# Patient Record
Sex: MALE | Race: WHITE | NOT HISPANIC OR LATINO | Employment: OTHER | URBAN - METROPOLITAN AREA
[De-identification: names, ages, dates, MRNs, and addresses within clinical notes are randomized per-mention and may not be internally consistent; named-entity substitution may affect disease eponyms.]

---

## 2018-06-28 DIAGNOSIS — I71.40 ABDOMINAL AORTIC ANEURYSM (AAA) WITHOUT RUPTURE (HCC): Primary | ICD-10-CM

## 2018-07-02 ENCOUNTER — OFFICE VISIT (OUTPATIENT)
Dept: CARDIAC SURGERY | Facility: CLINIC | Age: 83
End: 2018-07-02

## 2018-07-02 VITALS
BODY MASS INDEX: 26.53 KG/M2 | HEIGHT: 67 IN | SYSTOLIC BLOOD PRESSURE: 126 MMHG | TEMPERATURE: 98 F | HEART RATE: 89 BPM | OXYGEN SATURATION: 95 % | DIASTOLIC BLOOD PRESSURE: 80 MMHG | WEIGHT: 169 LBS

## 2018-07-02 DIAGNOSIS — I71.40 ABDOMINAL AORTIC ANEURYSM (AAA) WITHOUT RUPTURE (HCC): Primary | ICD-10-CM

## 2018-07-02 PROCEDURE — 99205 OFFICE O/P NEW HI 60 MIN: CPT | Performed by: THORACIC SURGERY (CARDIOTHORACIC VASCULAR SURGERY)

## 2018-07-02 RX ORDER — HYDROXYZINE HYDROCHLORIDE 25 MG/1
25 TABLET, FILM COATED ORAL DAILY
COMMUNITY

## 2018-07-02 RX ORDER — METHOCARBAMOL 500 MG/1
500 TABLET, FILM COATED ORAL DAILY PRN
COMMUNITY

## 2018-07-02 RX ORDER — MELATONIN
1000 DAILY
COMMUNITY

## 2018-07-02 RX ORDER — FUROSEMIDE 20 MG/1
20 TABLET ORAL
COMMUNITY

## 2018-07-02 RX ORDER — AMPICILLIN TRIHYDRATE 250 MG
200 CAPSULE ORAL DAILY
COMMUNITY

## 2018-07-02 RX ORDER — CLOBETASOL PROPIONATE 0.5 MG/G
OINTMENT TOPICAL 2 TIMES DAILY
COMMUNITY

## 2018-07-02 RX ORDER — ROPINIROLE 0.5 MG/1
0.5 TABLET, FILM COATED ORAL 3 TIMES DAILY
COMMUNITY

## 2018-07-02 RX ORDER — ASPIRIN 81 MG/1
81 TABLET ORAL DAILY
COMMUNITY

## 2018-07-02 RX ORDER — ATORVASTATIN CALCIUM 80 MG/1
80 TABLET, FILM COATED ORAL DAILY
COMMUNITY

## 2018-07-02 RX ORDER — LANOLIN ALCOHOL/MO/W.PET/CERES
1000 CREAM (GRAM) TOPICAL DAILY
COMMUNITY

## 2018-07-02 RX ORDER — AMLODIPINE BESYLATE 5 MG/1
5 TABLET ORAL DAILY
COMMUNITY

## 2018-07-02 NOTE — PROGRESS NOTES
"07/02/2018  Patient Information  Radha Varela                                                                                          [address] [place] [address]   1935  'PCP/Referring Physician'  Nicol Ugalde MD  [phone]  Sunshine Ugalde*  [phone]  Chief Complaint   Patient presents with   • Consult     \"I have a leaking aneurysm.\"   • Aortic Aneurysm       History of Present Illness:  This patient was referred to me to evaluate an abnormal CT scan finding of an infrarenal abdominal aortic aneurysm repair.  This patient has a most unusual story.  He apparently had an open repair of an infrarenal abdominal aortic aneurysm at the [facility] 21 years ago.  Approximately one year ago, he had a CT scan where the official report indicates the patient has an endoleak around his aneurysm.  Apparently nothing has been done regarding this finding during the last year because the patient and his wife felt he was too old and frail to undergo any further surgical procedures and his parkinsonism that has worsened recently.  Also I am unclear as to how he could have an endoleak if he did not have an endovascular repair to begin with.  I do not have the CT scan which was performed a year ago only the report reviewed.  The patient has no back pain, abdominal pain or flank pain and he has no significant claudication symptoms.  He apparently just has a CT scan report that is 1-year-old that indicates he has an endoleak around an aneurysm which was repaired with an open technique.  It might be possible that the native aneurysm sac was wrapped and sutured around the handsewn graft at the time of surgery and now contrast could be detected within the sac.      Patient Active Problem List   Diagnosis   • Abdominal aortic aneurysm (AAA) without rupture     Past Medical History:   Diagnosis Date   • Aneurysm    • Arthritis    • CHF (congestive heart failure)    • COPD " (chronic obstructive pulmonary disease)    • Coronary artery disease    • Dyslipidemia    • GERD (gastroesophageal reflux disease)    • Hiatal hernia    • Hypertension    • Parkinson's disease    • Peptic ulceration    • Psoriasis      Past Surgical History:   Procedure Laterality Date   • ABDOMINAL AORTIC ANEURYSM REPAIR WITH ENDOGRAFT     • CORONARY ARTERY BYPASS GRAFT     • SHOULDER ROTATOR CUFF REPAIR Right        Current Outpatient Prescriptions:   •  amLODIPine (NORVASC) 5 MG tablet, Take 5 mg by mouth Daily., Disp: , Rfl:   •  aspirin 81 MG EC tablet, Take 81 mg by mouth Daily., Disp: , Rfl:   •  atorvastatin (LIPITOR) 80 MG tablet, Take 80 mg by mouth Daily., Disp: , Rfl:   •  cholecalciferol (VITAMIN D3) 1000 units tablet, Take 1,000 Units by mouth Daily. 2 tablets daily, Disp: , Rfl:   •  clobetasol (TEMOVATE) 0.05 % ointment, Apply  topically 2 (Two) Times a Day., Disp: , Rfl:   •  Coenzyme Q10 (COQ10) 200 MG capsule, Take 200 mg by mouth Daily., Disp: , Rfl:   •  furosemide (LASIX) 20 MG tablet, Take 20 mg by mouth Daily., Disp: , Rfl:   •  hydrOXYzine (ATARAX) 25 MG tablet, Take 25 mg by mouth Daily., Disp: , Rfl:   •  methocarbamol (ROBAXIN) 500 MG tablet, Take 500 mg by mouth 3 (Three) Times a Day., Disp: , Rfl:   •  Omega-3 Fatty Acids (FISH OIL) 1360 MG capsule, Take  by mouth., Disp: , Rfl:   •  rOPINIRole (REQUIP) 0.5 MG tablet, Take 0.5 mg by mouth 3 (Three) Times a Day. Take 1 hour before bedtime., Disp: , Rfl:   •  vitamin B-12 (CYANOCOBALAMIN) 1000 MCG tablet, Take 1,000 mcg by mouth Daily., Disp: , Rfl:   Allergies   Allergen Reactions   • Sulfa Antibiotics Anaphylaxis     Social History     Social History   • Marital status:      Spouse name: N/A   • Number of children: 3   • Years of education: N/A     Occupational History   • retired/      Social History Main Topics   • Smoking status: Former Smoker     Packs/day: 3.00     Years: 15.00     Types: Cigarettes   •  "Smokeless tobacco: Never Used      Comment: quit 50 years ago   • Alcohol use Yes      Comment: wine   • Drug use: No   • Sexual activity: Not on file     Other Topics Concern   • Not on file     Social History Narrative    Lives in [facility] with his spouse.     Family History   Problem Relation Age of Onset   • Other Mother          of old age   • No Known Problems Father      Review of Systems   Constitution: Negative. Negative for chills, fever, malaise/fatigue, night sweats and weight loss.   HENT: Negative.  Negative for hearing loss, odynophagia and sore throat.    Cardiovascular: Positive for dyspnea on exertion and leg swelling. Negative for chest pain, orthopnea and palpitations.   Respiratory: Negative.  Negative for cough and hemoptysis.    Endocrine: Positive for cold intolerance and heat intolerance. Negative for polydipsia, polyphagia and polyuria.   Hematologic/Lymphatic: Does not bruise/bleed easily.   Skin: Positive for dry skin and itching. Negative for rash.   Musculoskeletal: Positive for back pain and joint pain. Negative for joint swelling and myalgias.   Gastrointestinal: Negative.  Negative for abdominal pain, constipation, diarrhea, hematemesis, hematochezia, melena, nausea and vomiting.   Genitourinary: Negative.  Negative for dysuria, frequency and hematuria.   Neurological: Positive for tremors. Negative for focal weakness, headaches, numbness and seizures.   Psychiatric/Behavioral: Negative.  Negative for suicidal ideas.   All other systems reviewed and are negative.    Vitals:    18 0658   BP: 126/80   BP Location: Right arm   Patient Position: Sitting   Pulse: 89   Temp: 98 °F (36.7 °C)   SpO2: 95%   Weight: 76.7 kg (169 lb)   Height: 170.2 cm (67\")      Physical Exam   CONSTITUTIONAL: Alert and conversant, Well dressed, Well nourished, No acute distress  EYES: Sclera clean, Anicteric, Pupils equal  ENT: No nasal deviation, Trachea midline  NECK: No neck masses, " Supple  LUNGS: No wheezing, Cough, non-congested  HEART: No rubs, No murmurs  GASTROINTESTINAL: Soft, non-distended, No masses, Non tender  to palpation, normal bowel sounds  NEURO: No motor deficits, No sensory deficits, Cranial Nerves 2 through 12 grossly intact  PSYCHIATRIC: Oriented to person, place and time, No memory deficits, Mood appropriate  VASCULAR: No carotid bruits, Femoral pulses palpable and symmetric  MUSKULOSKELETAL: No extremity trauma or extremity asymmetry    Labs/Imaging:   I have reviewed the unusual CT scan report discussing an endoleak after an open repair.    Assessment/Plan:   This is a patient with a previous open abdominal aortic aneurysm repair 21 years ago and now an unusual CT scan report comments on an endoleak.  This could be contrast within the native aneurysm sac which had been wrapped around the hand sutured graft to prevent fistulization at the time of surgery.  In any event, that scan is one year old and I do not have the images.  We will proceed with a CT angiogram of the abdominal aorta with 3-dimensional reconstruction.  Patient is aware of the risk of contrast reaction and nephrotoxicity and agrees to proceed.  We will make further plans based upon those studies.         Patient Active Problem List   Diagnosis   • Abdominal aortic aneurysm (AAA) without rupture     CC: MD Sonja Harris, , editing for Kit Feliciano M.D.    I, Kit Felciiano MD, have read and agree with the editing done by Sonja Mejia, .

## 2018-07-03 ENCOUNTER — TELEPHONE (OUTPATIENT)
Dept: CARDIAC SURGERY | Facility: CLINIC | Age: 83
End: 2018-07-03

## 2018-07-03 DIAGNOSIS — I71.40 ABDOMINAL AORTIC ANEURYSM (AAA) WITHOUT RUPTURE (HCC): Primary | ICD-10-CM

## 2018-07-03 NOTE — TELEPHONE ENCOUNTER
PT WANTING TO KNOW WHEN PT CAN GET TEST, TOLD PT THAT OUR OFFICE WILL BE IN TOUCH AS SOON AS WE KNOW WHEN [facility] WILL SCHEDULE THE CT

## 2018-07-06 ENCOUNTER — HOSPITAL ENCOUNTER (OUTPATIENT)
Dept: CT IMAGING | Facility: HOSPITAL | Age: 83
Discharge: HOME | End: 2018-07-06
Admitting: PHYSICIAN ASSISTANT

## 2018-07-06 DIAGNOSIS — I71.40 ABDOMINAL AORTIC ANEURYSM (AAA) WITHOUT RUPTURE (HCC): ICD-10-CM

## 2018-07-06 LAB — CREAT BLDA-MCNC: 1.5 MG/DL (ref 0.6–1.3)

## 2018-07-06 PROCEDURE — 74174 CTA ABD&PLVS W/CONTRAST: CPT

## 2018-07-06 PROCEDURE — 0 IOPAMIDOL PER 1 ML: Performed by: PHYSICIAN ASSISTANT

## 2018-07-06 PROCEDURE — 82565 ASSAY OF CREATININE: CPT

## 2018-07-06 RX ADMIN — IOPAMIDOL 75 ML: 755 INJECTION, SOLUTION INTRAVENOUS at 10:11

## 2018-07-16 ENCOUNTER — OFFICE VISIT (OUTPATIENT)
Dept: CARDIAC SURGERY | Facility: CLINIC | Age: 83
End: 2018-07-16

## 2018-07-16 VITALS
OXYGEN SATURATION: 98 % | SYSTOLIC BLOOD PRESSURE: 135 MMHG | DIASTOLIC BLOOD PRESSURE: 85 MMHG | WEIGHT: 169 LBS | HEART RATE: 74 BPM | TEMPERATURE: 97.8 F | BODY MASS INDEX: 26.53 KG/M2 | HEIGHT: 67 IN

## 2018-07-16 DIAGNOSIS — I71.40 ABDOMINAL AORTIC ANEURYSM (AAA) WITHOUT RUPTURE (HCC): Primary | ICD-10-CM

## 2018-07-16 PROCEDURE — 99213 OFFICE O/P EST LOW 20 MIN: CPT | Performed by: THORACIC SURGERY (CARDIOTHORACIC VASCULAR SURGERY)

## 2018-07-16 NOTE — PROGRESS NOTES
07/16/2018  Patient Information  Radha Varela                                                                                          [address]   [place] [address]   1935  'PCP/Referring Physician'  Janet Mahoney MD  [phone]  No ref. provider found    Chief Complaint   Patient presents with   • Follow-up     Follow up to discuss CTA results for an abdominal aortic aneurysm.   • Aortic Aneurysm       History of Present Illness:  As per my previous notes, this patient had an open aneurysm repair in [place] 20 years ago.  Apparently from a CT scan report from last year at the [facility], he was told he had an endoleak and they were planning some type of surgical intervention.  I could not understand the concept of an endoleak with an open repair.  I actually read the word endoleak in last year's report myself.  We repeated his CT scan here.  The patient had an open repair.  He has a very ectatic aorta with the maximum size of his aorta is 3.8 cm.      Patient Active Problem List   Diagnosis   • Abdominal aortic aneurysm (AAA) without rupture (CMS/HCC)     Past Medical History:   Diagnosis Date   • Aneurysm (CMS/HCC)    • Arthritis    • CHF (congestive heart failure) (CMS/HCC)    • COPD (chronic obstructive pulmonary disease) (CMS/HCC)    • Coronary artery disease    • Dyslipidemia    • GERD (gastroesophageal reflux disease)    • Hiatal hernia    • Hypertension    • Parkinson's disease (CMS/HCC)    • Peptic ulceration    • Psoriasis      Past Surgical History:   Procedure Laterality Date   • ABDOMINAL AORTIC ANEURYSM REPAIR WITH ENDOGRAFT     • CORONARY ARTERY BYPASS GRAFT     • SHOULDER ROTATOR CUFF REPAIR Right        Current Outpatient Prescriptions:   •  amLODIPine (NORVASC) 5 MG tablet, Take 5 mg by mouth Daily., Disp: , Rfl:   •  aspirin 81 MG EC tablet, Take 81 mg by mouth Daily., Disp: , Rfl:   •  atorvastatin (LIPITOR) 80 MG tablet, Take 80 mg by mouth Daily., Disp: , Rfl:    •  cholecalciferol (VITAMIN D3) 1000 units tablet, Take 1,000 Units by mouth Daily. 2 tablets daily, Disp: , Rfl:   •  clobetasol (TEMOVATE) 0.05 % ointment, Apply  topically 2 (Two) Times a Day., Disp: , Rfl:   •  Coenzyme Q10 (COQ10) 200 MG capsule, Take 200 mg by mouth Daily., Disp: , Rfl:   •  furosemide (LASIX) 20 MG tablet, Take 20 mg by mouth Daily., Disp: , Rfl:   •  hydrOXYzine (ATARAX) 25 MG tablet, Take 25 mg by mouth Daily., Disp: , Rfl:   •  methocarbamol (ROBAXIN) 500 MG tablet, Take 500 mg by mouth 3 (Three) Times a Day., Disp: , Rfl:   •  Omega-3 Fatty Acids (FISH OIL) 1360 MG capsule, Take  by mouth., Disp: , Rfl:   •  rOPINIRole (REQUIP) 0.5 MG tablet, Take 0.5 mg by mouth 3 (Three) Times a Day. Take 1 hour before bedtime., Disp: , Rfl:   •  vitamin B-12 (CYANOCOBALAMIN) 1000 MCG tablet, Take 1,000 mcg by mouth Daily., Disp: , Rfl:   Allergies   Allergen Reactions   • Sulfa Antibiotics Anaphylaxis     Social History     Social History   • Marital status:      Spouse name: N/A   • Number of children: 3   • Years of education: N/A     Occupational History   • retired/      Social History Main Topics   • Smoking status: Former Smoker     Packs/day: 3.00     Years: 15.00     Types: Cigarettes   • Smokeless tobacco: Never Used      Comment: quit 50 years ago   • Alcohol use Yes      Comment: wine   • Drug use: No   • Sexual activity: Not on file     Other Topics Concern   • Not on file     Social History Narrative    Lives in [facility] with his spouse.     Family History   Problem Relation Age of Onset   • Other Mother          of old age   • No Known Problems Father      Review of Systems   Constitution: Negative for chills, fever, malaise/fatigue, night sweats and weight loss.   HENT: Negative for hearing loss, odynophagia and sore throat.    Cardiovascular: Positive for dyspnea on exertion and leg swelling. Negative for chest pain, orthopnea and palpitations.  "  Respiratory: Negative for cough and hemoptysis.    Endocrine: Positive for cold intolerance and heat intolerance. Negative for polydipsia, polyphagia and polyuria.   Hematologic/Lymphatic: Does not bruise/bleed easily.   Skin: Positive for dry skin and itching. Negative for rash.   Musculoskeletal: Positive for back pain and joint pain. Negative for joint swelling and myalgias.   Gastrointestinal: Negative for abdominal pain, constipation, diarrhea, hematemesis, hematochezia, melena, nausea and vomiting.   Genitourinary: Negative for dysuria, frequency and hematuria.   Neurological: Positive for tremors. Negative for focal weakness, headaches, numbness and seizures.   Psychiatric/Behavioral: Negative for suicidal ideas.   All other systems reviewed and are negative.    Vitals:    07/16/18 0921   BP: 135/85   BP Location: Right arm   Patient Position: Sitting   Pulse: 74   Temp: 97.8 °F (36.6 °C)   SpO2: 98%   Weight: 76.7 kg (169 lb)   Height: 170.2 cm (67\")      Physical Exam   CONSTITUTIONAL: Alert and conversant, Well dressed, Well nourished, No acute distress  EYES: Sclera clean, Anicteric, Pupils equal  ENT: No nasal deviation, Trachea midline  NECK: No neck masses, Supple  LUNGS: No wheezing, Cough, non-congested  HEART: No rubs, No murmurs  GASTROINTESTINAL: Soft, non-distended, No masses, Non tender  to palpation, normal bowel sounds  NEURO: No motor deficits, No sensory deficits, Cranial Nerves 2 through 12 grossly intact there is a tremor of the right arm  PSYCHIATRIC: Oriented to person, place and time, No memory deficits, Mood appropriate  VASCULAR: No carotid bruits, Femoral pulses palpable and symmetric  MUSKULOSKELETAL: No extremity trauma or extremity asymmetry    Labs/Imaging:   I reviewed the CT scan report and I do not see evidence of an aneurysm.    Assessment/Plan:   I explained to the patient and his wife that I do not see that this patient requires surgery of any kind.  He is welcome to seek " opinions from other physicians but his maximum aortic dimension is 3.8 and he is a very frail 82-year-old patient and a very poor surgical candidate.  I actually recommended to them that we do not obtain any more CT scans.  They said they will agree with my assessment to not proceed with any operation and they do not want to seek opinions elsewhere, but they do request that I repeat the CT scan in one year.  I will make arrangements for a CT scan in one year.        Patient Active Problem List   Diagnosis   • Abdominal aortic aneurysm (AAA) without rupture (CMS/[facility])     CC:  MD Sonja Noel, , editing for Kit Feliciano M.D.    I, Kit Feliciano MD, have read and agree with the editing done by moose Callahan.

## 2018-11-21 RX ORDER — RANITIDINE 150 MG/1
150 TABLET ORAL 2 TIMES DAILY
Qty: 14 TABLET | Refills: 0 | Status: SHIPPED | OUTPATIENT
Start: 2018-11-21 | End: 2018-12-05

## 2018-11-21 RX ORDER — METHYLPREDNISOLONE 4 MG/1
TABLET ORAL
Qty: 21 TABLET | Refills: 0 | Status: SHIPPED | OUTPATIENT
Start: 2018-11-21 | End: 2018-12-05

## 2018-11-21 RX ORDER — OXYCODONE HYDROCHLORIDE AND ACETAMINOPHEN 5; 325 MG/1; MG/1
1 TABLET ORAL EVERY 8 HOURS PRN
Qty: 21 TABLET | Refills: 0 | Status: SHIPPED | OUTPATIENT
Start: 2018-11-21

## 2018-12-05 ENCOUNTER — OFFICE VISIT (OUTPATIENT)
Dept: ORTHOPEDIC SURGERY | Facility: CLINIC | Age: 83
End: 2018-12-05

## 2018-12-05 VITALS — BODY MASS INDEX: 25.43 KG/M2 | RESPIRATION RATE: 18 BRPM | HEIGHT: 67 IN | WEIGHT: 162 LBS

## 2018-12-05 DIAGNOSIS — G20 PARKINSON DISEASE (HCC): ICD-10-CM

## 2018-12-05 DIAGNOSIS — M70.62 GREATER TROCHANTERIC BURSITIS OF LEFT HIP: ICD-10-CM

## 2018-12-05 DIAGNOSIS — G62.9 POLYNEUROPATHY: ICD-10-CM

## 2018-12-05 DIAGNOSIS — M48.07 SPINAL STENOSIS OF LUMBOSACRAL REGION: ICD-10-CM

## 2018-12-05 DIAGNOSIS — M25.552 LATERAL PAIN OF LEFT HIP: Primary | ICD-10-CM

## 2018-12-05 DIAGNOSIS — M51.36 LUMBAR DEGENERATIVE DISC DISEASE: ICD-10-CM

## 2018-12-05 PROCEDURE — 99202 OFFICE O/P NEW SF 15 MIN: CPT | Performed by: ORTHOPAEDIC SURGERY

## 2018-12-05 PROCEDURE — 20610 DRAIN/INJ JOINT/BURSA W/O US: CPT | Performed by: ORTHOPAEDIC SURGERY

## 2018-12-05 PROCEDURE — 73502 X-RAY EXAM HIP UNI 2-3 VIEWS: CPT | Performed by: ORTHOPAEDIC SURGERY

## 2018-12-05 RX ADMIN — METHYLPREDNISOLONE ACETATE 40 MG: 40 INJECTION, SUSPENSION INTRA-ARTICULAR; INTRALESIONAL; INTRAMUSCULAR; SOFT TISSUE at 12:26

## 2018-12-10 ENCOUNTER — ANESTHESIA EVENT (OUTPATIENT)
Dept: PERIOP | Facility: HOSPITAL | Age: 83
End: 2018-12-10

## 2018-12-10 ENCOUNTER — ANESTHESIA (OUTPATIENT)
Dept: PERIOP | Facility: HOSPITAL | Age: 83
End: 2018-12-10

## 2018-12-10 ENCOUNTER — HOSPITAL ENCOUNTER (OUTPATIENT)
Facility: HOSPITAL | Age: 83
Setting detail: HOSPITAL OUTPATIENT SURGERY
Discharge: HOME | End: 2018-12-10
Attending: OPHTHALMOLOGY | Admitting: OPHTHALMOLOGY

## 2018-12-10 VITALS
TEMPERATURE: 97.4 F | HEART RATE: 78 BPM | SYSTOLIC BLOOD PRESSURE: 132 MMHG | OXYGEN SATURATION: 97 % | BODY MASS INDEX: 25.43 KG/M2 | HEIGHT: 67 IN | RESPIRATION RATE: 20 BRPM | WEIGHT: 162 LBS | DIASTOLIC BLOOD PRESSURE: 71 MMHG

## 2018-12-10 PROCEDURE — V2632 POST CHMBR INTRAOCULAR LENS: HCPCS | Performed by: OPHTHALMOLOGY

## 2018-12-10 PROCEDURE — 25010000002 EPINEPHRINE PER 0.1 MG: Performed by: OPHTHALMOLOGY

## 2018-12-10 PROCEDURE — 25010000002 MIDAZOLAM PER 1 MG: Performed by: NURSE ANESTHETIST, CERTIFIED REGISTERED

## 2018-12-10 DEVICE — LENS ACRYSOF IQ 6X13MM SA60WF 21.0: Type: IMPLANTABLE DEVICE | Site: POSTERIOR CHAMBER | Status: FUNCTIONAL

## 2018-12-10 RX ORDER — SODIUM CHLORIDE 0.9 % (FLUSH) 0.9 %
3 SYRINGE (ML) INJECTION AS NEEDED
Status: DISCONTINUED | OUTPATIENT
Start: 2018-12-10 | End: 2018-12-10 | Stop reason: HOSPADM

## 2018-12-10 RX ORDER — SODIUM CHLORIDE, SODIUM LACTATE, POTASSIUM CHLORIDE, CALCIUM CHLORIDE 600; 310; 30; 20 MG/100ML; MG/100ML; MG/100ML; MG/100ML
1000 INJECTION, SOLUTION INTRAVENOUS CONTINUOUS
Status: DISCONTINUED | OUTPATIENT
Start: 2018-12-10 | End: 2018-12-10 | Stop reason: HOSPADM

## 2018-12-10 RX ORDER — PREDNISOLONE ACETATE 10 MG/ML
1 SUSPENSION/ DROPS OPHTHALMIC
Status: DISCONTINUED | OUTPATIENT
Start: 2018-12-10 | End: 2018-12-10 | Stop reason: HOSPADM

## 2018-12-10 RX ORDER — CYCLOPENTOLATE HYDROCHLORIDE 20 MG/ML
1 SOLUTION/ DROPS OPHTHALMIC
Status: COMPLETED | OUTPATIENT
Start: 2018-12-10 | End: 2018-12-10

## 2018-12-10 RX ORDER — TETRACAINE HYDROCHLORIDE 5 MG/ML
1 SOLUTION OPHTHALMIC SEE ADMIN INSTRUCTIONS
Status: COMPLETED | OUTPATIENT
Start: 2018-12-10 | End: 2018-12-10

## 2018-12-10 RX ORDER — MOXIFLOXACIN 5 MG/ML
SOLUTION/ DROPS OPHTHALMIC AS NEEDED
Status: DISCONTINUED | OUTPATIENT
Start: 2018-12-10 | End: 2018-12-10 | Stop reason: HOSPADM

## 2018-12-10 RX ORDER — BALANCED SALT SOLUTION ENRICHED WITH BICARBONATE, DEXTROSE, AND GLUTATHIONE
KIT INTRAOCULAR AS NEEDED
Status: DISCONTINUED | OUTPATIENT
Start: 2018-12-10 | End: 2018-12-10 | Stop reason: HOSPADM

## 2018-12-10 RX ORDER — KETAMINE HYDROCHLORIDE 50 MG/ML
INJECTION, SOLUTION, CONCENTRATE INTRAMUSCULAR; INTRAVENOUS AS NEEDED
Status: DISCONTINUED | OUTPATIENT
Start: 2018-12-10 | End: 2018-12-10 | Stop reason: SURG

## 2018-12-10 RX ORDER — POLYMYXIN B SULFATE AND TRIMETHOPRIM 1; 10000 MG/ML; [USP'U]/ML
1 SOLUTION OPHTHALMIC
Status: DISCONTINUED | OUTPATIENT
Start: 2018-12-10 | End: 2018-12-10 | Stop reason: HOSPADM

## 2018-12-10 RX ORDER — EPINEPHRINE 1 MG/ML
INJECTION, SOLUTION, CONCENTRATE INTRAVENOUS AS NEEDED
Status: DISCONTINUED | OUTPATIENT
Start: 2018-12-10 | End: 2018-12-10 | Stop reason: HOSPADM

## 2018-12-10 RX ORDER — PREDNISOLONE ACETATE 10 MG/ML
SUSPENSION/ DROPS OPHTHALMIC AS NEEDED
Status: DISCONTINUED | OUTPATIENT
Start: 2018-12-10 | End: 2018-12-10 | Stop reason: HOSPADM

## 2018-12-10 RX ORDER — MIDAZOLAM HYDROCHLORIDE 1 MG/ML
INJECTION INTRAMUSCULAR; INTRAVENOUS AS NEEDED
Status: DISCONTINUED | OUTPATIENT
Start: 2018-12-10 | End: 2018-12-10 | Stop reason: SURG

## 2018-12-10 RX ORDER — POLYMYXIN B SULFATE AND TRIMETHOPRIM 1; 10000 MG/ML; [USP'U]/ML
SOLUTION OPHTHALMIC AS NEEDED
Status: DISCONTINUED | OUTPATIENT
Start: 2018-12-10 | End: 2018-12-10 | Stop reason: HOSPADM

## 2018-12-10 RX ORDER — PHENYLEPHRINE HYDROCHLORIDE 100 MG/ML
1 SOLUTION/ DROPS OPHTHALMIC
Status: COMPLETED | OUTPATIENT
Start: 2018-12-10 | End: 2018-12-10

## 2018-12-10 RX ORDER — TETRACAINE HYDROCHLORIDE 5 MG/ML
SOLUTION OPHTHALMIC AS NEEDED
Status: DISCONTINUED | OUTPATIENT
Start: 2018-12-10 | End: 2018-12-10 | Stop reason: HOSPADM

## 2018-12-10 RX ADMIN — KETAMINE HYDROCHLORIDE 6 MG: 50 INJECTION, SOLUTION INTRAMUSCULAR; INTRAVENOUS at 10:48

## 2018-12-10 RX ADMIN — TETRACAINE HYDROCHLORIDE 1 DROP: 5 SOLUTION OPHTHALMIC at 10:20

## 2018-12-10 RX ADMIN — CYCLOPENTOLATE HYDROCHLORIDE 1 DROP: 20 SOLUTION/ DROPS OPHTHALMIC at 10:22

## 2018-12-10 RX ADMIN — CYCLOPENTOLATE HYDROCHLORIDE 1 DROP: 20 SOLUTION/ DROPS OPHTHALMIC at 10:27

## 2018-12-10 RX ADMIN — PHENYLEPHRINE HYDROCHLORIDE 1 DROP: 100 SOLUTION/ DROPS OPHTHALMIC at 10:27

## 2018-12-10 RX ADMIN — TETRACAINE HYDROCHLORIDE 1 DROP: 5 SOLUTION OPHTHALMIC at 10:21

## 2018-12-10 RX ADMIN — PHENYLEPHRINE HYDROCHLORIDE 1 DROP: 100 SOLUTION/ DROPS OPHTHALMIC at 10:22

## 2018-12-10 RX ADMIN — SODIUM CHLORIDE, POTASSIUM CHLORIDE, SODIUM LACTATE AND CALCIUM CHLORIDE 1000 ML: 600; 310; 30; 20 INJECTION, SOLUTION INTRAVENOUS at 10:31

## 2018-12-10 RX ADMIN — TETRACAINE HYDROCHLORIDE 1 DROP: 5 SOLUTION OPHTHALMIC at 10:19

## 2018-12-10 RX ADMIN — MIDAZOLAM HYDROCHLORIDE 1 MG: 1 INJECTION, SOLUTION INTRAMUSCULAR; INTRAVENOUS at 10:39

## 2018-12-10 RX ADMIN — CYCLOPENTOLATE HYDROCHLORIDE 1 DROP: 20 SOLUTION/ DROPS OPHTHALMIC at 10:32

## 2018-12-10 RX ADMIN — PHENYLEPHRINE HYDROCHLORIDE 1 DROP: 100 SOLUTION/ DROPS OPHTHALMIC at 10:32

## 2018-12-10 NOTE — OP NOTE
PROCEDURE NOTE      Date of Procedure: 12/10/2018  Patient Name:  Radha Varela  MRN: [id number]  YOB: 1935      PREOPERATIVE DIAGNOSIS:  Visually significant combined form of senile cataract of the Left eye interfering with activities of daily living.    INDICATIONS FOR THE PROCEDURE:  Visually significant combined form of senile cataract of the Left eye interfering with activities of daily living.    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    SURGEON:  Charity Carrasco M.D.    NAME OF PROCEDURE:  Left cataract extraction with posterior chamber intraocular lens implant. Lens used: +21.0D SA60WF  CDE: 8.57    ANESTHESIA:  Topical with MAC.    COMPLICATIONS:  None.    DETAILS OF THE PROCEDURE:  After receiving appropriate informed consent and confirming and marking the eye to be operated upon, the patient was wheeled into the operating room. After confirming adequate anesthesia, the patient was prepped and draped in a standard sterile ophthalmic fashion. A lid speculum was then placed in the eye.  A 0.8 mm metal blade was then used to make two limbal paracenteses and the anterior chamber was reformed with Viscoat.  A 2.4 mm metal keratome was then used to make a temporal clear cornea tunneled incision.  A cystotome was used to puncture the anterior capsule and initiate a capsular flap.  Utrata forceps were used to complete a continuous curvilinear capsulorrhexis.  BSS on a [place] hydrodissection cannula was then used to hydrodissect and hydrodelineate the nucleus.  The nucleus was then phacoemulsified using a stop and chop technique.  CDE was 8.57%.  Remaining cortex was removed with bimanual I/A.  Posterior capsular polish was performed.  The capsular bag was then reformed with Healon-GV and a +21.0 D SA60WF lens implant, was then placed in the bag without event.  The Healon-GV was then removed  with bimanual irrigation and aspiration and the anterior chamber reformed with BSS.  The wounds were hydrated as necessary to maintain a water tight anterior chamber. Intracameral Moxifloxacin 1mg/0.1ml was administered and 5% povidone iodine solution was placed on the ocular surface. At the conclusion of the procedure, the lid speculum was removed and the patient undraped.  A drop of Vigamox and Pred Forte 1% and shield were placed over the eye.  The patient left the room in good condition having tolerated the procedure well.    Charity Carrasco MD

## 2018-12-10 NOTE — ANESTHESIA PREPROCEDURE EVALUATION
Anesthesia Evaluation     Patient summary reviewed and Nursing notes reviewed   no history of anesthetic complications:  NPO Solid Status: > 8 hours  NPO Liquid Status: > 8 hours           Airway   Mallampati: II  TM distance: >3 FB  Neck ROM: full  No difficulty expected and Possible difficult intubation  Dental      Pulmonary    (+) a smoker Former, COPD, decreased breath sounds,   Cardiovascular     PT is on anticoagulation therapy    (+) hypertension, CAD, CABG, CHF, CONWAY, PVD, hyperlipidemia,       Neuro/Psych  (+) neuromuscular disease, tremors,     GI/Hepatic/Renal/Endo    (+)  hiatal hernia, GERD, PUD,      Musculoskeletal     (+) arthralgias, back pain, chronic pain,   Abdominal    Substance History   (+) alcohol use,      OB/GYN          Other   (+) arthritis                     Anesthesia Plan    ASA 3     MAC   (Risks and benefits discussed including risk of aspiration, recall and dental damage. All patient questions answered. Will continue with POC.)  intravenous induction   Anesthetic plan, all risks, benefits, and alternatives have been provided, discussed and informed consent has been obtained with: patient.

## 2018-12-10 NOTE — DISCHARGE INSTRUCTIONS
[facility] [address] [place] [address]  [place] [address]  PH: [phone]  FAX: [phone]    Post - Operative Instructions for Dr. Carrasco's Cataract Patients    1.  Use your drops as prescribed, wait 5 minutes between each drop.  2.  Securely tape the protective shield over the operated eye at bedtime for the FIRST week only.  3.  Take your regular non-eye medications and continue any eye medications for the  non-operative eye.  4.  For the first week, avoid bending or stooping and lifting anything heavier than 5 pounds.  Also, avoid any blow to the head or eye.  Avoid getting dirty water in the eye.  Avoid sleeping on the side of the operated eye or face.  5.  No driving until you are told to by your physician.  6.  If significant eye pain is not relieved by medication, or you have increased redness, swelling, pain or loss of vision or any symptoms you are unsure of call Dr. Carrasco's office at [phone].    You should expect:    Slight Discomfort  Burning When Using Eye Drops  Blurred Vision and Dilated Pupil  Mild Redness  You Will Not Be Able To Read  Your Glasses May Not Work    Start the following eyedrops today as soon as you are home:     Pred Forte 1% instill one drop every 4 hours  Vigamox instill one drop every 8 hours    Wait 5 mins between drops.   Keep shield on at all times until office visit tomorrow.

## 2018-12-10 NOTE — ANESTHESIA POSTPROCEDURE EVALUATION
Patient: Radha Varela    Procedure Summary     Date:  12/10/18 Room / Location:  [facility] OR 1 /  [facility]    Anesthesia Start:  1045 Anesthesia Stop:      Procedure:  CATARACT PHACO EXTRACTION WITH INTRAOCULAR LENS IMPLANT LEFT (Left Eye) Diagnosis:       Combined forms of age-related cataract of both eyes      (Combined forms of age-related cataract of both eyes [H25.813])    Surgeon:  Charity Carrasco MD Provider:  Davi Bolton CRNA    Anesthesia Type:  MAC ASA Status:  3          Anesthesia Type: MAC  Last vitals  BP   132/77 (12/10/18 1021)   Temp   98 °F (36.7 °C) (12/10/18 1021)   Pulse   68 (12/10/18 1021)   Resp   18 (12/10/18 1021)     SpO2   96 % (12/10/18 1021)     Post Anesthesia Care and Evaluation    Patient location during evaluation: PHASE II  Patient participation: complete - patient participated  Level of consciousness: awake  Pain score: 0  Pain management: adequate  Airway patency: patent  Anesthetic complications: No anesthetic complications  PONV Status: none  Cardiovascular status: acceptable  Respiratory status: acceptable  Hydration status: acceptable    Comments: vsss resp spont, reflexes intact, responsive, report given to pacu nurse

## 2019-01-01 RX ORDER — METHYLPREDNISOLONE ACETATE 40 MG/ML
40 INJECTION, SUSPENSION INTRA-ARTICULAR; INTRALESIONAL; INTRAMUSCULAR; SOFT TISSUE
Status: COMPLETED | OUTPATIENT
Start: 2018-12-05 | End: 2018-12-05

## 2019-05-29 ENCOUNTER — TRANSCRIBE ORDERS (OUTPATIENT)
Dept: PHYSICAL THERAPY | Facility: CLINIC | Age: 84
End: 2019-05-29

## 2019-05-29 ENCOUNTER — TREATMENT (OUTPATIENT)
Dept: PHYSICAL THERAPY | Facility: CLINIC | Age: 84
End: 2019-05-29

## 2019-05-29 DIAGNOSIS — M54.32 BILATERAL SCIATICA: Primary | ICD-10-CM

## 2019-05-29 DIAGNOSIS — M54.42 CHRONIC BILATERAL LOW BACK PAIN WITH LEFT-SIDED SCIATICA: Primary | ICD-10-CM

## 2019-05-29 DIAGNOSIS — G89.29 CHRONIC BILATERAL LOW BACK PAIN WITH LEFT-SIDED SCIATICA: Primary | ICD-10-CM

## 2019-05-29 DIAGNOSIS — M54.31 BILATERAL SCIATICA: Primary | ICD-10-CM

## 2019-05-29 PROCEDURE — G0283 ELEC STIM OTHER THAN WOUND: HCPCS | Performed by: PHYSICAL THERAPIST

## 2019-05-29 PROCEDURE — 97162 PT EVAL MOD COMPLEX 30 MIN: CPT | Performed by: PHYSICAL THERAPIST

## 2019-05-29 PROCEDURE — 97012 MECHANICAL TRACTION THERAPY: CPT | Performed by: PHYSICAL THERAPIST

## 2019-05-29 NOTE — PROGRESS NOTES
"  Physical Therapy Initial Evaluation and Plan of Care        Patient: Radha Varela   : 1935  Diagnosis/ICD-10 Code:  Chronic bilateral low back pain with left-sided sciatica [M54.42, G89.29]  Referring practitioner: Kali Marcano M.D.  Date of Initial Visit: 2019  Today's Date: 2019  Patient seen for 1 sessions           Subjective Evaluation    History of Present Illness  Mechanism of injury: Pain started about the age of 40 handling heavy weight objects related to his job.  Lower back would \"go out\" from time to time.  Due to LBP stopped snow skiing about 10 years ago, and stopped golf about 5 years. 3-4 years ago saw a spinal surgeon who did not recommend surgery. Went through rehab at [facility] about the same time.     CURRENT COMPLAINTS  Bilateral Lumbar pain/spasm, worse left, with varying intensity.  Intermittent left lateral thigh/calf pain to ankle. No bowel or bladder dysfunction.  Difficultly walking due to LBP.  Symptoms worse with: standing, walking short distance.  Symptoms decreased with: lying, sitting in a suportative chair, stretching.    PMH: Parkinson disease, aging process issues. Bilateral foot numbness.  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  Aortic aneurism still present, had a sleeve placed about 21 years ago.    Subjective comment: Bilateral LBP with left LE symptoms  Patient Occupation: Retired:  [employer] .  Pain  Current pain ratin  At best pain ratin  At worst pain rating: 10  Quality: dull ache, throbbing, tight and radiating  Relieving factors: rest and change in position  Aggravating factors: ambulation and standing    Hand dominance: right             Objective       Static Posture   General Observations  Symmetrical weight bearing and flexed.     Head  Forward.    Shoulders  Rounded.    Thoracic Spine  Convex curve right, rotated left and hyperkyphosis.    Rib Cage  Rib hump.    Lumbar Spine   Flattened.   Lumbar spine (Left): Shifted.   Lumbar spine (Right): " Concave curve.      Pelvis   Anterior pelvic tilt  Pelvis (Right): Elevated.     Active Range of Motion     Lumbar   Flexion: WFL  Extension: 0 (LBP and tension in lumbar) degrees with pain  Left lateral flexion: 35 degrees with pain  Right lateral flexion: 45 degrees with pain  Left rotation: 35 degrees with pain  Right rotation: 35 degrees with pain    Strength/Myotome Testing     Left Hip   Planes of Motion   Flexion: 4  Extension: 4-  Abduction: 4    Right Hip   Planes of Motion   Flexion: 4  Extension: 4-  Abduction: 4    Left Knee   Flexion: 4+  Extension: 4+    Right Knee   Flexion: 4+  Extension: 4+    Left Ankle/Foot   Dorsiflexion: 5  Inversion: 5  Eversion: 5  Great toe extension: 5    Right Ankle/Foot   Dorsiflexion: 5  Inversion: 5  Eversion: 5  Great toe extension: 5    Tests     Lumbar     Left   Positive quadrant.   Negative crossed SLR and passive SLR.     Right   Positive quadrant.   Negative passive SLR.     Additional Tests Details  Trunk Unloading standing and prone decrease Lumbar and LE symptoms    Ambulation   Weight-Bearing Status   Weight-Bearing Status (Left): full weight bearing   Weight-Bearing Status (Right): full weight-bearing    Assistive device used: single point cane    Quality of Movement During Gait   Trunk  Forward lean.          Assessment & Plan     Assessment  Impairments: abnormal coordination, abnormal gait, abnormal or restricted ROM, activity intolerance, impaired balance, impaired physical strength and pain with function  Other impairment: Modified Oswestry 50  Assessment details: 83 year old male with a long standing history of lumbar pain and left LE symptoms that are getting worse with time.  He has lost normal posture and is in uncontrollable trunk flexion with scoliosis present.  He has signs and symptoms of spinal stenosis.  Responded well to gentle spinal unloading activities today.  Neurological exam is normal today.    Prognosis: fair  Prognosis details: Age,  general debility, and decline in function.  Functional Limitations: walking and standing  Goals  Plan Goals: STG to be in 4 weeks  1.  Pt is able to obtain a neutral position in prone for 30 minutes at a time.  2.  Pt is able to stay on feet up to 15 minutes at a time with AD  3.  Pt reports no symptoms below hip level   4.  Pt Modified Oswestry improves 45.  LTG to be in 12 weeks.  1.  Pt and family are independent with HEP.  2.  Pt Modified Oswestry improves 30.  3.  Pt is able to stay on feet 35 minutes at a time with assistive devices.  4.  Pt worst pain level is at 6/10.  5.  Pt is able to obtain an upright posture in standing for 3 minutes at a time several times a day.    Plan  Therapy options: will be seen for skilled physical therapy services  Planned modality interventions: high voltage pulsed current (pain management), traction and ultrasound  Planned therapy interventions: abdominal trunk stabilization, balance/weight-bearing training, body mechanics training, functional ROM exercises, gait training, home exercise program, joint mobilization, therapeutic activities, stretching, strengthening, spinal/joint mobilization, soft tissue mobilization, postural training and manual therapy  Frequency: 2x week  Duration in weeks: 12          Electrical Stimulation:    20     mins  05026 ( );  Traction     20     mins  85733      Timed Treatment:      mins   Total Treatment:     90   mins    PT SIGNATURE: Magdiel Carty PT   DATE TREATMENT INITIATED: 5/29/2019    Medicare Initial Certification  Certification Period: 8/28/2019  I certify that the therapy services are furnished while this patient is under my care.  The services outlined above are required by this patient, and will be reviewed every 90 days.     PHYSICIAN: ____________________________________________________________       Kali Marcano MD        DATE: __________________________________________________________________    Please sign and return  via fax to [phone].. Thank you, [facility].

## 2019-05-31 ENCOUNTER — TREATMENT (OUTPATIENT)
Dept: PHYSICAL THERAPY | Facility: CLINIC | Age: 84
End: 2019-05-31

## 2019-05-31 DIAGNOSIS — M54.42 CHRONIC BILATERAL LOW BACK PAIN WITH LEFT-SIDED SCIATICA: Primary | ICD-10-CM

## 2019-05-31 DIAGNOSIS — G89.29 CHRONIC BILATERAL LOW BACK PAIN WITH LEFT-SIDED SCIATICA: Primary | ICD-10-CM

## 2019-05-31 PROCEDURE — 97110 THERAPEUTIC EXERCISES: CPT | Performed by: PHYSICAL THERAPIST

## 2019-05-31 PROCEDURE — G0283 ELEC STIM OTHER THAN WOUND: HCPCS | Performed by: PHYSICAL THERAPIST

## 2019-05-31 PROCEDURE — 97012 MECHANICAL TRACTION THERAPY: CPT | Performed by: PHYSICAL THERAPIST

## 2019-06-04 ENCOUNTER — TREATMENT (OUTPATIENT)
Dept: PHYSICAL THERAPY | Facility: CLINIC | Age: 84
End: 2019-06-04

## 2019-06-04 DIAGNOSIS — G89.29 CHRONIC BILATERAL LOW BACK PAIN WITH LEFT-SIDED SCIATICA: Primary | ICD-10-CM

## 2019-06-04 DIAGNOSIS — M54.42 CHRONIC BILATERAL LOW BACK PAIN WITH LEFT-SIDED SCIATICA: Primary | ICD-10-CM

## 2019-06-04 PROCEDURE — 97035 APP MDLTY 1+ULTRASOUND EA 15: CPT | Performed by: PHYSICAL THERAPIST

## 2019-06-04 PROCEDURE — G0283 ELEC STIM OTHER THAN WOUND: HCPCS | Performed by: PHYSICAL THERAPIST

## 2019-06-04 PROCEDURE — 97110 THERAPEUTIC EXERCISES: CPT | Performed by: PHYSICAL THERAPIST

## 2019-06-06 ENCOUNTER — TREATMENT (OUTPATIENT)
Dept: PHYSICAL THERAPY | Facility: CLINIC | Age: 84
End: 2019-06-06

## 2019-06-06 DIAGNOSIS — G89.29 CHRONIC BILATERAL LOW BACK PAIN WITH LEFT-SIDED SCIATICA: Primary | ICD-10-CM

## 2019-06-06 DIAGNOSIS — M54.42 CHRONIC BILATERAL LOW BACK PAIN WITH LEFT-SIDED SCIATICA: Primary | ICD-10-CM

## 2019-06-06 PROCEDURE — 97110 THERAPEUTIC EXERCISES: CPT | Performed by: PHYSICAL THERAPIST

## 2019-06-06 PROCEDURE — G0283 ELEC STIM OTHER THAN WOUND: HCPCS | Performed by: PHYSICAL THERAPIST

## 2019-06-06 PROCEDURE — 97035 APP MDLTY 1+ULTRASOUND EA 15: CPT | Performed by: PHYSICAL THERAPIST

## 2019-06-07 NOTE — PROGRESS NOTES
Subjective   Radha Varela reports: Pain is not as bad when he stands, feels stronger but still can't stand long.    Objective   POSTURE: spinal kyphosis and left convex curve.  PALPATION: bilateral lumbar spine.    See Exercise, Manual, and Modality Logs for complete treatment.       Assessment/Plan  Spinal stenosis suspected but no LE symptoms just uncontrolled flexion of the trunk preventing standing activities.  Progress per Plan of Care and Progress strengthening /stabilization /functional activity           Manual Therapy:         mins  00160;  Therapeutic Exercise:    23     mins  92130;     Neuromuscular Simone:        mins  82093;    Therapeutic Activity:          mins  62876;     Gait Training:           mins  93118;     Ultrasound:     15     mins  60178;   Iontophoresis          mins  17582   Electrical Stimulation:    20     mins  84316 ( );  Dry Needling          mins self-pay  Fluidotherapy          mins 12916  Traction          mins 97041  Paraffin:          mins  97029    Timed Treatment:   38   mins   Total Treatment:     53   mins    Magdiel Carty, PT  Physical Therapist

## 2019-06-11 ENCOUNTER — TREATMENT (OUTPATIENT)
Dept: PHYSICAL THERAPY | Facility: CLINIC | Age: 84
End: 2019-06-11

## 2019-06-11 DIAGNOSIS — G89.29 CHRONIC BILATERAL LOW BACK PAIN WITH LEFT-SIDED SCIATICA: Primary | ICD-10-CM

## 2019-06-11 DIAGNOSIS — M54.42 CHRONIC BILATERAL LOW BACK PAIN WITH LEFT-SIDED SCIATICA: Primary | ICD-10-CM

## 2019-06-11 PROCEDURE — G0283 ELEC STIM OTHER THAN WOUND: HCPCS | Performed by: PHYSICAL THERAPIST

## 2019-06-11 PROCEDURE — 97110 THERAPEUTIC EXERCISES: CPT | Performed by: PHYSICAL THERAPIST

## 2019-06-11 NOTE — PROGRESS NOTES
Subjective   Radha Varela reports: He feels a little better since starting rehab.    Objective   POSTURE: spinal kyphosis and left convex curve.  PALPATION: bilateral lumbar spine.    See Exercise, Manual, and Modality Logs for complete treatment.       Assessment/Plan  Spinal stenosis suspected but no LE symptoms just uncontrolled flexion of the trunk preventing standing activities, strengthening activities appear to be helping.  Progress per Plan of Care and Progress strengthening /stabilization /functional activity           Manual Therapy:         mins  45018;  Therapeutic Exercise:    30     mins  16341;     Neuromuscular Simone:        mins  60878;    Therapeutic Activity:          mins  83863;     Gait Training:           mins  59137;     Ultrasound:          mins  52908;   Iontophoresis          mins  27684   Electrical Stimulation:    20     mins  31666 ( );  Dry Needling          mins self-pay  Fluidotherapy          mins 47942  Traction          mins 33234  Paraffin:          mins  93485    Timed Treatment:   30   mins   Total Treatment:     50   mins    Magdiel Carty, PT  Physical Therapist

## 2019-06-13 ENCOUNTER — TREATMENT (OUTPATIENT)
Dept: PHYSICAL THERAPY | Facility: CLINIC | Age: 84
End: 2019-06-13

## 2019-06-13 DIAGNOSIS — M54.42 CHRONIC BILATERAL LOW BACK PAIN WITH LEFT-SIDED SCIATICA: Primary | ICD-10-CM

## 2019-06-13 DIAGNOSIS — G89.29 CHRONIC BILATERAL LOW BACK PAIN WITH LEFT-SIDED SCIATICA: Primary | ICD-10-CM

## 2019-06-13 PROCEDURE — 97110 THERAPEUTIC EXERCISES: CPT | Performed by: PHYSICAL THERAPIST

## 2019-06-13 PROCEDURE — G0283 ELEC STIM OTHER THAN WOUND: HCPCS | Performed by: PHYSICAL THERAPIST

## 2019-06-13 NOTE — PROGRESS NOTES
Subjective   Radha Varela reports: PT is helping some, slowly.    Objective   POSTURE: spinal kyphosis and left convex curve.  PALPATION: bilateral lumbar spine tender, but not as severe.    See Exercise, Manual, and Modality Logs for complete treatment.       Assessment/Plan  Activity level improving, posture still very poor, strength cannot hold him upright for more than a few minutes at a time.  Progress per Plan of Care and Progress strengthening /stabilization /functional activity           Manual Therapy:         mins  88906;  Therapeutic Exercise:    39     mins  39876;     Neuromuscular Simone:        mins  86599;    Therapeutic Activity:          mins  91633;     Gait Training:           mins  06993;     Ultrasound:          mins  60466;   Iontophoresis          mins  17503   Electrical Stimulation:    20     mins  99531 ( );  Dry Needling          mins self-pay  Fluidotherapy          mins 22095  Traction          mins 96484  Paraffin:          mins  21141    Timed Treatment:   39   mins   Total Treatment:     20   mins    Magdiel Carty, PT  Physical Therapist

## 2019-06-16 NOTE — PROGRESS NOTES
Subjective   Radha Varela reports: feeling stronger, can walk a little longer now, but not as long as he wants.    Objective   POSTURE: severe kyphosis throughout entire spine and left scoliosis.  GAIT: ambulates with cane.  STRENGTH: Hip extension 4/5 functionally, bilaterally.    See Exercise, Manual, and Modality Logs for complete treatment.       Assessment/Plan  Age and posture makes progress very slow with him, but his effort is excellent.  Progress per Plan of Care and Progress strengthening /stabilization /functional activity           Manual Therapy:         mins  86360;  Therapeutic Exercise:    41     mins  23985;     Neuromuscular Simone:        mins  59558;    Therapeutic Activity:          mins  44095;     Gait Training:           mins  31622;     Ultrasound:          mins  85836;   Iontophoresis          mins  15191   Electrical Stimulation:    20     mins  00935 ( );  Dry Needling          mins self-pay  Fluidotherapy          mins 95488  Traction          mins 88718  Paraffin:          mins  16388    Timed Treatment:   41   mins   Total Treatment:     61   mins    Magdiel Carty, PT  Physical Therapist

## 2019-06-18 ENCOUNTER — TREATMENT (OUTPATIENT)
Dept: PHYSICAL THERAPY | Facility: CLINIC | Age: 84
End: 2019-06-18

## 2019-06-18 DIAGNOSIS — M54.42 CHRONIC BILATERAL LOW BACK PAIN WITH LEFT-SIDED SCIATICA: Primary | ICD-10-CM

## 2019-06-18 DIAGNOSIS — G89.29 CHRONIC BILATERAL LOW BACK PAIN WITH LEFT-SIDED SCIATICA: Primary | ICD-10-CM

## 2019-06-18 PROCEDURE — 97110 THERAPEUTIC EXERCISES: CPT | Performed by: PHYSICAL THERAPIST

## 2019-06-18 PROCEDURE — G0283 ELEC STIM OTHER THAN WOUND: HCPCS | Performed by: PHYSICAL THERAPIST

## 2019-06-18 NOTE — PROGRESS NOTES
Physical Therapy Daily Progress Note        Patient: Radha Varela   : 1935  Diagnosis/ICD-10 Code:  Chronic bilateral low back pain with left-sided sciatica [M54.42, G89.29]  Referring practitioner: Kali Marcano MD  Date of Initial Visit: Type: THERAPY  Noted: 2019  Today's Date: 2019  Patient seen for 7 sessions           Subjective   Radha Varela reports: LBP present today may be weather.    Objective   POSTURE: Still has gross kyphosis present, but does not appear to be as severe as previous weeks.  ROM: Thoracic Extension about 10 degrees short of neutral. Rotation about 15 degrees bilaterally.  See Exercise, Manual, and Modality Logs for complete treatment.       Assessment/Plan  His posture standing and during gait appears to be some better today. Rehab seems be helping some.  Progress per Plan of Care and Progress strengthening /stabilization /functional activity           Manual Therapy:         mins  09604;  Therapeutic Exercise:    44     mins  72841;     Neuromuscular Simone:        mins  94372;    Therapeutic Activity:          mins  56046;     Gait Training:           mins  11736;     Ultrasound:          mins  58253;    Electrical Stimulation:    20     mins  02483 ( );  Iontophoresis          mins 88773   Traction          mins  75460  Fluidotherapy          mins  04338  Dry Needling          mins self-pay  Paraffin          mins  26181    Timed Treatment:   44   mins   Total Treatment:     64   mins    Magdiel Carty PT  Physical Therapist

## 2019-06-20 ENCOUNTER — TREATMENT (OUTPATIENT)
Dept: PHYSICAL THERAPY | Facility: CLINIC | Age: 84
End: 2019-06-20

## 2019-06-20 DIAGNOSIS — M54.42 CHRONIC BILATERAL LOW BACK PAIN WITH LEFT-SIDED SCIATICA: Primary | ICD-10-CM

## 2019-06-20 DIAGNOSIS — G89.29 CHRONIC BILATERAL LOW BACK PAIN WITH LEFT-SIDED SCIATICA: Primary | ICD-10-CM

## 2019-06-20 PROCEDURE — G0283 ELEC STIM OTHER THAN WOUND: HCPCS | Performed by: PHYSICAL THERAPIST

## 2019-06-20 PROCEDURE — 97110 THERAPEUTIC EXERCISES: CPT | Performed by: PHYSICAL THERAPIST

## 2019-06-24 NOTE — PROGRESS NOTES
Physical Therapy Daily Progress Note        Patient: Radha Varela   : 1935  Diagnosis/ICD-10 Code:  Chronic bilateral low back pain with left-sided sciatica [M54.42, G89.29]  Referring practitioner: Kali Marcano MD  Date of Initial Visit: Type: THERAPY  Noted: 2019  Today's Date: 2019  Patient seen for 8 sessions           Subjective   Radha Varela reports: still feels some stronger and is standing some better.    Objective   GAIT: ambulates with SP cane most of the time, but if he takes slow steps he can walk short distance without AD  POSTURE: complete kyphosis of spine with scoliosis.    See Exercise, Manual, and Modality Logs for complete treatment.       Assessment/Plan  He is stronger but still very weak in LE and trunk.  PT is helping but progress is very slow.  Progress per Plan of Care and Progress strengthening /stabilization /functional activity           Manual Therapy:         mins  60225;  Therapeutic Exercise:    42     mins  26156;     Neuromuscular Simone:        mins  06181;    Therapeutic Activity:          mins  20226;     Gait Training:           mins  01856;     Ultrasound:          mins  80393;    Electrical Stimulation:    20     mins  80182 ( );  Iontophoresis          mins 07035   Traction          mins  98060  Fluidotherapy          mins  67200  Dry Needling          mins self-pay  Paraffin          mins  52128    Timed Treatment:   42   mins   Total Treatment:     62   mins    Magdiel Carty, PT  Physical Therapist

## 2019-06-25 ENCOUNTER — TREATMENT (OUTPATIENT)
Dept: PHYSICAL THERAPY | Facility: CLINIC | Age: 84
End: 2019-06-25

## 2019-06-25 DIAGNOSIS — G89.29 CHRONIC BILATERAL LOW BACK PAIN WITH LEFT-SIDED SCIATICA: Primary | ICD-10-CM

## 2019-06-25 DIAGNOSIS — M54.42 CHRONIC BILATERAL LOW BACK PAIN WITH LEFT-SIDED SCIATICA: Primary | ICD-10-CM

## 2019-06-25 PROCEDURE — 97110 THERAPEUTIC EXERCISES: CPT | Performed by: PHYSICAL THERAPIST

## 2019-06-25 PROCEDURE — G0283 ELEC STIM OTHER THAN WOUND: HCPCS | Performed by: PHYSICAL THERAPIST

## 2019-06-27 NOTE — PROGRESS NOTES
Physical Therapy Daily Progress Note        Patient: Radha Varela   : 1935  Diagnosis/ICD-10 Code:  Chronic bilateral low back pain with left-sided sciatica [M54.42, G89.29]  Referring practitioner: Kali Marcano MD  Date of Initial Visit: Type: THERAPY  Noted: 2019  Today's Date: 2019  Patient seen for 9 sessions           Subjective   Radha Varela reports: PT is definitely helping his pain and function, standing and walking longerl     Objective   GAIT: ambulates with SP cane most of the time, but if he takes slow steps he can walk short distance without AD  POSTURE: complete kyphosis of spine with scoliosis.  TRANSFERS:  Sit to stand and supine to sit less effort from the pt.    See Exercise, Manual, and Modality Logs for complete treatment.       Assessment/Plan  Suspect spinal stenosis, function and strength improving with rehab.  Progress per Plan of Care and Progress strengthening /stabilization /functional activity           Manual Therapy:         mins  92671;  Therapeutic Exercise:    40     mins  27144;     Neuromuscular Simone:        mins  41947;    Therapeutic Activity:          mins  17982;     Gait Training:           mins  45539;     Ultrasound:          mins  82162;    Electrical Stimulation:    20     mins  18216 ( );  Iontophoresis          mins 84559   Traction          mins  29975  Fluidotherapy          mins  12396  Dry Needling          mins self-pay  Paraffin          mins  58706    Timed Treatment:   40   mins   Total Treatment:     60   mins    Magdiel Carty PT  Physical Therapist

## 2019-07-02 ENCOUNTER — TELEPHONE (OUTPATIENT)
Dept: CARDIAC SURGERY | Facility: CLINIC | Age: 84
End: 2019-07-02

## 2019-07-08 ENCOUNTER — TELEPHONE (OUTPATIENT)
Dept: CARDIAC SURGERY | Facility: CLINIC | Age: 84
End: 2019-07-08

## 2019-07-08 ENCOUNTER — TREATMENT (OUTPATIENT)
Dept: PHYSICAL THERAPY | Facility: CLINIC | Age: 84
End: 2019-07-08

## 2019-07-08 DIAGNOSIS — I71.40 ABDOMINAL AORTIC ANEURYSM (AAA) WITHOUT RUPTURE (HCC): Primary | ICD-10-CM

## 2019-07-08 DIAGNOSIS — M54.42 CHRONIC BILATERAL LOW BACK PAIN WITH LEFT-SIDED SCIATICA: Primary | ICD-10-CM

## 2019-07-08 DIAGNOSIS — G89.29 CHRONIC BILATERAL LOW BACK PAIN WITH LEFT-SIDED SCIATICA: Primary | ICD-10-CM

## 2019-07-08 PROCEDURE — G0283 ELEC STIM OTHER THAN WOUND: HCPCS | Performed by: PHYSICAL THERAPIST

## 2019-07-08 PROCEDURE — 97012 MECHANICAL TRACTION THERAPY: CPT | Performed by: PHYSICAL THERAPIST

## 2019-07-08 PROCEDURE — 97035 APP MDLTY 1+ULTRASOUND EA 15: CPT | Performed by: PHYSICAL THERAPIST

## 2019-07-10 ENCOUNTER — TREATMENT (OUTPATIENT)
Dept: PHYSICAL THERAPY | Facility: CLINIC | Age: 84
End: 2019-07-10

## 2019-07-10 DIAGNOSIS — G89.29 CHRONIC BILATERAL LOW BACK PAIN WITH LEFT-SIDED SCIATICA: Primary | ICD-10-CM

## 2019-07-10 DIAGNOSIS — M54.42 CHRONIC BILATERAL LOW BACK PAIN WITH LEFT-SIDED SCIATICA: Primary | ICD-10-CM

## 2019-07-10 PROCEDURE — 97012 MECHANICAL TRACTION THERAPY: CPT | Performed by: PHYSICAL THERAPIST

## 2019-07-10 PROCEDURE — 97035 APP MDLTY 1+ULTRASOUND EA 15: CPT | Performed by: PHYSICAL THERAPIST

## 2019-07-10 PROCEDURE — G0283 ELEC STIM OTHER THAN WOUND: HCPCS | Performed by: PHYSICAL THERAPIST

## 2019-07-11 NOTE — PROGRESS NOTES
Subjective   Radha Varela reports: has been having a lot of bilateral LBP with right LE pain to the foot for the past 1 week +.  Walking and standing is very painful.    Objective   POSTURE: pt is in complete spinal kyphosis of about 50-60 degrees.  PALPATION: tender right>left lower lumbar and SI joint.  GAIT: complete trunk flexion, uses a single point cane, appears to be antalgic.    See Exercise, Manual, and Modality Logs for complete treatment.       Assessment/Plan  Less pain in lumbar area upon leaving.  Suspect severe spinal stenosis.  Seem to respond well to 90/90 lumbar traction.  Progress per Plan of Care and Progress strengthening /stabilization /functional activity           Manual Therapy:         mins  69599;  Therapeutic Exercise:         mins  35742;     Neuromuscular Simone:        mins  34126;    Therapeutic Activity:          mins  10806;     Gait Training:           mins  24049;     Ultrasound:     15     mins  00751;   Iontophoresis          mins  86845   Electrical Stimulation:    20     mins  91743 (MC );  Dry Needling          mins self-pay  Fluidotherapy          mins 54550  Traction     20     mins 44589  Paraffin:          mins  75412    Timed Treatment:   15   mins   Total Treatment:     55   mins    Magdiel Carty, PT  Physical Therapist

## 2019-07-12 ENCOUNTER — TREATMENT (OUTPATIENT)
Dept: PHYSICAL THERAPY | Facility: CLINIC | Age: 84
End: 2019-07-12

## 2019-07-12 DIAGNOSIS — G89.29 CHRONIC BILATERAL LOW BACK PAIN WITH LEFT-SIDED SCIATICA: Primary | ICD-10-CM

## 2019-07-12 DIAGNOSIS — M54.42 CHRONIC BILATERAL LOW BACK PAIN WITH LEFT-SIDED SCIATICA: Primary | ICD-10-CM

## 2019-07-12 PROCEDURE — G0283 ELEC STIM OTHER THAN WOUND: HCPCS | Performed by: PHYSICAL THERAPIST

## 2019-07-12 PROCEDURE — 97035 APP MDLTY 1+ULTRASOUND EA 15: CPT | Performed by: PHYSICAL THERAPIST

## 2019-07-12 PROCEDURE — 97012 MECHANICAL TRACTION THERAPY: CPT | Performed by: PHYSICAL THERAPIST

## 2019-07-12 PROCEDURE — 97110 THERAPEUTIC EXERCISES: CPT | Performed by: PHYSICAL THERAPIST

## 2019-07-15 ENCOUNTER — TREATMENT (OUTPATIENT)
Dept: PHYSICAL THERAPY | Facility: CLINIC | Age: 84
End: 2019-07-15

## 2019-07-15 DIAGNOSIS — G89.29 CHRONIC BILATERAL LOW BACK PAIN WITH LEFT-SIDED SCIATICA: Primary | ICD-10-CM

## 2019-07-15 DIAGNOSIS — M54.42 CHRONIC BILATERAL LOW BACK PAIN WITH LEFT-SIDED SCIATICA: Primary | ICD-10-CM

## 2019-07-15 PROCEDURE — 97110 THERAPEUTIC EXERCISES: CPT | Performed by: PHYSICAL THERAPIST

## 2019-07-15 PROCEDURE — 97012 MECHANICAL TRACTION THERAPY: CPT | Performed by: PHYSICAL THERAPIST

## 2019-07-15 PROCEDURE — 97035 APP MDLTY 1+ULTRASOUND EA 15: CPT | Performed by: PHYSICAL THERAPIST

## 2019-07-15 PROCEDURE — G0283 ELEC STIM OTHER THAN WOUND: HCPCS | Performed by: PHYSICAL THERAPIST

## 2019-07-15 NOTE — PROGRESS NOTES
Subjective   Radha Varela reports: LBP is less but the left LE pain to the foot persist.    Objective   AMBULATION: cane right hand.  POSTURE: Excessive kyphosis.  PALPATION: left lumbar/SI joint area  OBSERVATION: less pain with sit to stand and maneuvering on treatment table  See Exercise, Manual, and Modality Logs for complete treatment.       Assessment/Plan  Lumbar Stenosis with left lower lumbar nerve root irritation.  Improving.  Progress per Plan of Care           Manual Therapy:         mins  39587;  Therapeutic Exercise:    16     mins  97335;     Neuromuscular Simone:        mins  28562;    Therapeutic Activity:          mins  59538;     Gait Training:           mins  52565;     Ultrasound:     15     mins  36898;   Iontophoresis          mins  49839   Electrical Stimulation:    20     mins  41726 ( );  Dry Needling          mins self-pay  Fluidotherapy          mins 33913  Traction     20     mins 54408  Paraffin:          mins  89367    Timed Treatment:   31   mins   Total Treatment:     71   mins    Magdiel Carty, PT  Physical Therapist

## 2019-07-16 NOTE — PROGRESS NOTES
Subjective   Radha Varela reports: a lot less back and leg pain over the weekend, but the left LE has returned this morning.  Pain to the foot.    Objective   POSTURE: complete kyphosis of entire spine with some scolosis.  GAIT: ambulating with single point cane.  Step length =, but very slow gait, not complete foot clearance with all steps. Shuffles at times.  PALPATION: tender left SI joint and Left L5-S1 area.  See Exercise, Manual, and Modality Logs for complete treatment.       Assessment/Plan  He is more tender at left SI joint than any place else, but still suspect at nerve root issue on the left side. Responded very well to single traction on the left.  Progress per Plan of Care and Progress strengthening /stabilization /functional activity           Manual Therapy:         mins  93965;  Therapeutic Exercise:    21     mins  05904;     Neuromuscular Simone:        mins  87747;    Therapeutic Activity:          mins  01586;     Gait Training:           mins  72447;     Ultrasound:     10     mins  21337;   Iontophoresis          mins  16819   Electrical Stimulation:    20     mins  69391 ( );  Dry Needling          mins self-pay  Fluidotherapy          mins 87762  Traction     20     mins 92055  Paraffin:          mins  31180    Timed Treatment:   31   mins   Total Treatment:     71   mins    Magdiel Carty, PT  Physical Therapist

## 2019-07-17 ENCOUNTER — TREATMENT (OUTPATIENT)
Dept: PHYSICAL THERAPY | Facility: CLINIC | Age: 84
End: 2019-07-17

## 2019-07-17 DIAGNOSIS — M54.42 CHRONIC BILATERAL LOW BACK PAIN WITH LEFT-SIDED SCIATICA: Primary | ICD-10-CM

## 2019-07-17 DIAGNOSIS — G89.29 CHRONIC BILATERAL LOW BACK PAIN WITH LEFT-SIDED SCIATICA: Primary | ICD-10-CM

## 2019-07-17 PROCEDURE — G0283 ELEC STIM OTHER THAN WOUND: HCPCS | Performed by: PHYSICAL THERAPIST

## 2019-07-17 PROCEDURE — 97035 APP MDLTY 1+ULTRASOUND EA 15: CPT | Performed by: PHYSICAL THERAPIST

## 2019-07-17 PROCEDURE — 97012 MECHANICAL TRACTION THERAPY: CPT | Performed by: PHYSICAL THERAPIST

## 2019-07-17 PROCEDURE — 97110 THERAPEUTIC EXERCISES: CPT | Performed by: PHYSICAL THERAPIST

## 2019-07-18 ENCOUNTER — HOSPITAL ENCOUNTER (OUTPATIENT)
Dept: CT IMAGING | Facility: HOSPITAL | Age: 84
Discharge: HOME | End: 2019-07-18
Admitting: PHYSICIAN ASSISTANT

## 2019-07-18 DIAGNOSIS — I71.40 ABDOMINAL AORTIC ANEURYSM (AAA) WITHOUT RUPTURE (HCC): ICD-10-CM

## 2019-07-18 LAB — CREAT BLDA-MCNC: 1.7 MG/DL (ref 0.6–1.3)

## 2019-07-18 PROCEDURE — 74174 CTA ABD&PLVS W/CONTRAST: CPT

## 2019-07-18 PROCEDURE — 82565 ASSAY OF CREATININE: CPT

## 2019-07-18 PROCEDURE — 0 IOPAMIDOL PER 1 ML: Performed by: PHYSICIAN ASSISTANT

## 2019-07-18 RX ADMIN — IOPAMIDOL 70 ML: 755 INJECTION, SOLUTION INTRAVENOUS at 12:08

## 2019-07-18 NOTE — PROGRESS NOTES
Subjective   Radha Varela reports: last treatment helped a lot, almost back to where he was.    Objective   POSTURE: complete kyphosis of entire spine with some scolosis.  GAIT: ambulating with single point cane.  Step length =, but very slow gait, not complete foot clearance with all steps. Shuffles at times.  PALPATION: Left L5-S1 area.  See Exercise, Manual, and Modality Logs for complete treatment.       Assessment/Plan  Responded very well to single traction on the left. Appears to have S1 nerve root symptoms. He is improving.  Progress per Plan of Care and Progress strengthening /stabilization /functional activity           Manual Therapy:         mins  84871;  Therapeutic Exercise:    21     mins  59388;     Neuromuscular Simone:        mins  10931;    Therapeutic Activity:          mins  63534;     Gait Training:           mins  00049;     Ultrasound:     13     mins  04729;   Iontophoresis          mins  92996   Electrical Stimulation:    20     mins  07603 ( );  Dry Needling          mins self-pay  Fluidotherapy          mins 71174  Traction     20     mins 79423  Paraffin:          mins  67389    Timed Treatment:   34   mins   Total Treatment:     74   mins    Magdiel Carty, PT  Physical Therapist

## 2019-07-22 ENCOUNTER — OFFICE VISIT (OUTPATIENT)
Dept: CARDIAC SURGERY | Facility: CLINIC | Age: 84
End: 2019-07-22

## 2019-07-22 VITALS
BODY MASS INDEX: 26.84 KG/M2 | HEART RATE: 63 BPM | TEMPERATURE: 97.6 F | SYSTOLIC BLOOD PRESSURE: 142 MMHG | WEIGHT: 171 LBS | DIASTOLIC BLOOD PRESSURE: 64 MMHG | OXYGEN SATURATION: 98 % | HEIGHT: 67 IN

## 2019-07-22 DIAGNOSIS — I71.40 ABDOMINAL AORTIC ANEURYSM (AAA) WITHOUT RUPTURE (HCC): Primary | ICD-10-CM

## 2019-07-22 PROCEDURE — 99214 OFFICE O/P EST MOD 30 MIN: CPT | Performed by: THORACIC SURGERY (CARDIOTHORACIC VASCULAR SURGERY)

## 2019-07-22 NOTE — PROGRESS NOTES
07/22/2019  Patient Information  Radha Varela                                                                                          [address] [place] [address]   1935  'PCP/Referring Physician'  Kali Marcano MD  [phone]  No ref. provider found    Chief Complaint   Patient presents with   • Aortic Aneurysm     1 year follow-up with results from CTA abdomen for abdominal aortic aneurysm.  History of open repair in [place] over 20 years ago       History of Present Illness:   This is a patient who had an open infrarenal abdominal aortic aneurysm repair in [place] over 20 years ago.  I saw this patient last year where he was told that he had an endoleak and they were planning surgical intervention.  None of this made anatomic sense and we obtained a CT scan and indeed there was an open repair with no evidence of anastomotic aneurysm and no need for surgical intervention in my opinion.  I had recommended we obtain no further scans due to patient's overall frailty.  The patient was very insistent that we obtain one more scan in 1 year for evaluation and we did agree to do that.  At this time the patient is here to discuss that scan.  He has no unusual back, flank or abdominal pain.      Patient Active Problem List   Diagnosis   • Abdominal aortic aneurysm (AAA) without rupture (CMS/HCC)     Past Medical History:   Diagnosis Date   • Aneurysm (CMS/HCC)    • Arthritis    • CHF (congestive heart failure) (CMS/HCC)    • COPD (chronic obstructive pulmonary disease) (CMS/HCC)    • Coronary artery disease    • Dyslipidemia    • GERD (gastroesophageal reflux disease)    • Hiatal hernia    • History of exercise stress test    • History of nuclear stress test    • History of pneumonia    • Hypertension    • Lumbar degenerative disc disease    • Osteoarthritis    • Parkinson's disease (CMS/HCC)    • Peptic ulceration    • Psoriasis    • Spinal stenosis      Past Surgical History:    Procedure Laterality Date   • ABDOMINAL AORTIC ANEURYSM REPAIR WITH ENDOGRAFT     • APPENDECTOMY     • CATARACT EXTRACTION W/ INTRAOCULAR LENS IMPLANT Left 12/10/2018    Procedure: CATARACT PHACO EXTRACTION WITH INTRAOCULAR LENS IMPLANT LEFT;  Surgeon: Charity Carrasco MD;  Location: [facility];  Service: Ophthalmology   • COLONOSCOPY     • CORONARY ARTERY BYPASS GRAFT     • ENDOSCOPY     • SHOULDER ROTATOR CUFF REPAIR Right    • SKIN BIOPSY         Current Outpatient Medications:   •  amLODIPine (NORVASC) 5 MG tablet, Take 5 mg by mouth Daily., Disp: , Rfl:   •  aspirin 81 MG EC tablet, Take 81 mg by mouth Daily., Disp: , Rfl:   •  atorvastatin (LIPITOR) 80 MG tablet, Take 80 mg by mouth Daily., Disp: , Rfl:   •  cholecalciferol (VITAMIN D3) 1000 units tablet, Take 1,000 Units by mouth Daily. 2 tablets daily, Disp: , Rfl:   •  clobetasol (TEMOVATE) 0.05 % ointment, Apply  topically 2 (Two) Times a Day., Disp: , Rfl:   •  Coenzyme Q10 (COQ10) 200 MG capsule, Take 200 mg by mouth Daily., Disp: , Rfl:   •  furosemide (LASIX) 20 MG tablet, Take 20 mg by mouth Every Other Day As Needed., Disp: , Rfl:   •  hydrOXYzine (ATARAX) 25 MG tablet, Take 25 mg by mouth Daily., Disp: , Rfl:   •  methocarbamol (ROBAXIN) 500 MG tablet, Take 500 mg by mouth Daily As Needed., Disp: , Rfl:   •  Omega-3 Fatty Acids (FISH OIL) 1360 MG capsule, Take 1 capsule by mouth Daily., Disp: , Rfl:   •  oxyCODONE-acetaminophen (PERCOCET) 5-325 MG per tablet, Take 1 tablet by mouth Every 8 (Eight) Hours As Needed for Moderate Pain ., Disp: 21 tablet, Rfl: 0  •  rOPINIRole (REQUIP) 0.5 MG tablet, Take 0.5 mg by mouth 3 (Three) Times a Day. Take 1 hour before bedtime., Disp: , Rfl:   •  vitamin B-12 (CYANOCOBALAMIN) 1000 MCG tablet, Take 1,000 mcg by mouth Daily., Disp: , Rfl:   Allergies   Allergen Reactions   • Celebrex [Celecoxib] Anaphylaxis   • Sulfa Antibiotics Anaphylaxis     Social History     Socioeconomic History   • Marital status:       Spouse name: Not on file   • Number of children: 3   • Years of education: Not on file   • Highest education level: Not on file   Occupational History   • Occupation: retired/   Tobacco Use   • Smoking status: Former Smoker     Packs/day: 3.00     Years: 15.00     Pack years: 45.00     Types: Cigarettes     Last attempt to quit:      Years since quittin.5   • Smokeless tobacco: Never Used   • Tobacco comment: quit 50 years ago   Substance and Sexual Activity   • Alcohol use: Yes     Alcohol/week: 1.2 oz     Types: 2 Glasses of wine per week     Comment: DAILY   • Drug use: No   • Sexual activity: Defer   Social History Narrative    Lives in [facility] with his spouse.     Family History   Problem Relation Age of Onset   • Other Mother          of old age   • No Known Problems Father      Review of Systems   Constitution: Negative for chills, fever, malaise/fatigue, night sweats and weight loss.   HENT: Negative for hearing loss, odynophagia and sore throat.    Cardiovascular: Positive for leg swelling (left leg). Negative for chest pain, dyspnea on exertion, orthopnea and palpitations.   Respiratory: Negative for cough and hemoptysis.    Endocrine: Negative for cold intolerance, heat intolerance, polydipsia, polyphagia and polyuria.   Hematologic/Lymphatic: Does not bruise/bleed easily.   Skin: Negative for itching and rash.   Musculoskeletal: Positive for back pain. Negative for joint pain, joint swelling and myalgias.   Gastrointestinal: Negative for abdominal pain, constipation, diarrhea, hematemesis, hematochezia, melena, nausea and vomiting.   Genitourinary: Positive for frequency. Negative for dysuria and hematuria.   Neurological: Negative for focal weakness, headaches, numbness and seizures.   Psychiatric/Behavioral: Negative for depression and suicidal ideas. The patient is not nervous/anxious.    All other systems reviewed and are negative.    Vitals:    19 0812   BP:  "142/64   BP Location: Right arm   Patient Position: Sitting   Pulse: 63   Temp: 97.6 °F (36.4 °C)   TempSrc: Temporal   SpO2: 98%   Weight: 77.6 kg (171 lb)   Height: 170.2 cm (67\")      Physical Exam   CONSTITUTIONAL: Alert and conversant, Well dressed, frail in appearance, No acute distress walks with a cane  EYES: Sclera clean, Anicteric, Pupils equal  ENT: No nasal deviation, Trachea midline  NECK: No neck masses, Supple  LUNGS: No wheezing, Cough, non-congested  HEART: No rubs, No murmurs  GASTROINTESTINAL: Soft, non-distended, No masses, Non tender  to palpation, normal bowel sounds  NEURO: No motor deficits, No sensory deficits, Cranial Nerves 2 through 12 grossly intact  PSYCHIATRIC: Oriented to person, place and time, No memory deficits, Mood appropriate  VASCULAR: No carotid bruits, Femoral pulses palpable and symmetric  MUSKULOSKELETAL: No extremity trauma or extremity asymmetry    Labs/Imaging:   I reviewed the CT scan demonstrating no change from his previous scan.    Assessment/Plan:   This patient has had an open aneurysm repair over 20 years ago.  I reviewed the images myself.  There is no anastomotic aneurysm. The aorta above the graft has a maximum dimension of 3.8 cm.  The graft is somewhat ectatic and appears to have been too long and dimensions when it was measured and placed over 20 years ago.  However, no surgical intervention is needed at this time.  Patient is able to seek opinions from other physicians should they choose, but my recommendation is no further CAT scans for surveillance of this aneurysm in this frail 83-year-old patient and they seem to be in agreement with that, but I have asked that should they ever changed her mind they can seek opinion of other physicians.    Patient Active Problem List   Diagnosis   • Abdominal aortic aneurysm (AAA) without rupture (CMS/[facility], located within [facility])     CC: MD Sonja Ernandez, , editing for ADIEL Chew, Kit " MD Braden, have read and agree with the editing done by Sonja Mejia, .

## 2019-07-23 ENCOUNTER — TREATMENT (OUTPATIENT)
Dept: PHYSICAL THERAPY | Facility: CLINIC | Age: 84
End: 2019-07-23

## 2019-07-23 DIAGNOSIS — M54.42 CHRONIC BILATERAL LOW BACK PAIN WITH LEFT-SIDED SCIATICA: Primary | ICD-10-CM

## 2019-07-23 DIAGNOSIS — G89.29 CHRONIC BILATERAL LOW BACK PAIN WITH LEFT-SIDED SCIATICA: Primary | ICD-10-CM

## 2019-07-23 PROCEDURE — 97110 THERAPEUTIC EXERCISES: CPT | Performed by: PHYSICAL THERAPIST

## 2019-07-23 PROCEDURE — G0283 ELEC STIM OTHER THAN WOUND: HCPCS | Performed by: PHYSICAL THERAPIST

## 2019-07-23 PROCEDURE — 97012 MECHANICAL TRACTION THERAPY: CPT | Performed by: PHYSICAL THERAPIST

## 2019-07-24 NOTE — PROGRESS NOTES
Subjective   Radha Varela reports: the last couple of treatments have helped a lot. Less pain in the leg and back, and can walk around the house some better now.    Objective   GAIT: ambulating with single point cane in right hand.  TRANSFERS: very slow with laying to sitting but is independent (appears to be painful).  Sit to stand from low position min+ assist, sit to stand from chair independent.  PALPATION: Left L5-S1 area tender but not as severe as last week and not as large of an area.  See Exercise, Manual, and Modality Logs for complete treatment.       Assessment/Plan  Lumbar stenosis, function is improving. Moving some better.   Progress per Plan of Care and Progress strengthening /stabilization /functional activity           Manual Therapy:         mins  58493;  Therapeutic Exercise:    18     mins  31805;     Neuromuscular Simone:        mins  47217;    Therapeutic Activity:          mins  55296;     Gait Training:           mins  02863;     Ultrasound:          mins  61754;   Iontophoresis          mins  96111   Electrical Stimulation:    20     mins  32667 ( );  Dry Needling          mins self-pay  Fluidotherapy          mins 61153  Traction     20     mins 12677  Paraffin:          mins  25021    Timed Treatment:  18    mins   Total Treatment:     58   mins    Magdiel Carty, PT  Physical Therapist

## 2019-07-25 ENCOUNTER — TREATMENT (OUTPATIENT)
Dept: PHYSICAL THERAPY | Facility: CLINIC | Age: 84
End: 2019-07-25

## 2019-07-25 DIAGNOSIS — M54.42 CHRONIC BILATERAL LOW BACK PAIN WITH LEFT-SIDED SCIATICA: Primary | ICD-10-CM

## 2019-07-25 DIAGNOSIS — G89.29 CHRONIC BILATERAL LOW BACK PAIN WITH LEFT-SIDED SCIATICA: Primary | ICD-10-CM

## 2019-07-25 PROCEDURE — 97012 MECHANICAL TRACTION THERAPY: CPT | Performed by: PHYSICAL THERAPIST

## 2019-07-25 PROCEDURE — 97110 THERAPEUTIC EXERCISES: CPT | Performed by: PHYSICAL THERAPIST

## 2019-07-25 PROCEDURE — G0283 ELEC STIM OTHER THAN WOUND: HCPCS | Performed by: PHYSICAL THERAPIST

## 2019-07-25 PROCEDURE — 97035 APP MDLTY 1+ULTRASOUND EA 15: CPT | Performed by: PHYSICAL THERAPIST

## 2019-07-29 NOTE — PROGRESS NOTES
Subjective   Radha Varela reports: filling a lot better, back to where he was prior to last flare up.    Objective   GAIT: ambulating with single point cane in right hand.  TRANSFERS: very slow with laying to sitting but is independent (less painful).  Sit to stand from low position min assist, sit to stand from chair independent.  PALPATION: Left L5-S1 & SI joint area tender.    See Exercise, Manual, and Modality Logs for complete treatment.       Assessment/Plan  Lumbar stenosis, function is improving. Responding well to rehab again.  Progress per Plan of Care and Progress strengthening /stabilization /functional activity           Manual Therapy:         mins  22314;  Therapeutic Exercise:    24     mins  62211;     Neuromuscular Simone:        mins  52703;    Therapeutic Activity:          mins  25086;     Gait Training:           mins  83690;     Ultrasound:     15     mins  92621;   Iontophoresis          mins  38942   Electrical Stimulation:    20     mins  21251 ( );  Dry Needling          mins self-pay  Fluidotherapy          mins 74298  Traction     20     mins 40537  Paraffin:          mins  16588    Timed Treatment:  39    mins   Total Treatment:     79   mins    Magdiel Carty, PT  Physical Therapist

## 2019-07-30 ENCOUNTER — TREATMENT (OUTPATIENT)
Dept: PHYSICAL THERAPY | Facility: CLINIC | Age: 84
End: 2019-07-30

## 2019-07-30 DIAGNOSIS — M54.42 CHRONIC BILATERAL LOW BACK PAIN WITH LEFT-SIDED SCIATICA: Primary | ICD-10-CM

## 2019-07-30 DIAGNOSIS — G89.29 CHRONIC BILATERAL LOW BACK PAIN WITH LEFT-SIDED SCIATICA: Primary | ICD-10-CM

## 2019-07-30 PROCEDURE — G0283 ELEC STIM OTHER THAN WOUND: HCPCS | Performed by: PHYSICAL THERAPIST

## 2019-07-30 PROCEDURE — 97110 THERAPEUTIC EXERCISES: CPT | Performed by: PHYSICAL THERAPIST

## 2019-07-30 PROCEDURE — 97012 MECHANICAL TRACTION THERAPY: CPT | Performed by: PHYSICAL THERAPIST

## 2019-07-30 PROCEDURE — 97035 APP MDLTY 1+ULTRASOUND EA 15: CPT | Performed by: PHYSICAL THERAPIST

## 2019-08-01 ENCOUNTER — TREATMENT (OUTPATIENT)
Dept: PHYSICAL THERAPY | Facility: CLINIC | Age: 84
End: 2019-08-01

## 2019-08-01 DIAGNOSIS — G89.29 CHRONIC BILATERAL LOW BACK PAIN WITH LEFT-SIDED SCIATICA: Primary | ICD-10-CM

## 2019-08-01 DIAGNOSIS — M54.42 CHRONIC BILATERAL LOW BACK PAIN WITH LEFT-SIDED SCIATICA: Primary | ICD-10-CM

## 2019-08-01 PROCEDURE — G0283 ELEC STIM OTHER THAN WOUND: HCPCS | Performed by: PHYSICAL THERAPIST

## 2019-08-01 PROCEDURE — 97110 THERAPEUTIC EXERCISES: CPT | Performed by: PHYSICAL THERAPIST

## 2019-08-01 PROCEDURE — 97012 MECHANICAL TRACTION THERAPY: CPT | Performed by: PHYSICAL THERAPIST

## 2019-08-05 NOTE — PROGRESS NOTES
Physical Therapy Daily Progress Note        Patient: Radha Varela   : 1935  Diagnosis/ICD-10 Code:  Chronic bilateral low back pain with left-sided sciatica [M54.42, G89.29]  Referring practitioner: Kali Marcano MD  Date of Initial Visit: Type: THERAPY  Noted: 2019  Today's Date: 2019  Patient seen for 18 sessions           Subjective   Radha Varela reports: about the same as earlier in the week, overall some better. Left LBP is less, and Left LE symptoms are less frequent.     Objective   POSTURE: thoracic/lumbar,cervical kyphosis.  GAIT: ambulates with single point cane on the right.  OBSERVATION: some difficultly moving and changing position on treatment plinth, but better than a couple weeks ago.    See Exercise, Manual, and Modality Logs for complete treatment.       Assessment/Plan  Significant spinal dysfunctions, but improving.  Spinal stenosis with left LE symptoms slowly improving.  Progress per Plan of Care and Progress strengthening /stabilization /functional activity Progress slowly.           Manual Therapy:         mins  67535;  Therapeutic Exercise:    35     mins  05881;     Neuromuscular Simone:        mins  97490;    Therapeutic Activity:          mins  82430;     Gait Training:           mins  01210;     Ultrasound:          mins  83508;    Electrical Stimulation:    20     mins  80340 ( );  Iontophoresis          mins 94238   Traction     20     mins  96093  Fluidotherapy          mins  33717  Dry Needling          mins self-pay  Paraffin          mins  05917    Timed Treatment:   35   mins   Total Treatment:     75   mins    Magdiel Carty, PT  Physical Therapist

## 2019-08-06 ENCOUNTER — TREATMENT (OUTPATIENT)
Dept: PHYSICAL THERAPY | Facility: CLINIC | Age: 84
End: 2019-08-06

## 2019-08-06 DIAGNOSIS — G89.29 CHRONIC BILATERAL LOW BACK PAIN WITH LEFT-SIDED SCIATICA: Primary | ICD-10-CM

## 2019-08-06 DIAGNOSIS — M54.42 CHRONIC BILATERAL LOW BACK PAIN WITH LEFT-SIDED SCIATICA: Primary | ICD-10-CM

## 2019-08-06 PROCEDURE — 97110 THERAPEUTIC EXERCISES: CPT | Performed by: PHYSICAL THERAPIST

## 2019-08-06 PROCEDURE — 97012 MECHANICAL TRACTION THERAPY: CPT | Performed by: PHYSICAL THERAPIST

## 2019-08-06 PROCEDURE — G0283 ELEC STIM OTHER THAN WOUND: HCPCS | Performed by: PHYSICAL THERAPIST

## 2019-08-06 PROCEDURE — 97035 APP MDLTY 1+ULTRASOUND EA 15: CPT | Performed by: PHYSICAL THERAPIST

## 2019-08-06 NOTE — PROGRESS NOTES
Physical Therapy Daily Progress Note        Patient: Radha Varela   : 1935  Diagnosis/ICD-10 Code:  Chronic bilateral low back pain with left-sided sciatica [M54.42, G89.29]  Referring practitioner: Kali Marcano MD  Date of Initial Visit: Type: THERAPY  Noted: 2019  Today's Date: 2019  Patient seen for 19 sessions           Subjective   Radha Varela reports: had a good weekend, but today is experiencing more left lumbar/hip pain, not sure why.    Objective   POSTURE: kyphotic posture C-spine to sacral, thoracic scoliosis present as well.  PALPATION: tender left L5/S1 area.  Less tender over left SI joint today.  GAIT: ambulation with single point cane.  TRANSFERS: min assist with sit to stand from low seat.    See Exercise, Manual, and Modality Logs for complete treatment.       Assessment/Plan  Spinal deformities and some stenosis suspected.  Pt posture has improved some, but still abnormal.  Progress per Plan of Care and Progress strengthening /stabilization /functional activity           Manual Therapy:         mins  14327;  Therapeutic Exercise:    25     mins  64261;     Neuromuscular Simone:        mins  77775;    Therapeutic Activity:          mins  83058;     Gait Training:           mins  94207;     Ultrasound:     13     mins  81928;    Electrical Stimulation:    20     mins  92395 ( );  Iontophoresis          mins 07985   Traction     20     mins  67114  Fluidotherapy          mins  91514  Dry Needling          mins self-pay  Paraffin          mins  45282    Timed Treatment:   38   mins   Total Treatment:     78   mins    Magdiel Carty, PT  Physical Therapist

## 2019-08-08 ENCOUNTER — TREATMENT (OUTPATIENT)
Dept: PHYSICAL THERAPY | Facility: CLINIC | Age: 84
End: 2019-08-08

## 2019-08-08 DIAGNOSIS — G89.29 CHRONIC BILATERAL LOW BACK PAIN WITH LEFT-SIDED SCIATICA: Primary | ICD-10-CM

## 2019-08-08 DIAGNOSIS — M54.42 CHRONIC BILATERAL LOW BACK PAIN WITH LEFT-SIDED SCIATICA: Primary | ICD-10-CM

## 2019-08-08 PROCEDURE — G0283 ELEC STIM OTHER THAN WOUND: HCPCS | Performed by: PHYSICAL THERAPIST

## 2019-08-08 PROCEDURE — 97110 THERAPEUTIC EXERCISES: CPT | Performed by: PHYSICAL THERAPIST

## 2019-08-08 PROCEDURE — 97035 APP MDLTY 1+ULTRASOUND EA 15: CPT | Performed by: PHYSICAL THERAPIST

## 2019-08-08 PROCEDURE — 97012 MECHANICAL TRACTION THERAPY: CPT | Performed by: PHYSICAL THERAPIST

## 2019-08-12 NOTE — PROGRESS NOTES
Physical Therapy Daily Progress Note        Patient: Radha Varela   : 1935  Diagnosis/ICD-10 Code:  Chronic bilateral low back pain with left-sided sciatica [M54.42, G89.29]  Referring practitioner: Kali Marcano MD  Date of Initial Visit: Type: THERAPY  Noted: 2019  Today's Date: 2019  Patient seen for 20 sessions           Subjective   Radha Varela reports: less pain today than earlier in the week.    Objective   POSTURE: kyphotic posture C-spine to sacral, thoracic scoliosis present as well.  PALPATION: tender left L5/S1 area and left SI joint.  GAIT: ambulation with single point cane.  TRANSFERS: min assist with sit to stand from low seat.    See Exercise, Manual, and Modality Logs for complete treatment.       Assessment/Plan  Able to add back some activities today, did not seem to have an adverse effect on him at the time.  Progress per Plan of Care and Progress strengthening /stabilization /functional activity           Manual Therapy:         mins  84751;  Therapeutic Exercise:    34     mins  27182;     Neuromuscular Simone:        mins  26358;    Therapeutic Activity:          mins  91017;     Gait Training:           mins  77868;     Ultrasound:     13     mins  24801;    Electrical Stimulation:    20     mins  13685 ( );  Iontophoresis          mins 53824   Traction     20     mins  31015  Fluidotherapy          mins  94746  Dry Needling          mins self-pay  Paraffin          mins  46661    Timed Treatment:   47   mins   Total Treatment:     87   mins    Magdiel Carty, PT  Physical Therapist

## 2019-08-13 ENCOUNTER — TREATMENT (OUTPATIENT)
Dept: PHYSICAL THERAPY | Facility: CLINIC | Age: 84
End: 2019-08-13

## 2019-08-13 DIAGNOSIS — M54.42 CHRONIC BILATERAL LOW BACK PAIN WITH LEFT-SIDED SCIATICA: Primary | ICD-10-CM

## 2019-08-13 DIAGNOSIS — G89.29 CHRONIC BILATERAL LOW BACK PAIN WITH LEFT-SIDED SCIATICA: Primary | ICD-10-CM

## 2019-08-13 PROCEDURE — 97110 THERAPEUTIC EXERCISES: CPT | Performed by: PHYSICAL THERAPIST

## 2019-08-13 PROCEDURE — 97530 THERAPEUTIC ACTIVITIES: CPT | Performed by: PHYSICAL THERAPIST

## 2019-08-15 ENCOUNTER — TREATMENT (OUTPATIENT)
Dept: PHYSICAL THERAPY | Facility: CLINIC | Age: 84
End: 2019-08-15

## 2019-08-15 DIAGNOSIS — M54.42 CHRONIC BILATERAL LOW BACK PAIN WITH LEFT-SIDED SCIATICA: Primary | ICD-10-CM

## 2019-08-15 DIAGNOSIS — G89.29 CHRONIC BILATERAL LOW BACK PAIN WITH LEFT-SIDED SCIATICA: Primary | ICD-10-CM

## 2019-08-15 PROCEDURE — G0283 ELEC STIM OTHER THAN WOUND: HCPCS | Performed by: PHYSICAL THERAPIST

## 2019-08-15 PROCEDURE — 97110 THERAPEUTIC EXERCISES: CPT | Performed by: PHYSICAL THERAPIST

## 2019-08-15 NOTE — PROGRESS NOTES
Physical Therapy Daily Progress Note        Patient: Radha Varela   : 1935  Diagnosis/ICD-10 Code:  Chronic bilateral low back pain with left-sided sciatica [M54.42, G89.29]  Referring practitioner: Kali Marcano MD  Date of Initial Visit: Type: THERAPY  Noted: 2019  Today's Date: 8/15/2019  Patient seen for 21 sessions             Subjective   Radha Varela reports: He did well after treatment last time, with just the e-stim and hot pack at the end of the session. Says he does not believe the traction is necessary anymore, he is doing fine without it     Objective   No significant change since last treatment     See Exercise, Manual, and Modality Logs for complete treatment.       Assessment/Plan  Pt handled exercises well, including inclusion of a new back exercise. Said he felt the bridges he's done in the past may be the source of some irritation he's felt after past treatments, so we opted out of those today and will see how he feels next session   Progress per Plan of Care           Manual Therapy:         mins  15393;  Therapeutic Exercise:    45     mins  70945;     Neuromuscular Simone:        mins  01349;    Therapeutic Activity:          mins  95802;     Gait Training:           mins  46467;     Ultrasound:          mins  49655;    Electrical Stimulation:    20     mins  98696 ( );  Iontophoresis          mins 97456   Traction          mins  34110  Fluidotherapy          mins  02170  Dry Needling          mins self-pay  Paraffin          mins  61571    Timed Treatment:   45   mins   Total Treatment:     65   mins    I was present in the PT Department guiding the student by approving, concurring, and confirming the skilled judgement for all services rendered.     Ezequiel Angel, SPT  Student Physical Therapist    Magdiel Carty, PT  Physical Therapist

## 2019-08-15 NOTE — PROGRESS NOTES
Subjective   Radha Varela reports: far less pain down the leg and less lumbar pain. Able to stand a little longer, but still an issue.  Getting out of chairs is easier    Objective   PALPATION: tender left SI joint and Left lumbar/sacral junction.  POSTURE: more upright but still in kyphosis cervical to sacrum..  TRANSFERS: able to go sit to stand with minimal effort and independently today. But still needs min to mod assist off total gym.  See Exercise, Manual, and Modality Logs for complete treatment.       Assessment/Plan  Trying to see how pt does without traction today.  He appears to have less nerve root irritation and improved function.  Progress per Plan of Care and Progress strengthening /stabilization /functional activity           Manual Therapy:         mins  69513;  Therapeutic Exercise:    32     mins  86875;     Neuromuscular Simone:        mins  02013;    Therapeutic Activity:     37     mins  71035;     Gait Training:           mins  49220;     Ultrasound:          mins  81848;   Iontophoresis          mins  46812   Electrical Stimulation:         mins  84712 (MC );  Dry Needling          mins self-pay  Fluidotherapy          mins 28145  Traction          mins 10140  Paraffin:          mins  81892    Timed Treatment:   69   mins   Total Treatment:     69   mins    Magdiel Carty, PT  Physical Therapist

## 2019-08-20 ENCOUNTER — TREATMENT (OUTPATIENT)
Dept: PHYSICAL THERAPY | Facility: CLINIC | Age: 84
End: 2019-08-20

## 2019-08-20 DIAGNOSIS — M54.42 CHRONIC BILATERAL LOW BACK PAIN WITH LEFT-SIDED SCIATICA: Primary | ICD-10-CM

## 2019-08-20 DIAGNOSIS — G89.29 CHRONIC BILATERAL LOW BACK PAIN WITH LEFT-SIDED SCIATICA: Primary | ICD-10-CM

## 2019-08-20 PROCEDURE — 97110 THERAPEUTIC EXERCISES: CPT | Performed by: PHYSICAL THERAPIST

## 2019-08-20 PROCEDURE — 97530 THERAPEUTIC ACTIVITIES: CPT | Performed by: PHYSICAL THERAPIST

## 2019-08-20 PROCEDURE — G0283 ELEC STIM OTHER THAN WOUND: HCPCS | Performed by: PHYSICAL THERAPIST

## 2019-08-20 NOTE — PROGRESS NOTES
Physical Therapy Daily Progress Note        Patient: Radha Varela   : 1935  Diagnosis/ICD-10 Code:  Chronic bilateral low back pain with left-sided sciatica [M54.42, G89.29]  Referring practitioner: Kali Marcano MD  Date of Initial Visit: Type: THERAPY  Noted: 2019  Today's Date: 2019  Patient seen for 23 sessions           Subjective   Radha Varela reports: legs are doing a lot better. Back pain is more in the middle now, left side not as bad.    Objective   PALPATION: tender L5/S1 mid-line  POSTURE: Kyphotic curve throughout the spine.  GAIT: ambulates with single point cane, = step length.  TRANSFERS:  Able to get up from low equipment with SBA, but he did have to use his hands to pull up with.  See Exercise, Manual, and Modality Logs for complete treatment.       Assessment/Plan  Lumbar spinal stenosis symptoms improving. Better gait, improved functional strength. Still a fall risk but not as severe.  Progress per Plan of Care and Progress strengthening /stabilization /functional activity           Manual Therapy:         mins  60612;  Therapeutic Exercise:    30   mins  27592;     Neuromuscular Simone:        mins  68678;    Therapeutic Activity:     20     mins  96706;     Gait Training:           mins  54218;     Ultrasound:          mins  79654;    Electrical Stimulation:   20      mins  61709 ( );  Iontophoresis          mins 07482   Traction          mins  49635  Fluidotherapy          mins  75121  Dry Needling          mins self-pay  Paraffin          mins  09323    Timed Treatment:   50   mins   Total Treatment:     70   mins    Magdiel Carty PT  Physical Therapist

## 2019-08-22 ENCOUNTER — TREATMENT (OUTPATIENT)
Dept: PHYSICAL THERAPY | Facility: CLINIC | Age: 84
End: 2019-08-22

## 2019-08-22 DIAGNOSIS — M54.42 CHRONIC BILATERAL LOW BACK PAIN WITH LEFT-SIDED SCIATICA: Primary | ICD-10-CM

## 2019-08-22 DIAGNOSIS — G89.29 CHRONIC BILATERAL LOW BACK PAIN WITH LEFT-SIDED SCIATICA: Primary | ICD-10-CM

## 2019-08-22 PROCEDURE — 97110 THERAPEUTIC EXERCISES: CPT | Performed by: PHYSICAL THERAPIST

## 2019-08-22 PROCEDURE — G0283 ELEC STIM OTHER THAN WOUND: HCPCS | Performed by: PHYSICAL THERAPIST

## 2019-08-22 PROCEDURE — 97530 THERAPEUTIC ACTIVITIES: CPT | Performed by: PHYSICAL THERAPIST

## 2019-08-26 NOTE — PROGRESS NOTES
Physical Therapy Daily Progress Note        Patient: Radha Varela   : 1935  Diagnosis/ICD-10 Code:  Chronic bilateral low back pain with left-sided sciatica [M54.42, G89.29]  Referring practitioner: Kali Marcano MD  Date of Initial Visit: Type: THERAPY  Noted: 2019  Today's Date: 2019  Patient seen for 24 sessions           Subjective   Radha Varela reports: able to stand maybe 20 minutes at time now, walking longer in the house before needing to sit.  May be seeing a spinal surgeon again.    Objective   PALPATION: tender L5/S1 mid-line  POSTURE: Kyphotic curve throughout the spine.  GAIT: ambulates with single point cane, = step length.  OBSERVATION: able to get on and off the treatment plinth with greater ease now.  See Exercise, Manual, and Modality Logs for complete treatment.       Assessment/Plan  Lumbar spinal stenosis symptoms improving. Everyday life function improving for pt. Rehab has been helpful for him.  Progress per Plan of Care and Progress strengthening /stabilization /functional activity           Manual Therapy:         mins  13876;  Therapeutic Exercise:    30   mins  85822;     Neuromuscular Simone:        mins  60180;    Therapeutic Activity:     20     mins  45114;     Gait Training:           mins  27045;     Ultrasound:          mins  33063;    Electrical Stimulation:   20      mins  12242 ( );  Iontophoresis          mins 39252   Traction          mins  96775  Fluidotherapy          mins  61217  Dry Needling          mins self-pay  Paraffin          mins  22169    Timed Treatment:   50   mins   Total Treatment:     70   mins    Magdiel Carty, PT  Physical Therapist

## 2019-08-27 ENCOUNTER — TREATMENT (OUTPATIENT)
Dept: PHYSICAL THERAPY | Facility: CLINIC | Age: 84
End: 2019-08-27

## 2019-08-27 DIAGNOSIS — M54.42 CHRONIC BILATERAL LOW BACK PAIN WITH LEFT-SIDED SCIATICA: Primary | ICD-10-CM

## 2019-08-27 DIAGNOSIS — G89.29 CHRONIC BILATERAL LOW BACK PAIN WITH LEFT-SIDED SCIATICA: Primary | ICD-10-CM

## 2019-08-27 PROCEDURE — 97110 THERAPEUTIC EXERCISES: CPT | Performed by: PHYSICAL THERAPIST

## 2019-08-27 PROCEDURE — G0283 ELEC STIM OTHER THAN WOUND: HCPCS | Performed by: PHYSICAL THERAPIST

## 2019-08-27 PROCEDURE — 97530 THERAPEUTIC ACTIVITIES: CPT | Performed by: PHYSICAL THERAPIST

## 2019-08-29 ENCOUNTER — TREATMENT (OUTPATIENT)
Dept: PHYSICAL THERAPY | Facility: CLINIC | Age: 84
End: 2019-08-29

## 2019-08-29 DIAGNOSIS — M54.42 CHRONIC BILATERAL LOW BACK PAIN WITH LEFT-SIDED SCIATICA: Primary | ICD-10-CM

## 2019-08-29 DIAGNOSIS — G89.29 CHRONIC BILATERAL LOW BACK PAIN WITH LEFT-SIDED SCIATICA: Primary | ICD-10-CM

## 2019-08-29 PROCEDURE — 97530 THERAPEUTIC ACTIVITIES: CPT | Performed by: PHYSICAL THERAPIST

## 2019-08-29 PROCEDURE — G0283 ELEC STIM OTHER THAN WOUND: HCPCS | Performed by: PHYSICAL THERAPIST

## 2019-08-29 PROCEDURE — 97110 THERAPEUTIC EXERCISES: CPT | Performed by: PHYSICAL THERAPIST

## 2019-08-30 NOTE — PROGRESS NOTES
Physical Therapy Daily Progress Note        Patient: Radha Varela   : 1935  Diagnosis/ICD-10 Code:  Chronic bilateral low back pain with left-sided sciatica [M54.42, G89.29]  Referring practitioner: Kali Marcano MD  Date of Initial Visit: Type: THERAPY  Noted: 2019  Today's Date: 2019  Patient seen for 25 sessions           Subjective   Radha Varela reports: scheduled to see a neurologist about his back, D.O.at  in October.    Objective   PALPATION: tender L5/S1 mid-line  POSTURE: Kyphotic curve throughout the spine.  GAIT: ambulates with single point cane, = step length.  STRENGTH: Quad 4-/5 bilaterally    See Exercise, Manual, and Modality Logs for complete treatment.       Assessment/Plan  Chronic lumbar pain with intermittent LE symptoms improving with PT.  Still warrants additional rehab.  Progress per Plan of Care and Progress strengthening /stabilization /functional activity Reassess progress next visit.           Manual Therapy:         mins  72063;  Therapeutic Exercise:    32   mins  57848;     Neuromuscular Simone:        mins  96686;    Therapeutic Activity:     20     mins  86907;     Gait Training:           mins  97824;     Ultrasound:          mins  35240;    Electrical Stimulation:   20      mins  85194 ( );  Iontophoresis          mins 60583   Traction          mins  24086  Fluidotherapy          mins  74703  Dry Needling          mins self-pay  Paraffin          mins  26154    Timed Treatment:   52   mins   Total Treatment:     72   mins    Magdiel Carty, PT  Physical Therapist

## 2019-09-02 NOTE — PROGRESS NOTES
Physical Therapy Daily Progress Note        Patient: Radha Varela   : 1935  Diagnosis/ICD-10 Code:  Chronic bilateral low back pain with left-sided sciatica [M54.42, G89.29]  Referring practitioner: Kali Marcano MD  Date of Initial Visit: Type: THERAPY  Noted: 2019  Today's Date: 2019  Patient seen for 26 sessions           Subjective   Radha Varela reports: Pt is helping able to stand and walk some longer now.  Only very occasional leg pain now    Objective   PALPATION: tender L5/S1 mid-line  POSTURE: Kyphotic curve throughout the spine.  GAIT: ambulates with single point cane, = step length.    See Exercise, Manual, and Modality Logs for complete treatment.       Assessment/Plan  Chronic lumbar pain with intermittent LE symptoms improving with PT.  Improving his strength will help reduce his fall risk.  Progress per Plan of Care and Progress strengthening /stabilization /functional activity Reassess progress next visit.           Manual Therapy:         mins  36338;  Therapeutic Exercise:    33   mins  28369;     Neuromuscular Simone:        mins  30626;    Therapeutic Activity:     20     mins  16663;     Gait Training:           mins  41002;     Ultrasound:          mins  34118;    Electrical Stimulation:   20      mins  30956 ( );  Iontophoresis          mins 57802   Traction          mins  32384  Fluidotherapy          mins  24108  Dry Needling          mins self-pay  Paraffin          mins  70751    Timed Treatment:   53   mins   Total Treatment:     73   mins    Magdiel Carty PT  Physical Therapist

## 2019-09-03 ENCOUNTER — OFFICE VISIT (OUTPATIENT)
Dept: PHYSICAL THERAPY | Facility: CLINIC | Age: 84
End: 2019-09-03

## 2019-09-03 DIAGNOSIS — G89.29 CHRONIC BILATERAL LOW BACK PAIN WITH LEFT-SIDED SCIATICA: Primary | ICD-10-CM

## 2019-09-03 DIAGNOSIS — M54.42 CHRONIC BILATERAL LOW BACK PAIN WITH LEFT-SIDED SCIATICA: Primary | ICD-10-CM

## 2019-09-03 PROCEDURE — 97530 THERAPEUTIC ACTIVITIES: CPT | Performed by: PHYSICAL THERAPIST

## 2019-09-03 PROCEDURE — 97110 THERAPEUTIC EXERCISES: CPT | Performed by: PHYSICAL THERAPIST

## 2019-09-03 PROCEDURE — G0283 ELEC STIM OTHER THAN WOUND: HCPCS | Performed by: PHYSICAL THERAPIST

## 2019-09-05 ENCOUNTER — OFFICE VISIT (OUTPATIENT)
Dept: PHYSICAL THERAPY | Facility: CLINIC | Age: 84
End: 2019-09-05

## 2019-09-05 DIAGNOSIS — M54.42 CHRONIC BILATERAL LOW BACK PAIN WITH LEFT-SIDED SCIATICA: Primary | ICD-10-CM

## 2019-09-05 DIAGNOSIS — G89.29 CHRONIC BILATERAL LOW BACK PAIN WITH LEFT-SIDED SCIATICA: Primary | ICD-10-CM

## 2019-09-05 PROCEDURE — 97530 THERAPEUTIC ACTIVITIES: CPT | Performed by: PHYSICAL THERAPIST

## 2019-09-05 PROCEDURE — G0283 ELEC STIM OTHER THAN WOUND: HCPCS | Performed by: PHYSICAL THERAPIST

## 2019-09-05 PROCEDURE — 97110 THERAPEUTIC EXERCISES: CPT | Performed by: PHYSICAL THERAPIST

## 2019-09-05 NOTE — PROGRESS NOTES
Re-Assessment / Re-Certification        Patient: Radha Varela   : 1935  Diagnosis/ICD-10 Code:  Chronic bilateral low back pain with left-sided sciatica [M54.42, G89.29]  Referring practitioner: Kali Marcano MD  Date of Initial Visit: 9/3/2019  Today's Date: 2019  Patient seen for 27 sessions      Subjective:   Radha Varela reports: PT has helped a lot. Can stand and walk longer with less back and far less frequent left LE pain.  Subjective Questionnaire: Oswestry: 44  Clinical Progress: improved.  Kyphotic curve decreased from about 55 degrees to 32 degrees.  Home Program Compliance: Yes  Treatment has included: therapeutic exercise, therapeutic activity, electrical stimulation, ultrasound and traction    Subjective Evaluation    Pain  At best pain ratin  At worst pain ratin  Location: Bilateral lumbar pain, worse in the middle and the left lumbar. Spasm if he stands too long.         Objective       Static Posture     Head  Forward.    Shoulders  Rounded.    Thoracic Spine  Hyperkyphosis.    Lumbar Spine   Flattened.     Hip   Hip (Left): Increased flexion.   Hip (Right): Increased flexion.     Comments  32 degree spinal kyphosis.    Tenderness     Additional Tenderness Details  Mid line L4 to S1 and left SI joint area.    Neurological Testing     Reflexes   Left   Patellar (L4): normal (2+)  Achilles (S1): normal (2+)    Right   Patellar (L4): normal (2+)  Achilles (S1): normal (2+)    Active Range of Motion     Lumbar   Flexion: WFL  Extension: 10 degrees   Left lateral flexion: 40 degrees   Right lateral flexion: 45 degrees     Strength/Myotome Testing     Left Hip   Planes of Motion   Flexion: 4  Extension: 4+  Abduction: 4    Right Hip   Planes of Motion   Flexion: 4+  Extension: 4+  Abduction: 4    Left Knee   Flexion: 5  Extension: 5    Right Knee   Flexion: 5  Extension: 5    Left Ankle/Foot   Inversion: 5  Eversion: 5  Great toe extension: 5    Right Ankle/Foot    Inversion: 5  Eversion: 5  Great toe extension: 5    Tests     Lumbar     Left   Positive quadrant.   Negative passive SLR.     Right   Negative passive SLR and quadrant.     Ambulation   Weight-Bearing Status   Weight-Bearing Status (Left): full weight bearing   Weight-Bearing Status (Right): full weight-bearing    Assistive device used: single point cane    Ambulation: Level Surfaces   Ambulation with assistive device: independent    Ambulation: Stairs   Ascend stairs: independent  Railings: one rail  Descend stairs: independent  Railings: one rail    Observational Gait   Gait: crouched   Decreased walking speed and stride length.   Left foot contact pattern: heel to toe  Right foot contact pattern: heel to toe  Left arm swing: decreased  Right arm swing: decreased    Quality of Movement During Gait   Trunk  Forward lean.     Functional Assessment     Comments  Unable to squat beyond 1/3 ROM without support.  Unable to single leg stand without support.  TUG Functional Test:  20 seconds  Modified Oswestry: 44     Assessment & Plan     Assessment  Impairments: abnormal or restricted ROM, impaired balance and impaired physical strength  Functional Limitations: walking and standing    Progress toward previous goals: Partially Met    New Goals  Short-term goals (STG): To be met in 4 weeks  1.  Pt can stay up on his feet for 25 minutes at time.  2.  Pt function improves so that Modified Oswestry score improves to 39.  Long-term goals (LTG): To be met in 8 weeks.  1.  Pt is able to continue HEP at a local [facility] type facility.  2.  Reduce Kyphotic curve by another 7 degrees.  3.  Function improves so that Modified Oswestry score improves to 33.      Recommendations: Continue as planned  Timeframe: 6 weeks  Prognosis to achieve goals: good    PT Signature: Magdiel Carty, PT      Based upon review of the patient's progress and continued therapy plan, it is my medical opinion that Radha Varela should continue physical  [facility]  [address], [address]  [facility] [address] [phone].    Signature: __________________________________  Kali Marcano MD      Therapeutic Exercise:    31     mins  17047;     Therapeutic Activity:     21     mins  22677;       Electrical Stimulation:    20     mins  49279 ( );      Timed Treatment:   52   mins   Total Treatment:     72   mins

## 2019-09-09 NOTE — PROGRESS NOTES
Subjective   Radha Varela reports: Schedule for MRI tomorrow. Pt is very pleased with progress with PT, afraid without it he may regress.    Objective   POSTURE: Kyphotic curve of about 25 degrees/  GAIT: single point cane able to stay more upright for about 10 minutes at time.  TRANSFERS : independent with all.  PALPATION: tender lumbar/sacral junction.    See Exercise, Manual, and Modality Logs for complete treatment.       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  He has improved a lot since starting rehab. He may regress without continuing some kind of structured exercise program.  Progress per Plan of Care and Progress strengthening /stabilization /functional activity           Manual Therapy:         mins  22120;  Therapeutic Exercise:    30     mins  28216;     Neuromuscular Simone:        mins  65965;    Therapeutic Activity:     21     mins  22168;     Gait Training:           mins  94409;     Ultrasound:          mins  58431;   Iontophoresis          mins  90174   Electrical Stimulation:    20     mins  69424 (MC );  Dry Needling          mins self-pay  Fluidotherapy          mins 60058  Traction          mins 13594  Paraffin:          mins  73858    Timed Treatment:   51   mins   Total Treatment:     71   mins    Magdiel Carty, PT  Physical Therapist

## 2019-09-10 ENCOUNTER — OFFICE VISIT (OUTPATIENT)
Dept: PHYSICAL THERAPY | Facility: CLINIC | Age: 84
End: 2019-09-10

## 2019-09-10 DIAGNOSIS — G89.29 CHRONIC BILATERAL LOW BACK PAIN WITH LEFT-SIDED SCIATICA: Primary | ICD-10-CM

## 2019-09-10 DIAGNOSIS — M54.42 CHRONIC BILATERAL LOW BACK PAIN WITH LEFT-SIDED SCIATICA: Primary | ICD-10-CM

## 2019-09-10 PROCEDURE — 97110 THERAPEUTIC EXERCISES: CPT | Performed by: PHYSICAL THERAPIST

## 2019-09-10 PROCEDURE — 97530 THERAPEUTIC ACTIVITIES: CPT | Performed by: PHYSICAL THERAPIST

## 2019-09-10 PROCEDURE — G0283 ELEC STIM OTHER THAN WOUND: HCPCS | Performed by: PHYSICAL THERAPIST

## 2019-09-12 ENCOUNTER — OFFICE VISIT (OUTPATIENT)
Dept: PHYSICAL THERAPY | Facility: CLINIC | Age: 84
End: 2019-09-12

## 2019-09-12 DIAGNOSIS — M54.42 CHRONIC BILATERAL LOW BACK PAIN WITH LEFT-SIDED SCIATICA: Primary | ICD-10-CM

## 2019-09-12 DIAGNOSIS — G89.29 CHRONIC BILATERAL LOW BACK PAIN WITH LEFT-SIDED SCIATICA: Primary | ICD-10-CM

## 2019-09-12 PROCEDURE — G0283 ELEC STIM OTHER THAN WOUND: HCPCS | Performed by: PHYSICAL THERAPIST

## 2019-09-12 PROCEDURE — 97530 THERAPEUTIC ACTIVITIES: CPT | Performed by: PHYSICAL THERAPIST

## 2019-09-12 PROCEDURE — 97110 THERAPEUTIC EXERCISES: CPT | Performed by: PHYSICAL THERAPIST

## 2019-09-12 NOTE — PROGRESS NOTES
Subjective   Radha Varela reports: After MRI, his PCP has decided he needed to go to another physician.    Objective   POSTURE: Kyphotic curve of about 22 degrees  TRANSFERS : independent with all.  PALPATION: tender lumbar/sacral junction.    See Exercise, Manual, and Modality Logs for complete treatment.       Assessment/Plan  Pt is still a fall risk but seems he may be plateauing.  Progress per Plan of Care and Progress strengthening /stabilization /functional activity           Manual Therapy:         mins  44900;  Therapeutic Exercise:    30     mins  24560;     Neuromuscular Simone:        mins  92445;    Therapeutic Activity:     22     mins  46214;     Gait Training:           mins  21371;     Ultrasound:          mins  79078;   Iontophoresis          mins  74442   Electrical Stimulation:    20     mins  59069 ( );  Dry Needling          mins self-pay  Fluidotherapy          mins 18586  Traction          mins 98151  Paraffin:          mins  72956    Timed Treatment:   52   mins   Total Treatment:     72   mins    Magdiel Carty, PT  Physical Therapist

## 2019-09-15 NOTE — PROGRESS NOTES
Subjective   Radha Varela reports: his wife suffered a compression fracture and is hospital awaiting a kyphoplasty.  Pt is looking a lift chairs for himself due to difficulty getting out of chairs.  Has more LBP than he has over the past couple weeks.     Objective   POSTURE: Kyphotic curve of about 24 degrees.  GAIT: single point cane able to stay more upright for about 10-15 minutes at time.  TRANSFERS : independent with all, but is very slow with sit to stand.  PALPATION: tender lumbar/sacral junction.    See Exercise, Manual, and Modality Logs for complete treatment.       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  But still seems to be plateauing.   Progress per Plan of Care and Progress strengthening /stabilization /functional activity 1 x next week, may consider discharging pt.           Manual Therapy:         mins  41454;  Therapeutic Exercise:    30     mins  37388;     Neuromuscular Simone:        mins  78769;    Therapeutic Activity:     20     mins  97628;     Gait Training:           mins  46843;     Ultrasound:        mins  62495;   Iontophoresis          mins  33435   Electrical Stimulation:    20     mins  87649 ( );  Dry Needling          mins self-pay  Fluidotherapy          mins 39738  Traction          mins 30656  Paraffin:          mins  83078    Timed Treatment:   50   mins   Total Treatment:     70   mins    Magdiel Carty, PT  Physical Therapist

## 2020-12-17 RX ORDER — METHYLPREDNISOLONE 4 MG/1
TABLET ORAL
Qty: 21 TABLET | Refills: 0 | Status: SHIPPED | OUTPATIENT
Start: 2020-12-17

## 2021-10-31 RX ORDER — CEPHALEXIN 500 MG/1
500 CAPSULE ORAL 3 TIMES DAILY
Qty: 30 CAPSULE | Refills: 0 | Status: SHIPPED | OUTPATIENT
Start: 2021-10-31 | End: 2021-11-10

## 2023-06-08 RX ORDER — CHOLECALCIFEROL (VITAMIN D3) 125 MCG
10 CAPSULE ORAL NIGHTLY PRN
COMMUNITY

## 2023-06-12 ENCOUNTER — HOSPITAL ENCOUNTER (OUTPATIENT)
Facility: HOSPITAL | Age: 88
Setting detail: HOSPITAL OUTPATIENT SURGERY
Discharge: HOME | End: 2023-06-12
Attending: OPHTHALMOLOGY | Admitting: OPHTHALMOLOGY
Payer: MEDICARE

## 2023-06-12 ENCOUNTER — ANESTHESIA (OUTPATIENT)
Dept: PERIOP | Facility: HOSPITAL | Age: 88
End: 2023-06-12
Payer: MEDICARE

## 2023-06-12 ENCOUNTER — ANESTHESIA EVENT (OUTPATIENT)
Dept: PERIOP | Facility: HOSPITAL | Age: 88
End: 2023-06-12
Payer: MEDICARE

## 2023-06-12 VITALS
HEART RATE: 56 BPM | WEIGHT: 162 LBS | BODY MASS INDEX: 25.43 KG/M2 | HEIGHT: 67 IN | SYSTOLIC BLOOD PRESSURE: 162 MMHG | OXYGEN SATURATION: 95 % | RESPIRATION RATE: 18 BRPM | TEMPERATURE: 98.2 F | DIASTOLIC BLOOD PRESSURE: 64 MMHG

## 2023-06-12 PROCEDURE — 25010000002 EPINEPHRINE PER 0.1 MG: Performed by: OPHTHALMOLOGY

## 2023-06-12 PROCEDURE — V2632 POST CHMBR INTRAOCULAR LENS: HCPCS | Performed by: OPHTHALMOLOGY

## 2023-06-12 PROCEDURE — 25010000002 MIDAZOLAM PER 1MG: Performed by: NURSE ANESTHETIST, CERTIFIED REGISTERED

## 2023-06-12 DEVICE — LENS ACRYSOF IQ 6X13MM SA60WF 20: Type: IMPLANTABLE DEVICE | Site: POSTERIOR CHAMBER | Status: FUNCTIONAL

## 2023-06-12 RX ORDER — TETRACAINE HYDROCHLORIDE 5 MG/ML
1 SOLUTION OPHTHALMIC SEE ADMIN INSTRUCTIONS
Status: DISCONTINUED | OUTPATIENT
Start: 2023-06-12 | End: 2023-06-12 | Stop reason: HOSPADM

## 2023-06-12 RX ORDER — MIDAZOLAM HYDROCHLORIDE 2 MG/2ML
INJECTION, SOLUTION INTRAMUSCULAR; INTRAVENOUS AS NEEDED
Status: DISCONTINUED | OUTPATIENT
Start: 2023-06-12 | End: 2023-06-12 | Stop reason: SURG

## 2023-06-12 RX ORDER — PREDNISOLONE ACETATE 10 MG/ML
1 SUSPENSION/ DROPS OPHTHALMIC SEE ADMIN INSTRUCTIONS
Status: DISCONTINUED | OUTPATIENT
Start: 2023-06-12 | End: 2023-06-12 | Stop reason: HOSPADM

## 2023-06-12 RX ORDER — PREDNISOLONE ACETATE 10 MG/ML
SUSPENSION/ DROPS OPHTHALMIC AS NEEDED
Status: DISCONTINUED | OUTPATIENT
Start: 2023-06-12 | End: 2023-06-12 | Stop reason: HOSPADM

## 2023-06-12 RX ORDER — BALANCED SALT SOLUTION 6.4; .75; .48; .3; 3.9; 1.7 MG/ML; MG/ML; MG/ML; MG/ML; MG/ML; MG/ML
SOLUTION OPHTHALMIC AS NEEDED
Status: DISCONTINUED | OUTPATIENT
Start: 2023-06-12 | End: 2023-06-12 | Stop reason: HOSPADM

## 2023-06-12 RX ORDER — SODIUM CHLORIDE, SODIUM LACTATE, POTASSIUM CHLORIDE, CALCIUM CHLORIDE 600; 310; 30; 20 MG/100ML; MG/100ML; MG/100ML; MG/100ML
1000 INJECTION, SOLUTION INTRAVENOUS CONTINUOUS
Status: DISCONTINUED | OUTPATIENT
Start: 2023-06-12 | End: 2023-06-12 | Stop reason: HOSPADM

## 2023-06-12 RX ORDER — KETAMINE HCL IN NACL, ISO-OSM 100MG/10ML
SYRINGE (ML) INJECTION AS NEEDED
Status: DISCONTINUED | OUTPATIENT
Start: 2023-06-12 | End: 2023-06-12 | Stop reason: SURG

## 2023-06-12 RX ORDER — TETRACAINE HYDROCHLORIDE 5 MG/ML
SOLUTION OPHTHALMIC AS NEEDED
Status: DISCONTINUED | OUTPATIENT
Start: 2023-06-12 | End: 2023-06-12 | Stop reason: HOSPADM

## 2023-06-12 RX ORDER — SODIUM CHLORIDE 0.9 % (FLUSH) 0.9 %
10 SYRINGE (ML) INJECTION AS NEEDED
Status: DISCONTINUED | OUTPATIENT
Start: 2023-06-12 | End: 2023-06-12 | Stop reason: HOSPADM

## 2023-06-12 RX ORDER — POLYMYXIN B SULFATE AND TRIMETHOPRIM 1; 10000 MG/ML; [USP'U]/ML
SOLUTION OPHTHALMIC AS NEEDED
Status: DISCONTINUED | OUTPATIENT
Start: 2023-06-12 | End: 2023-06-12 | Stop reason: HOSPADM

## 2023-06-12 RX ORDER — CYCLOPENT/TROPIC/PHEN/KETR/WAT 1%-1%-2.5%
1 DROPS (EA) OPHTHALMIC (EYE)
Status: COMPLETED | OUTPATIENT
Start: 2023-06-12 | End: 2023-06-12

## 2023-06-12 RX ORDER — POLYMYXIN B SULFATE AND TRIMETHOPRIM 1; 10000 MG/ML; [USP'U]/ML
1 SOLUTION OPHTHALMIC SEE ADMIN INSTRUCTIONS
Status: DISCONTINUED | OUTPATIENT
Start: 2023-06-12 | End: 2023-06-12 | Stop reason: HOSPADM

## 2023-06-12 RX ADMIN — Medication 1 DROP: at 09:22

## 2023-06-12 RX ADMIN — MIDAZOLAM HYDROCHLORIDE 1 MG: 1 INJECTION, SOLUTION INTRAMUSCULAR; INTRAVENOUS at 09:54

## 2023-06-12 RX ADMIN — TETRACAINE HYDROCHLORIDE 1 DROP: 5 SOLUTION OPHTHALMIC at 09:16

## 2023-06-12 RX ADMIN — TETRACAINE HYDROCHLORIDE 1 DROP: 5 SOLUTION OPHTHALMIC at 09:15

## 2023-06-12 RX ADMIN — Medication 1 DROP: at 09:17

## 2023-06-12 RX ADMIN — Medication 5 MG: at 09:54

## 2023-06-12 RX ADMIN — Medication 1 DROP: at 09:27

## 2023-06-12 RX ADMIN — SODIUM CHLORIDE, POTASSIUM CHLORIDE, SODIUM LACTATE AND CALCIUM CHLORIDE 1000 ML: 600; 310; 30; 20 INJECTION, SOLUTION INTRAVENOUS at 09:23

## 2023-06-12 NOTE — ANESTHESIA PREPROCEDURE EVALUATION
Anesthesia Evaluation     Patient summary reviewed and Nursing notes reviewed   no history of anesthetic complications:   NPO Solid Status: > 8 hours  NPO Liquid Status: > 8 hours           Airway   Mallampati: II  TM distance: >3 FB  Neck ROM: full  Possible difficult intubation  Dental - normal exam     Pulmonary    (+) a smoker Former, COPD,sleep apnea, decreased breath sounds  Cardiovascular - normal exam    PT is on anticoagulation therapy    (+) hypertension, valvular problems/murmurs murmur, past MI , CAD, CABGdysrhythmias Paroxysmal Atrial Fib, CHF , CONWAY, PVD, hyperlipidemia,  carotid artery disease      Neuro/Psych  (+) neuromuscular disease, Parkinson's disease, tremors  GI/Hepatic/Renal/Endo    (+) hiatal hernia, GERD, PUD, renal disease CRI    Musculoskeletal     (+) arthralgias, back pain, chronic pain  Abdominal    Substance History   (+) alcohol use     OB/GYN          Other   arthritis,                     Anesthesia Plan    ASA 3     MAC     (Risks and benefits discussed including risk of aspiration, recall and dental damage. All patient questions answered. Will continue with POC.)  intravenous induction     Anesthetic plan, risks, benefits, and alternatives have been provided, discussed and informed consent has been obtained with: patient.  Pre-procedure education provided  Plan discussed with CRNA.

## 2023-06-12 NOTE — DISCHARGE INSTRUCTIONS
[facility] [address] [place] [address]  [place] [address]  PH: [phone]  FAX: [phone]      Post - Operative Instructions for Dr. Carrasco's Cataract Patients      1.  Use your drops as prescribed, wait 5 minutes between each drop.  Eye drops are to be used as instructed during the daytime only. Do not get up in the night to use eye drops.  2.  Continue glaucoma eye drops in both eyes post op.  3.  Securely tape the protective shield over the operated eye at bedtime for the FIRST week only.  4.  Take your regular non-eye medications and continue any eye medications for the  non-operative eye.  5.  For the first week, avoid bending or stooping and lifting anything heavier than 5 pounds.  Also, avoid any blow to the head or eye.  Avoid getting dirty water in the eye.  Avoid sleeping on the side of the operated eye or face.  6.  No driving until you are told to by your physician.  7.  If significant eye pain is not relieved by medication, or you have increased redness, swelling, pain or loss of vision or any symptoms you are unsure of call Dr. Carrasco's office at [phone].      You should expect:    Slight Discomfort  Burning When Using Eye Drops  Blurred Vision and Dilated Pupil  Mild Redness  You Will Not Be Able To Read  Your Glasses May Not Work      PHYSICIAN TO CHOOSE THE APPROPRIATE MEDICATIONS TO BE SENT HOME WITH PATIENT    __x__  Prednisolone Acetate 1% (Pred Forte) ophthalmic solution    ____   Difluprednate (Durezol) .05% ophthalmic emulsion    ____   Vigamox(moxifloxacin) 0.5% ophthalmic solution    __x__   Polymyxin B/Trimethoprim solution    ____   Bacitracin Zinc and polymyxin B sulfate ophthalmic ointment   Start the following eyedrops today as soon as you are home:     Prednisolone Acetate 1% (Pred Forte) ophthalmic solution instill one drop every 2 hours while awake  Polytrim instill one drop every 4 hours    Wait 5 mins between drops. Eye drops are to be  used as instructed during the daytime only. Do not get up in the night to use eye drops.    You may only remove the eye shield to administer eye medications (place shield back over eye after each medication administration); otherwise keep shield on at all times until office visit tomorrow.    Please follow all post op instructions and follow up appointment time from your physician's office included in your discharge packet.  . No pushing, pulling, tugging,  heavy lifting, or strenuous activity.  No major decision making, driving, or drinking alcoholic beverages for 24 hours. ( due to the medications you have  received)  Always use good hand hygiene/washing techniques.  NO driving while taking pain medications.    * if you have an incision:  Check your incision area every day for signs of infection.   Check for:  * more redness, swelling, or pain  *more fluid or blood  *warmth  *pus or bad smell    To assist you in voiding:  Drink plenty of fluids  Listen to running water while attempting to void.    If you are unable to urinate and you have an uncomfortable urge to void or it has been   6 hours since you were discharged, return to the Emergency Room

## 2023-06-12 NOTE — ANESTHESIA POSTPROCEDURE EVALUATION
Patient: Radha Varela    Procedure Summary       Date: 06/12/23 Room / Location: [facility] OR 1 /  [facility]    Anesthesia Start: 0951 Anesthesia Stop: 1014    Procedure: CATARACT PHACO EXTRACTION WITH INTRAOCULAR LENS IMPLANT RIGHT (Right: Eye) Diagnosis:       Age-related nuclear cataract of right eye      (Age-related nuclear cataract of right eye [H25.11])    Surgeons: Charity Carrasco MD Provider: Portia Monson CRNA    Anesthesia Type: MAC ASA Status: 3            Anesthesia Type: MAC    Vitals  Vitals Value Taken Time   /76 06/12/23 1019   Temp 98.2 °F (36.8 °C) 06/12/23 1019   Pulse 58 06/12/23 1019   Resp 18 06/12/23 1019   SpO2 96 % 06/12/23 1019           Post Anesthesia Care and Evaluation    Patient location during evaluation: PHASE II  Patient participation: complete - patient participated  Level of consciousness: awake and alert  Pain score: 0  Pain management: satisfactory to patient    Airway patency: patent  Anesthetic complications: No anesthetic complications  PONV Status: none  Cardiovascular status: acceptable and stable  Respiratory status: acceptable  Hydration status: acceptable    Comments: Vitals signs as noted in nursing documentation as per protocol.

## 2023-06-12 NOTE — OP NOTE
PROCEDURE NOTE      Date of Procedure: 6/12/2023  Patient Name:  Radha Varela  MRN: [id number]  YOB: 1935      PREOPERATIVE DIAGNOSIS:  Visually significant combined form of senile cataract of the Right eye interfering with activities of daily living.    INDICATIONS FOR THE PROCEDURE:  Visually significant combined form of senile cataract of the Right eye interfering with activities of daily living.    POSTOPERATIVE DIAGNOSIS:  Visually significant combined form of senile cataract of the Right eye interfering with activities of daily living. Hard lens.    SURGEON:  Charity Carrasco M.D.    NAME OF PROCEDURE:  Right cataract extraction with posterior chamber intraocular lens implant. Lens used: +   Implant Name Type Inv. Item Serial No.  Lot No. LRB No. Used Action   LENS ACRYSOF IQ 6X13MM SA60WF 20 - [id number] - [id number] Implant LENS ACRYSOF IQ 6X13MM [id number] 20 [id number] THERESE NA Right 1 Implanted     CDE: 19.24 of which ~14 in the bag    ANESTHESIA:  Topical with MAC.    COMPLICATIONS:  None.    ESTIMATED BLOOD LOSS:  Less than 1cc.    DETAILS OF THE PROCEDURE:  After receiving appropriate informed consent and confirming and marking the eye to be operated upon, the patient was wheeled into the operating room. After confirming adequate anesthesia, the patient was prepped and draped in a standard sterile ophthalmic fashion. A lid speculum was then placed in the eye.  A 0.8 mm metal blade was then used to make two limbal paracenteses and the anterior chamber was reformed with Viscoat.  A 2.4 mm metal keratome was then used to make a superonasal clear cornea tunneled incision.  A cystotome was used to puncture the anterior capsule and initiate a capsular flap.  Utrata forceps were used to complete a continuous curvilinear capsulorrhexis.  BSS on a [place] hydrodissection cannula was then used to hydrodissect and hydrodelineate the nucleus.  The nucleus was then  phacoemulsified using a divide and conquertechnique.  CDE was 19.24%.  Remaining cortex was removed with bimanual I/A.  Posterior capsular polish was performed.  The capsular bag was then reformed with Provisc and a   Implant Name Type Inv. Item Serial No.  Lot No. LRB No. Used Action   LENS ACRYSOF IQ 6X13MM SA60WF 20 - [id number] - [id number] Implant LENS ACRYSOF IQ 6X13MM SA60WF 20 [id number] THERESE NA Right 1 Implanted     lens implant, was then placed in the bag without event.  The Provisc was then removed with bimanual irrigation and aspiration and the anterior chamber reformed with BSS.  The wounds were hydrated as necessary to maintain a water tight anterior chamber. Intracameral Moxifloxacin 1mg/0.1ml was administered and 5% povidone iodine solution was placed on the ocular surface. At the conclusion of the procedure, the lid speculum was removed and the patient undraped.  A drop of Polytrim and prednisolone acetate 1%  and shield were placed over the eye.  The patient left the room in good condition having tolerated the procedure well.    Charity Carrasco MD

## 2023-08-13 ENCOUNTER — HOSPITAL ENCOUNTER (EMERGENCY)
Facility: HOSPITAL | Age: 88
Discharge: HOME | End: 2023-08-13
Attending: EMERGENCY MEDICINE | Admitting: EMERGENCY MEDICINE
Payer: MEDICARE

## 2023-08-13 ENCOUNTER — APPOINTMENT (OUTPATIENT)
Dept: CT IMAGING | Facility: HOSPITAL | Age: 88
End: 2023-08-13
Payer: MEDICARE

## 2023-08-13 ENCOUNTER — APPOINTMENT (OUTPATIENT)
Dept: GENERAL RADIOLOGY | Facility: HOSPITAL | Age: 88
End: 2023-08-13
Payer: MEDICARE

## 2023-08-13 VITALS
RESPIRATION RATE: 17 BRPM | HEART RATE: 63 BPM | SYSTOLIC BLOOD PRESSURE: 149 MMHG | HEIGHT: 67 IN | OXYGEN SATURATION: 97 % | WEIGHT: 162 LBS | DIASTOLIC BLOOD PRESSURE: 76 MMHG | BODY MASS INDEX: 25.43 KG/M2 | TEMPERATURE: 97.2 F

## 2023-08-13 DIAGNOSIS — I71.40 ABDOMINAL AORTIC ANEURYSM (AAA) WITHOUT RUPTURE, UNSPECIFIED PART: ICD-10-CM

## 2023-08-13 DIAGNOSIS — B02.9 HERPES ZOSTER WITHOUT COMPLICATION: ICD-10-CM

## 2023-08-13 DIAGNOSIS — I71.40 ABDOMINAL AORTIC ANEURYSM (AAA), UNSPECIFIED PART, UNSPECIFIED WHETHER RUPTURED: ICD-10-CM

## 2023-08-13 DIAGNOSIS — R19.7 DIARRHEA, UNSPECIFIED TYPE: Primary | ICD-10-CM

## 2023-08-13 LAB
ALBUMIN SERPL-MCNC: 4.2 G/DL (ref 3.5–5.2)
ALBUMIN/GLOB SERPL: 1.7 G/DL
ALP SERPL-CCNC: 93 U/L (ref 39–117)
ALT SERPL W P-5'-P-CCNC: <5 U/L (ref 1–41)
ANION GAP SERPL CALCULATED.3IONS-SCNC: 11.3 MMOL/L (ref 5–15)
AST SERPL-CCNC: 9 U/L (ref 1–40)
BASOPHILS # BLD AUTO: 0.04 10*3/MM3 (ref 0–0.2)
BASOPHILS NFR BLD AUTO: 0.6 % (ref 0–1.5)
BILIRUB SERPL-MCNC: 0.3 MG/DL (ref 0–1.2)
BILIRUB UR QL STRIP: NEGATIVE
BUN SERPL-MCNC: 36 MG/DL (ref 8–23)
BUN/CREAT SERPL: 16.1 (ref 7–25)
CALCIUM SPEC-SCNC: 8.5 MG/DL (ref 8.6–10.5)
CHLORIDE SERPL-SCNC: 102 MMOL/L (ref 98–107)
CLARITY UR: CLEAR
CO2 SERPL-SCNC: 24.7 MMOL/L (ref 22–29)
COLOR UR: YELLOW
CREAT SERPL-MCNC: 2.24 MG/DL (ref 0.76–1.27)
DEPRECATED RDW RBC AUTO: 47.9 FL (ref 37–54)
EGFRCR SERPLBLD CKD-EPI 2021: 27.7 ML/MIN/1.73
EOSINOPHIL # BLD AUTO: 0.39 10*3/MM3 (ref 0–0.4)
EOSINOPHIL NFR BLD AUTO: 6.1 % (ref 0.3–6.2)
ERYTHROCYTE [DISTWIDTH] IN BLOOD BY AUTOMATED COUNT: 13.1 % (ref 12.3–15.4)
GLOBULIN UR ELPH-MCNC: 2.5 GM/DL
GLUCOSE BLDC GLUCOMTR-MCNC: 101 MG/DL (ref 70–130)
GLUCOSE SERPL-MCNC: 99 MG/DL (ref 65–99)
GLUCOSE UR STRIP-MCNC: NEGATIVE MG/DL
HCT VFR BLD AUTO: 33.7 % (ref 37.5–51)
HGB BLD-MCNC: 10.7 G/DL (ref 13–17.7)
HGB UR QL STRIP.AUTO: NEGATIVE
HOLD SPECIMEN: NORMAL
HOLD SPECIMEN: NORMAL
IMM GRANULOCYTES # BLD AUTO: 0.04 10*3/MM3 (ref 0–0.05)
IMM GRANULOCYTES NFR BLD AUTO: 0.6 % (ref 0–0.5)
KETONES UR QL STRIP: NEGATIVE
LEUKOCYTE ESTERASE UR QL STRIP.AUTO: NEGATIVE
LYMPHOCYTES # BLD AUTO: 0.84 10*3/MM3 (ref 0.7–3.1)
LYMPHOCYTES NFR BLD AUTO: 13.1 % (ref 19.6–45.3)
MAGNESIUM SERPL-MCNC: 2.2 MG/DL (ref 1.6–2.4)
MCH RBC QN AUTO: 31.7 PG (ref 26.6–33)
MCHC RBC AUTO-ENTMCNC: 31.8 G/DL (ref 31.5–35.7)
MCV RBC AUTO: 99.7 FL (ref 79–97)
MONOCYTES # BLD AUTO: 0.95 10*3/MM3 (ref 0.1–0.9)
MONOCYTES NFR BLD AUTO: 14.8 % (ref 5–12)
NEUTROPHILS NFR BLD AUTO: 4.15 10*3/MM3 (ref 1.7–7)
NEUTROPHILS NFR BLD AUTO: 64.8 % (ref 42.7–76)
NITRITE UR QL STRIP: NEGATIVE
NRBC BLD AUTO-RTO: 0 /100 WBC (ref 0–0.2)
PH UR STRIP.AUTO: 5.5 [PH] (ref 5–8)
PLATELET # BLD AUTO: 121 10*3/MM3 (ref 140–450)
PMV BLD AUTO: 9.2 FL (ref 6–12)
POTASSIUM SERPL-SCNC: 3.6 MMOL/L (ref 3.5–5.2)
PROT SERPL-MCNC: 6.7 G/DL (ref 6–8.5)
PROT UR QL STRIP: NEGATIVE
RBC # BLD AUTO: 3.38 10*6/MM3 (ref 4.14–5.8)
SODIUM SERPL-SCNC: 138 MMOL/L (ref 136–145)
SP GR UR STRIP: 1.01 (ref 1–1.03)
TROPONIN T SERPL HS-MCNC: 41 NG/L
TROPONIN T SERPL HS-MCNC: 47 NG/L
UROBILINOGEN UR QL STRIP: NORMAL
WBC NRBC COR # BLD: 6.41 10*3/MM3 (ref 3.4–10.8)
WHOLE BLOOD HOLD COAG: NORMAL
WHOLE BLOOD HOLD SPECIMEN: NORMAL

## 2023-08-13 PROCEDURE — 99284 EMERGENCY DEPT VISIT MOD MDM: CPT

## 2023-08-13 PROCEDURE — 82948 REAGENT STRIP/BLOOD GLUCOSE: CPT

## 2023-08-13 PROCEDURE — 71045 X-RAY EXAM CHEST 1 VIEW: CPT

## 2023-08-13 PROCEDURE — 84484 ASSAY OF TROPONIN QUANT: CPT

## 2023-08-13 PROCEDURE — 81003 URINALYSIS AUTO W/O SCOPE: CPT

## 2023-08-13 PROCEDURE — 80053 COMPREHEN METABOLIC PANEL: CPT

## 2023-08-13 PROCEDURE — 36415 COLL VENOUS BLD VENIPUNCTURE: CPT

## 2023-08-13 PROCEDURE — 96360 HYDRATION IV INFUSION INIT: CPT

## 2023-08-13 PROCEDURE — 74176 CT ABD & PELVIS W/O CONTRAST: CPT

## 2023-08-13 PROCEDURE — 83735 ASSAY OF MAGNESIUM: CPT

## 2023-08-13 PROCEDURE — 84484 ASSAY OF TROPONIN QUANT: CPT | Performed by: EMERGENCY MEDICINE

## 2023-08-13 PROCEDURE — 85025 COMPLETE CBC W/AUTO DIFF WBC: CPT

## 2023-08-13 RX ORDER — ACYCLOVIR 50 MG/G
1 OINTMENT TOPICAL EVERY 4 HOURS
Qty: 30 G | Refills: 0 | Status: SHIPPED | OUTPATIENT
Start: 2023-08-13

## 2023-08-13 RX ORDER — SODIUM CHLORIDE 0.9 % (FLUSH) 0.9 %
10 SYRINGE (ML) INJECTION AS NEEDED
Status: DISCONTINUED | OUTPATIENT
Start: 2023-08-13 | End: 2023-08-13 | Stop reason: HOSPADM

## 2023-08-13 RX ORDER — HYDROCODONE BITARTRATE AND ACETAMINOPHEN 5; 325 MG/1; MG/1
1 TABLET ORAL EVERY 6 HOURS PRN
Qty: 10 TABLET | Refills: 0 | Status: SHIPPED | OUTPATIENT
Start: 2023-08-13

## 2023-08-13 RX ADMIN — SODIUM CHLORIDE 500 ML: 9 INJECTION, SOLUTION INTRAVENOUS at 09:38

## 2023-08-13 NOTE — ED NOTES
[facility] called back with Dr. Hernandez requesting MD Rj. Call transferred.

## 2023-08-13 NOTE — ED NOTES
Called [facility] [[email or web address]] at this time requesting Vascular Surgery consult per MD Rj.  [facility] [email or web address] stated they would call us back.

## 2023-08-14 NOTE — ED PROVIDER NOTES
Subjective   History of Present Illness  87-year-old male presents to the ED with a chief complaint of generalized weakness fatigue and diarrhea.  Patient has had moderate diarrhea for the last 5 days to week it has improved slightly.  He is feeling generally weak and has had some decreased urinary output and his urine has been dark.  He is not having any chest pain.  No shortness of breath.  No cough or wheeze.  No other complaints at this time.        Review of Systems   Constitutional:  Positive for fatigue.   Cardiovascular:  Negative for chest pain, palpitations and leg swelling.   Gastrointestinal:  Positive for diarrhea. Negative for constipation, nausea and vomiting.   Neurological:  Positive for weakness.   All other systems reviewed and are negative.    Past Medical History:   Diagnosis Date    Aneurysm     Arthritis     Cardiac murmur     Carotid stenosis     Celiac artery stenosis     Chest pain     CHF (congestive heart failure)     Chronic kidney disease     COPD (chronic obstructive pulmonary disease)     Coronary artery disease     Dyslipidemia     Elevated cholesterol     GERD (gastroesophageal reflux disease)     Hiatal hernia     History of exercise stress test     History of nuclear stress test     History of pneumonia     Hypertension     Lumbar degenerative disc disease     MI, old 1995    Osteoarthritis     PAD (peripheral artery disease)     Parkinson's disease     Paroxysmal A-fib     Peptic ulceration     Psoriasis     Renal artery stenosis     Sleep apnea     uses o2 @ HS    Spinal stenosis     Tremor     Vertebral artery stenosis        Allergies   Allergen Reactions    Celebrex [Celecoxib] Anaphylaxis    Sulfa Antibiotics Anaphylaxis       Past Surgical History:   Procedure Laterality Date    ABDOMINAL AORTIC ANEURYSM REPAIR WITH ENDOGRAFT  1994    APPENDECTOMY      CATARACT EXTRACTION W/ INTRAOCULAR LENS IMPLANT Left 12/10/2018    Procedure: CATARACT PHACO EXTRACTION WITH INTRAOCULAR  LENS IMPLANT LEFT;  Surgeon: Charity Carrasco MD;  Location: [facility];  Service: Ophthalmology    CATARACT EXTRACTION W/ INTRAOCULAR LENS IMPLANT Right 2023    Procedure: CATARACT PHACO EXTRACTION WITH INTRAOCULAR LENS IMPLANT RIGHT;  Surgeon: Charity Carrasco MD;  Location: [facility];  Service: Ophthalmology;  Laterality: Right;    COLONOSCOPY      CORONARY ARTERY BYPASS GRAFT      ENDOSCOPY      SHOULDER ROTATOR CUFF REPAIR Right     SKIN BIOPSY         Family History   Problem Relation Age of Onset    Other Mother          of old age    No Known Problems Father        Social History     Socioeconomic History    Marital status:     Number of children: 3   Tobacco Use    Smoking status: Former     Packs/day: 3.00     Years: 15.00     Pack years: 45.00     Types: Cigarettes     Quit date:      Years since quittin.6    Smokeless tobacco: Never    Tobacco comments:     quit 50 years ago   Vaping Use    Vaping Use: Never used   Substance and Sexual Activity    Alcohol use: Not Currently     Alcohol/week: 2.0 standard drinks     Types: 2 Glasses of wine per week     Comment: DAILY    Drug use: No    Sexual activity: Defer           Objective   Physical Exam  Vitals and nursing note reviewed.   Constitutional:       Appearance: Normal appearance.   HENT:      Head: Normocephalic and atraumatic.   Eyes:      Extraocular Movements: Extraocular movements intact.      Pupils: Pupils are equal, round, and reactive to light.   Cardiovascular:      Pulses: Normal pulses.   Pulmonary:      Effort: Pulmonary effort is normal.      Breath sounds: Normal breath sounds.   Musculoskeletal:         General: No swelling or tenderness.   Skin:     Comments: Vesicular rash along dermatomal pattern from right back along right lateral chest wall consistent with herpes zoster   Neurological:      General: No focal deficit present.      Mental Status: He is alert and oriented to person, place, and time.        Procedures           ED Course  ED Course as of 08/14/23 1713   Sun Aug 13, 2023   0939 EKG interpreted by me.  Sinus rhythm.  Bradycardic.  Frequent PVCs.  Left bundle branch block.  No obvious ST or T wave changes.  Nonspecific EKG [CG]      ED Course User Index  [CG] Chris De La Rosa                                            Medical Decision Making  Problems Addressed:  Abdominal aortic aneurysm (AAA) without rupture, unspecified part: complicated acute illness or injury  Abdominal aortic aneurysm (AAA), unspecified part, unspecified whether ruptured: complicated acute illness or injury  Diarrhea, unspecified type: complicated acute illness or injury  Herpes zoster without complication: complicated acute illness or injury    Amount and/or Complexity of Data Reviewed  Labs: ordered.  Radiology: ordered.    Risk  Prescription drug management.      Chief complaint: Diarrhea    Differential diagnosis includes but not limited to: Gastroenteritis, colitis, diverticulitis, other    Patient arrives stable, afebrile, without respiratory distress with initial vitals interpreted by myself.  Pertinent initial vitals include within normal limits    Plan: Symptomatic treatment IV fluid hydration CT imaging    Results from initial plan were reviewed and interpreted by myself and include: Mild ADITHYA.  Minimal elevation in his creatinine.  Further labs did not show significant elevation in white blood cell count.  Chronic anemia.  CT negative for acute process.  Does show known abnormalities of his aorta.  Discussed this with the patient and his daughter at bedside who indicates that he has previously been told by [facility] earlier last year that he is not a candidate for any type of repair on his aorta.    Consultations N/A    Reevaluation resting comfortably.  Feeling better.    Discussion: See note above.  Appropriate for discharge and follow-up outpatient as needed.  Family is agreeable to  this plan.    Diagnostic information from other sources includes: Review of previous visits, prior labs, prior imaging, available notes from prior evaluations or visits with specialists, medication list, allergies, past medical history, past surgical history    Interventions in the ED included: IV fluid rehydration.    Disposition plan: Discharge      Final diagnoses:   Diarrhea, unspecified type   Herpes zoster without complication   Abdominal aortic aneurysm (AAA), unspecified part, unspecified whether ruptured   Abdominal aortic aneurysm (AAA) without rupture, unspecified part       ED Disposition  ED Disposition       ED Disposition   Discharge    Condition   Stable    Comment   --               No follow-up provider specified.       Medication List        New Prescriptions      acyclovir 5 % ointment  Commonly known as: Zovirax  Apply 1 application  topically to the appropriate area as directed Every 4 (Four) Hours.     HYDROcodone-acetaminophen 5-325 MG per tablet  Commonly known as: NORCO  Take 1 tablet by mouth Every 6 (Six) Hours As Needed for Severe Pain.               Where to Get Your Medications        These medications were sent to [facility] - [place], [place] - [address] [place] [address] [address] - [phone]  - [phone] [address] [place] [id number]      Phone: [phone]   acyclovir 5 % ointment  HYDROcodone-acetaminophen 5-325 MG per tablet            Chris De La Rosa, DO  08/14/23 9019

## 2023-08-22 RX ORDER — ACYCLOVIR 50 MG/G
1 OINTMENT TOPICAL 4 TIMES DAILY
Qty: 30 G | Refills: 3 | Status: SHIPPED | OUTPATIENT
Start: 2023-08-22 | End: 2023-08-25 | Stop reason: HOSPADM

## 2023-08-23 ENCOUNTER — APPOINTMENT (OUTPATIENT)
Dept: CT IMAGING | Facility: HOSPITAL | Age: 88
DRG: 392 | End: 2023-08-23
Payer: MEDICARE

## 2023-08-23 ENCOUNTER — APPOINTMENT (OUTPATIENT)
Dept: GENERAL RADIOLOGY | Facility: HOSPITAL | Age: 88
DRG: 392 | End: 2023-08-23
Payer: MEDICARE

## 2023-08-23 ENCOUNTER — HOSPITAL ENCOUNTER (INPATIENT)
Facility: HOSPITAL | Age: 88
LOS: 1 days | Discharge: HOME | DRG: 392 | End: 2023-08-25
Attending: STUDENT IN AN ORGANIZED HEALTH CARE EDUCATION/TRAINING PROGRAM | Admitting: INTERNAL MEDICINE
Payer: MEDICARE

## 2023-08-23 DIAGNOSIS — E87.6 HYPOKALEMIA: ICD-10-CM

## 2023-08-23 DIAGNOSIS — B02.9 HERPES ZOSTER WITHOUT COMPLICATION: ICD-10-CM

## 2023-08-23 DIAGNOSIS — I71.40 ABDOMINAL AORTIC ANEURYSM (AAA) WITHOUT RUPTURE, UNSPECIFIED PART: ICD-10-CM

## 2023-08-23 DIAGNOSIS — F02.B0 MODERATE DEMENTIA DUE TO PARKINSON'S DISEASE, WITHOUT BEHAVIORAL DISTURBANCE, PSYCHOTIC DISTURBANCE, MOOD DISTURBANCE, OR ANXIETY: ICD-10-CM

## 2023-08-23 DIAGNOSIS — R77.8 ELEVATED TROPONIN: ICD-10-CM

## 2023-08-23 DIAGNOSIS — I71.40 ABDOMINAL AORTIC ANEURYSM (AAA), UNSPECIFIED PART, UNSPECIFIED WHETHER RUPTURED: ICD-10-CM

## 2023-08-23 DIAGNOSIS — R53.1 WEAKNESS GENERALIZED: ICD-10-CM

## 2023-08-23 DIAGNOSIS — G20 MODERATE DEMENTIA DUE TO PARKINSON'S DISEASE, WITHOUT BEHAVIORAL DISTURBANCE, PSYCHOTIC DISTURBANCE, MOOD DISTURBANCE, OR ANXIETY: ICD-10-CM

## 2023-08-23 DIAGNOSIS — R19.7 DIARRHEA, UNSPECIFIED TYPE: Primary | ICD-10-CM

## 2023-08-23 PROBLEM — I25.10 CORONARY ARTERY DISEASE INVOLVING NATIVE CORONARY ARTERY OF NATIVE HEART WITHOUT ANGINA PECTORIS: Status: ACTIVE | Noted: 2023-08-23

## 2023-08-23 PROBLEM — G20.A1 PARKINSON'S DISEASE DEMENTIA: Status: ACTIVE | Noted: 2023-08-23

## 2023-08-23 PROBLEM — F02.80 PARKINSON'S DISEASE DEMENTIA: Status: ACTIVE | Noted: 2023-08-23

## 2023-08-23 LAB
ALBUMIN SERPL-MCNC: 4.2 G/DL (ref 3.5–5.2)
ALBUMIN/GLOB SERPL: 1.8 G/DL
ALP SERPL-CCNC: 82 U/L (ref 39–117)
ALT SERPL W P-5'-P-CCNC: <5 U/L (ref 1–41)
ANION GAP SERPL CALCULATED.3IONS-SCNC: 12.1 MMOL/L (ref 5–15)
AST SERPL-CCNC: 14 U/L (ref 1–40)
BASOPHILS # BLD AUTO: 0.05 10*3/MM3 (ref 0–0.2)
BASOPHILS NFR BLD AUTO: 0.7 % (ref 0–1.5)
BILIRUB SERPL-MCNC: 0.5 MG/DL (ref 0–1.2)
BUN SERPL-MCNC: 28 MG/DL (ref 8–23)
BUN/CREAT SERPL: 12.5 (ref 7–25)
CALCIUM SPEC-SCNC: 9 MG/DL (ref 8.6–10.5)
CHLORIDE SERPL-SCNC: 98 MMOL/L (ref 98–107)
CO2 SERPL-SCNC: 26.9 MMOL/L (ref 22–29)
CREAT SERPL-MCNC: 2.24 MG/DL (ref 0.76–1.27)
DEPRECATED RDW RBC AUTO: 44.9 FL (ref 37–54)
EGFRCR SERPLBLD CKD-EPI 2021: 27.7 ML/MIN/1.73
EOSINOPHIL # BLD AUTO: 0.18 10*3/MM3 (ref 0–0.4)
EOSINOPHIL NFR BLD AUTO: 2.5 % (ref 0.3–6.2)
ERYTHROCYTE [DISTWIDTH] IN BLOOD BY AUTOMATED COUNT: 12.9 % (ref 12.3–15.4)
FLUAV SUBTYP SPEC NAA+PROBE: NOT DETECTED
FLUBV RNA ISLT QL NAA+PROBE: NOT DETECTED
GEN 5 2HR TROPONIN T REFLEX: 68 NG/L
GLOBULIN UR ELPH-MCNC: 2.3 GM/DL
GLUCOSE SERPL-MCNC: 99 MG/DL (ref 65–99)
HCT VFR BLD AUTO: 32.2 % (ref 37.5–51)
HGB BLD-MCNC: 10.7 G/DL (ref 13–17.7)
HOLD SPECIMEN: NORMAL
HOLD SPECIMEN: NORMAL
IMM GRANULOCYTES # BLD AUTO: 0.04 10*3/MM3 (ref 0–0.05)
IMM GRANULOCYTES NFR BLD AUTO: 0.6 % (ref 0–0.5)
LYMPHOCYTES # BLD AUTO: 1.17 10*3/MM3 (ref 0.7–3.1)
LYMPHOCYTES NFR BLD AUTO: 16.5 % (ref 19.6–45.3)
MAGNESIUM SERPL-MCNC: 2.2 MG/DL (ref 1.6–2.4)
MCH RBC QN AUTO: 31.8 PG (ref 26.6–33)
MCHC RBC AUTO-ENTMCNC: 33.2 G/DL (ref 31.5–35.7)
MCV RBC AUTO: 95.8 FL (ref 79–97)
MONOCYTES # BLD AUTO: 0.75 10*3/MM3 (ref 0.1–0.9)
MONOCYTES NFR BLD AUTO: 10.6 % (ref 5–12)
NEUTROPHILS NFR BLD AUTO: 4.9 10*3/MM3 (ref 1.7–7)
NEUTROPHILS NFR BLD AUTO: 69.1 % (ref 42.7–76)
NRBC BLD AUTO-RTO: 0 /100 WBC (ref 0–0.2)
NT-PROBNP SERPL-MCNC: 2549 PG/ML (ref 0–1800)
PLATELET # BLD AUTO: 129 10*3/MM3 (ref 140–450)
PMV BLD AUTO: 9.2 FL (ref 6–12)
POTASSIUM SERPL-SCNC: 2.8 MMOL/L (ref 3.5–5.2)
PROT SERPL-MCNC: 6.5 G/DL (ref 6–8.5)
RBC # BLD AUTO: 3.36 10*6/MM3 (ref 4.14–5.8)
SARS-COV-2 RNA RESP QL NAA+PROBE: NOT DETECTED
SODIUM SERPL-SCNC: 137 MMOL/L (ref 136–145)
TROPONIN T DELTA: -7 NG/L
TROPONIN T SERPL HS-MCNC: 75 NG/L
WBC NRBC COR # BLD: 7.09 10*3/MM3 (ref 3.4–10.8)
WHOLE BLOOD HOLD COAG: NORMAL
WHOLE BLOOD HOLD SPECIMEN: NORMAL

## 2023-08-23 PROCEDURE — 83880 ASSAY OF NATRIURETIC PEPTIDE: CPT | Performed by: PHYSICIAN ASSISTANT

## 2023-08-23 PROCEDURE — 84484 ASSAY OF TROPONIN QUANT: CPT | Performed by: STUDENT IN AN ORGANIZED HEALTH CARE EDUCATION/TRAINING PROGRAM

## 2023-08-23 PROCEDURE — G0378 HOSPITAL OBSERVATION PER HR: HCPCS

## 2023-08-23 PROCEDURE — 80053 COMPREHEN METABOLIC PANEL: CPT | Performed by: STUDENT IN AN ORGANIZED HEALTH CARE EDUCATION/TRAINING PROGRAM

## 2023-08-23 PROCEDURE — 93005 ELECTROCARDIOGRAM TRACING: CPT | Performed by: STUDENT IN AN ORGANIZED HEALTH CARE EDUCATION/TRAINING PROGRAM

## 2023-08-23 PROCEDURE — 71045 X-RAY EXAM CHEST 1 VIEW: CPT

## 2023-08-23 PROCEDURE — 74176 CT ABD & PELVIS W/O CONTRAST: CPT

## 2023-08-23 PROCEDURE — 36415 COLL VENOUS BLD VENIPUNCTURE: CPT

## 2023-08-23 PROCEDURE — 99285 EMERGENCY DEPT VISIT HI MDM: CPT

## 2023-08-23 PROCEDURE — 87636 SARSCOV2 & INF A&B AMP PRB: CPT | Performed by: STUDENT IN AN ORGANIZED HEALTH CARE EDUCATION/TRAINING PROGRAM

## 2023-08-23 PROCEDURE — 83735 ASSAY OF MAGNESIUM: CPT | Performed by: STUDENT IN AN ORGANIZED HEALTH CARE EDUCATION/TRAINING PROGRAM

## 2023-08-23 PROCEDURE — 85025 COMPLETE CBC W/AUTO DIFF WBC: CPT | Performed by: STUDENT IN AN ORGANIZED HEALTH CARE EDUCATION/TRAINING PROGRAM

## 2023-08-23 PROCEDURE — 25010000002 HEPARIN (PORCINE) PER 1000 UNITS: Performed by: INTERNAL MEDICINE

## 2023-08-23 RX ORDER — ROPINIROLE 0.25 MG/1
0.5 TABLET, FILM COATED ORAL EVERY 8 HOURS SCHEDULED
Status: DISCONTINUED | OUTPATIENT
Start: 2023-08-23 | End: 2023-08-25 | Stop reason: HOSPADM

## 2023-08-23 RX ORDER — ACETAMINOPHEN 650 MG/1
650 SUPPOSITORY RECTAL EVERY 4 HOURS PRN
Status: DISCONTINUED | OUTPATIENT
Start: 2023-08-23 | End: 2023-08-25 | Stop reason: HOSPADM

## 2023-08-23 RX ORDER — AMLODIPINE BESYLATE 5 MG/1
5 TABLET ORAL DAILY
Status: DISCONTINUED | OUTPATIENT
Start: 2023-08-24 | End: 2023-08-25 | Stop reason: HOSPADM

## 2023-08-23 RX ORDER — ACETAMINOPHEN 325 MG/1
650 TABLET ORAL EVERY 4 HOURS PRN
Status: DISCONTINUED | OUTPATIENT
Start: 2023-08-23 | End: 2023-08-25 | Stop reason: HOSPADM

## 2023-08-23 RX ORDER — SODIUM CHLORIDE 9 MG/ML
40 INJECTION, SOLUTION INTRAVENOUS AS NEEDED
Status: DISCONTINUED | OUTPATIENT
Start: 2023-08-23 | End: 2023-08-25 | Stop reason: HOSPADM

## 2023-08-23 RX ORDER — HEPARIN SODIUM 5000 [USP'U]/ML
5000 INJECTION, SOLUTION INTRAVENOUS; SUBCUTANEOUS EVERY 12 HOURS SCHEDULED
Status: DISCONTINUED | OUTPATIENT
Start: 2023-08-23 | End: 2023-08-25 | Stop reason: HOSPADM

## 2023-08-23 RX ORDER — SODIUM CHLORIDE 0.9 % (FLUSH) 0.9 %
10 SYRINGE (ML) INJECTION AS NEEDED
Status: DISCONTINUED | OUTPATIENT
Start: 2023-08-23 | End: 2023-08-25 | Stop reason: HOSPADM

## 2023-08-23 RX ORDER — POTASSIUM CHLORIDE 750 MG/1
40 CAPSULE, EXTENDED RELEASE ORAL ONCE
Status: COMPLETED | OUTPATIENT
Start: 2023-08-23 | End: 2023-08-23

## 2023-08-23 RX ORDER — ASPIRIN 81 MG/1
81 TABLET ORAL DAILY
Status: DISCONTINUED | OUTPATIENT
Start: 2023-08-24 | End: 2023-08-25 | Stop reason: HOSPADM

## 2023-08-23 RX ORDER — SODIUM CHLORIDE 0.9 % (FLUSH) 0.9 %
10 SYRINGE (ML) INJECTION EVERY 12 HOURS SCHEDULED
Status: DISCONTINUED | OUTPATIENT
Start: 2023-08-23 | End: 2023-08-25 | Stop reason: HOSPADM

## 2023-08-23 RX ORDER — HYDROCODONE BITARTRATE AND ACETAMINOPHEN 5; 325 MG/1; MG/1
1 TABLET ORAL EVERY 6 HOURS PRN
Status: DISCONTINUED | OUTPATIENT
Start: 2023-08-23 | End: 2023-08-25 | Stop reason: HOSPADM

## 2023-08-23 RX ORDER — CHOLECALCIFEROL (VITAMIN D3) 125 MCG
10 CAPSULE ORAL NIGHTLY PRN
Status: DISCONTINUED | OUTPATIENT
Start: 2023-08-23 | End: 2023-08-25 | Stop reason: HOSPADM

## 2023-08-23 RX ORDER — ACETAMINOPHEN 160 MG/5ML
650 SOLUTION ORAL EVERY 4 HOURS PRN
Status: DISCONTINUED | OUTPATIENT
Start: 2023-08-23 | End: 2023-08-25 | Stop reason: HOSPADM

## 2023-08-23 RX ORDER — SODIUM CHLORIDE 9 MG/ML
100 INJECTION, SOLUTION INTRAVENOUS CONTINUOUS
Status: DISCONTINUED | OUTPATIENT
Start: 2023-08-23 | End: 2023-08-24

## 2023-08-23 RX ORDER — L.ACID,PARA/B.BIFIDUM/S.THERM 8B CELL
1 CAPSULE ORAL 2 TIMES DAILY
Status: DISCONTINUED | OUTPATIENT
Start: 2023-08-23 | End: 2023-08-25 | Stop reason: HOSPADM

## 2023-08-23 RX ORDER — ONDANSETRON 2 MG/ML
4 INJECTION INTRAMUSCULAR; INTRAVENOUS EVERY 6 HOURS PRN
Status: DISCONTINUED | OUTPATIENT
Start: 2023-08-23 | End: 2023-08-25 | Stop reason: HOSPADM

## 2023-08-23 RX ORDER — HYDROXYZINE HYDROCHLORIDE 25 MG/1
25 TABLET, FILM COATED ORAL NIGHTLY PRN
Status: DISCONTINUED | OUTPATIENT
Start: 2023-08-23 | End: 2023-08-25 | Stop reason: HOSPADM

## 2023-08-23 RX ORDER — ATORVASTATIN CALCIUM 80 MG/1
80 TABLET, FILM COATED ORAL DAILY
Status: DISCONTINUED | OUTPATIENT
Start: 2023-08-24 | End: 2023-08-25 | Stop reason: HOSPADM

## 2023-08-23 RX ADMIN — Medication 1 CAPSULE: at 21:27

## 2023-08-23 RX ADMIN — CARBIDOPA AND LEVODOPA 2 TABLET: 25; 100 TABLET ORAL at 21:27

## 2023-08-23 RX ADMIN — HEPARIN SODIUM 5000 UNITS: 5000 INJECTION INTRAVENOUS; SUBCUTANEOUS at 21:28

## 2023-08-23 RX ADMIN — Medication 10 ML: at 21:28

## 2023-08-23 RX ADMIN — POTASSIUM CHLORIDE 40 MEQ: 10 CAPSULE, COATED, EXTENDED RELEASE ORAL at 16:29

## 2023-08-23 RX ADMIN — SODIUM CHLORIDE 500 ML: 9 INJECTION, SOLUTION INTRAVENOUS at 16:06

## 2023-08-23 RX ADMIN — ROPINIROLE 0.5 MG: 0.25 TABLET, FILM COATED ORAL at 21:27

## 2023-08-23 RX ADMIN — SODIUM CHLORIDE 500 ML: 9 INJECTION, SOLUTION INTRAVENOUS at 17:43

## 2023-08-23 NOTE — ED PROVIDER NOTES
Subjective  History of Present Illness:    Chief Complaint: Diarrhea   History of Present Illness: Patient is an 87-year-old male with history of arthritis, carotid stenosis, celiac artery stenosis, CHF, COPD, CAD, hypertension, atrial fibrillation, Parkinson's disease, and sleep apnea presenting to the ER for evaluation of possible dehydration.  Patient's daughter is at bedside and helps with HPI.  Patient lives at an assisted living facility.  She reports that patient was seen here earlier this month on the 13th with diarrhea and dehydration.  He was also found to have shingles and was treated with acyclovir.  She states that he had been doing well after the fluids for a while, diarrhea persisted but he was able to eat and drink.  She states for the past couple days he has not been eating or drinking and she is afraid he may be dehydrated given he has had persistent diarrhea.  She reports that the outside facility had sent a stool sample and was told that there was blood in the stool but there were no other results that were noted to her.  She denies any known history of fever or chills at the facility.  Patient is denying any pain, shortness of breath.  She states she does wear oxygen at night sometimes when sleeping.  She reports that he has known AAA and follows with [facility] but they have told him he is not a surgical candidate.  Onset: Days to weeks  Duration: Persistent  Exacerbating / Alleviating factors: None  Associated symptoms: None      Nurses Notes reviewed and agree, including vitals, allergies, social history and prior medical history.     REVIEW OF SYSTEMS: All systems reviewed and not pertinent unless noted.  Review of Systems      Positive for: Diarrhea    Negative for: Fever, chills, abdominal pain, cough, chest pain, shortness of breath    Past Medical History:   Diagnosis Date    Aneurysm     Arthritis     Cardiac murmur     Carotid stenosis     Celiac artery stenosis     Chest  pain     CHF (congestive heart failure)     Chronic kidney disease     COPD (chronic obstructive pulmonary disease)     Coronary artery disease     Dyslipidemia     Elevated cholesterol     GERD (gastroesophageal reflux disease)     Hiatal hernia     History of exercise stress test     History of nuclear stress test     History of pneumonia     Hypertension     Lumbar degenerative disc disease     MI, old     Osteoarthritis     PAD (peripheral artery disease)     Parkinson's disease     Paroxysmal A-fib     Peptic ulceration     Psoriasis     Renal artery stenosis     Sleep apnea     uses o2 @ HS    Spinal stenosis     Tremor     Vertebral artery stenosis        Allergies:    Celebrex [celecoxib] and Sulfa antibiotics      Past Surgical History:   Procedure Laterality Date    ABDOMINAL AORTIC ANEURYSM REPAIR WITH ENDOGRAFT      APPENDECTOMY      CATARACT EXTRACTION W/ INTRAOCULAR LENS IMPLANT Left 12/10/2018    Procedure: CATARACT PHACO EXTRACTION WITH INTRAOCULAR LENS IMPLANT LEFT;  Surgeon: Charity Carrasco MD;  Location: [facility] OR;  Service: Ophthalmology    CATARACT EXTRACTION W/ INTRAOCULAR LENS IMPLANT Right 2023    Procedure: CATARACT PHACO EXTRACTION WITH INTRAOCULAR LENS IMPLANT RIGHT;  Surgeon: Charity Carrasco MD;  Location: [facility] OR;  Service: Ophthalmology;  Laterality: Right;    COLONOSCOPY      CORONARY ARTERY BYPASS GRAFT      ENDOSCOPY      SHOULDER ROTATOR CUFF REPAIR Right     SKIN BIOPSY           Social History     Socioeconomic History    Marital status:     Number of children: 3   Tobacco Use    Smoking status: Former     Packs/day: 3.00     Years: 15.00     Pack years: 45.00     Types: Cigarettes     Quit date:      Years since quittin.6    Smokeless tobacco: Never    Tobacco comments:     quit 50 years ago   Vaping Use    Vaping Use: Never used   Substance and Sexual Activity    Alcohol use: Not Currently     Alcohol/week: 2.0 standard drinks     Types: 2  "Glasses of wine per week     Comment: DAILY    Drug use: No    Sexual activity: Defer         Family History   Problem Relation Age of Onset    Other Mother          of old age    No Known Problems Father        Objective  Physical Exam:  /62   Pulse 50   Temp 98.1 øF (36.7 øC) (Oral)   Resp 16   Ht 170.2 cm (67\")   Wt 72.6 kg (160 lb)   SpO2 95%   BMI 25.06 kg/mý      Physical Exam    CONSTITUTIONAL: Well developed, no acute distress, appears chronically ill.  He is sleeping and resting in the stretcher but will answer questions  VITAL SIGNS: per nursing, reviewed and noted  SKIN: exposed skin with no rashes, ulcerations or petechiae  EYES: Grossly EOMI, no icterus, PERRL  ENT: Normal voice.  Nares patent, tongue and lips are very dry  RESPIRATORY:  No increased work of breathing. No retractions.  Lung sounds decreased bilaterally without obvious wheezes  CARDIOVASCULAR: Bradycardic, regular rhythm  GI: Abdomen soft, nontender to palpation  MUSCULOSKELETAL:  No tenderness. Full ROM. Strength and tone grossly normal.  no spasms.  NEUROLOGIC: Alert, oriented x 3. No gross deficits. GCS 15.   PSYCH: appropriate affect.      Procedures    ED Course:        ED Course as of 08/23/23 1842   Wed Aug 23, 2023   1536 EKG interpreted by me, left bundle branch block with normal sinus rhythm, no concerning ST changes noted, rate of 61, abnormal EKG [JE]   1557 Discussed with patient and family member that we could send stool for PCR if able to give sample for [LR]   1619 Magnesium: 2.2 [LR]   1619 proBNP(!): 2,549.0 [LR]   1619 HS Troponin T(!!): 75 [LR]   1619 Glucose: 99 [LR]   1619 BUN(!): 28 [LR]   1619 Creatinine(!): 2.24 [LR]   1619 Sodium: 137 [LR]   1619 Potassium(!): 2.8 [LR]   1619 Chloride: 98 [LR]   1619 CO2: 26.9 [LR]   1619 Calcium: 9.0 [LR]   1619 Total Protein: 6.5 [LR]   1619 Albumin: 4.2 [LR]   1619 ALT (SGPT): <5 [LR]   1619 AST (SGOT): 14 [LR]   1619 Alkaline Phosphatase: 82 [LR]   1619 " Total Bilirubin: 0.5 [LR]   1619 Globulin: 2.3 [LR]   1619 A/G Ratio: 1.8 [LR]   1620 BUN/Creatinine Ratio: 12.5 [LR]   1620 Anion Gap: 12.1 [LR]   1620 eGFR(!): 27.7 [LR]   1620 WBC: 7.09 [LR]   1620 Hemoglobin(!): 10.7 [LR]   1635 COVID19: Not Detected [LR]   1635 Influenza A PCR: Not Detected [LR]   1635 Influenza B PCR: Not Detected [LR]   1713 Discussed with patient's family at bedside.  Patient is too weak to go back to his assisted living facility at this time, will speak to the hospitalist for admission.  He feels tired and weak at this time but denies any chest pain or shortness of breath [LR]   1717 Spoke with Dr. Glass, they want a GI PCR panel ordered and a noncontrasted CT scan, will place in observation. [LR]      ED Course User Index  [JE] Waqas Damon MD  [LR] Loli Cabezas PA-C       Lab Results (last 24 hours)       Procedure Component Value Units Date/Time    CBC & Differential [[id number]]  (Abnormal) Collected: 08/23/23 1535    Specimen: Blood Updated: 08/23/23 1541    Narrative:      The following orders were created for panel order CBC & Differential.  Procedure                               Abnormality         Status                     ---------                               -----------         ------                     CBC Auto Differential[[id number]]        Abnormal            Final result                 Please view results for these tests on the individual orders.    Comprehensive Metabolic Panel [[id number]]  (Abnormal) Collected: 08/23/23 1535    Specimen: Blood Updated: 08/23/23 1603     Glucose 99 mg/dL      BUN 28 mg/dL      Creatinine 2.24 mg/dL      Sodium 137 mmol/L      Potassium 2.8 mmol/L      Chloride 98 mmol/L      CO2 26.9 mmol/L      Calcium 9.0 mg/dL      Total Protein 6.5 g/dL      Albumin 4.2 g/dL      ALT (SGPT) <5 U/L      AST (SGOT) 14 U/L      Alkaline Phosphatase 82 U/L      Total Bilirubin 0.5 mg/dL      Globulin 2.3 gm/dL      A/G Ratio 1.8 g/dL       BUN/Creatinine Ratio 12.5     Anion Gap 12.1 mmol/L      eGFR 27.7 mL/min/1.73     Narrative:      GFR Normal >60  Chronic Kidney Disease <60  Kidney Failure <15    The GFR formula is only valid for adults with stable renal function between ages 18 and 70.    Single High Sensitivity Troponin T [[id number]]  (Abnormal) Collected: 08/23/23 1535    Specimen: Blood Updated: 08/23/23 1617     HS Troponin T 75 ng/L     Narrative:      High Sensitive Troponin T Reference Range:  <10.0 ng/L- Negative Female for AMI  <15.0 ng/L- Negative Male for AMI  >=10 - Abnormal Female indicating possible myocardial injury.  >=15 - Abnormal Male indicating possible myocardial injury.   Clinicians would have to utilize clinical acumen, EKG, Troponin, and serial changes to determine if it is an Acute Myocardial Infarction or myocardial injury due to an underlying chronic condition.         Magnesium [[id number]]  (Normal) Collected: 08/23/23 1535    Specimen: Blood Updated: 08/23/23 1603     Magnesium 2.2 mg/dL     CBC Auto Differential [[id number]]  (Abnormal) Collected: 08/23/23 1535    Specimen: Blood Updated: 08/23/23 1541     WBC 7.09 10*3/mm3      RBC 3.36 10*6/mm3      Hemoglobin 10.7 g/dL      Hematocrit 32.2 %      MCV 95.8 fL      MCH 31.8 pg      MCHC 33.2 g/dL      RDW 12.9 %      RDW-SD 44.9 fl      MPV 9.2 fL      Platelets 129 10*3/mm3      Neutrophil % 69.1 %      Lymphocyte % 16.5 %      Monocyte % 10.6 %      Eosinophil % 2.5 %      Basophil % 0.7 %      Immature Grans % 0.6 %      Neutrophils, Absolute 4.90 10*3/mm3      Lymphocytes, Absolute 1.17 10*3/mm3      Monocytes, Absolute 0.75 10*3/mm3      Eosinophils, Absolute 0.18 10*3/mm3      Basophils, Absolute 0.05 10*3/mm3      Immature Grans, Absolute 0.04 10*3/mm3      nRBC 0.0 /100 WBC     BNP [[id number]]  (Abnormal) Collected: 08/23/23 1535    Specimen: Blood Updated: 08/23/23 1602     proBNP 2,549.0 pg/mL     Narrative:      Among patients with dyspnea, NT-proBNP is  highly sensitive for the detection of acute congestive heart failure. In addition NT-proBNP of <300 pg/ml effectively rules out acute congestive heart failure with 99% negative predictive value.      COVID-19 and FLU A/B PCR - Swab, Nasopharynx [[id number]]  (Normal) Collected: 08/23/23 1536    Specimen: Swab from Nasopharynx Updated: 08/23/23 1632     COVID19 Not Detected     Influenza A PCR Not Detected     Influenza B PCR Not Detected    Narrative:      Fact sheet for providers: https://www.fda.gov/media/569423/download    Fact sheet for patients: https://www.fda.gov/media/864967/download    Test performed by PCR.    High Sensitivity Troponin T 2Hr [[id number]]  (Abnormal) Collected: 08/23/23 1746    Specimen: Blood Updated: 08/23/23 1829     HS Troponin T 68 ng/L      Troponin T Delta -7 ng/L     Narrative:      High Sensitive Troponin T Reference Range:  <10.0 ng/L- Negative Female for AMI  <15.0 ng/L- Negative Male for AMI  >=10 - Abnormal Female indicating possible myocardial injury.  >=15 - Abnormal Male indicating possible myocardial injury.   Clinicians would have to utilize clinical acumen, EKG, Troponin, and serial changes to determine if it is an Acute Myocardial Infarction or myocardial injury due to an underlying chronic condition.                  XR Chest 1 View    Result Date: 8/23/2023  FINAL REPORT TECHNIQUE: An AP portable view of the chest was obtained. CLINICAL HISTORY: Weak/Dizzy/AMS triage protocol COMPARISON: None FINDINGS: There has been a prior median sternotomy.  There are areas of linear atelectasis in the left lung base.  There is an apparent masslike opacity seen at the right cardiophrenic angle.  CT chest could further evaluate unless the nature of this opacity is known.  There is borderline cardiomegaly without pulmonary edema. There is no pleural disease, adenopathy, or significant osseous abnormality.     Impression: Mild left basilar atelectasis.  Possible mass in the right  cardiophrenic angle, consider CT. Authenticated and Electronically Signed by Tu Villarreal M.D. on 08/23/2023 06:28:41 PM        MDM     Amount and/or Complexity of Data Reviewed  Clinical lab tests: reviewed  Tests in the medicine section of CPTr: reviewed  Decide to obtain previous medical records or to obtain history from someone other than the patient: yes        Initial impression of presenting illness: Patient is an 87-year-old male presenting to the ER for evaluation of possible dehydration    DDX: includes but is not limited to: Electrolyte abnormalities, dehydration, viral illness, infectious diarrhea, underlying infection such as C. difficile, urinary tract infection, fluid overload, pneumonia, and other concerns.    Patient arrives in overall stable condition with vitals interpreted by myself.     Pertinent features from physical exam: Patient initially 87% on room air was placed on 2 L nasal cannula.  He appears dry, mucous membranes are dry.  Patient is sleepy but arousable, abdomen nontender    Initial diagnostic plan: We will obtain basic labs, troponin, magnesium, CMP, BNP, urinalysis, COVID influenza swab, EKG and chest x-ray.  We will start with 500 cc fluid bolus.  Discussed obtaining stool sample if possible.    Results from initial plan were reviewed and interpreted by me revealing overall stable work-up.  EKG interpreted by the attending.  Patient's hemoglobin was 10.7 which was stable in comparison to previous 10 days ago.  Creatinine is also stable at 2.24 with no significant change.  BUN was 28, potassium was 2.8, no other significant electrolyte abnormalities.  Initial troponin was 75, proBNP was 2549.  COVID and influenza were negative.  Chest x-ray was reviewed and appears similar to previous, sent for official radiology read.  Urinalysis pending    Diagnostic information from other sources: Chart review, patient's daughter.    Interventions / Re-evaluation: Patient was given 1 L of fluid  total, p.o. potassium.    Results/clinical rationale were discussed with patient and daughter at bedside.  Patient's daughter states that he is at an assisted living facility and given his weakness there is no way that he can care for himself at this time, lives with wife who is in a amputee and not able to care for patient at this time.  Given the significant weakness, they are agreeable to be admitted to the hospital for further work-up and possible placement to rehab facility if needed.    Consultations/Discussion of results with other physicians: Spoke with hospitalist Dr. Glass who graciously accepted patient for admission.  He did request a noncontrasted CT scan of the abdomen pelvis which was ordered, ordered a GI panel as well but patient has not been able to give stool sample yet.    Disposition plan: Admit  -----    Final diagnoses:   Diarrhea, unspecified type   Hypokalemia   Weakness generalized   Elevated troponin          Loli Cabezas PA-C  08/23/23 9733

## 2023-08-23 NOTE — H&P
[facility]   HISTORY AND PHYSICAL      Name:  Radha Varela   Age:  87 y.o.  Sex:  male  :  1935  MRN:  [id number]   Visit Number:  [id number]  Admission Date:  2023  Date Of Service:  23  Primary Care Physician:  Provider, No Known    Chief Complaint:     Generalized weakness and diarrhea.    History Of Presenting Illness:      Mr. Varela is an 87-year-old pleasant male resident of assisted living facility, with multiple medical problems including coronary artery disease status post CABG, chronic kidney disease stage III, abdominal aortic aneurysm repair, atrial fibrillation, Parkinson's disease, carotid and celiac artery stenosis, COPD on home oxygen at nighttime, obstructive sleep apnea was brought to the emergency room by his daughter with symptoms of generalized weakness and progressive diarrhea that has been going on for more than 10 days.  There is no prior history of antibiotics use.  No history of any fever or abdominal pain.  The diarrhea is watery associated with mucus but no blood.  No history of any nausea or vomiting.  Apparently a stool sample was sent at the facility and it showed positivity for occult blood.  Patient denies any chest pain or shortness of breath.  He does occasionally complain of gaseous bloating of the stomach.  Patient does have extensive history of aortic atherosclerosis including aortic aneurysm at 5.3 cm.  He was deemed not to be a surgical candidate at [facility].    Patient was recently seen in the emergency room on 2023 where he was diagnosed with shingles and was given topical medication.  At that time CT of the abdomen and pelvis was done which showed infrarenal abdominal aortic aneurysm with interval increase in size.  It also showed thoracic and abdominal aorta aneurysm measuring up to 5.4 cm.    In the emergency room, he was afebrile with a heart rate of 62 and a blood pressure of 127/60.  His  initial pulse oxygen saturation was 87% on room air and he was placed on 2 L of oxygen with improvement in his saturation to 95%.  Initial troponin T was 75 with a proBNP of 2549.  CMP was fairly unremarkable except for a BUN of 28, creatinine of 2.24 (baseline), potassium of 2.8.  CBC showed a hemoglobin of 10.7 (baseline) and a platelet count of 129 (baseline).    Review Of Systems:    All systems were reviewed and negative except as mentioned in history of presenting illness, assessment and plan.    Past Medical History: Patient  has a past medical history of Aneurysm, Arthritis, Cardiac murmur, Carotid stenosis, Celiac artery stenosis, Chest pain, CHF (congestive heart failure), Chronic kidney disease, COPD (chronic obstructive pulmonary disease), Coronary artery disease, Dyslipidemia, Elevated cholesterol, GERD (gastroesophageal reflux disease), Hiatal hernia, History of exercise stress test, History of nuclear stress test, History of pneumonia, Hypertension, Lumbar degenerative disc disease, MI, old (1995), Osteoarthritis, PAD (peripheral artery disease), Parkinson's disease, Paroxysmal A-fib, Peptic ulceration, Psoriasis, Renal artery stenosis, Sleep apnea, Spinal stenosis, Tremor, and Vertebral artery stenosis.    Past Surgical History: Patient  has a past surgical history that includes Coronary artery bypass graft (1995); AAA repair, endovascular (1994); Shoulder open rotator cuff repair (Right); Appendectomy; Colonoscopy; Esophagogastroduodenoscopy; Skin biopsy; Cataract extraction w/ intraocular lens implant (Left, 12/10/2018); and Cataract extraction w/ intraocular lens implant (Right, 6/12/2023).    Social History: Patient  reports that he quit smoking about 54 years ago. His smoking use included cigarettes. He has a 45.00 pack-year smoking history. He has never used smokeless tobacco. He reports that he does not currently use alcohol after a past usage of about 2.0 standard drinks per week. He reports  that he does not use drugs.    Family History:  Patient family history includes No Known Problems in his father; Other in his mother.     Allergies:      Celebrex [celecoxib] and Sulfa antibiotics    Home Medications:    Prior to Admission Medications       Prescriptions Last Dose Informant Patient Reported? Taking?    acyclovir (Zovirax) 5 % ointment   No No    Apply 1 application  topically to the appropriate area as directed 4 (Four) Times a Day.    amLODIPine (NORVASC) 5 MG tablet  Self Yes No    Take 1 tablet by mouth Daily.    aspirin 81 MG EC tablet  Self Yes No    Take 1 tablet by mouth Daily.    atorvastatin (LIPITOR) 80 MG tablet  Self Yes No    Take 1 tablet by mouth Daily.    carbidopa-levodopa (SINEMET)  MG per tablet   Yes No    Take 2 tablets by mouth 4 (Four) Times a Day.    cholecalciferol (VITAMIN D3) 1000 units tablet  Self Yes No    Take 1 tablet by mouth Daily. 2 tablets daily    Coenzyme Q10 (COQ10) 200 MG capsule  Self Yes No    Take 1 capsule by mouth Daily.    furosemide (LASIX) 20 MG tablet  Self Yes No    Take 2 tablets by mouth Daily.    HYDROcodone-acetaminophen (NORCO) 5-325 MG per tablet   No No    Take 1 tablet by mouth Every 6 (Six) Hours As Needed for Severe Pain.    hydrOXYzine (ATARAX) 25 MG tablet  Self Yes No    Take 1 tablet by mouth At Night As Needed.    melatonin 5 MG tablet tablet   Yes No    Take 2 tablets by mouth At Night As Needed.    methocarbamol (ROBAXIN) 500 MG tablet  Self Yes No    Take 1 tablet by mouth Daily As Needed.    rOPINIRole (REQUIP) 0.5 MG tablet  Self Yes No    Take 1 tablet by mouth 4 (Four) Times a Day. Take 1 hour before bedtime.          ED Medications:    Medications   sodium chloride 0.9 % flush 10 mL (has no administration in time range)   sodium chloride 0.9 % flush 10 mL (has no administration in time range)   sodium chloride 0.9 % bolus 500 mL (500 mL Intravenous New Bag 8/23/23 8619)   sodium chloride 0.9 % bolus 500 mL (0 mL  "Intravenous Stopped 8/23/23 1728)   potassium chloride (MICRO-K) CR capsule 40 mEq (40 mEq Oral Given 8/23/23 1629)     Vital Signs:  Temp:  [98.1 øF (36.7 øC)] 98.1 øF (36.7 øC)  Heart Rate:  [46-62] 49  Resp:  [16] 16  BP: (122-137)/(60-70) 129/63        08/23/23  1543   Weight: 72.6 kg (160 lb)     Body mass index is 25.06 kg/mý.    Physical Exam:     Most recent vital Signs: /63   Pulse (!) 49   Temp 98.1 øF (36.7 øC) (Oral)   Resp 16   Ht 170.2 cm (67\")   Wt 72.6 kg (160 lb)   SpO2 93%   BMI 25.06 kg/mý     Physical Exam  Constitutional:       General: He is not in acute distress.     Appearance: Normal appearance. He is not ill-appearing.   HENT:      Head: Normocephalic and atraumatic.      Right Ear: External ear normal.      Left Ear: External ear normal.      Nose: Nose normal.      Mouth/Throat:      Mouth: Mucous membranes are moist.   Eyes:      Extraocular Movements: Extraocular movements intact.      Conjunctiva/sclera: Conjunctivae normal.   Cardiovascular:      Rate and Rhythm: Regular rhythm. Bradycardia present.      Pulses: Normal pulses.      Heart sounds: Normal heart sounds. No murmur heard.  Pulmonary:      Effort: Pulmonary effort is normal.      Breath sounds: Normal breath sounds. No wheezing or rales.   Abdominal:      General: Abdomen is flat. Bowel sounds are normal.      Palpations: Abdomen is soft.      Tenderness: There is no abdominal tenderness. There is no guarding or rebound.      Comments: Midline surgical scar noted.   Musculoskeletal:         General: Normal range of motion.      Cervical back: Neck supple.      Right lower leg: No edema.      Left lower leg: No edema.      Comments: Vein stripping scars noted.   Skin:     General: Skin is warm.      Findings: Rash present. No erythema.      Comments: Healing shingles rash noted on the right infra axillary area.   Neurological:      General: No focal deficit present.      Mental Status: He is alert and oriented to " person, place, and time. Mental status is at baseline.   Psychiatric:         Mood and Affect: Mood normal.         Behavior: Behavior normal.     Laboratory data:    I have reviewed the labs done in the emergency room.    Results from last 7 days   Lab Units 08/23/23  1535   SODIUM mmol/L 137   POTASSIUM mmol/L 2.8*   CHLORIDE mmol/L 98   CO2 mmol/L 26.9   BUN mg/dL 28*   CREATININE mg/dL 2.24*   CALCIUM mg/dL 9.0   BILIRUBIN mg/dL 0.5   ALK PHOS U/L 82   ALT (SGPT) U/L <5   AST (SGOT) U/L 14   GLUCOSE mg/dL 99     Results from last 7 days   Lab Units 08/23/23  1535   WBC 10*3/mm3 7.09   HEMOGLOBIN g/dL 10.7*   HEMATOCRIT % 32.2*   PLATELETS 10*3/mm3 129*         Results from last 7 days   Lab Units 08/23/23  1535   HSTROP T ng/L 75*     Results from last 7 days   Lab Units 08/23/23  1535   PROBNP pg/mL 2,549.0*     EKG:      EKG done in the emergency room was reviewed by me.  It shows sinus bradycardia at 61 bpm.  Left axis deviation noted.  Intraventricular conduction abnormality noted with no significant ST-T changes.    Radiology:    Portable chest x-ray done in the emergency room was reviewed by me.  It shows borderline cardiomegaly.  Sternal wires noted.  No significant infiltrates noted.  An official report is currently pending.    Assessment:    Acute persistent diarrhea of uncertain etiology, POA.  Generalized weakness secondary to dehydration and hypokalemia, POA.  Chronic kidney disease stage III.  Recent history of right-sided thoracic shingles rash.  Coronary artery disease status post CABG.  Abdominal aortic aneurysm status postrepair.  Parkinson's disease.  Obstructive sleep apnea.  Chronic anemia.      Plan:    Persistent diarrhea.  - Exact etiology of this is uncertain but could be infective in nature.  - We will obtain GI panel.  - I will hold off on antibody therapy at this time.  - We will continue gentle IV hydration.  - Lactobacillus supplements.    Hypokalemia.  - Patient has already received  40 mEq of potassium supplement in the emergency room.  - We will place him on electrolyte protocol.    Generalized weakness/Parkinson's disease.  - We will consult physical and Occupational Therapy.    Elevated troponin level.  - This is likely secondary to chronic kidney disease.  - I do not suspect acute coronary syndrome at this time.  - We will repeat troponin level in the morning.    I have discussed the patient's condition and treatment plan with his daughter Sandra who is at the bedside.  His CODE STATUS is full code.    Risk Assessment: Moderate  DVT Prophylaxis: Heparin  Code Status: Full  Diet: Cardiac    Advance Care Planning   ACP discussion was held with the patient during this visit. Patient does not have an advance directive, information provided.         Osvaldo Glass MD  08/23/23  17:49 EDT    Dictated utilizing Dragon dictation.

## 2023-08-23 NOTE — ED NOTES
"Patient states he is still unable to provide a urine sample just yet. States he \"prefers not to have a catheter\".   "

## 2023-08-24 ENCOUNTER — APPOINTMENT (OUTPATIENT)
Dept: CARDIOLOGY | Facility: HOSPITAL | Age: 88
DRG: 392 | End: 2023-08-24
Payer: MEDICARE

## 2023-08-24 LAB
ADV 40+41 DNA STL QL NAA+NON-PROBE: NOT DETECTED
ANION GAP SERPL CALCULATED.3IONS-SCNC: 11 MMOL/L (ref 5–15)
ASTRO TYP 1-8 RNA STL QL NAA+NON-PROBE: NOT DETECTED
BACTERIA UR QL AUTO: NORMAL /HPF
BH CV ECHO MEAS - AI P1/2T: 573.7 MSEC
BH CV ECHO MEAS - AO MAX PG: 9.6 MMHG
BH CV ECHO MEAS - AO MEAN PG: 5 MMHG
BH CV ECHO MEAS - AO ROOT DIAM: 4.1 CM
BH CV ECHO MEAS - AO V2 MAX: 155 CM/SEC
BH CV ECHO MEAS - AO V2 VTI: 35.7 CM
BH CV ECHO MEAS - AVA(I,D): 1.9 CM2
BH CV ECHO MEAS - EDV(CUBED): 26.5 ML
BH CV ECHO MEAS - EDV(MOD-SP2): 74.5 ML
BH CV ECHO MEAS - EDV(MOD-SP4): 74 ML
BH CV ECHO MEAS - EF(MOD-BP): 59.6 %
BH CV ECHO MEAS - EF(MOD-SP2): 63.1 %
BH CV ECHO MEAS - EF(MOD-SP4): 56.9 %
BH CV ECHO MEAS - ESV(CUBED): 9.1 ML
BH CV ECHO MEAS - ESV(MOD-SP2): 27.5 ML
BH CV ECHO MEAS - ESV(MOD-SP4): 31.9 ML
BH CV ECHO MEAS - FS: 29.9 %
BH CV ECHO MEAS - IVS/LVPW: 1.15 CM
BH CV ECHO MEAS - IVSD: 1.46 CM
BH CV ECHO MEAS - LAT PEAK E' VEL: 7.7 CM/SEC
BH CV ECHO MEAS - LV MASS(C)D: 133.5 GRAMS
BH CV ECHO MEAS - LV MAX PG: 4.1 MMHG
BH CV ECHO MEAS - LV MEAN PG: 2 MMHG
BH CV ECHO MEAS - LV V1 MAX: 101 CM/SEC
BH CV ECHO MEAS - LV V1 VTI: 20.7 CM
BH CV ECHO MEAS - LVIDD: 3 CM
BH CV ECHO MEAS - LVIDS: 2.09 CM
BH CV ECHO MEAS - LVOT AREA: 3.3 CM2
BH CV ECHO MEAS - LVOT DIAM: 2.04 CM
BH CV ECHO MEAS - LVPWD: 1.27 CM
BH CV ECHO MEAS - MED PEAK E' VEL: 4 CM/SEC
BH CV ECHO MEAS - MV A MAX VEL: 110 CM/SEC
BH CV ECHO MEAS - MV DEC TIME: 0.3 MSEC
BH CV ECHO MEAS - MV E MAX VEL: 78.3 CM/SEC
BH CV ECHO MEAS - MV E/A: 0.71
BH CV ECHO MEAS - MV MAX PG: 7.1 MMHG
BH CV ECHO MEAS - MV MEAN PG: 3 MMHG
BH CV ECHO MEAS - MV V2 VTI: 36.2 CM
BH CV ECHO MEAS - MVA(VTI): 1.87 CM2
BH CV ECHO MEAS - PA ACC TIME: 0.1 SEC
BH CV ECHO MEAS - PA V2 MAX: 97.6 CM/SEC
BH CV ECHO MEAS - PI END-D VEL: 90.9 CM/SEC
BH CV ECHO MEAS - RV MAX PG: 1.81 MMHG
BH CV ECHO MEAS - RV V1 MAX: 67.2 CM/SEC
BH CV ECHO MEAS - RV V1 VTI: 13.4 CM
BH CV ECHO MEAS - SV(LVOT): 67.7 ML
BH CV ECHO MEAS - SV(MOD-SP2): 47 ML
BH CV ECHO MEAS - SV(MOD-SP4): 42.1 ML
BH CV ECHO MEAS - TAPSE (>1.6): 1.12 CM
BH CV ECHO MEASUREMENTS AVERAGE E/E' RATIO: 13.38
BH CV XLRA - TDI S': 5.7 CM/SEC
BILIRUB UR QL STRIP: NEGATIVE
BUN SERPL-MCNC: 25 MG/DL (ref 8–23)
BUN/CREAT SERPL: 14 (ref 7–25)
C CAYETANENSIS DNA STL QL NAA+NON-PROBE: NOT DETECTED
C COLI+JEJ+UPSA DNA STL QL NAA+NON-PROBE: NOT DETECTED
CALCIUM SPEC-SCNC: 8.4 MG/DL (ref 8.6–10.5)
CHLORIDE SERPL-SCNC: 106 MMOL/L (ref 98–107)
CLARITY UR: CLEAR
CO2 SERPL-SCNC: 25 MMOL/L (ref 22–29)
COLOR UR: ABNORMAL
CREAT SERPL-MCNC: 1.78 MG/DL (ref 0.76–1.27)
CRYPTOSP DNA STL QL NAA+NON-PROBE: NOT DETECTED
DEPRECATED RDW RBC AUTO: 45.3 FL (ref 37–54)
E HISTOLYT DNA STL QL NAA+NON-PROBE: NOT DETECTED
EAEC PAA PLAS AGGR+AATA ST NAA+NON-PRB: NOT DETECTED
EC STX1+STX2 GENES STL QL NAA+NON-PROBE: NOT DETECTED
EGFRCR SERPLBLD CKD-EPI 2021: 36.5 ML/MIN/1.73
EPEC EAE GENE STL QL NAA+NON-PROBE: NOT DETECTED
ERYTHROCYTE [DISTWIDTH] IN BLOOD BY AUTOMATED COUNT: 12.7 % (ref 12.3–15.4)
ETEC LTA+ST1A+ST1B TOX ST NAA+NON-PROBE: NOT DETECTED
G LAMBLIA DNA STL QL NAA+NON-PROBE: NOT DETECTED
GLUCOSE SERPL-MCNC: 76 MG/DL (ref 65–99)
GLUCOSE UR STRIP-MCNC: NEGATIVE MG/DL
HCT VFR BLD AUTO: 33 % (ref 37.5–51)
HGB BLD-MCNC: 10.7 G/DL (ref 13–17.7)
HGB UR QL STRIP.AUTO: NEGATIVE
HYALINE CASTS UR QL AUTO: NORMAL /LPF
KETONES UR QL STRIP: ABNORMAL
LEUKOCYTE ESTERASE UR QL STRIP.AUTO: NEGATIVE
MAGNESIUM SERPL-MCNC: 2.2 MG/DL (ref 1.6–2.4)
MCH RBC QN AUTO: 31.7 PG (ref 26.6–33)
MCHC RBC AUTO-ENTMCNC: 32.4 G/DL (ref 31.5–35.7)
MCV RBC AUTO: 97.6 FL (ref 79–97)
NITRITE UR QL STRIP: NEGATIVE
NOROVIRUS GI+II RNA STL QL NAA+NON-PROBE: NOT DETECTED
P SHIGELLOIDES DNA STL QL NAA+NON-PROBE: NOT DETECTED
PH UR STRIP.AUTO: 6 [PH] (ref 5–8)
PLATELET # BLD AUTO: 128 10*3/MM3 (ref 140–450)
PMV BLD AUTO: 9.5 FL (ref 6–12)
POTASSIUM SERPL-SCNC: 2.9 MMOL/L (ref 3.5–5.2)
PROT UR QL STRIP: ABNORMAL
RBC # BLD AUTO: 3.38 10*6/MM3 (ref 4.14–5.8)
RBC # UR STRIP: NORMAL /HPF
REF LAB TEST METHOD: NORMAL
RVA RNA STL QL NAA+NON-PROBE: NOT DETECTED
S ENT+BONG DNA STL QL NAA+NON-PROBE: NOT DETECTED
SAPO I+II+IV+V RNA STL QL NAA+NON-PROBE: NOT DETECTED
SHIGELLA SP+EIEC IPAH ST NAA+NON-PROBE: NOT DETECTED
SODIUM SERPL-SCNC: 142 MMOL/L (ref 136–145)
SP GR UR STRIP: 1.01 (ref 1–1.03)
SQUAMOUS #/AREA URNS HPF: NORMAL /HPF
TROPONIN T SERPL HS-MCNC: 69 NG/L
TSH SERPL DL<=0.05 MIU/L-ACNC: 0.7 UIU/ML (ref 0.27–4.2)
UROBILINOGEN UR QL STRIP: ABNORMAL
V CHOL+PARA+VUL DNA STL QL NAA+NON-PROBE: NOT DETECTED
V CHOLERAE DNA STL QL NAA+NON-PROBE: NOT DETECTED
WBC # UR STRIP: NORMAL /HPF
WBC NRBC COR # BLD: 6.25 10*3/MM3 (ref 3.4–10.8)
Y ENTEROCOL DNA STL QL NAA+NON-PROBE: NOT DETECTED

## 2023-08-24 PROCEDURE — 84484 ASSAY OF TROPONIN QUANT: CPT | Performed by: INTERNAL MEDICINE

## 2023-08-24 PROCEDURE — 81001 URINALYSIS AUTO W/SCOPE: CPT | Performed by: STUDENT IN AN ORGANIZED HEALTH CARE EDUCATION/TRAINING PROGRAM

## 2023-08-24 PROCEDURE — 97162 PT EVAL MOD COMPLEX 30 MIN: CPT

## 2023-08-24 PROCEDURE — 25010000002 HEPARIN (PORCINE) PER 1000 UNITS: Performed by: INTERNAL MEDICINE

## 2023-08-24 PROCEDURE — 99204 OFFICE O/P NEW MOD 45 MIN: CPT | Performed by: INTERNAL MEDICINE

## 2023-08-24 PROCEDURE — G0378 HOSPITAL OBSERVATION PER HR: HCPCS

## 2023-08-24 PROCEDURE — 93306 TTE W/DOPPLER COMPLETE: CPT

## 2023-08-24 PROCEDURE — 80048 BASIC METABOLIC PNL TOTAL CA: CPT | Performed by: INTERNAL MEDICINE

## 2023-08-24 PROCEDURE — 83735 ASSAY OF MAGNESIUM: CPT | Performed by: INTERNAL MEDICINE

## 2023-08-24 PROCEDURE — 84443 ASSAY THYROID STIM HORMONE: CPT | Performed by: INTERNAL MEDICINE

## 2023-08-24 PROCEDURE — 97165 OT EVAL LOW COMPLEX 30 MIN: CPT

## 2023-08-24 PROCEDURE — 93306 TTE W/DOPPLER COMPLETE: CPT | Performed by: INTERNAL MEDICINE

## 2023-08-24 PROCEDURE — 87507 IADNA-DNA/RNA PROBE TQ 12-25: CPT | Performed by: PHYSICIAN ASSISTANT

## 2023-08-24 PROCEDURE — 85027 COMPLETE CBC AUTOMATED: CPT | Performed by: INTERNAL MEDICINE

## 2023-08-24 RX ORDER — POTASSIUM CHLORIDE 20 MEQ/1
40 TABLET, EXTENDED RELEASE ORAL EVERY 4 HOURS
Status: COMPLETED | OUTPATIENT
Start: 2023-08-24 | End: 2023-08-24

## 2023-08-24 RX ORDER — ESCITALOPRAM OXALATE 10 MG/1
10 TABLET ORAL DAILY
Status: ON HOLD | COMMUNITY
End: 2023-08-25 | Stop reason: SDUPTHER

## 2023-08-24 RX ORDER — NITROGLYCERIN 0.4 MG/1
0.4 TABLET SUBLINGUAL
Status: ON HOLD | COMMUNITY

## 2023-08-24 RX ORDER — ENTACAPONE 200 MG/1
200 TABLET ORAL 4 TIMES DAILY
Status: ON HOLD | COMMUNITY
End: 2023-08-25 | Stop reason: SDUPTHER

## 2023-08-24 RX ORDER — LANOLIN ALCOHOL/MO/W.PET/CERES
1000 CREAM (GRAM) TOPICAL DAILY
Status: ON HOLD | COMMUNITY
End: 2023-08-25 | Stop reason: SDUPTHER

## 2023-08-24 RX ORDER — SENNOSIDES A AND B 8.6 MG/1
1 TABLET, FILM COATED ORAL DAILY PRN
COMMUNITY
End: 2023-08-25 | Stop reason: HOSPADM

## 2023-08-24 RX ORDER — ACETAMINOPHEN 325 MG/1
650 TABLET ORAL EVERY 6 HOURS PRN
Status: ON HOLD | COMMUNITY

## 2023-08-24 RX ORDER — MAGNESIUM OXIDE 400 MG/1
400 TABLET ORAL DAILY
COMMUNITY
End: 2023-08-25 | Stop reason: HOSPADM

## 2023-08-24 RX ORDER — SACCHAROMYCES BOULARDII 250 MG
250 CAPSULE ORAL 3 TIMES DAILY
Status: ON HOLD | COMMUNITY
End: 2023-08-25 | Stop reason: SDUPTHER

## 2023-08-24 RX ORDER — TRAZODONE HYDROCHLORIDE 50 MG/1
50 TABLET ORAL NIGHTLY
Status: ON HOLD | COMMUNITY
End: 2023-08-25 | Stop reason: SDUPTHER

## 2023-08-24 RX ORDER — LOPERAMIDE HYDROCHLORIDE 2 MG/1
4 CAPSULE ORAL 4 TIMES DAILY PRN
Status: ON HOLD | COMMUNITY
End: 2023-08-25 | Stop reason: SDUPTHER

## 2023-08-24 RX ORDER — SODIUM CHLORIDE 9 MG/ML
50 INJECTION, SOLUTION INTRAVENOUS CONTINUOUS
Status: DISCONTINUED | OUTPATIENT
Start: 2023-08-24 | End: 2023-08-25

## 2023-08-24 RX ORDER — LOPERAMIDE HYDROCHLORIDE 2 MG/1
2 CAPSULE ORAL 2 TIMES DAILY
Status: DISCONTINUED | OUTPATIENT
Start: 2023-08-24 | End: 2023-08-25 | Stop reason: HOSPADM

## 2023-08-24 RX ORDER — ONDANSETRON 4 MG/1
4 TABLET, FILM COATED ORAL EVERY 8 HOURS PRN
Status: ON HOLD | COMMUNITY
End: 2023-08-25 | Stop reason: SDUPTHER

## 2023-08-24 RX ORDER — FAMOTIDINE 10 MG
10 TABLET ORAL 2 TIMES DAILY PRN
Status: ON HOLD | COMMUNITY
End: 2023-08-25 | Stop reason: SDUPTHER

## 2023-08-24 RX ADMIN — ATORVASTATIN CALCIUM 80 MG: 80 TABLET, FILM COATED ORAL at 08:48

## 2023-08-24 RX ADMIN — SODIUM CHLORIDE 50 ML/HR: 9 INJECTION, SOLUTION INTRAVENOUS at 14:48

## 2023-08-24 RX ADMIN — HEPARIN SODIUM 5000 UNITS: 5000 INJECTION INTRAVENOUS; SUBCUTANEOUS at 08:47

## 2023-08-24 RX ADMIN — CARBIDOPA AND LEVODOPA 2 TABLET: 25; 100 TABLET ORAL at 08:48

## 2023-08-24 RX ADMIN — Medication 10 ML: at 08:48

## 2023-08-24 RX ADMIN — SODIUM CHLORIDE 100 ML/HR: 9 INJECTION, SOLUTION INTRAVENOUS at 04:50

## 2023-08-24 RX ADMIN — Medication 10 ML: at 21:47

## 2023-08-24 RX ADMIN — Medication 1 CAPSULE: at 21:47

## 2023-08-24 RX ADMIN — CARBIDOPA AND LEVODOPA 2 TABLET: 25; 100 TABLET ORAL at 17:15

## 2023-08-24 RX ADMIN — HEPARIN SODIUM 5000 UNITS: 5000 INJECTION INTRAVENOUS; SUBCUTANEOUS at 21:47

## 2023-08-24 RX ADMIN — ROPINIROLE 0.5 MG: 0.25 TABLET, FILM COATED ORAL at 22:21

## 2023-08-24 RX ADMIN — HYDROCODONE BITARTRATE AND ACETAMINOPHEN 1 TABLET: 5; 325 TABLET ORAL at 13:04

## 2023-08-24 RX ADMIN — POTASSIUM CHLORIDE 40 MEQ: 1500 TABLET, EXTENDED RELEASE ORAL at 13:04

## 2023-08-24 RX ADMIN — ROPINIROLE 0.5 MG: 0.25 TABLET, FILM COATED ORAL at 06:11

## 2023-08-24 RX ADMIN — AMLODIPINE BESYLATE 5 MG: 5 TABLET ORAL at 08:47

## 2023-08-24 RX ADMIN — Medication 1 CAPSULE: at 08:48

## 2023-08-24 RX ADMIN — CARBIDOPA AND LEVODOPA 2 TABLET: 25; 100 TABLET ORAL at 21:47

## 2023-08-24 RX ADMIN — LOPERAMIDE HYDROCHLORIDE 2 MG: 2 CAPSULE ORAL at 22:44

## 2023-08-24 RX ADMIN — CARBIDOPA AND LEVODOPA 2 TABLET: 25; 100 TABLET ORAL at 11:41

## 2023-08-24 RX ADMIN — ASPIRIN 81 MG: 81 TABLET, COATED ORAL at 08:47

## 2023-08-24 RX ADMIN — POTASSIUM CHLORIDE 40 MEQ: 1500 TABLET, EXTENDED RELEASE ORAL at 21:46

## 2023-08-24 RX ADMIN — POTASSIUM CHLORIDE 40 MEQ: 1500 TABLET, EXTENDED RELEASE ORAL at 17:15

## 2023-08-24 NOTE — PLAN OF CARE
"Goal Outcome Evaluation:  Plan of Care Reviewed With: patient        Progress: no change  Outcome Evaluation: OT eval completed. Patient is supine in bed, Ox4, no c/o pain. On 2L O2 satting 100%, O2 removed prior to mobility as patient states he only wears it at night. Urinary catheter intact. Patient performed supine to sit with modified ind, requires Min A for UBB, mod A for LBB, SBA for UBD, mod A for LBD, SBA for grooming, Sup for self feeding, mod A for toileting. Patient reports bilateral feet numbness as well as \"freezing episodes\" at home that have caused him to fall. Patient performed sit to stand and tf to chair with min A using RW, declined further mobility this date. Patient is expected to benefit from continued OT services prior to DC. In regards to DC patient states he wants to talk to his daughter.      Anticipated Discharge Disposition (OT): home with assist, home with home health, inpatient rehabilitation facility  "

## 2023-08-24 NOTE — PLAN OF CARE
Goal Outcome Evaluation:  Plan of Care Reviewed With: patient        Progress: no change  Outcome Evaluation: Pt participated in PT eval this date. Pt transferred to EOB with CGA. Pt transferred to standing with Megan and Rwx. Pt required assist for donning socks and brief. Pt was able to transfer to the chair 3ft with Megan and Rwx. Pt did well with mobility, but states he would be open to STR as he has concerns he needs more strengthening and to work on his gait. Pt is expected to benefit from skilled PTx during this inpatient stay to address deficits in strength, gait and transfers.      Anticipated Discharge Disposition (PT): inpatient rehabilitation facility, skilled nursing facility, home with assist, home with home health

## 2023-08-24 NOTE — CONSULTS
In Patient Consult      Date of Consultation: 2023  Patient Name: Radha Varela  MRN: [id number]  : 1935     Referring provider: Osvaldo Glass MD    Primary care provider:  Provider, No Known    Reason for consultation: Acute diarrhea    History of Present Illness:   This is a 87-year-old elderly male patient with multiple medical comorbidities including hypertension, hyperlipidemia, coronary artery disease status post CABG, remote history of abdominal arctic aneurysm repair in , chronic atrial fibrillation, CKD stage III, Parkinson disease, peripheral vascular disease, COPD on home oxygen, obstructive sleep apnea was brought in emergency room on 2023 with complaints of generalized weakness and progressive diarrhea for the past  2 weeks.  Patient is currently resident of assisted living facility.    Patient states that he does have issues with the stomach especially having loose stool depending on what he eats and drink however usually will get 1 or 2 loose stools.  But this time since about 2 weeks he has been having progressive watery diarrhea mixed with the loose stool without any blood.  He initially went about 2-3 times per day but later on it was 5-6 times per day and profuse.  Associated lower abdominal cramps pain as such.  Denies any fever or chills.  Denies any nausea vomiting      There is no history of any recent antibiotic use recently or any new medications.  No prior history of any colitis.  Family history of any inflammatory bowel disease.  No prior history of any C. difficile colitis.  He had a recent shingles 2 weeks ago.  He had a colonoscopy about 3 to 4 years ago along with EGD as per patient and was been told normal. Patient has known history of abdominal aortic aneurysm about 6.1 cm and thoracic aneurysm measuring about 5.1 cm.    In the emergency room his work-up done revealed severe hypokalemia with potassium 2.8 and signs of dehydration.   Hemoglobin was 10.7 which was baseline.  Borderline low platelet of 129,000 which was baseline as well.  He was admitted for further evaluation management and GI was consulted.    Subjective     Past Medical History:   Diagnosis Date    Aneurysm     Arthritis     Cardiac murmur     Carotid stenosis     Celiac artery stenosis     Chest pain     CHF (congestive heart failure)     Chronic kidney disease     Coronary artery disease     Dyslipidemia     Elevated cholesterol     GERD (gastroesophageal reflux disease)     Hiatal hernia     History of exercise stress test     History of nuclear stress test     History of pneumonia     Hypertension     Lumbar degenerative disc disease     MI, old     Osteoarthritis     PAD (peripheral artery disease)     Parkinson's disease     Paroxysmal A-fib     Peptic ulceration     Psoriasis     Renal artery stenosis     Sleep apnea     uses o2 @ HS    Spinal stenosis     Tremor     Vertebral artery stenosis        Past Surgical History:   Procedure Laterality Date    ABDOMINAL AORTIC ANEURYSM REPAIR WITH ENDOGRAFT      APPENDECTOMY      CATARACT EXTRACTION W/ INTRAOCULAR LENS IMPLANT Left 12/10/2018    Procedure: CATARACT PHACO EXTRACTION WITH INTRAOCULAR LENS IMPLANT LEFT;  Surgeon: Charity Carrasco MD;  Location: [facility];  Service: Ophthalmology    CATARACT EXTRACTION W/ INTRAOCULAR LENS IMPLANT Right 2023    Procedure: CATARACT PHACO EXTRACTION WITH INTRAOCULAR LENS IMPLANT RIGHT;  Surgeon: Charity Carrasco MD;  Location: [facility] OR;  Service: Ophthalmology;  Laterality: Right;    COLONOSCOPY      CORONARY ARTERY BYPASS GRAFT      ENDOSCOPY      SHOULDER ROTATOR CUFF REPAIR Right     SKIN BIOPSY         Family History   Problem Relation Age of Onset    Other Mother          of old age    No Known Problems Father        Social History     Socioeconomic History    Marital status:     Number of children: 3   Tobacco Use    Smoking status: Former     Packs/day:  3.00     Years: 15.00     Pack years: 45.00     Types: Cigarettes     Quit date:      Years since quittin.6    Smokeless tobacco: Never    Tobacco comments:     quit 50 years ago   Vaping Use    Vaping Use: Never used   Substance and Sexual Activity    Alcohol use: Not Currently     Alcohol/week: 2.0 standard drinks     Types: 2 Glasses of wine per week     Comment: DAILY    Drug use: No    Sexual activity: Defer         Current Facility-Administered Medications:     acetaminophen (TYLENOL) tablet 650 mg, 650 mg, Oral, Q4H PRN **OR** acetaminophen (TYLENOL) 160 MG/5ML solution 650 mg, 650 mg, Oral, Q4H PRN **OR** acetaminophen (TYLENOL) suppository 650 mg, 650 mg, Rectal, Q4H PRN, Osvaldo Glass MD    amLODIPine (NORVASC) tablet 5 mg, 5 mg, Oral, Daily, Osvaldo Glass MD, 5 mg at 23 0847    aspirin EC tablet 81 mg, 81 mg, Oral, Daily, Osvaldo Glass MD, 81 mg at 23 0847    atorvastatin (LIPITOR) tablet 80 mg, 80 mg, Oral, Daily, Osvaldo Glass MD, 80 mg at 23 0848    Calcium Replacement - Follow Nurse / BPA Driven Protocol, , Does not apply, PRN, Osvaldo Glass MD    carbidopa-levodopa (SINEMET)  MG per tablet 2 tablet, 2 tablet, Oral, 4x Daily, Osvaldo Glass MD, 2 tablet at 23 1715    heparin (porcine) 5000 UNIT/ML injection 5,000 Units, 5,000 Units, Subcutaneous, Q12H, Osvaldo Glass MD, 5,000 Units at 23 0847    HYDROcodone-acetaminophen (NORCO) 5-325 MG per tablet 1 tablet, 1 tablet, Oral, Q6H PRN, Osvaldo Glass MD, 1 tablet at 23 1304    hydrOXYzine (ATARAX) tablet 25 mg, 25 mg, Oral, Nightly PRN, Osvaldo Glass MD    lactobacillus acidophilus (RISAQUAD) capsule 1 capsule, 1 capsule, Oral, BID, Osvaldo Glass MD, 1 capsule at 23 0848    Magnesium Standard Dose Replacement - Follow Nurse / BPA Driven Protocol, , Does not apply, Quan TENORIO Roshan, MD    melatonin tablet 10 mg, 10 mg, Oral, Nightly PRNQuan Roshan, MD    ondansetron (ZOFRAN) injection 4 mg, 4 mg,  Intravenous, Q6H PRN, Osvaldo Glass MD    Phosphorus Replacement - Follow Nurse / BPA Driven Protocol, , Does not apply, PRNQuan Roshan, MD    potassium chloride (K-DUR,KLOR-CON) CR tablet 40 mEq, 40 mEq, Oral, Q4H, Osvaldo Glass MD, 40 mEq at 08/24/23 1715    Potassium Replacement - Follow Nurse / BPA Driven Protocol, , Does not apply, PRNQuan Roshan, MD    rOPINIRole (REQUIP) tablet 0.5 mg, 0.5 mg, Oral, Q8H, Osvaldo Glass MD, 0.5 mg at 08/24/23 0611    sodium chloride 0.9 % flush 10 mL, 10 mL, Intravenous, PRN, Waqas Damon MD    [COMPLETED] Insert Peripheral IV, , , Once **AND** sodium chloride 0.9 % flush 10 mL, 10 mL, Intravenous, PRN, Loli Cabezas PA-C    sodium chloride 0.9 % flush 10 mL, 10 mL, Intravenous, Q12H, Osvaldo Glass MD, 10 mL at 08/24/23 0848    sodium chloride 0.9 % flush 10 mL, 10 mL, Intravenous, PRN, Osvaldo Glass MD    sodium chloride 0.9 % infusion 40 mL, 40 mL, Intravenous, PRN, Osvaldo Glass MD    sodium chloride 0.9 % infusion, 50 mL/hr, Intravenous, Continuous, Osvaldo Glass MD, Last Rate: 50 mL/hr at 08/24/23 1448, 50 mL/hr at 08/24/23 1448    Allergies   Allergen Reactions    Celebrex [Celecoxib] Anaphylaxis    Sulfa Antibiotics Anaphylaxis       Review of Systems   Constitutional:  Negative for fever.   HENT:  Negative for sore throat and trouble swallowing.    Eyes:  Negative for visual disturbance.   Respiratory:  Negative for cough, chest tightness and shortness of breath.    Cardiovascular:  Negative for chest pain, palpitations and leg swelling.   Gastrointestinal:  Positive for abdominal pain (Abdominal cramps) and diarrhea. Negative for blood in stool, constipation, nausea, vomiting and indigestion.   Endocrine: Negative for polyphagia.   Genitourinary:  Negative for dysuria and hematuria.   Musculoskeletal:  Negative for back pain, joint swelling and neck pain.   Skin:  Negative for rash, skin lesions and wound.   Neurological:  Positive for weakness  "(Generalized weakness). Negative for dizziness, seizures, speech difficulty, numbness and confusion.   Hematological:  Negative for adenopathy. Does not bruise/bleed easily.   Psychiatric/Behavioral:  Negative for hallucinations and depressed mood.       The following portions of the patient's history were reviewed and updated as appropriate: allergies, current medications, past family history, past medical history, past social history, past surgical history and problem list.    Objective     Vitals:    08/24/23 0723 08/24/23 1104 08/24/23 1105 08/24/23 1550   BP: 146/75  162/74 95/61   BP Location: Left arm  Left arm Left arm   Patient Position: Lying  Lying Sitting   Pulse: 67  60 62   Resp: 20  18 18   Temp: 97.8 øF (36.6 øC)  97.9 øF (36.6 øC) 98.1 øF (36.7 øC)   TempSrc: Oral  Oral Oral   SpO2: 91%  92% 92%   Weight:  73.3 kg (161 lb 9.6 oz)     Height:  170.2 cm (67.01\")         Physical Exam  Vitals and nursing note reviewed.   Constitutional:       Appearance: Normal appearance. He is well-developed.   HENT:      Head: Normocephalic and atraumatic.      Right Ear: External ear normal.      Left Ear: External ear normal.      Mouth/Throat:      Mouth: Mucous membranes are dry.   Eyes:      Comments: Pallor present   Neck:      Thyroid: No thyromegaly.      Trachea: No tracheal deviation.   Cardiovascular:      Rate and Rhythm: Normal rate and regular rhythm.      Heart sounds: No murmur heard.  Pulmonary:      Effort: Pulmonary effort is normal. No respiratory distress.      Breath sounds: Normal breath sounds.   Abdominal:      General: Bowel sounds are normal. There is no distension.      Palpations: Abdomen is soft. There is no mass.      Tenderness: There is no abdominal tenderness.      Hernia: No hernia is present.      Comments: Prior surgical scar noted in the midline   Musculoskeletal:         General: Normal range of motion.      Cervical back: Normal range of motion.   Skin:     General: Skin is " warm and dry.   Neurological:      Mental Status: He is alert and oriented to person, place, and time.      Cranial Nerves: No cranial nerve deficit.      Sensory: No sensory deficit.   Psychiatric:         Behavior: Behavior normal.         Thought Content: Thought content normal.         Judgment: Judgment normal.       Results from last 7 days   Lab Units 08/24/23  0712 08/23/23  1535   SODIUM mmol/L 142 137   POTASSIUM mmol/L 2.9* 2.8*   CHLORIDE mmol/L 106 98   CO2 mmol/L 25.0 26.9   BUN mg/dL 25* 28*   CREATININE mg/dL 1.78* 2.24*   CALCIUM mg/dL 8.4* 9.0   ALBUMIN g/dL  --  4.2   BILIRUBIN mg/dL  --  0.5   ALK PHOS U/L  --  82   ALT (SGPT) U/L  --  <5   AST (SGOT) U/L  --  14   GLUCOSE mg/dL 76 99   WBC 10*3/mm3 6.25 7.09   HEMOGLOBIN g/dL 10.7* 10.7*   PLATELETS 10*3/mm3 128* 129*       Imaging Results (Last 24 Hours)       Procedure Component Value Units Date/Time    CT Abdomen Pelvis Without Contrast [[id number]] Collected: 08/23/23 1957     Updated: 08/23/23 1958    Narrative:      FINAL REPORT    TECHNIQUE:  Routine axial images through the abdomen and pelvis were  obtained.    CLINICAL HISTORY:  Persistent diarrhea, weakness    COMPARISON:  CXR earlier today    FINDINGS:  Abdomen: There are marked coronary artery calcifications.  There has been prior CABG.  There is a marked tortuous lower thoracic aorta extending to the right of the midline which accounts for the abnormal opacity at the cardiophrenic angle on the prior   chest x-ray.  The gallbladder is normal.  Pancreas is predominantly fatty replaced.  There are right greater than left renal cysts.  The solid abdominal organs and ureters are otherwise unremarkable.  There is increased fluid within the colon consistent   with history of diarrhea with no evidence of acute colitis.  The GI tract is otherwise unremarkable.  There has been a prior appendectomy.  The abdominal aorta is calcified and torturous with an infrarenal aneurysm measuring 6.1 cm in  greatest diameter   on the coronal images.  There is lesser ectasia of the bilateral common iliac arteries.    Pelvis: The urinary bladder is normal. There is no pelvic or abdominal ascites, adenopathy or acute osseous abnormality.  There is scoliosis and severe degenerative changes of the thoracolumbar spine.      Impression:      Increased fluid within the colon consistent with diarrhea.  No acute abnormality of the abdomen and pelvis otherwise.    Markedly tortuous and dilated aorta with a 6.1 cm infrarenal abdominal aortic aneurysm.    Authenticated and Electronically Signed by Tu Villarreal M.D. on  08/23/2023 07:57:15 PM            Assessment / Plan      Assessment/Recommendations:     Acute diarrhea  2.  Dehydration  3.  Hypokalemia  4.  Recent history of shingles  5.  Chronic normocytic anemia    Etiology of his acute nonbloody watery diarrhea is unclear however history and work-up so far more suggestive of some sort of viral diarrhea.  Unclear whether his recent shingles has any role to play.  C. difficile colitis need to be ruled out although unlikely.     His history and imaging studies are not suggestive any ischemic colitis or chronic colitis  His diarrhea has already improved.  He had only 2 bowel movement since morning.     Would recommend sending stool for C. difficile colitis  If C. difficile negative, consider Imodium 1 tablets p.o. twice daily for 48 hours  May consider Flagyl 500 mg p.o. 3 times daily for 7 days if diarrhea persists  Probiotics daily  No indication for endoscopic examination of the colon  We will follow along with you      6.  Coronary artery disease  7.  A-fib  8.  Abdominal and thoracic aortic aneurysm        Thank you very much for letting me participate in the care of this patient.  Please do not hesitate to call me if you have any questions.    Mary Lou Marquez MD  [facility]  8/24/2023  19:28 EDT    Please note that portions of this note may have been  completed with a voice recognition program.

## 2023-08-24 NOTE — THERAPY EVALUATION
Patient Name: Radha Varela  : 1935    MRN: [id number]                              Today's Date: 2023       Admit Date: 2023    Visit Dx:     ICD-10-CM ICD-9-CM   1. Diarrhea, unspecified type  R19.7 787.91   2. Hypokalemia  E87.6 276.8   3. Weakness generalized  R53.1 780.79   4. Elevated troponin  R77.8 790.6     Patient Active Problem List   Diagnosis    Abdominal aortic aneurysm (AAA) without rupture    Generalized weakness    Hypokalemia    Diarrhea of presumed infectious origin    Parkinson's disease dementia    Coronary artery disease involving native coronary artery of native heart without angina pectoris     Past Medical History:   Diagnosis Date    Aneurysm     Arthritis     Cardiac murmur     Carotid stenosis     Celiac artery stenosis     Chest pain     CHF (congestive heart failure)     Chronic kidney disease     Coronary artery disease     Dyslipidemia     Elevated cholesterol     GERD (gastroesophageal reflux disease)     Hiatal hernia     History of exercise stress test     History of nuclear stress test     History of pneumonia     Hypertension     Lumbar degenerative disc disease     MI, old     Osteoarthritis     PAD (peripheral artery disease)     Parkinson's disease     Paroxysmal A-fib     Peptic ulceration     Psoriasis     Renal artery stenosis     Sleep apnea     uses o2 @ HS    Spinal stenosis     Tremor     Vertebral artery stenosis      Past Surgical History:   Procedure Laterality Date    ABDOMINAL AORTIC ANEURYSM REPAIR WITH ENDOGRAFT      APPENDECTOMY      CATARACT EXTRACTION W/ INTRAOCULAR LENS IMPLANT Left 12/10/2018    Procedure: CATARACT PHACO EXTRACTION WITH INTRAOCULAR LENS IMPLANT LEFT;  Surgeon: Charity Carrasco MD;  Location: [facility];  Service: Ophthalmology    CATARACT EXTRACTION W/ INTRAOCULAR LENS IMPLANT Right 2023    Procedure: CATARACT PHACO EXTRACTION WITH INTRAOCULAR LENS IMPLANT RIGHT;  Surgeon: Charity Carrasco MD;  Location:   [facility];  Service: Ophthalmology;  Laterality: Right;    COLONOSCOPY      CORONARY ARTERY BYPASS GRAFT  1995    ENDOSCOPY      SHOULDER ROTATOR CUFF REPAIR Right     SKIN BIOPSY        General Information       Row Name 08/24/23 1237          OT Time and Intention    Document Type evaluation  -SD     Mode of Treatment occupational therapy  -SD       Row Name 08/24/23 1237          General Information    Patient Profile Reviewed yes  -SD     Prior Level of Function independent:;all household mobility;min assist:;ADL's  [facility], [facility] and [facility] available at [facility]. Patient walks with a rollator, also has a RW, wears O2 at night, has a walk in shower with [facility]  -SD     Existing Precautions/Restrictions fall;other (see comments)  urinary catheter, O2 at night, sensation deficits major feet  -SD     Barriers to Rehab medically complex;previous functional deficit;impaired sensation  -SD       Row Name 08/24/23 1237          Living Environment    People in Home spouse  -SD       Row Name 08/24/23 1237          Home Main Entrance    Number of Stairs, Main Entrance none  -SD       Row Name 08/24/23 1237          Stairs Within Home, Primary    Number of Stairs, Within Home, Primary none  -SD       Row Name 08/24/23 1237          Cognition    Orientation Status (Cognition) oriented x 4  -SD       Row Name 08/24/23 1237          Safety Issues, Functional Mobility    Safety Issues Affecting Function (Mobility) safety precautions follow-through/compliance;safety precaution awareness  -SD     Impairments Affecting Function (Mobility) balance;endurance/activity tolerance;strength;sensation/sensory awareness  -SD               User Key  (r) = Recorded By, (t) = Taken By, (c) = Cosigned By      Initials Name Provider Type    SD Lizbet Skinner OT Occupational Therapist                     Mobility/ADL's       Row Name 08/24/23 1242          Bed Mobility    Bed Mobility supine-sit  -SD      Supine-Sit [place] (Bed Mobility) modified independence  -SD     Assistive Device (Bed Mobility) bed rails;head of bed elevated  -SD       Row Name 08/24/23 1242          Transfers    Transfers sit-stand transfer;bed-chair transfer  -SD       Row Name 08/24/23 1242          Bed-Chair Transfer    Bed-Chair [place] (Transfers) minimum assist (75% patient effort)  -SD     Assistive Device (Bed-Chair Transfers) walker, front-wheeled  -SD       Row Name 08/24/23 1242          Sit-Stand Transfer    Sit-Stand [place] (Transfers) minimum assist (75% patient effort)  -SD     Assistive Device (Sit-Stand Transfers) walker, front-wheeled  -SD       Row Name 08/24/23 1242          Functional Mobility    Functional Mobility- Ind. Level minimum assist (75% patient effort)  -SD     Functional Mobility- Device walker, front-wheeled  -SD     Functional Mobility-Distance (Feet) 3  -SD     Functional Mobility- Safety Issues balance decreased during turns;sequencing ability decreased;step length decreased;weight-shifting ability decreased  -SD       Row Name 08/24/23 1242          Activities of Daily Living    BADL Assessment/Intervention bathing;upper body dressing;lower body dressing;grooming;feeding;toileting  -SD       Row Name 08/24/23 1242          Bathing Assessment/Intervention    [place] Level (Bathing) upper body;minimum assist (75% patient effort);lower body;moderate assist (50% patient effort)  -SD       Row Name 08/24/23 1242          Upper Body Dressing Assessment/Training    [place] Level (Upper Body Dressing) contact guard assist  -SD       Row Name 08/24/23 1242          Lower Body Dressing Assessment/Training    [place] Level (Lower Body Dressing) don;pants/bottoms;socks;moderate assist (50% patient effort)  -SD       Row Name 08/24/23 1242          Grooming Assessment/Training    [place] Level (Grooming) standby assist  -SD       Row Name 08/24/23 1242          Self-Feeding  Assessment/Training    [place] Level (Feeding) supervision  -SD       Row Name 08/24/23 1242          Toileting Assessment/Training    [place] Level (Toileting) moderate assist (50% patient effort)  -SD     Comment, (Toileting) urinary catheter  -SD               User Key  (r) = Recorded By, (t) = Taken By, (c) = Cosigned By      Initials Name Provider Type    Lizbet Ward OT Occupational Therapist                   Obj/Interventions       Row Name 08/24/23 1243          Sensory Assessment (Somatosensory)    Sensory Assessment (Somatosensory) bilateral LE  -SD     Bilateral LE Sensory Assessment general sensation  -SD     Sensory Subjective Reports numbness  -SD       Row Name 08/24/23 1243          Range of Motion Comprehensive    General Range of Motion bilateral upper extremity ROM WFL  -SD       [facility] Name 08/24/23 1243          Strength Comprehensive (MMT)    General Manual Muscle Testing (MMT) Assessment upper extremity strength deficits identified  -SD     Comment, General Manual Muscle Testing (MMT) Assessment UB 3+/5  -SD               User Key  (r) = Recorded By, (t) = Taken By, (c) = Cosigned By      Initials Name Provider Type    Lizbet Ward OT Occupational Therapist                   Goals/Plan       Row Name 08/24/23 1248          Transfer Goal 1 (OT)    Activity/Assistive Device (Transfer Goal 1, OT) sit-to-stand/stand-to-sit;walker, rolling  -SD     [place] Level/Cues Needed (Transfer Goal 1, OT) standby assist  -SD     Time Frame (Transfer Goal 1, OT) long term goal (LTG)  -SD     Progress/Outcome (Transfer Goal 1, OT) goal ongoing  -SD       Row Name 08/24/23 1248          Dressing Goal 1 (OT)    Activity/Device (Dressing Goal 1, OT) lower body dressing  -SD     [place]/Cues Needed (Dressing Goal 1, OT) contact guard required  -SD     Time Frame (Dressing Goal 1, OT) 2 weeks  -SD     Progress/Outcome (Dressing Goal 1, OT) goal ongoing  -SD       Row Name 08/24/23  "1248          Toileting Goal 1 (OT)    Activity/Device (Toileting Goal 1, OT) toileting skills, all;commode  -SD     [place] Level/Cues Needed (Toileting Goal 1, OT) contact guard required  -SD     Time Frame (Toileting Goal 1, OT) 2 weeks  -SD     Progress/Outcome (Toileting Goal 1, OT) goal ongoing  -SD       Row Name 08/24/23 1248          Strength Goal 1 (OT)    Strength Goal 1 (OT) Patient to perform UB ther ex as tolerated  -SD     Time Frame (Strength Goal 1, OT) long term goal (LTG)  -SD     Progress/Outcome (Strength Goal 1, OT) goal ongoing  -SD       Row Name 08/24/23 1248          Therapy Assessment/Plan (OT)    Planned Therapy Interventions (OT) activity tolerance training;adaptive equipment training;BADL retraining;patient/caregiver education/training;ROM/therapeutic exercise;strengthening exercise;transfer/mobility retraining  -SD               User Key  (r) = Recorded By, (t) = Taken By, (c) = Cosigned By      Initials Name Provider Type    Lizbet Ward OT Occupational Therapist                   Clinical Impression       Row Name 08/24/23 1243          Pain Assessment    Pretreatment Pain Rating 0/10 - no pain  -SD     Posttreatment Pain Rating 0/10 - no pain  -SD       Row Name 08/24/23 1243          Plan of Care Review    Plan of Care Reviewed With patient  -SD     Progress no change  -SD     Outcome Evaluation OT eval completed. Patient is supine in bed, Ox4, no c/o pain. On 2L O2 satting 100%, O2 removed prior to mobility as patient states he only wears it at night. Urinary catheter intact. Patient performed supine to sit with modified ind, requires Min A for UBB, mod A for LBB, SBA for UBD, mod A for LBD, SBA for grooming, Sup for self feeding, mod A for toileting. Patient reports bilateral feet numbness as well as \"freezing episodes\" at home that have caused him to fall. Patient performed sit to stand and tf to chair with min A using RW, declined further mobility this date. " Patient is expected to benefit from continued OT services prior to DC. In regards to DC patient states he wants to talk to his daughter.  -SD       Row Name 08/24/23 1243          Therapy Assessment/Plan (OT)    Patient/Family Therapy Goal Statement (OT) increase strength and mobility  -SD     Rehab Potential (OT) good, to achieve stated therapy goals  -SD     Criteria for Skilled Therapeutic Interventions Met (OT) skilled treatment is necessary  -SD     Therapy Frequency (OT) 3 times/wk  5 times if indicated  -SD       Row Name 08/24/23 1243          Therapy Plan Review/Discharge Plan (OT)    Anticipated Discharge Disposition (OT) home with assist;home with home health;inpatient rehabilitation facility  -SD       Row Name 08/24/23 1243          Vital Signs    Pre SpO2 (%) 100  -SD     O2 Delivery Pre Treatment supplemental O2  -SD     O2 Delivery Intra Treatment room air  -SD     Post SpO2 (%) 96  -SD     O2 Delivery Post Treatment room air  -SD       Row Name 08/24/23 1243          Positioning and Restraints    Pre-Treatment Position in bed  -SD     Post Treatment Position chair  -SD     In Chair sitting;call light within reach;encouraged to call for assist;exit alarm on  -SD               User Key  (r) = Recorded By, (t) = Taken By, (c) = Cosigned By      Initials Name Provider Type    Lizbet Ward OT Occupational Therapist                   Outcome Measures       Row Name 08/24/23 1249          How much help from another is currently needed...    Putting on and taking off regular lower body clothing? 2  -SD     Bathing (including washing, rinsing, and drying) 2  -SD     Toileting (which includes using toilet bed pan or urinal) 2  -SD     Putting on and taking off regular upper body clothing 3  -SD     Taking care of personal grooming (such as brushing teeth) 3  -SD     Eating meals 4  -SD     AM-PAC 6 Clicks Score (OT) 16  -SD       Row Name 08/24/23 0800          How much help from another person do you  currently need...    Turning from your back to your side while in flat bed without using bedrails? 3  -AL     Moving from lying on back to sitting on the side of a flat bed without bedrails? 3  -AL     Moving to and from a bed to a chair (including a wheelchair)? 2  -AL     Standing up from a chair using your arms (e.g., wheelchair, bedside chair)? 2  -AL     Climbing 3-5 steps with a railing? 1  -AL     To walk in hospital room? 2  -AL     AM-PAC 6 Clicks Score (PT) 13  -AL     Highest level of mobility 4 --> Transferred to chair/commode  -AL       Row Name 08/24/23 1249          Functional Assessment    Outcome Measure Options AM-PAC 6 Clicks Daily Activity (OT)  -SD               User Key  (r) = Recorded By, (t) = Taken By, (c) = Cosigned By      Initials Name Provider Type    Lizbet Ward OT Occupational Therapist    Antoinette Huston, RN Registered Nurse                    Occupational Therapy Education       Title: PT OT SLP Therapies (In Progress)       Topic: Occupational Therapy (In Progress)       Point: ADL training (Done)       Description:   Instruct learner(s) on proper safety adaptation and remediation techniques during self care or transfers.   Instruct in proper use of assistive devices.                  Learning Progress Summary             Patient Acceptance, E,TB, VU by SD at 8/24/2023 1250    Comment: OT POC                         Point: Home exercise program (Not Started)       Description:   Instruct learner(s) on appropriate technique for monitoring, assisting and/or progressing therapeutic exercises/activities.                  Learner Progress:  Not documented in this visit.              Point: Precautions (Not Started)       Description:   Instruct learner(s) on prescribed precautions during self-care and functional transfers.                  Learner Progress:  Not documented in this visit.              Point: Body mechanics (Not Started)       Description:   Instruct learner(s)  "on proper positioning and spine alignment during self-care, functional mobility activities and/or exercises.                  Learner Progress:  Not documented in this visit.                              User Key       Initials Effective Dates Name Provider Type Discipline    SD 06/16/21 -  Lizbet Skinner OT Occupational Therapist OT                  OT Recommendation and Plan  Planned Therapy Interventions (OT): activity tolerance training, adaptive equipment training, BADL retraining, patient/caregiver education/training, ROM/therapeutic exercise, strengthening exercise, transfer/mobility retraining  Therapy Frequency (OT): 3 times/wk (5 times if indicated)  Plan of Care Review  Plan of Care Reviewed With: patient  Progress: no change  Outcome Evaluation: OT eval completed. Patient is supine in bed, Ox4, no c/o pain. On 2L O2 satting 100%, O2 removed prior to mobility as patient states he only wears it at night. Urinary catheter intact. Patient performed supine to sit with modified ind, requires Min A for UBB, mod A for LBB, SBA for UBD, mod A for LBD, SBA for grooming, Sup for self feeding, mod A for toileting. Patient reports bilateral feet numbness as well as \"freezing episodes\" at home that have caused him to fall. Patient performed sit to stand and tf to chair with min A using RW, declined further mobility this date. Patient is expected to benefit from continued OT services prior to DC. In regards to DC patient states he wants to talk to his daughter.     Time Calculation:   Evaluation Complexity (OT)  Review Occupational Profile/Medical/Therapy History Complexity: brief/low complexity  Assessment, Occupational Performance/Identification of Deficit Complexity: 1-3 performance deficits  Clinical Decision Making Complexity (OT): problem focused assessment/low complexity  Overall Complexity of Evaluation (OT): low complexity     Time Calculation- OT       Row Name 08/24/23 7545             Time Calculation- " OT    OT Start Time 1129  -SD      OT Received On 08/24/23  -SD      OT Goal Re-Cert Due Date 09/05/23  -SD         Untimed Charges    OT Eval/Re-eval Minutes 45  -SD         Total Minutes    Untimed Charges Total Minutes 45  -SD       Total Minutes 45  -SD                User Key  (r) = Recorded By, (t) = Taken By, (c) = Cosigned By      Initials Name Provider Type    Lizbet Ward, CARINA Occupational Therapist                  Therapy Charges for Today       Code Description Service Date Service Provider Modifiers Qty    [id number]  OT EVAL LOW COMPLEXITY 3 8/24/2023 Lizbet Skinner OT GO 1                 Lizbet Skinner OT  8/24/2023

## 2023-08-24 NOTE — THERAPY EVALUATION
Patient Name: Radha Varela  : 1935    MRN: [id number]                              Today's Date: 2023       Admit Date: 2023    Visit Dx:     ICD-10-CM ICD-9-CM   1. Diarrhea, unspecified type  R19.7 787.91   2. Hypokalemia  E87.6 276.8   3. Weakness generalized  R53.1 780.79   4. Elevated troponin  R77.8 790.6     Patient Active Problem List   Diagnosis    Abdominal aortic aneurysm (AAA) without rupture    Generalized weakness    Hypokalemia    Diarrhea of presumed infectious origin    Parkinson's disease dementia    Coronary artery disease involving native coronary artery of native heart without angina pectoris     Past Medical History:   Diagnosis Date    Aneurysm     Arthritis     Cardiac murmur     Carotid stenosis     Celiac artery stenosis     Chest pain     CHF (congestive heart failure)     Chronic kidney disease     Coronary artery disease     Dyslipidemia     Elevated cholesterol     GERD (gastroesophageal reflux disease)     Hiatal hernia     History of exercise stress test     History of nuclear stress test     History of pneumonia     Hypertension     Lumbar degenerative disc disease     MI, old     Osteoarthritis     PAD (peripheral artery disease)     Parkinson's disease     Paroxysmal A-fib     Peptic ulceration     Psoriasis     Renal artery stenosis     Sleep apnea     uses o2 @ HS    Spinal stenosis     Tremor     Vertebral artery stenosis      Past Surgical History:   Procedure Laterality Date    ABDOMINAL AORTIC ANEURYSM REPAIR WITH ENDOGRAFT      APPENDECTOMY      CATARACT EXTRACTION W/ INTRAOCULAR LENS IMPLANT Left 12/10/2018    Procedure: CATARACT PHACO EXTRACTION WITH INTRAOCULAR LENS IMPLANT LEFT;  Surgeon: Charity Carrasco MD;  Location: [facility];  Service: Ophthalmology    CATARACT EXTRACTION W/ INTRAOCULAR LENS IMPLANT Right 2023    Procedure: CATARACT PHACO EXTRACTION WITH INTRAOCULAR LENS IMPLANT RIGHT;   Surgeon: Charity Carrasco MD;  Location: [facility];  Service: Ophthalmology;  Laterality: Right;    COLONOSCOPY      CORONARY ARTERY BYPASS GRAFT  1995    ENDOSCOPY      SHOULDER ROTATOR CUFF REPAIR Right     SKIN BIOPSY        General Information     Row Name 08/24/23 1242          Physical Therapy Time and Intention    Document Type evaluation  -MS     Mode of Treatment physical therapy  -MS     Row Name 08/24/23 1242          General Information    Patient Profile Reviewed yes  -MS     Prior Level of Function independent:;all household mobility  -MS     Existing Precautions/Restrictions fall;oxygen therapy device and L/min  -MS     Barriers to Rehab medically complex;previous functional deficit  -MS     Row Name 08/24/23 1242          Living Environment    People in Home spouse  -MS     Row Name 08/24/23 1242          Home Main Entrance    Number of Stairs, Main Entrance none  -MS     Row Name 08/24/23 1242          Stairs Within Home, Primary    Number of Stairs, Within Home, Primary none  -MS     Row Name 08/24/23 1242          Cognition    Orientation Status (Cognition) oriented x 4  -MS     Row Name 08/24/23 1242          Safety Issues, Functional Mobility    Safety Issues Affecting Function (Mobility) safety precautions follow-through/compliance;safety precaution awareness  -MS     Impairments Affecting Function (Mobility) balance;endurance/activity tolerance;strength;sensation/sensory awareness  -MS           User Key  (r) = Recorded By, (t) = Taken By, (c) = Cosigned By    Initials Name Provider Type    MS Danial Santamaria, PT Physical Therapist               Mobility     Row Name 08/24/23 1243          Bed Mobility    Bed Mobility supine-sit  -MS     Supine-Sit [place] (Bed Mobility) modified independence  -MS     Assistive Device (Bed Mobility) bed rails;head of bed elevated  -MS     Row Name 08/24/23 1243          Bed-Chair Transfer    Bed-Chair [place] (Transfers) minimum assist (75%  patient effort)  -MS     Assistive Device (Bed-Chair Transfers) walker, front-wheeled  -MS     Row Name 08/24/23 1243          Sit-Stand Transfer    Sit-Stand [place] (Transfers) minimum assist (75% patient effort)  -MS     Assistive Device (Sit-Stand Transfers) walker, front-wheeled  -MS     Row Name 08/24/23 1243          Gait/Stairs (Locomotion)    [place] Level (Gait) minimum assist (75% patient effort)  -MS     Assistive Device (Gait) walker, front-wheeled  -MS     Distance in Feet (Gait) 3  -MS     Deviations/Abnormal Patterns (Gait) marian decreased;festinating/shuffling  -MS           User Key  (r) = Recorded By, (t) = Taken By, (c) = Cosigned By    Initials Name Provider Type    Danial Silvestre, JOAN Physical Therapist               Obj/Interventions     Row Name 08/24/23 1244          Range of Motion Comprehensive    General Range of Motion bilateral lower extremity ROM WFL  -MS     Row Name 08/24/23 1244          Strength Comprehensive (MMT)    General Manual Muscle Testing (MMT) Assessment lower extremity strength deficits identified  -MS     Comment, General Manual Muscle Testing (MMT) Assessment B LE 4-/5  -MS     Row Name 08/24/23 1244          Balance    Balance Assessment standing static balance;standing dynamic balance  -MS     Static Standing Balance minimal assist  -MS     Dynamic Standing Balance minimal assist  -MS           User Key  (r) = Recorded By, (t) = Taken By, (c) = Cosigned By    Initials Name Provider Type    Danial Silvestre, PT Physical Therapist               Goals/Plan     Row Name 08/24/23 1250          Transfer Goal 1 (PT)    Activity/Assistive Device (Transfer Goal 1, PT) transfers, all;sit-to-stand/stand-to-sit  -MS     [place] Level/Cues Needed (Transfer Goal 1, PT) standby assist  -MS     Time Frame (Transfer Goal 1, PT) long term goal (LTG);2 weeks  -MS     Row Name 08/24/23 1250          Gait Training Goal 1 (PT)    Activity/Assistive Device (Gait  Training Goal 1, PT) gait (walking locomotion);assistive device use  -MS     [place] Level (Gait Training Goal 1, PT) standby assist  -MS     Distance (Gait Training Goal 1, PT) 150ft  -MS     Time Frame (Gait Training Goal 1, PT) long term goal (LTG);2 weeks  -MS     Row Name 08/24/23 1250          Patient Education Goal (PT)    Activity (Patient Education Goal, PT) ther exer x15 reps ea  -MS     [place]/Cues/Accuracy (Memory Goal 2, PT) demonstrates adequately  -MS     Time Frame (Patient Education Goal, PT) short term goal (STG);1 week  -MS     Row Name 08/24/23 1250          Therapy Assessment/Plan (PT)    Planned Therapy Interventions (PT) balance training;bed mobility training;gait training;transfer training;patient/family education;strengthening;stair training;ROM (range of motion);neuromuscular re-education;home exercise program  -MS           User Key  (r) = Recorded By, (t) = Taken By, (c) = Cosigned By    Initials Name Provider Type    MS Danial Santamaria, PT Physical Therapist               Clinical Impression     Row Name 08/24/23 1245          Pain    Pretreatment Pain Rating 0/10 - no pain  -MS     Posttreatment Pain Rating 0/10 - no pain  -MS     Row Name 08/24/23 1245          Plan of Care Review    Plan of Care Reviewed With patient  -MS     Progress no change  -MS     Outcome Evaluation Pt participated in PT eval this date. Pt transferred to EOB with CGA. Pt transferred to standing with Megan and Rwx. Pt required assist for donning socks and brief. Pt was able to transfer to the chair 3ft with Megan and Rwx. Pt did well with mobility, but states he would be open to STR as he has concerns he needs more strengthening and to work on his gait. Pt is expected to benefit from skilled PTx during this inpatient stay to address deficits in strength, gait and transfers.  -MS     Row Name 08/24/23 2237          Therapy Assessment/Plan (PT)    Patient/Family Therapy Goals Statement (PT) to get  stronger  -MS     Rehab Potential (PT) good, to achieve stated therapy goals  -MS     Criteria for Skilled Interventions Met (PT) yes;meets criteria  -MS     Therapy Frequency (PT) 5 times/wk  -MS     Row Name 08/24/23 1245          Vital Signs    Pre SpO2 (%) 99  -MS     O2 Delivery Pre Treatment supplemental O2  -MS     Intra SpO2 (%) 95  -MS     O2 Delivery Intra Treatment room air  -MS     Post SpO2 (%) 96  -MS     O2 Delivery Post Treatment room air  -MS     Pre Patient Position Supine  -MS     Intra Patient Position Standing  -MS     Post Patient Position Sitting  -MS     Row Name 08/24/23 1245          Positioning and Restraints    Pre-Treatment Position in bed  -MS     Post Treatment Position chair  -MS     In Chair sitting;call light within reach;encouraged to call for assist;exit alarm on  -MS           User Key  (r) = Recorded By, (t) = Taken By, (c) = Cosigned By    Initials Name Provider Type    Danial Silvestre, PT Physical Therapist               Outcome Measures     Row Name 08/24/23 1252 08/24/23 0800       How much help from another person do you currently need...    Turning from your back to your side while in flat bed without using bedrails? 3  -MS 3  -AL    Moving from lying on back to sitting on the side of a flat bed without bedrails? 3  -MS 3  -AL    Moving to and from a bed to a chair (including a wheelchair)? 3  -MS 2  -AL    Standing up from a chair using your arms (e.g., wheelchair, bedside chair)? 3  -MS 2  -AL    Climbing 3-5 steps with a railing? 2  -MS 1  -AL    To walk in hospital room? 3  -MS 2  -AL    AM-PAC 6 Clicks Score (PT) 17  -MS 13  -AL    Highest level of mobility 5 --> Static standing  -MS 4 --> Transferred to chair/commode  -AL    Row Name 08/24/23 1249          Functional Assessment    Outcome Measure Options AM-PAC 6 Clicks Daily Activity (OT)  -SD           User Key  (r) = Recorded By, (t) = Taken By, (c) = Cosigned By    Initials Name Provider Type    MEG Skinner  Lizbet, OT Occupational Therapist    Danial Silvestre, PT Physical Therapist    Antoinette Huston, RN Registered Nurse                             Physical Therapy Education     Title: PT OT SLP Therapies (In Progress)     Topic: Physical Therapy (In Progress)     Point: Mobility training (Done)     Learning Progress Summary           Patient Acceptance, E, VU by MS at 8/24/2023 1253    Comment: importance of mobility                   Point: Home exercise program (Done)     Learning Progress Summary           Patient Acceptance, E, VU by MS at 8/24/2023 1253    Comment: importance of mobility                   Point: Body mechanics (Not Started)     Learner Progress:  Not documented in this visit.          Point: Precautions (Not Started)     Learner Progress:  Not documented in this visit.                      User Key     Initials Effective Dates Name Provider Type Discipline    MS 08/22/23 -  Danial Santamaria, PT Physical Therapist PT              PT Recommendation and Plan  Planned Therapy Interventions (PT): balance training, bed mobility training, gait training, transfer training, patient/family education, strengthening, stair training, ROM (range of motion), neuromuscular re-education, home exercise program  Plan of Care Reviewed With: patient  Progress: no change  Outcome Evaluation: Pt participated in PT eval this date. Pt transferred to EOB with CGA. Pt transferred to standing with Mgean and Rwx. Pt required assist for donning socks and brief. Pt was able to transfer to the chair 3ft with Megan and Rwx. Pt did well with mobility, but states he would be open to STR as he has concerns he needs more strengthening and to work on his gait. Pt is expected to benefit from skilled PTx during this inpatient stay to address deficits in strength, gait and transfers.     Time Calculation:   PT Evaluation Complexity  History, PT Evaluation Complexity: 1-2 personal factors and/or comorbidities  Examination of Body  Systems (PT Eval Complexity): total of 3 or more elements  Clinical Presentation (PT Evaluation Complexity): evolving  Clinical Decision Making (PT Evaluation Complexity): moderate complexity  Overall Complexity (PT Evaluation Complexity): moderate complexity     PT Charges     Row Name 08/24/23 1254             Time Calculation    Start Time 1128  -MS      PT Received On 08/24/23  -MS      PT Goal Re-Cert Due Date 09/03/23  -MS         Untimed Charges    PT Eval/Re-eval Minutes 45  -MS         Total Minutes    Untimed Charges Total Minutes 45  -MS       Total Minutes 45  -MS            User Key  (r) = Recorded By, (t) = Taken By, (c) = Cosigned By    Initials Name Provider Type    MS Danial Santamaria, PT Physical Therapist              Therapy Charges for Today     Code Description Service Date Service Provider Modifiers Qty    06119619244 HC PT EVAL MOD COMPLEXITY 3 8/24/2023 Danial Santamaria, PT GP 1          PT G-Codes  Outcome Measure Options: AM-PAC 6 Clicks Daily Activity (OT)  AM-PAC 6 Clicks Score (PT): 17  AM-PAC 6 Clicks Score (OT): 16  PT Discharge Summary  Anticipated Discharge Disposition (PT): inpatient rehabilitation facility, skilled nursing facility, home with assist, home with home health    Danial Santamaria PT  8/24/2023

## 2023-08-24 NOTE — PROGRESS NOTES
[facility]    Name:  Radha Varela   Age:  87 y.o.  Sex:  male  :  1935  MRN:  [id number]   Visit Number:  [id number]  Admission Date:  2023  Date Of Service:  23  Primary Care Physician:  Provider, No Known     LOS: 0 days :    Chief Complaint:      Diarrhea.    Subjective:    Mr. Varela was seen and examined this morning.  He is currently lying down on the bed and is comfortable at rest.  He did have diarrhea this morning and a stool sample was sent for GI panel and it is negative.  Denies any abdominal pain or shortness of breath.  He did work with physical therapy today.    Hospital Course:    Mr. Varela is an 87-year-old pleasant male resident of assisted living facility, with multiple medical problems including coronary artery disease status post CABG, chronic kidney disease stage III, abdominal aortic aneurysm repair, atrial fibrillation, Parkinson's disease, carotid and celiac artery stenosis, COPD on home oxygen at nighttime, obstructive sleep apnea was brought to the emergency room by his daughter with symptoms of generalized weakness and progressive diarrhea that has been going on for more than 10 days.  There is no prior history of antibiotics use. Patient does have extensive history of aortic atherosclerosis including aortic aneurysm at 6 cm. He was deemed not to be a surgical candidate at [facility].     Patient was recently seen in the emergency room on 2023 where he was diagnosed with shingles and was given topical medication.  At that time CT of the abdomen and pelvis was done which showed infrarenal abdominal aortic aneurysm with interval increase in size.  It also showed thoracic and abdominal aorta aneurysm measuring up to 5.4 cm.     In the emergency room, he was afebrile with a heart rate of 62 and a blood pressure of 127/60.  His initial pulse oxygen saturation was 87% on room air and he was placed on  2 L of oxygen with improvement in his saturation to 95%.  Initial troponin T was 75 with a proBNP of 2549.  CMP was fairly unremarkable except for a BUN of 28, creatinine of 2.24 (baseline), potassium of 2.8.  CBC showed a hemoglobin of 10.7 (baseline) and a platelet count of 129 (baseline).  Patient was given IV fluids, oral potassium and was subsequently admitted to the medical floor with telemetry.  His GI panel was negative.    Review of Systems:     All systems were reviewed and negative except as mentioned in subjective, assessment and plan.    Vital Signs:    Temp:  [97.2 øF (36.2 øC)-98.1 øF (36.7 øC)] 97.9 øF (36.6 øC)  Heart Rate:  [46-67] 60  Resp:  [16-20] 18  BP: (122-162)/(60-75) 162/74    Intake and output:    I/O last 3 completed shifts:  In: 1416.7 [I.V.:916.7; IV Piggyback:500]  Out: 200 [Urine:200]  No intake/output data recorded.    Physical Examination:    General Appearance:  Alert and cooperative.    Head:  Atraumatic and normocephalic.   Eyes: Conjunctivae and sclerae normal, no icterus. No pallor.   Throat: No oral lesions, no thrush, oral mucosa moist.   Neck: Supple, trachea midline, no thyromegaly.   Lungs:   Breath sounds heard bilaterally equally.  No wheezing or crackles. No Pleural rub or bronchial breathing.   Heart:  Normal S1 and S2, no murmur, no gallop, no rub. No JVD.  CABG scar noted.   Abdomen:   Normal bowel sounds, no masses, no organomegaly. Soft, nontender, nondistended, no rebound tenderness.  Midline surgical scar noted in the lower abdomen.  Condom catheter in place.   Extremities: Supple, no edema, no cyanosis, no clubbing.   Skin: No bleeding.  Healing shingles rash noted on the right infra axillary area.   Neurologic: Alert and oriented x 3. No facial asymmetry. Moves all four limbs. No tremors.      Laboratory results:    Results from last 7 days   Lab Units 08/24/23  0712 08/23/23  1535   SODIUM mmol/L 142 137   POTASSIUM mmol/L 2.9* 2.8*   CHLORIDE mmol/L 106 98    CO2 mmol/L 25.0 26.9   BUN mg/dL 25* 28*   CREATININE mg/dL 1.78* 2.24*   CALCIUM mg/dL 8.4* 9.0   BILIRUBIN mg/dL  --  0.5   ALK PHOS U/L  --  82   ALT (SGPT) U/L  --  <5   AST (SGOT) U/L  --  14   GLUCOSE mg/dL 76 99     Results from last 7 days   Lab Units 08/24/23  0712 08/23/23  1535   WBC 10*3/mm3 6.25 7.09   HEMOGLOBIN g/dL 10.7* 10.7*   HEMATOCRIT % 33.0* 32.2*   PLATELETS 10*3/mm3 128* 129*         Results from last 7 days   Lab Units 08/24/23  0712 08/23/23  1746 08/23/23  1535   HSTROP T ng/L 69* 68* 75*         Recent Labs     11/03/22  1901 12/06/22  1825   PHART 7.46*  --    MYO1MIZ 37  --    AIU7MOB 26  --    BASEEXCESS  --  3.0      I have reviewed the patient's laboratory results.    Radiology results:    Adult Transthoracic Echo Complete W/ Cont if Necessary Per Protocol    Result Date: 8/24/2023  1.  Normal left ventricular size and systolic function, LVEF 55-60%. 2.  Mild to moderate concentric LVH. 3.  Grade 1 diastolic dysfunction. 4.  Normal right ventricular size and systolic function. 5.  Normal left atrial volume index. 6.  Moderate calcification of the aortic valve without significant stenosis. 7.  Trace aortic regurgitation. 8.  Borderline dilation of the aortic root.     CT Abdomen Pelvis Without Contrast    Result Date: 8/23/2023  FINAL REPORT TECHNIQUE: Routine axial images through the abdomen and pelvis were obtained. CLINICAL HISTORY: Persistent diarrhea, weakness COMPARISON: CXR earlier today FINDINGS: Abdomen: There are marked coronary artery calcifications.  There has been prior CABG.  There is a marked tortuous lower thoracic aorta extending to the right of the midline which accounts for the abnormal opacity at the cardiophrenic angle on the prior chest x-ray.  The gallbladder is normal.  Pancreas is predominantly fatty replaced.  There are right greater than left renal cysts.  The solid abdominal organs and ureters are otherwise unremarkable.  There is increased fluid within  the colon consistent with history of diarrhea with no evidence of acute colitis.  The GI tract is otherwise unremarkable.  There has been a prior appendectomy.  The abdominal aorta is calcified and torturous with an infrarenal aneurysm measuring 6.1 cm in greatest diameter on the coronal images.  There is lesser ectasia of the bilateral common iliac arteries. Pelvis: The urinary bladder is normal. There is no pelvic or abdominal ascites, adenopathy or acute osseous abnormality.  There is scoliosis and severe degenerative changes of the thoracolumbar spine.     Impression: Increased fluid within the colon consistent with diarrhea.  No acute abnormality of the abdomen and pelvis otherwise. Markedly tortuous and dilated aorta with a 6.1 cm infrarenal abdominal aortic aneurysm. Authenticated and Electronically Signed by Tu Villarreal M.D. on 08/23/2023 07:57:15 PM    XR Chest 1 View    Result Date: 8/23/2023  FINAL REPORT TECHNIQUE: An AP portable view of the chest was obtained. CLINICAL HISTORY: Weak/Dizzy/AMS triage protocol COMPARISON: None FINDINGS: There has been a prior median sternotomy.  There are areas of linear atelectasis in the left lung base.  There is an apparent masslike opacity seen at the right cardiophrenic angle.  CT chest could further evaluate unless the nature of this opacity is known.  There is borderline cardiomegaly without pulmonary edema. There is no pleural disease, adenopathy, or significant osseous abnormality.     Impression: Mild left basilar atelectasis.  Possible mass in the right cardiophrenic angle, consider CT. Authenticated and Electronically Signed by Tu Villarreal M.D. on 08/23/2023 06:28:41 PM   I have reviewed the patient's radiology reports.    Medication Review:     I have reviewed the patient's active and prn medications.     Problem List:      Generalized weakness    Abdominal aortic aneurysm (AAA) without rupture    Hypokalemia    Diarrhea of presumed infectious origin     Parkinson's disease dementia    Coronary artery disease involving native coronary artery of native heart without angina pectoris    Assessment:    Acute persistent diarrhea of uncertain etiology, POA.  Generalized weakness secondary to dehydration and hypokalemia, POA.  Chronic kidney disease stage III.  Recent history of right-sided thoracic shingles rash.  Coronary artery disease status post CABG.  Abdominal aortic aneurysm status postrepair.  Parkinson's disease.  Obstructive sleep apnea.  Chronic anemia.    Plan:    Persistent diarrhea.  - Exact etiology of this is uncertain but could be ischemic in nature due to his significant atherosclerotic and aortic disease..  - GI panel is negative.  - I will hold off on antibody therapy at this time.  - We will continue gentle IV hydration for another 10 hours.  - Lactobacillus supplements.     Hypokalemia.  - Continue to replace orally as per the electrolyte protocol.     Generalized weakness/Parkinson's disease.  - Continue physical and occupational therapy.  - Continue Sinemet and ropinirole.     Elevated troponin level.  - This is likely secondary to chronic kidney disease.  - I do not suspect acute coronary syndrome at this time.    Discussed with nursing staff and multidisciplinary team.    DVT Prophylaxis: Heparin  Code Status: Full  Diet: Cardiac  Discharge Plan: Hopefully, short-term rehabilitation in 1 to 2 days.    Osvaldo Glass MD  08/24/23  14:02 EDT    Dictated utilizing Dragon dictation.     - - -

## 2023-08-24 NOTE — CASE MANAGEMENT/SOCIAL WORK
Discharge Planning Assessment  [facility]     Patient Name: Radha Varela  MRN: [id number]  Today's Date: 8/24/2023    Admit Date: 8/23/2023    Plan: DCP   Discharge Needs Assessment       Row Name 08/24/23 1633       Living Environment    People in Home spouse    Current Living Arrangements assisted living facility    Potentially Unsafe Housing Conditions none    Provides Primary Care For no one, unable/limited ability to care for self    Able to Return to Prior Arrangements yes       Resource/Environmental Concerns    Transportation Concerns none       Discharge Needs Assessment    Readmission Within the Last 30 Days no previous admission in last 30 days    Equipment Currently Used at Home walker, rolling;cane, straight;wheelchair;shower chair;oxygen    Concerns to be Addressed discharge planning    Anticipated Changes Related to Illness none                   Discharge Plan       Row Name 08/24/23 7071       Plan    Plan DCP    Patient/Family in Agreement with Plan yes    Plan Comments Sw met with pt at bedside regarding discharge planning. Pt lives in Assisted Living facility at [facility] in [place] with spouse. Pt requires some assistance with ADL's. Pt uses walker, w/c, shower chair, cane. Pt also uses home O2 at night through [employer]. Pt unsure of PCP. [facility]. Pt reports not having [facility] insurance. pt states daughter will provide transportation at d/c. Therapy is recommending STR. SW/CM team to follow up with daughter regarding dc plan and where to send referrals to.    Final Discharge Disposition Code 30 - still a patient                  Continued Care and Services - Admitted Since 8/23/2023    Coordination has not been started for this encounter.          Demographic Summary       Row Name 08/24/23 6281       General Information    Admission Type observation    Arrived From home    Required Notices Provided Observation Status Notice     Referral Source admission list    Reason for Consult discharge planning    Preferred Language English                   Functional Status       Row Name 08/24/23 1632       Functional Status, IADL    Medications independent    Meal Preparation assistive equipment and person    Housekeeping assistive equipment and person    Laundry assistive equipment and person    Shopping assistive equipment and person       Mental Status Summary    Recent Changes in Mental Status/Cognitive Functioning unable to assess       Employment/    Employment Status retired                   Psychosocial       Row Name 08/24/23 1633       Developmental Stage (Eriksson's)    Developmental Stage Stage 8 (65 years-death/Late Adulthood) Integrity vs. Despair                   Abuse/Neglect    No documentation.                  Legal    No documentation.                  Substance Abuse    No documentation.                  Patient Forms    No documentation.                     JOAO Lazaro

## 2023-08-24 NOTE — PLAN OF CARE
Goal Outcome Evaluation:           Progress: no change  Outcome Evaluation: VSS. Respirations even and non labored. No concerns voised at this time.

## 2023-08-24 NOTE — PLAN OF CARE
Goal Outcome Evaluation:                   Pt tolerating treatments well, no new onset pain or distress throughout shift. He received therapy this shift with no complaints at this time. Pt remains compliant with care plan.

## 2023-08-25 VITALS
SYSTOLIC BLOOD PRESSURE: 142 MMHG | WEIGHT: 166.45 LBS | RESPIRATION RATE: 16 BRPM | HEART RATE: 72 BPM | OXYGEN SATURATION: 96 % | HEIGHT: 67 IN | DIASTOLIC BLOOD PRESSURE: 81 MMHG | TEMPERATURE: 97.8 F | BODY MASS INDEX: 26.12 KG/M2

## 2023-08-25 PROBLEM — E87.6 HYPOKALEMIA: Status: RESOLVED | Noted: 2023-08-23 | Resolved: 2023-08-25

## 2023-08-25 LAB
ANION GAP SERPL CALCULATED.3IONS-SCNC: 10.1 MMOL/L (ref 5–15)
BUN SERPL-MCNC: 24 MG/DL (ref 8–23)
BUN/CREAT SERPL: 13.8 (ref 7–25)
C DIFF GDH + TOXINS A+B STL QL IA.RAPID: NEGATIVE
C DIFF GDH + TOXINS A+B STL QL IA.RAPID: NEGATIVE
CALCIUM SPEC-SCNC: 8.8 MG/DL (ref 8.6–10.5)
CHLORIDE SERPL-SCNC: 111 MMOL/L (ref 98–107)
CO2 SERPL-SCNC: 19.9 MMOL/L (ref 22–29)
CREAT SERPL-MCNC: 1.74 MG/DL (ref 0.76–1.27)
EGFRCR SERPLBLD CKD-EPI 2021: 37.5 ML/MIN/1.73
GLUCOSE SERPL-MCNC: 91 MG/DL (ref 65–99)
POTASSIUM SERPL-SCNC: 3.5 MMOL/L (ref 3.5–5.2)
POTASSIUM SERPL-SCNC: 3.8 MMOL/L (ref 3.5–5.2)
POTASSIUM SERPL-SCNC: 3.9 MMOL/L (ref 3.5–5.2)
SODIUM SERPL-SCNC: 141 MMOL/L (ref 136–145)

## 2023-08-25 PROCEDURE — 97530 THERAPEUTIC ACTIVITIES: CPT

## 2023-08-25 PROCEDURE — 84132 ASSAY OF SERUM POTASSIUM: CPT | Performed by: INTERNAL MEDICINE

## 2023-08-25 PROCEDURE — 87324 CLOSTRIDIUM AG IA: CPT | Performed by: INTERNAL MEDICINE

## 2023-08-25 PROCEDURE — 97535 SELF CARE MNGMENT TRAINING: CPT

## 2023-08-25 PROCEDURE — 97110 THERAPEUTIC EXERCISES: CPT

## 2023-08-25 PROCEDURE — 84132 ASSAY OF SERUM POTASSIUM: CPT | Performed by: STUDENT IN AN ORGANIZED HEALTH CARE EDUCATION/TRAINING PROGRAM

## 2023-08-25 PROCEDURE — 87449 NOS EACH ORGANISM AG IA: CPT | Performed by: INTERNAL MEDICINE

## 2023-08-25 PROCEDURE — 80048 BASIC METABOLIC PNL TOTAL CA: CPT | Performed by: INTERNAL MEDICINE

## 2023-08-25 PROCEDURE — 25010000002 HEPARIN (PORCINE) PER 1000 UNITS: Performed by: INTERNAL MEDICINE

## 2023-08-25 RX ORDER — NALOXONE HYDROCHLORIDE 4 MG/.1ML
SPRAY NASAL
Qty: 2 EACH | Refills: 0 | Status: ON HOLD | OUTPATIENT
Start: 2023-08-25

## 2023-08-25 RX ORDER — ROPINIROLE 0.5 MG/1
1 TABLET, FILM COATED ORAL 4 TIMES DAILY
Qty: 120 TABLET | Refills: 0 | Status: ON HOLD | OUTPATIENT
Start: 2023-08-25

## 2023-08-25 RX ORDER — LOPERAMIDE HYDROCHLORIDE 2 MG/1
4 CAPSULE ORAL 4 TIMES DAILY PRN
Qty: 60 CAPSULE | Refills: 0 | Status: ON HOLD | OUTPATIENT
Start: 2023-08-25

## 2023-08-25 RX ORDER — DEXTROSE AND SODIUM CHLORIDE 5; .9 G/100ML; G/100ML
100 INJECTION, SOLUTION INTRAVENOUS CONTINUOUS
Status: DISCONTINUED | OUTPATIENT
Start: 2023-08-25 | End: 2023-08-25 | Stop reason: HOSPADM

## 2023-08-25 RX ORDER — FUROSEMIDE 20 MG/1
20 TABLET ORAL EVERY OTHER DAY
Qty: 30 TABLET | Refills: 0 | Status: ON HOLD | OUTPATIENT
Start: 2023-08-25

## 2023-08-25 RX ORDER — ASPIRIN 81 MG/1
81 TABLET ORAL DAILY
Qty: 30 TABLET | Refills: 0 | Status: ON HOLD | OUTPATIENT
Start: 2023-08-25

## 2023-08-25 RX ORDER — ESCITALOPRAM OXALATE 10 MG/1
10 TABLET ORAL DAILY
Qty: 30 TABLET | Refills: 0 | Status: ON HOLD | OUTPATIENT
Start: 2023-08-25

## 2023-08-25 RX ORDER — HYDROCODONE BITARTRATE AND ACETAMINOPHEN 5; 325 MG/1; MG/1
1 TABLET ORAL EVERY 6 HOURS PRN
Qty: 20 TABLET | Refills: 0 | Status: ON HOLD | OUTPATIENT
Start: 2023-08-25

## 2023-08-25 RX ORDER — ENTACAPONE 200 MG/1
200 TABLET ORAL 4 TIMES DAILY
Qty: 120 TABLET | Refills: 0 | Status: ON HOLD | OUTPATIENT
Start: 2023-08-25

## 2023-08-25 RX ORDER — ONDANSETRON 4 MG/1
4 TABLET, FILM COATED ORAL EVERY 8 HOURS PRN
Qty: 30 TABLET | Refills: 0 | Status: ON HOLD | OUTPATIENT
Start: 2023-08-25

## 2023-08-25 RX ORDER — FAMOTIDINE 10 MG
10 TABLET ORAL 2 TIMES DAILY PRN
Qty: 30 TABLET | Refills: 5 | Status: ON HOLD | OUTPATIENT
Start: 2023-08-25

## 2023-08-25 RX ORDER — MELATONIN
1000 DAILY
Qty: 30 TABLET | Refills: 0 | Status: ON HOLD | OUTPATIENT
Start: 2023-08-25

## 2023-08-25 RX ORDER — LANOLIN ALCOHOL/MO/W.PET/CERES
1000 CREAM (GRAM) TOPICAL DAILY
Qty: 30 TABLET | Refills: 0 | Status: ON HOLD | OUTPATIENT
Start: 2023-08-25

## 2023-08-25 RX ORDER — TRAZODONE HYDROCHLORIDE 50 MG/1
50 TABLET ORAL NIGHTLY
Qty: 30 TABLET | Refills: 0 | Status: ON HOLD | OUTPATIENT
Start: 2023-08-25

## 2023-08-25 RX ORDER — AMLODIPINE BESYLATE 5 MG/1
2.5 TABLET ORAL DAILY
Qty: 30 TABLET | Refills: 0 | Status: ON HOLD | OUTPATIENT
Start: 2023-08-25

## 2023-08-25 RX ORDER — FUROSEMIDE 20 MG/1
20 TABLET ORAL EVERY OTHER DAY
Start: 2023-08-25 | End: 2023-08-25 | Stop reason: SDUPTHER

## 2023-08-25 RX ORDER — SACCHAROMYCES BOULARDII 250 MG
250 CAPSULE ORAL 2 TIMES DAILY
Qty: 60 CAPSULE | Refills: 0 | Status: ON HOLD | OUTPATIENT
Start: 2023-08-25

## 2023-08-25 RX ORDER — POTASSIUM CHLORIDE 20 MEQ/1
40 TABLET, EXTENDED RELEASE ORAL EVERY 4 HOURS
Status: DISCONTINUED | OUTPATIENT
Start: 2023-08-25 | End: 2023-08-25

## 2023-08-25 RX ADMIN — HEPARIN SODIUM 5000 UNITS: 5000 INJECTION INTRAVENOUS; SUBCUTANEOUS at 09:38

## 2023-08-25 RX ADMIN — DEXTROSE AND SODIUM CHLORIDE 100 ML/HR: 5; 900 INJECTION, SOLUTION INTRAVENOUS at 10:20

## 2023-08-25 RX ADMIN — CARBIDOPA AND LEVODOPA 2 TABLET: 25; 100 TABLET ORAL at 12:22

## 2023-08-25 RX ADMIN — ROPINIROLE 0.5 MG: 0.25 TABLET, FILM COATED ORAL at 05:04

## 2023-08-25 RX ADMIN — ASPIRIN 81 MG: 81 TABLET, COATED ORAL at 09:38

## 2023-08-25 RX ADMIN — LOPERAMIDE HYDROCHLORIDE 2 MG: 2 CAPSULE ORAL at 09:38

## 2023-08-25 RX ADMIN — AMLODIPINE BESYLATE 5 MG: 5 TABLET ORAL at 09:38

## 2023-08-25 RX ADMIN — ROPINIROLE 0.5 MG: 0.25 TABLET, FILM COATED ORAL at 15:42

## 2023-08-25 RX ADMIN — Medication 10 ML: at 09:39

## 2023-08-25 RX ADMIN — POTASSIUM CHLORIDE 40 MEQ: 1500 TABLET, EXTENDED RELEASE ORAL at 05:04

## 2023-08-25 RX ADMIN — CARBIDOPA AND LEVODOPA 2 TABLET: 25; 100 TABLET ORAL at 09:38

## 2023-08-25 RX ADMIN — Medication 1 CAPSULE: at 09:38

## 2023-08-25 RX ADMIN — ATORVASTATIN CALCIUM 80 MG: 80 TABLET, FILM COATED ORAL at 09:38

## 2023-08-25 NOTE — THERAPY TREATMENT NOTE
Patient Name: Radha Varela  : 1935    MRN: [id number]                              Today's Date: 2023       Admit Date: 2023    Visit Dx:     ICD-10-CM ICD-9-CM   1. Diarrhea, unspecified type  R19.7 787.91   2. Hypokalemia  E87.6 276.8   3. Weakness generalized  R53.1 780.79   4. Elevated troponin  R77.8 790.6     Patient Active Problem List   Diagnosis    Abdominal aortic aneurysm (AAA) without rupture    Generalized weakness    Hypokalemia    Diarrhea of presumed infectious origin    Parkinson's disease dementia    Coronary artery disease involving native coronary artery of native heart without angina pectoris     Past Medical History:   Diagnosis Date    Aneurysm     Arthritis     Cardiac murmur     Carotid stenosis     Celiac artery stenosis     Chest pain     CHF (congestive heart failure)     Chronic kidney disease     Coronary artery disease     Dyslipidemia     Elevated cholesterol     GERD (gastroesophageal reflux disease)     Hiatal hernia     History of exercise stress test     History of nuclear stress test     History of pneumonia     Hypertension     Lumbar degenerative disc disease     MI, old     Osteoarthritis     PAD (peripheral artery disease)     Parkinson's disease     Paroxysmal A-fib     Peptic ulceration     Psoriasis     Renal artery stenosis     Sleep apnea     uses o2 @ HS    Spinal stenosis     Tremor     Vertebral artery stenosis      Past Surgical History:   Procedure Laterality Date    ABDOMINAL AORTIC ANEURYSM REPAIR WITH ENDOGRAFT      APPENDECTOMY      CATARACT EXTRACTION W/ INTRAOCULAR LENS IMPLANT Left 12/10/2018    Procedure: CATARACT PHACO EXTRACTION WITH INTRAOCULAR LENS IMPLANT LEFT;  Surgeon: Charity Carrasoc MD;  Location: [facility];  Service: Ophthalmology    CATARACT EXTRACTION W/ INTRAOCULAR LENS IMPLANT Right 2023    Procedure: CATARACT PHACO EXTRACTION WITH INTRAOCULAR LENS IMPLANT RIGHT;  Surgeon: Charity Carrasco MD;  Location:  [facility] OR;  Service: Ophthalmology;  Laterality: Right;    COLONOSCOPY      CORONARY ARTERY BYPASS GRAFT  1995    ENDOSCOPY      SHOULDER ROTATOR CUFF REPAIR Right     SKIN BIOPSY        General Information       Row Name 08/25/23 1338          OT Time and Intention    Document Type therapy note (daily note)  -DB     Mode of Treatment occupational therapy  -DB       Row Name 08/25/23 1338          General Information    Patient Profile Reviewed yes  -DB               User Key  (r) = Recorded By, (t) = Taken By, (c) = Cosigned By      Initials Name Provider Type    Bakari Lopez OT Occupational Therapist                     Mobility/ADL's       Row Name 08/25/23 1339          Grooming Assessment/Training    [place] Level (Grooming) wash face, hands;set up  -DB               User Key  (r) = Recorded By, (t) = Taken By, (c) = Cosigned By      Initials Name Provider Type    Bakari Lopez OT Occupational Therapist                   Obj/Interventions       Row Name 08/25/23 6445          Shoulder (Therapeutic Exercise)    Shoulder (Therapeutic Exercise) strengthening exercise  -DB     Shoulder Strengthening (Therapeutic Exercise) bilateral;flexion;aBduction;horizontal aBduction/aDduction;sitting;resistance band;red;10 repetitions;2 sets  -DB       Row Name 08/25/23 1335          Elbow/Forearm (Therapeutic Exercise)    Elbow/Forearm (Therapeutic Exercise) strengthening exercise  -DB     Elbow/Forearm Strengthening (Therapeutic Exercise) bilateral;flexion;resistance band;red;10 repetitions  -DB       Row Name 08/25/23 1335          Motor Skills    Therapeutic Exercise shoulder;elbow/forearm  -DB               User Key  (r) = Recorded By, (t) = Taken By, (c) = Cosigned By      Initials Name Provider Type    Bakari Lopez OT Occupational Therapist                   Goals/Plan    No documentation.                  Clinical Impression       Row Name 08/25/23 1343          Pain Assessment     Pretreatment Pain Rating 0/10 - no pain  -DB     Posttreatment Pain Rating 0/10 - no pain  -DB       Row Name 08/25/23 1342          Plan of Care Review    Outcome Evaluation Pt sitting up in chair upon OT arrival. Pt agreeable to UB exercises to increase strength and endurance needed for ADL mgmt. Pt tolerates [id number] x2sets of bicep curls, shoulder flexion, shoulder abduction, horizontal abduction and scapular protraction/retraction using red resistance theraband. Pt reqs rest breaks as needed due to fatigue. He reports some SOB and therapist instructed in energy conservation techniques. Pt receptive to recommendations this date. Pt agreeable to participate in grooming tasks of washing hands and face. Pt able to wash hands and face with s/u.  -DB               User Key  (r) = Recorded By, (t) = Taken By, (c) = Cosigned By      Initials Name Provider Type    DB Bakari Alejo, OT Occupational Therapist                   Outcome Measures       Row Name 08/25/23 1345          How much help from another is currently needed...    Putting on and taking off regular lower body clothing? 3  -DB     Bathing (including washing, rinsing, and drying) 2  -DB     Toileting (which includes using toilet bed pan or urinal) 2  -DB     Putting on and taking off regular upper body clothing 3  -DB     Taking care of personal grooming (such as brushing teeth) 3  -DB     Eating meals 3  -DB     AM-PAC 6 Clicks Score (OT) 16  -DB       Row Name 08/25/23 0800          How much help from another person do you currently need...    Turning from your back to your side while in flat bed without using bedrails? 3  -SP     Moving from lying on back to sitting on the side of a flat bed without bedrails? 3  -SP     Moving to and from a bed to a chair (including a wheelchair)? 3  -SP     Standing up from a chair using your arms (e.g., wheelchair, bedside chair)? 3  -SP     Climbing 3-5 steps with a railing? 2  -SP     To walk in hospital  room? 3  -SP     AM-PAC 6 Clicks Score (PT) 17  -SP     Highest level of mobility 5 --> Static standing  -SP       Row Name 08/25/23 1345          Functional Assessment    Outcome Measure Options AM-PAC 6 Clicks Daily Activity (OT)  -DB               User Key  (r) = Recorded By, (t) = Taken By, (c) = Cosigned By      Initials Name Provider Type    Mercedez Chappell, RN Registered Nurse    Bakari Lopez OT Occupational Therapist                    Occupational Therapy Education       Title: PT OT SLP Therapies (In Progress)       Topic: Occupational Therapy (In Progress)       Point: ADL training (Done)       Description:   Instruct learner(s) on proper safety adaptation and remediation techniques during self care or transfers.   Instruct in proper use of assistive devices.                  Learning Progress Summary             Patient Acceptance, E,TB, VU by DB at 8/25/2023 1346    Comment: Pt educated on energy conservation techniques during UB exercises as well as HEP for UB exercises. Pt mod A with HEP this date.    Acceptance, E,TB, VU by SD at 8/24/2023 1250    Comment: OT POC                         Point: Home exercise program (Done)       Description:   Instruct learner(s) on appropriate technique for monitoring, assisting and/or progressing therapeutic exercises/activities.                  Learning Progress Summary             Patient Acceptance, E,TB, VU by DB at 8/25/2023 1346    Comment: Pt educated on energy conservation techniques during UB exercises as well as HEP for UB exercises. Pt mod A with HEP this date.                         Point: Precautions (Not Started)       Description:   Instruct learner(s) on prescribed precautions during self-care and functional transfers.                  Learner Progress:  Not documented in this visit.              Point: Body mechanics (Not Started)       Description:   Instruct learner(s) on proper positioning and spine alignment during self-care,  functional mobility activities and/or exercises.                  Learner Progress:  Not documented in this visit.                              User Key       Initials Effective Dates Name Provider Type Discipline    SD 06/16/21 -  Lizbet Skinner OT Occupational Therapist OT    DB 07/06/23 -  Bakari Alejo OT Occupational Therapist OT                  OT Recommendation and Plan     Plan of Care Review  Outcome Evaluation: Pt sitting up in chair upon OT arrival. Pt agreeable to UB exercises to increase strength and endurance needed for ADL mgmt. Pt tolerates [id number] x2sets of bicep curls, shoulder flexion, shoulder abduction, horizontal abduction and scapular protraction/retraction using red resistance theraband. Pt reqs rest breaks as needed due to fatigue. He reports some SOB and therapist instructed in energy conservation techniques. Pt receptive to recommendations this date. Pt agreeable to participate in grooming tasks of washing hands and face. Pt able to wash hands and face with s/u.     Time Calculation:         Time Calculation- OT       Row Name 08/25/23 1348             Time Calculation- OT    OT Start Time 1313  -DB      OT Stop Time 1336  -DB      OT Time Calculation (min) 23 min  -DB      OT Received On 08/25/23  -DB      OT Goal Re-Cert Due Date 09/05/23  -DB         Timed Charges    63938 - OT Therapeutic Exercise Minutes 18  -DB      22316 - OT Self Care/Mgmt Minutes 5  -DB         Total Minutes    Timed Charges Total Minutes 23  -DB       Total Minutes 23  -DB                User Key  (r) = Recorded By, (t) = Taken By, (c) = Cosigned By      Initials Name Provider Type    DB Bakari Alejo OT Occupational Therapist                  Therapy Charges for Today       Code Description Service Date Service Provider Modifiers Qty    19117257936 HC OT THER PROC EA 15 MIN 8/25/2023 Bakari Alejo OT GO 1    12346642627 HC OT SELF CARE/MGMT/TRAIN EA 15 MIN 8/25/2023 Melo  Bakari, OT GO 1                 Bakari Alejo, OT  8/25/2023

## 2023-08-25 NOTE — DISCHARGE PLACEMENT REQUEST
"Referral STR/request [place] Jones Wolf (87 y.o. Male)       Date of Birth   1935    Social Security Number       Address   [address] [address]    Home Phone   [phone]    MRN   [id number]       Scientology   Yarsani    Marital Status                               Admission Date   8/23/23    Admission Type   Emergency    Admitting Provider   Osvaldo Glass MD    Attending Provider   Osvaldo Glass MD    [facility]       Discharge Date       Discharge Disposition       Discharge Destination                                 Attending Provider: Osvaldo Glass MD    Allergies: Celebrex [Celecoxib], Sulfa Antibiotics    Isolation: None   Infection: None   Code Status: CPR    Ht: 170.2 cm (67.01\")   Wt: 75.5 kg (166 lb 7.2 oz)    Admission Cmt: None   Principal Problem: Generalized weakness [R53.1]                   Active Insurance as of 8/23/2023       Primary Coverage       Payor Plan Insurance Group Employer/Plan Group    MEDICARE MEDICARE A & B        Payor Plan Address Payor Plan Phone Number Payor Plan Fax Number Effective Dates    [address] [phone]  11/1/2000 - None Entered    [facility] [id number]         Subscriber Name Subscriber Birth Date Member ID       JONES ISRAEL 1935 [id number]               Secondary Coverage       Payor Plan Insurance Group Employer/Plan Group      LIFE INSURANCE        Payor Plan Address Payor Plan Phone Number Payor Plan Fax Number Effective Dates    [address] [address] [phone]  3/1/2001 - None Entered    [place] [id number]         Subscriber Name Subscriber Birth Date Member ID       JONES ISRAEL 1935 [id number]                     Emergency Contacts        (Rel.) Home Phone Work Phone Mobile Phone    MARQUIS KENYON (Daughter) [phone] -- [phone]                 History & Physical        Quan, " MD Osvaldo at 23 1749            [facility]   HISTORY AND PHYSICAL      Name:  Radha Varela   Age:  87 y.o.  Sex:  male  :  1935  MRN:  [id number]   Visit Number:  [id number]  Admission Date:  2023  Date Of Service:  23  Primary Care Physician:  Provider, No Known    Chief Complaint:     Generalized weakness and diarrhea.    History Of Presenting Illness:      Mr. Varela is an 87-year-old pleasant male resident of assisted living facility, with multiple medical problems including coronary artery disease status post CABG, chronic kidney disease stage III, abdominal aortic aneurysm repair, atrial fibrillation, Parkinson's disease, carotid and celiac artery stenosis, COPD on home oxygen at nighttime, obstructive sleep apnea was brought to the emergency room by his daughter with symptoms of generalized weakness and progressive diarrhea that has been going on for more than 10 days.  There is no prior history of antibiotics use.  No history of any fever or abdominal pain.  The diarrhea is watery associated with mucus but no blood.  No history of any nausea or vomiting.  Apparently a stool sample was sent at the facility and it showed positivity for occult blood.  Patient denies any chest pain or shortness of breath.  He does occasionally complain of gaseous bloating of the stomach.  Patient does have extensive history of aortic atherosclerosis including aortic aneurysm at 5.3 cm.  He was deemed not to be a surgical candidate at [facility].    Patient was recently seen in the emergency room on 2023 where he was diagnosed with shingles and was given topical medication.  At that time CT of the abdomen and pelvis was done which showed infrarenal abdominal aortic aneurysm with interval increase in size.  It also showed thoracic and abdominal aorta aneurysm measuring up to 5.4 cm.    In the emergency room, he was afebrile with a heart rate of 62 and a  blood pressure of 127/60.  His initial pulse oxygen saturation was 87% on room air and he was placed on 2 L of oxygen with improvement in his saturation to 95%.  Initial troponin T was 75 with a proBNP of 2549.  CMP was fairly unremarkable except for a BUN of 28, creatinine of 2.24 (baseline), potassium of 2.8.  CBC showed a hemoglobin of 10.7 (baseline) and a platelet count of 129 (baseline).    Review Of Systems:    All systems were reviewed and negative except as mentioned in history of presenting illness, assessment and plan.    Past Medical History: Patient  has a past medical history of Aneurysm, Arthritis, Cardiac murmur, Carotid stenosis, Celiac artery stenosis, Chest pain, CHF (congestive heart failure), Chronic kidney disease, COPD (chronic obstructive pulmonary disease), Coronary artery disease, Dyslipidemia, Elevated cholesterol, GERD (gastroesophageal reflux disease), Hiatal hernia, History of exercise stress test, History of nuclear stress test, History of pneumonia, Hypertension, Lumbar degenerative disc disease, MI, old (1995), Osteoarthritis, PAD (peripheral artery disease), Parkinson's disease, Paroxysmal A-fib, Peptic ulceration, Psoriasis, Renal artery stenosis, Sleep apnea, Spinal stenosis, Tremor, and Vertebral artery stenosis.    Past Surgical History: Patient  has a past surgical history that includes Coronary artery bypass graft (1995); AAA repair, endovascular (1994); Shoulder open rotator cuff repair (Right); Appendectomy; Colonoscopy; Esophagogastroduodenoscopy; Skin biopsy; Cataract extraction w/ intraocular lens implant (Left, 12/10/2018); and Cataract extraction w/ intraocular lens implant (Right, 6/12/2023).    Social History: Patient  reports that he quit smoking about 54 years ago. His smoking use included cigarettes. He has a 45.00 pack-year smoking history. He has never used smokeless tobacco. He reports that he does not currently use alcohol after a past usage of about 2.0  standard drinks per week. He reports that he does not use drugs.    Family History:  Patient family history includes No Known Problems in his father; Other in his mother.     Allergies:      Celebrex [celecoxib] and Sulfa antibiotics    Home Medications:    Prior to Admission Medications       Prescriptions Last Dose Informant Patient Reported? Taking?    acyclovir (Zovirax) 5 % ointment   No No    Apply 1 application  topically to the appropriate area as directed 4 (Four) Times a Day.    amLODIPine (NORVASC) 5 MG tablet  Self Yes No    Take 1 tablet by mouth Daily.    aspirin 81 MG EC tablet  Self Yes No    Take 1 tablet by mouth Daily.    atorvastatin (LIPITOR) 80 MG tablet  Self Yes No    Take 1 tablet by mouth Daily.    carbidopa-levodopa (SINEMET)  MG per tablet   Yes No    Take 2 tablets by mouth 4 (Four) Times a Day.    cholecalciferol (VITAMIN D3) 1000 units tablet  Self Yes No    Take 1 tablet by mouth Daily. 2 tablets daily    Coenzyme Q10 (COQ10) 200 MG capsule  Self Yes No    Take 1 capsule by mouth Daily.    furosemide (LASIX) 20 MG tablet  Self Yes No    Take 2 tablets by mouth Daily.    HYDROcodone-acetaminophen (NORCO) 5-325 MG per tablet   No No    Take 1 tablet by mouth Every 6 (Six) Hours As Needed for Severe Pain.    hydrOXYzine (ATARAX) 25 MG tablet  Self Yes No    Take 1 tablet by mouth At Night As Needed.    melatonin 5 MG tablet tablet   Yes No    Take 2 tablets by mouth At Night As Needed.    methocarbamol (ROBAXIN) 500 MG tablet  Self Yes No    Take 1 tablet by mouth Daily As Needed.    rOPINIRole (REQUIP) 0.5 MG tablet  Self Yes No    Take 1 tablet by mouth 4 (Four) Times a Day. Take 1 hour before bedtime.          ED Medications:    Medications   sodium chloride 0.9 % flush 10 mL (has no administration in time range)   sodium chloride 0.9 % flush 10 mL (has no administration in time range)   sodium chloride 0.9 % bolus 500 mL (500 mL Intravenous New Bag 8/23/23 0609)   sodium  "chloride 0.9 % bolus 500 mL (0 mL Intravenous Stopped 8/23/23 1728)   potassium chloride (MICRO-K) CR capsule 40 mEq (40 mEq Oral Given 8/23/23 1629)     Vital Signs:  Temp:  [98.1 øF (36.7 øC)] 98.1 øF (36.7 øC)  Heart Rate:  [46-62] 49  Resp:  [16] 16  BP: (122-137)/(60-70) 129/63        08/23/23  1543   Weight: 72.6 kg (160 lb)     Body mass index is 25.06 kg/mý.    Physical Exam:     Most recent vital Signs: /63   Pulse (!) 49   Temp 98.1 øF (36.7 øC) (Oral)   Resp 16   Ht 170.2 cm (67\")   Wt 72.6 kg (160 lb)   SpO2 93%   BMI 25.06 kg/mý     Physical Exam  Constitutional:       General: He is not in acute distress.     Appearance: Normal appearance. He is not ill-appearing.   HENT:      Head: Normocephalic and atraumatic.      Right Ear: External ear normal.      Left Ear: External ear normal.      Nose: Nose normal.      Mouth/Throat:      Mouth: Mucous membranes are moist.   Eyes:      Extraocular Movements: Extraocular movements intact.      Conjunctiva/sclera: Conjunctivae normal.   Cardiovascular:      Rate and Rhythm: Regular rhythm. Bradycardia present.      Pulses: Normal pulses.      Heart sounds: Normal heart sounds. No murmur heard.  Pulmonary:      Effort: Pulmonary effort is normal.      Breath sounds: Normal breath sounds. No wheezing or rales.   Abdominal:      General: Abdomen is flat. Bowel sounds are normal.      Palpations: Abdomen is soft.      Tenderness: There is no abdominal tenderness. There is no guarding or rebound.      Comments: Midline surgical scar noted.   Musculoskeletal:         General: Normal range of motion.      Cervical back: Neck supple.      Right lower leg: No edema.      Left lower leg: No edema.      Comments: Vein stripping scars noted.   Skin:     General: Skin is warm.      Findings: Rash present. No erythema.      Comments: Healing shingles rash noted on the right infra axillary area.   Neurological:      General: No focal deficit present.      Mental " Status: He is alert and oriented to person, place, and time. Mental status is at baseline.   Psychiatric:         Mood and Affect: Mood normal.         Behavior: Behavior normal.     Laboratory data:    I have reviewed the labs done in the emergency room.    Results from last 7 days   Lab Units 08/23/23  1535   SODIUM mmol/L 137   POTASSIUM mmol/L 2.8*   CHLORIDE mmol/L 98   CO2 mmol/L 26.9   BUN mg/dL 28*   CREATININE mg/dL 2.24*   CALCIUM mg/dL 9.0   BILIRUBIN mg/dL 0.5   ALK PHOS U/L 82   ALT (SGPT) U/L <5   AST (SGOT) U/L 14   GLUCOSE mg/dL 99     Results from last 7 days   Lab Units 08/23/23  1535   WBC 10*3/mm3 7.09   HEMOGLOBIN g/dL 10.7*   HEMATOCRIT % 32.2*   PLATELETS 10*3/mm3 129*         Results from last 7 days   Lab Units 08/23/23  1535   HSTROP T ng/L 75*     Results from last 7 days   Lab Units 08/23/23  1535   PROBNP pg/mL 2,549.0*     EKG:      EKG done in the emergency room was reviewed by me.  It shows sinus bradycardia at 61 bpm.  Left axis deviation noted.  Intraventricular conduction abnormality noted with no significant ST-T changes.    Radiology:    Portable chest x-ray done in the emergency room was reviewed by me.  It shows borderline cardiomegaly.  Sternal wires noted.  No significant infiltrates noted.  An official report is currently pending.    Assessment:    Acute persistent diarrhea of uncertain etiology, POA.  Generalized weakness secondary to dehydration and hypokalemia, POA.  Chronic kidney disease stage III.  Recent history of right-sided thoracic shingles rash.  Coronary artery disease status post CABG.  Abdominal aortic aneurysm status postrepair.  Parkinson's disease.  Obstructive sleep apnea.  Chronic anemia.      Plan:    Persistent diarrhea.  - Exact etiology of this is uncertain but could be infective in nature.  - We will obtain GI panel.  - I will hold off on antibody therapy at this time.  - We will continue gentle IV hydration.  - Lactobacillus  supplements.    Hypokalemia.  - Patient has already received 40 mEq of potassium supplement in the emergency room.  - We will place him on electrolyte protocol.    Generalized weakness/Parkinson's disease.  - We will consult physical and Occupational Therapy.    Elevated troponin level.  - This is likely secondary to chronic kidney disease.  - I do not suspect acute coronary syndrome at this time.  - We will repeat troponin level in the morning.    I have discussed the patient's condition and treatment plan with his daughter Sandra who is at the bedside.  His CODE STATUS is full code.    Risk Assessment: Moderate  DVT Prophylaxis: Heparin  Code Status: Full  Diet: Cardiac    Advance Care Planning   ACP discussion was held with the patient during this visit. Patient does not have an advance directive, information provided.         Osvaldo Glass MD  08/23/23  17:49 EDT    Dictated utilizing Dragon dictation.    Electronically signed by Osvaldo Glass MD at 08/23/23 1804       Prior to Admission Medications       Prescriptions Last Dose Informant Patient Reported? Taking?    acyclovir (Zovirax) 5 % ointment 8/18/2023  No Yes    Apply 1 application  topically to the appropriate area as directed 4 (Four) Times a Day.    Patient taking differently:  Apply 1 application  topically to the appropriate area as directed Every 4 (Four) Hours.    amLODIPine (NORVASC) 5 MG tablet 8/18/2023 Self Yes Yes    Take 0.5 tablets by mouth Daily.    aspirin 81 MG EC tablet 8/18/2023 Self Yes Yes    Take 1 tablet by mouth Daily.    carbidopa-levodopa (SINEMET)  MG per tablet 8/18/2023  Yes Yes    Take 2 tablets by mouth 4 (Four) Times a Day.    cholecalciferol (VITAMIN D3) 1000 units tablet 8/18/2023 Self Yes Yes    Take 1 tablet by mouth Daily. 2 tablets daily    furosemide (LASIX) 20 MG tablet 8/18/2023 Self Yes Yes    Take 2 tablets by mouth Daily. And one tablet as needed for edema    hydrOXYzine (ATARAX) 25 MG tablet 8/17/2023 Self  Yes Yes    Take 1 tablet by mouth At Night As Needed.    rOPINIRole (REQUIP) 0.5 MG tablet 8/18/2023 Self Yes Yes    Take 2 tablets by mouth 4 (Four) Times a Day. Take 1 hour before bedtime.    acetaminophen (TYLENOL) 325 MG tablet Unknown  Yes No    Take 2 tablets by mouth Every 6 (Six) Hours As Needed for Mild Pain.    atorvastatin (LIPITOR) 80 MG tablet Not Taking Self Yes No    Take 1 tablet by mouth Daily.    Patient not taking:  Reported on 8/24/2023    Coenzyme Q10 (COQ10) 200 MG capsule Not Taking Self Yes No    Take 1 capsule by mouth Daily.    Patient not taking:  Reported on 8/24/2023    Diclofenac Sodium (VOLTAREN) 1 % gel gel Unknown  Yes No    Apply 4 g topically to the appropriate area as directed 4 (Four) Times a Day As Needed. Apply to Right hip    entacapone (COMTAN) 200 MG tablet 8/18/2023  Yes No    Take 1 tablet by mouth 4 (Four) Times a Day.    escitalopram (LEXAPRO) 10 MG tablet 8/18/2023  Yes No    Take 1 tablet by mouth Daily.    famotidine (PEPCID) 10 MG tablet Unknown  Yes No    Take 1 tablet by mouth 2 (Two) Times a Day As Needed for Heartburn.    HYDROcodone-acetaminophen (NORCO) 5-325 MG per tablet Unknown  No No    Take 1 tablet by mouth Every 6 (Six) Hours As Needed for Severe Pain.    loperamide (IMODIUM) 2 MG capsule Unknown  Yes No    Take 2 capsules by mouth 4 (Four) Times a Day As Needed for Diarrhea. Do not exceed 8 capsules daily    magnesium oxide (MAG-OX) 400 MG tablet 8/18/2023  Yes No    Take 1 tablet by mouth Daily.    melatonin 5 MG tablet tablet   Yes No    Take 2 tablets by mouth At Night As Needed.    methocarbamol (ROBAXIN) 500 MG tablet Not Taking Self Yes No    Take 1 tablet by mouth Daily As Needed.    Patient not taking:  Reported on 8/24/2023    nitroglycerin (NITROSTAT) 0.4 MG SL tablet Unknown  Yes No    Place 1 tablet under the tongue Every 5 (Five) Minutes As Needed for Chest Pain. Take no more than 3 doses in 15 minutes.    ondansetron (ZOFRAN) 4 MG tablet  Unknown  Yes No    Take 1 tablet by mouth Every 8 (Eight) Hours As Needed for Nausea or Vomiting.    saccharomyces boulardii (FLORASTOR) 250 MG capsule 2023  Yes No    Take 1 capsule by mouth 3 (Three) Times a Day.    senna 8.6 MG tablet Unknown  Yes No    Take 1 tablet by mouth Daily As Needed for Constipation.    traZODone (DESYREL) 50 MG tablet 2023  Yes No    Take 1 tablet by mouth Every Night.    vitamin B-12 (CYANOCOBALAMIN) 1000 MCG tablet 2023  Yes No    Take 1 tablet by mouth Daily.             Physical Therapy Notes (most recent note)        Danial Santamaria, PT at 23 1255  Version 1 of 1         Patient Name: Radha Varela  : 1935    MRN: [id number]                              Today's Date: 2023       Admit Date: 2023    Visit Dx:     ICD-10-CM ICD-9-CM   1. Diarrhea, unspecified type  R19.7 787.91   2. Hypokalemia  E87.6 276.8   3. Weakness generalized  R53.1 780.79   4. Elevated troponin  R77.8 790.6     Patient Active Problem List   Diagnosis    Abdominal aortic aneurysm (AAA) without rupture    Generalized weakness    Hypokalemia    Diarrhea of presumed infectious origin    Parkinson's disease dementia    Coronary artery disease involving native coronary artery of native heart without angina pectoris     Past Medical History:   Diagnosis Date    Aneurysm     Arthritis     Cardiac murmur     Carotid stenosis     Celiac artery stenosis     Chest pain     CHF (congestive heart failure)     Chronic kidney disease     Coronary artery disease     Dyslipidemia     Elevated cholesterol     GERD (gastroesophageal reflux disease)     Hiatal hernia     History of exercise stress test     History of nuclear stress test     History of pneumonia     Hypertension     Lumbar degenerative disc disease     MI, old     Osteoarthritis     PAD (peripheral artery disease)     Parkinson's disease     Paroxysmal A-fib     Peptic ulceration     Psoriasis     Renal artery  stenosis     Sleep apnea     uses o2 @ HS    Spinal stenosis     Tremor     Vertebral artery stenosis      Past Surgical History:   Procedure Laterality Date    ABDOMINAL AORTIC ANEURYSM REPAIR WITH ENDOGRAFT  1994    APPENDECTOMY      CATARACT EXTRACTION W/ INTRAOCULAR LENS IMPLANT Left 12/10/2018    Procedure: CATARACT PHACO EXTRACTION WITH INTRAOCULAR LENS IMPLANT LEFT;  Surgeon: Charity Carrasco MD;  Location: [facility];  Service: Ophthalmology    CATARACT EXTRACTION W/ INTRAOCULAR LENS IMPLANT Right 6/12/2023    Procedure: CATARACT PHACO EXTRACTION WITH INTRAOCULAR LENS IMPLANT RIGHT;  Surgeon: Charity Carrasco MD;  [facility] OR;  Service: Ophthalmology;  Laterality: Right;    COLONOSCOPY      CORONARY ARTERY BYPASS GRAFT  1995    ENDOSCOPY      SHOULDER ROTATOR CUFF REPAIR Right     SKIN BIOPSY        General Information       Row Name 08/24/23 1242          Physical Therapy Time and Intention    Document Type evaluation  -MS     Mode of Treatment physical therapy  -MS       Row Name 08/24/23 1242          General Information    Patient Profile Reviewed yes  -MS     Prior Level of Function independent:;all household mobility  -MS     Existing Precautions/Restrictions fall;oxygen therapy device and L/min  -MS     Barriers to Rehab medically complex;previous functional deficit  -MS       Row Name 08/24/23 1242          Living Environment    People in Home spouse  -MS       Row Name 08/24/23 1242          Home Main Entrance    Number of Stairs, Main Entrance none  -MS       Row Name 08/24/23 1242          Stairs Within Home, Primary    Number of Stairs, Within Home, Primary none  -MS       Row Name 08/24/23 1242          Cognition    Orientation Status (Cognition) oriented x 4  -MS       Row Name 08/24/23 1242          Safety Issues, Functional Mobility    Safety Issues Affecting Function (Mobility) safety precautions follow-through/compliance;safety precaution awareness  -MS     Impairments Affecting Function  (Mobility) balance;endurance/activity tolerance;strength;sensation/sensory awareness  -MS               User Key  (r) = Recorded By, (t) = Taken By, (c) = Cosigned By      Initials Name Provider Type    Danial Silvestre PT Physical Therapist                   Mobility       Row Name 08/24/23 1243          Bed Mobility    Bed Mobility supine-sit  -MS     Supine-Sit [place] (Bed Mobility) modified independence  -MS     Assistive Device (Bed Mobility) bed rails;head of bed elevated  -MS       Row Name 08/24/23 1243          Bed-Chair Transfer    Bed-Chair [place] (Transfers) minimum assist (75% patient effort)  -MS     Assistive Device (Bed-Chair Transfers) walker, front-wheeled  -MS       Row Name 08/24/23 1243          Sit-Stand Transfer    Sit-Stand [place] (Transfers) minimum assist (75% patient effort)  -MS     Assistive Device (Sit-Stand Transfers) walker, front-wheeled  -MS       Row Name 08/24/23 1243          Gait/Stairs (Locomotion)    [place] Level (Gait) minimum assist (75% patient effort)  -MS     Assistive Device (Gait) walker, front-wheeled  -MS     Distance in Feet (Gait) 3  -MS     Deviations/Abnormal Patterns (Gait) marian decreased;festinating/shuffling  -MS               User Key  (r) = Recorded By, (t) = Taken By, (c) = Cosigned By      Initials Name Provider Type    Danial Silvestre PT Physical Therapist                   Obj/Interventions       Row Name 08/24/23 1244          Range of Motion Comprehensive    General Range of Motion bilateral lower extremity ROM WFL  -MS       Row Name 08/24/23 1244          Strength Comprehensive (MMT)    General Manual Muscle Testing (MMT) Assessment lower extremity strength deficits identified  -MS     Comment, General Manual Muscle Testing (MMT) Assessment B LE 4-/5  -MS       Row Name 08/24/23 1244          Balance    Balance Assessment standing static balance;standing dynamic balance  -MS     Static Standing Balance minimal  assist  -MS     Dynamic Standing Balance minimal assist  -MS               User Key  (r) = Recorded By, (t) = Taken By, (c) = Cosigned By      Initials Name Provider Type    Danial Silvestre, PT Physical Therapist                   Goals/Plan       Row Name 08/24/23 1250          Transfer Goal 1 (PT)    Activity/Assistive Device (Transfer Goal 1, PT) transfers, all;sit-to-stand/stand-to-sit  -MS     [place] Level/Cues Needed (Transfer Goal 1, PT) standby assist  -MS     Time Frame (Transfer Goal 1, PT) long term goal (LTG);2 weeks  -MS       Row Name 08/24/23 1250          Gait Training Goal 1 (PT)    Activity/Assistive Device (Gait Training Goal 1, PT) gait (walking locomotion);assistive device use  -MS     [place] Level (Gait Training Goal 1, PT) standby assist  -MS     Distance (Gait Training Goal 1, PT) 150ft  -MS     Time Frame (Gait Training Goal 1, PT) long term goal (LTG);2 weeks  -MS       Row Name 08/24/23 1250          Patient Education Goal (PT)    Activity (Patient Education Goal, PT) ther exer x15 reps ea  -MS     [place]/Cues/Accuracy (Memory Goal 2, PT) demonstrates adequately  -MS     Time Frame (Patient Education Goal, PT) short term goal (STG);1 week  -MS       Row Name 08/24/23 1250          Therapy Assessment/Plan (PT)    Planned Therapy Interventions (PT) balance training;bed mobility training;gait training;transfer training;patient/family education;strengthening;stair training;ROM (range of motion);neuromuscular re-education;home exercise program  -MS               User Key  (r) = Recorded By, (t) = Taken By, (c) = Cosigned By      Initials Name Provider Type    Danial Silvestre, PT Physical Therapist                   Clinical Impression       Row Name 08/24/23 1245          Pain    Pretreatment Pain Rating 0/10 - no pain  -MS     Posttreatment Pain Rating 0/10 - no pain  -MS       Row Name 08/24/23 1245          Plan of Care Review    Plan of Care Reviewed With patient   -MS     Progress no change  -MS     Outcome Evaluation Pt participated in PT eval this date. Pt transferred to EOB with CGA. Pt transferred to standing with Megan and Rwx. Pt required assist for donning socks and brief. Pt was able to transfer to the chair 3ft with Megan and Rwx. Pt did well with mobility, but states he would be open to STR as he has concerns he needs more strengthening and to work on his gait. Pt is expected to benefit from skilled PTx during this inpatient stay to address deficits in strength, gait and transfers.  -MS       Row Name 08/24/23 1245          Therapy Assessment/Plan (PT)    Patient/Family Therapy Goals Statement (PT) to get stronger  -MS     Rehab Potential (PT) good, to achieve stated therapy goals  -MS     Criteria for Skilled Interventions Met (PT) yes;meets criteria  -MS     Therapy Frequency (PT) 5 times/wk  -MS       Row Name 08/24/23 1245          Vital Signs    Pre SpO2 (%) 99  -MS     O2 Delivery Pre Treatment supplemental O2  -MS     Intra SpO2 (%) 95  -MS     O2 Delivery Intra Treatment room air  -MS     Post SpO2 (%) 96  -MS     O2 Delivery Post Treatment room air  -MS     Pre Patient Position Supine  -MS     Intra Patient Position Standing  -MS     Post Patient Position Sitting  -MS       Row Name 08/24/23 1245          Positioning and Restraints    Pre-Treatment Position in bed  -MS     Post Treatment Position chair  -MS     In Chair sitting;call light within reach;encouraged to call for assist;exit alarm on  -MS               User Key  (r) = Recorded By, (t) = Taken By, (c) = Cosigned By      Initials Name Provider Type    Danial Silvestre, PT Physical Therapist                   Outcome Measures       Row Name 08/24/23 1252 08/24/23 0800       How much help from another person do you currently need...    Turning from your back to your side while in flat bed without using bedrails? 3  -MS 3  -AL    Moving from lying on back to sitting on the side of a flat bed without  bedrails? 3  -MS 3  -AL    Moving to and from a bed to a chair (including a wheelchair)? 3  -MS 2  -AL    Standing up from a chair using your arms (e.g., wheelchair, bedside chair)? 3  -MS 2  -AL    Climbing 3-5 steps with a railing? 2  -MS 1  -AL    To walk in hospital room? 3  -MS 2  -AL    AM-PAC 6 Clicks Score (PT) 17  -MS 13  -AL    Highest level of mobility 5 --> Static standing  -MS 4 --> Transferred to chair/commode  -AL      Row Name 08/24/23 1249          Functional Assessment    Outcome Measure Options AM-PAC 6 Clicks Daily Activity (OT)  -SD               User Key  (r) = Recorded By, (t) = Taken By, (c) = Cosigned By      Initials Name Provider Type    SD Lizbet Skinner, OT Occupational Therapist    Danial Silvestre, PT Physical Therapist    Antoinette Huston, RN Registered Nurse                                 Physical Therapy Education       Title: PT OT SLP Therapies (In Progress)       Topic: Physical Therapy (In Progress)       Point: Mobility training (Done)       Learning Progress Summary             Patient Acceptance, E, VU by MS at 8/24/2023 1253    Comment: importance of mobility                         Point: Home exercise program (Done)       Learning Progress Summary             Patient Acceptance, E, VU by MS at 8/24/2023 1253    Comment: importance of mobility                         Point: Body mechanics (Not Started)       Learner Progress:  Not documented in this visit.              Point: Precautions (Not Started)       Learner Progress:  Not documented in this visit.                              User Key       Initials Effective Dates Name Provider Type Discipline    MS 08/22/23 -  Danial Santamaria, PT Physical Therapist PT                  PT Recommendation and Plan  Planned Therapy Interventions (PT): balance training, bed mobility training, gait training, transfer training, patient/family education, strengthening, stair training, ROM (range of motion), neuromuscular  re-education, home exercise program  Plan of Care Reviewed With: patient  Progress: no change  Outcome Evaluation: Pt participated in PT eval this date. Pt transferred to EOB with CGA. Pt transferred to standing with Megan and Rwx. Pt required assist for donning socks and brief. Pt was able to transfer to the chair 3ft with Megan and Rwx. Pt did well with mobility, but states he would be open to STR as he has concerns he needs more strengthening and to work on his gait. Pt is expected to benefit from skilled PTx during this inpatient stay to address deficits in strength, gait and transfers.     Time Calculation:   PT Evaluation Complexity  History, PT Evaluation Complexity: 1-2 personal factors and/or comorbidities  Examination of Body Systems (PT Eval Complexity): total of 3 or more elements  Clinical Presentation (PT Evaluation Complexity): evolving  Clinical Decision Making (PT Evaluation Complexity): moderate complexity  Overall Complexity (PT Evaluation Complexity): moderate complexity     PT Charges       Row Name 08/24/23 1254             Time Calculation    Start Time 1128  -MS      PT Received On 08/24/23  -MS      PT Goal Re-Cert Due Date 09/03/23  -MS         Untimed Charges    PT Eval/Re-eval Minutes 45  -MS         Total Minutes    Untimed Charges Total Minutes 45  -MS       Total Minutes 45  -MS                User Key  (r) = Recorded By, (t) = Taken By, (c) = Cosigned By      Initials Name Provider Type    Danial Silvestre PT Physical Therapist                  Therapy Charges for Today       Code Description Service Date Service Provider Modifiers Qty    91921236773 HC PT EVAL MOD COMPLEXITY 3 8/24/2023 Danial Santamaria PT GP 1            PT G-Codes  Outcome Measure Options: AM-PAC 6 Clicks Daily Activity (OT)  AM-PAC 6 Clicks Score (PT): 17  AM-PAC 6 Clicks Score (OT): 16  PT Discharge Summary  Anticipated Discharge Disposition (PT): inpatient rehabilitation facility, skilled nursing facility,  home with assist, home with home health    Danial Santamaria, PT  2023      Electronically signed by Danial Santamaria, PT at 23 1255          Occupational Therapy Notes (most recent note)        Lizbet Skinner, OT at 23 1251          Patient Name: Radha Varela  : 1935    MRN: [id number]                              Today's Date: 2023       Admit Date: 2023    Visit Dx:     ICD-10-CM ICD-9-CM   1. Diarrhea, unspecified type  R19.7 787.91   2. Hypokalemia  E87.6 276.8   3. Weakness generalized  R53.1 780.79   4. Elevated troponin  R77.8 790.6     Patient Active Problem List   Diagnosis    Abdominal aortic aneurysm (AAA) without rupture    Generalized weakness    Hypokalemia    Diarrhea of presumed infectious origin    Parkinson's disease dementia    Coronary artery disease involving native coronary artery of native heart without angina pectoris     Past Medical History:   Diagnosis Date    Aneurysm     Arthritis     Cardiac murmur     Carotid stenosis     Celiac artery stenosis     Chest pain     CHF (congestive heart failure)     Chronic kidney disease     Coronary artery disease     Dyslipidemia     Elevated cholesterol     GERD (gastroesophageal reflux disease)     Hiatal hernia     History of exercise stress test     History of nuclear stress test     History of pneumonia     Hypertension     Lumbar degenerative disc disease     MI, old     Osteoarthritis     PAD (peripheral artery disease)     Parkinson's disease     Paroxysmal A-fib     Peptic ulceration     Psoriasis     Renal artery stenosis     Sleep apnea     uses o2 @ HS    Spinal stenosis     Tremor     Vertebral artery stenosis      Past Surgical History:   Procedure Laterality Date    ABDOMINAL AORTIC ANEURYSM REPAIR WITH ENDOGRAFT      APPENDECTOMY      CATARACT EXTRACTION W/ INTRAOCULAR LENS IMPLANT Left 12/10/2018    Procedure: CATARACT PHACO EXTRACTION WITH INTRAOCULAR LENS IMPLANT LEFT;   Surgeon: Charity Carrasco MD;  Location: [facility];  Service: Ophthalmology    CATARACT EXTRACTION W/ INTRAOCULAR LENS IMPLANT Right 6/12/2023    Procedure: CATARACT PHACO EXTRACTION WITH INTRAOCULAR LENS IMPLANT RIGHT;  Surgeon: Charity Carrasco MD;  Location: [facility];  Service: Ophthalmology;  Laterality: Right;    COLONOSCOPY      CORONARY ARTERY BYPASS GRAFT  1995    ENDOSCOPY      SHOULDER ROTATOR CUFF REPAIR Right     SKIN BIOPSY        General Information       Row Name 08/24/23 1237          OT Time and Intention    Document Type evaluation  -SD     Mode of Treatment occupational therapy  -SD       Row Name 08/24/23 1237          General Information    Patient Profile Reviewed yes  -SD     Prior Level of Function independent:;all household mobility;min assist:;ADL's  [facility] at [facility]. Patient walks with a rollator, also has a RW, wears O2 at night, has a walk in shower with SC  -SD     Existing Precautions/Restrictions fall;other (see comments)  urinary catheter, O2 at night, sensation deficits major feet  -SD     Barriers to Rehab medically complex;previous functional deficit;impaired sensation  -SD       Row Name 08/24/23 1237          Living Environment    People in Home spouse  -SD       Row Name 08/24/23 1237          Home Main Entrance    Number of Stairs, Main Entrance none  -SD       Row Name 08/24/23 1237          Stairs Within Home, Primary    Number of Stairs, Within Home, Primary none  -SD       Row Name 08/24/23 1237          Cognition    Orientation Status (Cognition) oriented x 4  -SD       Row Name 08/24/23 1237          Safety Issues, Functional Mobility    Safety Issues Affecting Function (Mobility) safety precautions follow-through/compliance;safety precaution awareness  -SD     Impairments Affecting Function (Mobility) balance;endurance/activity tolerance;strength;sensation/sensory awareness  -SD               User Key   (r) = Recorded By, (t) = Taken By, (c) = Cosigned By      Initials Name Provider Type    SD Lizbet Skinner OT Occupational Therapist                     Mobility/ADL's       Row Name 08/24/23 1242          Bed Mobility    Bed Mobility supine-sit  -SD     Supine-Sit [place] (Bed Mobility) modified independence  -SD     Assistive Device (Bed Mobility) bed rails;head of bed elevated  -SD       Row Name 08/24/23 1242          Transfers    Transfers sit-stand transfer;bed-chair transfer  -SD       Row Name 08/24/23 1242          Bed-Chair Transfer    Bed-Chair [place] (Transfers) minimum assist (75% patient effort)  -SD     Assistive Device (Bed-Chair Transfers) walker, front-wheeled  -SD       Row Name 08/24/23 1242          Sit-Stand Transfer    Sit-Stand [place] (Transfers) minimum assist (75% patient effort)  -SD     Assistive Device (Sit-Stand Transfers) walker, front-wheeled  -SD       Row Name 08/24/23 1242          Functional Mobility    Functional Mobility- Ind. Level minimum assist (75% patient effort)  -SD     Functional Mobility- Device walker, front-wheeled  -SD     Functional Mobility-Distance (Feet) 3  -SD     Functional Mobility- Safety Issues balance decreased during turns;sequencing ability decreased;step length decreased;weight-shifting ability decreased  -SD       Row Name 08/24/23 1242          Activities of Daily Living    BADL Assessment/Intervention bathing;upper body dressing;lower body dressing;grooming;feeding;toileting  -SD       Row Name 08/24/23 1242          Bathing Assessment/Intervention    [place] Level (Bathing) upper body;minimum assist (75% patient effort);lower body;moderate assist (50% patient effort)  -SD       Row Name 08/24/23 1242          Upper Body Dressing Assessment/Training    [place] Level (Upper Body Dressing) contact guard assist  -SD       Row Name 08/24/23 1242          Lower Body Dressing Assessment/Training    [place] Level (Lower  Body Dressing) don;pants/bottoms;socks;moderate assist (50% patient effort)  -SD       Row Name 08/24/23 1242          Grooming Assessment/Training    [place] Level (Grooming) standby assist  -SD       Row Name 08/24/23 1242          Self-Feeding Assessment/Training    [place] Level (Feeding) supervision  -SD       Row Name 08/24/23 1242          Toileting Assessment/Training    [place] Level (Toileting) moderate assist (50% patient effort)  -SD     Comment, (Toileting) urinary catheter  -SD               User Key  (r) = Recorded By, (t) = Taken By, (c) = Cosigned By      Initials Name Provider Type    Lizbet Ward OT Occupational Therapist                   Obj/Interventions       Row Name 08/24/23 1243          Sensory Assessment (Somatosensory)    Sensory Assessment (Somatosensory) bilateral LE  -SD     Bilateral LE Sensory Assessment general sensation  -SD     Sensory Subjective Reports numbness  -SD       [facility] Name 08/24/23 1243          Range of Motion Comprehensive    General Range of Motion bilateral upper extremity ROM WFL  -SD       Row Name 08/24/23 1243          Strength Comprehensive (MMT)    General Manual Muscle Testing (MMT) Assessment upper extremity strength deficits identified  -SD     Comment, General Manual Muscle Testing (MMT) Assessment UB 3+/5  -SD               User Key  (r) = Recorded By, (t) = Taken By, (c) = Cosigned By      Initials Name Provider Type    Lizbet Ward OT Occupational Therapist                   Goals/Plan       Row Name 08/24/23 1248          Transfer Goal 1 (OT)    Activity/Assistive Device (Transfer Goal 1, OT) sit-to-stand/stand-to-sit;walker, rolling  -SD     [place] Level/Cues Needed (Transfer Goal 1, OT) standby assist  -SD     Time Frame (Transfer Goal 1, OT) long term goal (LTG)  -SD     Progress/Outcome (Transfer Goal 1, OT) goal ongoing  -SD       [facility] Name 08/24/23 1248          Dressing Goal 1 (OT)    Activity/Device (Dressing  Goal 1, OT) lower body dressing  -SD     [place]/Cues Needed (Dressing Goal 1, OT) contact guard required  -SD     Time Frame (Dressing Goal 1, OT) 2 weeks  -SD     Progress/Outcome (Dressing Goal 1, OT) goal ongoing  -SD       Row Name 08/24/23 1248          Toileting Goal 1 (OT)    Activity/Device (Toileting Goal 1, OT) toileting skills, all;commode  -SD     [place] Level/Cues Needed (Toileting Goal 1, OT) contact guard required  -SD     Time Frame (Toileting Goal 1, OT) 2 weeks  -SD     Progress/Outcome (Toileting Goal 1, OT) goal ongoing  -SD       Row Name 08/24/23 1248          Strength Goal 1 (OT)    Strength Goal 1 (OT) Patient to perform UB ther ex as tolerated  -SD     Time Frame (Strength Goal 1, OT) long term goal (LTG)  -SD     Progress/Outcome (Strength Goal 1, OT) goal ongoing  -SD       Row Name 08/24/23 1248          Therapy Assessment/Plan (OT)    Planned Therapy Interventions (OT) activity tolerance training;adaptive equipment training;BADL retraining;patient/caregiver education/training;ROM/therapeutic exercise;strengthening exercise;transfer/mobility retraining  -SD               User Key  (r) = Recorded By, (t) = Taken By, (c) = Cosigned By      Initials Name Provider Type    Lizbet Ward OT Occupational Therapist                   Clinical Impression       Row Name 08/24/23 1243          Pain Assessment    Pretreatment Pain Rating 0/10 - no pain  -SD     Posttreatment Pain Rating 0/10 - no pain  -SD       Row Name 08/24/23 1243          Plan of Care Review    Plan of Care Reviewed With patient  -SD     Progress no change  -SD     Outcome Evaluation OT eval completed. Patient is supine in bed, Ox4, no c/o pain. On 2L O2 satting 100%, O2 removed prior to mobility as patient states he only wears it at night. Urinary catheter intact. Patient performed supine to sit with modified ind, requires Min A for UBB, mod A for LBB, SBA for UBD, mod A for LBD, SBA for grooming, Sup for self  "feeding, mod A for toileting. Patient reports bilateral feet numbness as well as \"freezing episodes\" at home that have caused him to fall. Patient performed sit to stand and tf to chair with min A using RW, declined further mobility this date. Patient is expected to benefit from continued OT services prior to DC. In regards to DC patient states he wants to talk to his daughter.  -SD       Row Name 08/24/23 1243          Therapy Assessment/Plan (OT)    Patient/Family Therapy Goal Statement (OT) increase strength and mobility  -SD     Rehab Potential (OT) good, to achieve stated therapy goals  -SD     Criteria for Skilled Therapeutic Interventions Met (OT) skilled treatment is necessary  -SD     Therapy Frequency (OT) 3 times/wk  5 times if indicated  -SD       Row Name 08/24/23 1243          Therapy Plan Review/Discharge Plan (OT)    Anticipated Discharge Disposition (OT) home with assist;home with home health;inpatient rehabilitation facility  -SD       Row Name 08/24/23 1243          Vital Signs    Pre SpO2 (%) 100  -SD     O2 Delivery Pre Treatment supplemental O2  -SD     O2 Delivery Intra Treatment room air  -SD     Post SpO2 (%) 96  -SD     O2 Delivery Post Treatment room air  -SD       Row Name 08/24/23 1243          Positioning and Restraints    Pre-Treatment Position in bed  -SD     Post Treatment Position chair  -SD     In Chair sitting;call light within reach;encouraged to call for assist;exit alarm on  -SD               User Key  (r) = Recorded By, (t) = Taken By, (c) = Cosigned By      Initials Name Provider Type    Lizbet Ward OT Occupational Therapist                   Outcome Measures       Row Name 08/24/23 1246          How much help from another is currently needed...    Putting on and taking off regular lower body clothing? 2  -SD     Bathing (including washing, rinsing, and drying) 2  -SD     Toileting (which includes using toilet bed pan or urinal) 2  -SD     Putting on and taking off " regular upper body clothing 3  -SD     Taking care of personal grooming (such as brushing teeth) 3  -SD     Eating meals 4  -SD     AM-PAC 6 Clicks Score (OT) 16  -SD       Row Name 08/24/23 0800          How much help from another person do you currently need...    Turning from your back to your side while in flat bed without using bedrails? 3  -AL     Moving from lying on back to sitting on the side of a flat bed without bedrails? 3  -AL     Moving to and from a bed to a chair (including a wheelchair)? 2  -AL     Standing up from a chair using your arms (e.g., wheelchair, bedside chair)? 2  -AL     Climbing 3-5 steps with a railing? 1  -AL     To walk in hospital room? 2  -AL     AM-PAC 6 Clicks Score (PT) 13  -AL     Highest level of mobility 4 --> Transferred to chair/commode  -AL       Row Name 08/24/23 1249          Functional Assessment    Outcome Measure Options AM-PAC 6 Clicks Daily Activity (OT)  -SD               User Key  (r) = Recorded By, (t) = Taken By, (c) = Cosigned By      Initials Name Provider Type    Lizbet Ward OT Occupational Therapist    Antoinette Huston, RN Registered Nurse                    Occupational Therapy Education       Title: PT OT SLP Therapies (In Progress)       Topic: Occupational Therapy (In Progress)       Point: ADL training (Done)       Description:   Instruct learner(s) on proper safety adaptation and remediation techniques during self care or transfers.   Instruct in proper use of assistive devices.                  Learning Progress Summary             Patient Acceptance, E,TB, VU by SD at 8/24/2023 1250    Comment: OT POC                         Point: Home exercise program (Not Started)       Description:   Instruct learner(s) on appropriate technique for monitoring, assisting and/or progressing therapeutic exercises/activities.                  Learner Progress:  Not documented in this visit.              Point: Precautions (Not Started)       Description:  "  Instruct learner(s) on prescribed precautions during self-care and functional transfers.                  Learner Progress:  Not documented in this visit.              Point: Body mechanics (Not Started)       Description:   Instruct learner(s) on proper positioning and spine alignment during self-care, functional mobility activities and/or exercises.                  Learner Progress:  Not documented in this visit.                              User Key       Initials Effective Dates Name Provider Type Discipline    SD 06/16/21 -  Lizbet Skinner OT Occupational Therapist OT                  OT Recommendation and Plan  Planned Therapy Interventions (OT): activity tolerance training, adaptive equipment training, BADL retraining, patient/caregiver education/training, ROM/therapeutic exercise, strengthening exercise, transfer/mobility retraining  Therapy Frequency (OT): 3 times/wk (5 times if indicated)  Plan of Care Review  Plan of Care Reviewed With: patient  Progress: no change  Outcome Evaluation: OT eval completed. Patient is supine in bed, Ox4, no c/o pain. On 2L O2 satting 100%, O2 removed prior to mobility as patient states he only wears it at night. Urinary catheter intact. Patient performed supine to sit with modified ind, requires Min A for UBB, mod A for LBB, SBA for UBD, mod A for LBD, SBA for grooming, Sup for self feeding, mod A for toileting. Patient reports bilateral feet numbness as well as \"freezing episodes\" at home that have caused him to fall. Patient performed sit to stand and tf to chair with min A using RW, declined further mobility this date. Patient is expected to benefit from continued OT services prior to DC. In regards to DC patient states he wants to talk to his daughter.     Time Calculation:   Evaluation Complexity (OT)  Review Occupational Profile/Medical/Therapy History Complexity: brief/low complexity  Assessment, Occupational Performance/Identification of Deficit Complexity: 1-3 " performance deficits  Clinical Decision Making Complexity (OT): problem focused assessment/low complexity  Overall Complexity of Evaluation (OT): low complexity     Time Calculation- OT       Row Name 08/24/23 1250             Time Calculation- OT    OT Start Time 1129  -SD      OT Received On 08/24/23  -SD      OT Goal Re-Cert Due Date 09/05/23  -SD         Untimed Charges    OT Eval/Re-eval Minutes 45  -SD         Total Minutes    Untimed Charges Total Minutes 45  -SD       Total Minutes 45  -SD                User Key  (r) = Recorded By, (t) = Taken By, (c) = Cosigned By      Initials Name Provider Type    SD Lizbet Skinner OT Occupational Therapist                  Therapy Charges for Today       Code Description Service Date Service Provider Modifiers Qty    07748334579 HC OT EVAL LOW COMPLEXITY 3 8/24/2023 Lizbet Skinner OT GO 1                 Lizbet Skinner OT  8/24/2023    Electronically signed by Lizbet Skinner OT at 08/24/23 1252

## 2023-08-25 NOTE — CASE MANAGEMENT/SOCIAL WORK
Case Management Discharge Note                Selected Continued Care - Admitted Since 8/23/2023       Destination    No services have been selected for the patient.                Durable Medical Equipment Coordination complete.      Service Provider Selected Services Address Phone Fax Patient Preferred    [facility] [address] [place], [place] [address]ATE [address] CORDERO [address] [place] [phone] --       Internal Comment last updated by Lynette De La Rosa RN 8/25/2023 1629    Hospital bed and c                         Dialysis/Infusion    No services have been selected for the patient.                Home Medical Care    No services have been selected for the patient.                Therapy    No services have been selected for the patient.                Community Resources    No services have been selected for the patient.                Community & DME    No services have been selected for the patient.                    Transportation Services  Private: Car    Final Discharge Disposition Code: 01 - home or self-care

## 2023-08-25 NOTE — PROGRESS NOTES
[facility]    Name:  Radha Varela   Age:  87 y.o.  Sex:  male  :  1935  MRN:  [id number]   Visit Number:  [id number]  Admission Date:  2023  Date Of Service:  23  Primary Care Physician:  Provider, No Known     LOS: 0 days :    Chief Complaint:      Diarrhea.    Subjective:    Mr. Varela was seen and examined this morning and again later in the day.  He was complaining of generalized weakness and fatigue this morning but subsequently started feeling better and was able to sit up on the chair.  He still has some diarrhea.  He was seen by Dr. Marquez yesterday.  Patient's GI panel and C. difficile toxin test were negative.  He has been initiated on Imodium for symptomatic control.  He did have some intermittent confusion noted this morning.  Denies any depression or suicidal ideation.    Hospital Course:    Mr. Varela is an 87-year-old pleasant male resident of assisted living facility, with multiple medical problems including coronary artery disease status post CABG, chronic kidney disease stage III, abdominal aortic aneurysm repair, atrial fibrillation, Parkinson's disease, carotid and celiac artery stenosis, COPD on home oxygen at nighttime, obstructive sleep apnea was brought to the emergency room by his daughter with symptoms of generalized weakness and progressive diarrhea that has been going on for more than 10 days.  There is no prior history of antibiotics use. Patient does have extensive history of aortic atherosclerosis including aortic aneurysm at 6 cm. He was deemed not to be a surgical candidate at [facility].     Patient was recently seen in the emergency room on 2023 where he was diagnosed with shingles and was given topical medication.  At that time CT of the abdomen and pelvis was done which showed infrarenal abdominal aortic aneurysm with interval increase in size.  It also showed thoracic and abdominal  aorta aneurysm measuring up to 5.4 cm.     In the emergency room, he was afebrile with a heart rate of 62 and a blood pressure of 127/60.  His initial pulse oxygen saturation was 87% on room air and he was placed on 2 L of oxygen with improvement in his saturation to 95%.  Initial troponin T was 75 with a proBNP of 2549.  CMP was fairly unremarkable except for a BUN of 28, creatinine of 2.24 (baseline), potassium of 2.8.  CBC showed a hemoglobin of 10.7 (baseline) and a platelet count of 129 (baseline).  Patient was given IV fluids, oral potassium and was subsequently admitted to the medical floor with telemetry.  His GI panel was negative.  He was seen by Dr. Marquez who felt that the patient may have underlying viral gastroenteritis.  Recommended C. difficile toxin test which was negative.  Patient was started on Imodium for diarrhea.  He was continued on physical and occupational therapy.  Case management was consulted for possible short-term rehab placement.    Review of Systems:     All systems were reviewed and negative except as mentioned in subjective, assessment and plan.    Vital Signs:    Temp:  [97 øF (36.1 øC)-98.2 øF (36.8 øC)] 98.2 øF (36.8 øC)  Heart Rate:  [62-84] 84  Resp:  [18-22] 18  BP: ()/(61-90) 151/90    Intake and output:    I/O last 3 completed shifts:  In: 1470 [I.V.:1470]  Out: 725 [Urine:725]  I/O this shift:  In: 50 [P.O.:50]  Out: -     Physical Examination:    General Appearance:  Alert and cooperative.    Head:  Atraumatic and normocephalic.   Eyes: Conjunctivae and sclerae normal, no icterus. No pallor.   Throat: No oral lesions, no thrush, oral mucosa moist.   Neck: Supple, trachea midline, no thyromegaly.   Lungs:   Breath sounds heard bilaterally equally.  No wheezing or crackles. No Pleural rub or bronchial breathing.   Heart:  Normal S1 and S2, no murmur, no gallop, no rub. No JVD.  CABG scar noted.   Abdomen:   Normal bowel sounds, no masses, no organomegaly. Soft,  nontender, nondistended, no rebound tenderness.  Midline surgical scar noted in the lower abdomen.     Extremities: Supple, no edema, no cyanosis, no clubbing.   Skin: No bleeding.  Healing shingles rash noted on the right infra axillary area.   Neurologic: Alert and oriented x 3. No facial asymmetry. Moves all four limbs. No tremors.      Laboratory results:    Results from last 7 days   Lab Units 08/25/23  1251 08/25/23  0701 08/25/23  0240 08/24/23  0712 08/23/23  1535   SODIUM mmol/L  --  141  --  142 137   POTASSIUM mmol/L 3.9 3.8 3.5 2.9* 2.8*   CHLORIDE mmol/L  --  111*  --  106 98   CO2 mmol/L  --  19.9*  --  25.0 26.9   BUN mg/dL  --  24*  --  25* 28*   CREATININE mg/dL  --  1.74*  --  1.78* 2.24*   CALCIUM mg/dL  --  8.8  --  8.4* 9.0   BILIRUBIN mg/dL  --   --   --   --  0.5   ALK PHOS U/L  --   --   --   --  82   ALT (SGPT) U/L  --   --   --   --  <5   AST (SGOT) U/L  --   --   --   --  14   GLUCOSE mg/dL  --  91  --  76 99       Results from last 7 days   Lab Units 08/24/23  0712 08/23/23  1535   WBC 10*3/mm3 6.25 7.09   HEMOGLOBIN g/dL 10.7* 10.7*   HEMATOCRIT % 33.0* 32.2*   PLATELETS 10*3/mm3 128* 129*           Results from last 7 days   Lab Units 08/24/23  0712 08/23/23  1746 08/23/23  1535   HSTROP T ng/L 69* 68* 75*           Recent Labs     11/03/22  1901 12/06/22  1825   PHART 7.46*  --    UZR4UKI 37  --    PVY6DNE 26  --    BASEEXCESS  --  3.0        I have reviewed the patient's laboratory results.    Radiology results:    Adult Transthoracic Echo Complete W/ Cont if Necessary Per Protocol    Result Date: 8/24/2023  1.  Normal left ventricular size and systolic function, LVEF 55-60%. 2.  Mild to moderate concentric LVH. 3.  Grade 1 diastolic dysfunction. 4.  Normal right ventricular size and systolic function. 5.  Normal left atrial volume index. 6.  Moderate calcification of the aortic valve without significant stenosis. 7.  Trace aortic regurgitation. 8.  Borderline dilation of the aortic  root.     CT Abdomen Pelvis Without Contrast    Result Date: 8/23/2023  FINAL REPORT TECHNIQUE: Routine axial images through the abdomen and pelvis were obtained. CLINICAL HISTORY: Persistent diarrhea, weakness COMPARISON: CXR earlier today FINDINGS: Abdomen: There are marked coronary artery calcifications.  There has been prior CABG.  There is a marked tortuous lower thoracic aorta extending to the right of the midline which accounts for the abnormal opacity at the cardiophrenic angle on the prior chest x-ray.  The gallbladder is normal.  Pancreas is predominantly fatty replaced.  There are right greater than left renal cysts.  The solid abdominal organs and ureters are otherwise unremarkable.  There is increased fluid within the colon consistent with history of diarrhea with no evidence of acute colitis.  The GI tract is otherwise unremarkable.  There has been a prior appendectomy.  The abdominal aorta is calcified and torturous with an infrarenal aneurysm measuring 6.1 cm in greatest diameter on the coronal images.  There is lesser ectasia of the bilateral common iliac arteries. Pelvis: The urinary bladder is normal. There is no pelvic or abdominal ascites, adenopathy or acute osseous abnormality.  There is scoliosis and severe degenerative changes of the thoracolumbar spine.     Impression: Increased fluid within the colon consistent with diarrhea.  No acute abnormality of the abdomen and pelvis otherwise. Markedly tortuous and dilated aorta with a 6.1 cm infrarenal abdominal aortic aneurysm. Authenticated and Electronically Signed by Tu Villarreal M.D. on 08/23/2023 07:57:15 PM    XR Chest 1 View    Result Date: 8/23/2023  FINAL REPORT TECHNIQUE: An AP portable view of the chest was obtained. CLINICAL HISTORY: Weak/Dizzy/AMS triage protocol COMPARISON: None FINDINGS: There has been a prior median sternotomy.  There are areas of linear atelectasis in the left lung base.  There is an apparent masslike opacity seen at  the right cardiophrenic angle.  CT chest could further evaluate unless the nature of this opacity is known.  There is borderline cardiomegaly without pulmonary edema. There is no pleural disease, adenopathy, or significant osseous abnormality.     Impression: Mild left basilar atelectasis.  Possible mass in the right cardiophrenic angle, consider CT. Authenticated and Electronically Signed by Tu Villarreal M.D. on 08/23/2023 06:28:41 PM   I have reviewed the patient's radiology reports.    Medication Review:     I have reviewed the patient's active and prn medications.     Problem List:      Generalized weakness    Abdominal aortic aneurysm (AAA) without rupture    Hypokalemia    Diarrhea of presumed infectious origin    Parkinson's disease dementia    Coronary artery disease involving native coronary artery of native heart without angina pectoris    Assessment:    Acute persistent diarrhea of uncertain etiology, POA.  Generalized weakness secondary to dehydration and hypokalemia, POA.  Chronic kidney disease stage III.  Recent history of right-sided thoracic shingles rash.  Coronary artery disease status post CABG.  Abdominal aortic aneurysm status postrepair.  Parkinson's disease.  Obstructive sleep apnea.  Chronic anemia.    Plan:    Persistent diarrhea.  - Exact etiology of this is uncertain but could be related to viral gastroenteritis.  - Patient was seen by Dr. Marquez from gastroenterology yesterday and his recommendations are appreciated.  - GI panel and stool C. difficile toxin test were negative.  - Due to generalized weakness we will continue IV fluids with dextrose normal saline for 1 more bag.  - Lactobacillus supplements.     Hypokalemia.  - Continue to replace orally as per the electrolyte protocol.     Generalized weakness/Parkinson's disease.  - Continue physical and occupational therapy.  - Continue Sinemet and ropinirole.     Elevated troponin level.  - This is likely secondary to chronic kidney  disease.  - I do not suspect acute coronary syndrome at this time.    Discussed with nursing staff and multidisciplinary team.  I discussed the patient's condition and treatment plan with his daughter Sandra who is at the bedside.  If the patient is not able to find local facilities for short-term rehabilitation, she wants to take him home and wait for beds to open up at the local facilities.    We will order hospital bed at home in case he gets discharged home over the weekend. The patient has a medical condition which requires frequent changes in body positioning in ways not feasibly achieved with an ordinary bed.      DVT Prophylaxis: Heparin  Code Status: Full  Diet: Cardiac  Discharge Plan: Hopefully, short-term rehabilitation in 1 to 2 days.    Osvaldo Glass MD  08/25/23  13:48 EDT    Dictated utilizing Dragon dictation.

## 2023-08-25 NOTE — PLAN OF CARE
Goal Outcome Evaluation:              Outcome Evaluation: Pt sitting up in chair upon OT arrival. Pt agreeable to UB exercises to increase strength and endurance needed for ADL mgmt. Pt tolerates [id number] x2sets of bicep curls, shoulder flexion, shoulder abduction, horizontal abduction and scapular protraction/retraction using red resistance theraband. Pt reqs rest breaks as needed due to fatigue. He reports some SOB and therapist instructed in energy conservation techniques. Pt receptive to recommendations this date. Pt agreeable to participate in grooming tasks of washing hands and face. Pt able to wash hands and face with s/u.

## 2023-08-25 NOTE — CASE MANAGEMENT/SOCIAL WORK
VM left for dtr to return call concerning dcp. Pt is obs, not eligible for STR with ins. Covering.  Pt status changed, now meets inpt. Met with dtr. Agreeable to [place] [facility] STR being made. Also considering pt discharging to her home; hospital bed would be needed. Will follow.  14:10 New order for hospital bed noted and faxed to [facility]- will go to daughter's home; pt will not be returning to assisted living.

## 2023-08-25 NOTE — PLAN OF CARE
Goal Outcome Evaluation:           Progress: no change  Outcome Evaluation: VSS. No acute change during this shift. Patient respirations even and non labored. no concerns voised at this time.

## 2023-08-25 NOTE — DISCHARGE SUMMARY
[facility]   DISCHARGE SUMMARY      Name:  Radha Varela   Age:  87 y.o.  Sex:  male  :  1935  MRN:  [id number]   Visit Number:  [id number]    Admission Date:  2023  Date of Discharge:  2023  Primary Care Physician:  Provider, No Known    Important issues to note:    1.  Patient was admitted with significant generalized weakness, dehydration, hypokalemia secondary to diarrhea which is likely viral in nature.  GI panel and C. difficile toxins have been negative.  Patient has been recommended to continue Imodium as needed for diarrhea at home.  2.  Continue plenty of oral hydration and follow-up with primary care provider in 1 week with repeat BMP.  3.  Due to his diarrhea, his magnesium oxide and senna have been discontinued.  4.  Family declined home health services.  He will be arranged bedside commode and hospital bed at home.  He already has a rolling walker at home.    Discharge Diagnoses:     Acute diarrhea likely viral in nature, POA.  Generalized weakness secondary to dehydration and hypokalemia, POA.  Chronic kidney disease stage III.  Recent history of right-sided thoracic shingles rash.  Coronary artery disease status post CABG.  Abdominal aortic aneurysm status postrepair.  Parkinson's disease.  Obstructive sleep apnea.  Chronic anemia.    Problem List:     Active Hospital Problems    Diagnosis  POA    **Generalized weakness [R53.1]  Yes    Diarrhea of presumed infectious origin [R19.7]  Yes    Parkinson's disease dementia [G20, F02.80]  Yes    Coronary artery disease involving native coronary artery of native heart without angina pectoris [I25.10]  Yes    Abdominal aortic aneurysm (AAA) without rupture [I71.40]  Yes      Resolved Hospital Problems    Diagnosis Date Resolved POA    Hypokalemia [E87.6] 2023 Yes     Presenting Problem:    Chief Complaint   Patient presents with    Dehydration      Consults:     Consulting Physician(s)        Provider Relationship Specialty    Mary Lou Marquez MD Consulting Physician Gastroenterology          Procedures Performed:    None.    History of presenting illness/Hospital Course:    Mr. Varela is an 87-year-old pleasant male resident of assisted living facility, with multiple medical problems including coronary artery disease status post CABG, chronic kidney disease stage III, abdominal aortic aneurysm repair, atrial fibrillation, Parkinson's disease, carotid and celiac artery stenosis, COPD on home oxygen at nighttime, obstructive sleep apnea was brought to the emergency room by his daughter with symptoms of generalized weakness and progressive diarrhea that has been going on for more than 10 days.  There is no prior history of antibiotics use. Patient does have extensive history of aortic atherosclerosis including aortic aneurysm at 6 cm. He was deemed not to be a surgical candidate at [facility].     Patient was recently seen in the emergency room on 8/13/2023 where he was diagnosed with shingles and was given topical medication.  At that time CT of the abdomen and pelvis was done which showed infrarenal abdominal aortic aneurysm with interval increase in size.  It also showed thoracic and abdominal aorta aneurysm measuring up to 5.4 cm.     In the emergency room, he was afebrile with a heart rate of 62 and a blood pressure of 127/60.  His initial pulse oxygen saturation was 87% on room air and he was placed on 2 L of oxygen with improvement in his saturation to 95%.  Initial troponin T was 75 with a proBNP of 2549.  CMP was fairly unremarkable except for a BUN of 28, creatinine of 2.24 (baseline), potassium of 2.8.  CBC showed a hemoglobin of 10.7 (baseline) and a platelet count of 129 (baseline).  Patient was given IV fluids, oral potassium and was subsequently admitted to the medical floor with telemetry.  His GI panel was negative.  He was seen by Dr. Marquez who felt that the patient may  have underlying viral gastroenteritis.  Recommended C. difficile toxin test which was negative.  Patient was started on Imodium for diarrhea.  He was continued on physical and occupational therapy.  Case management was consulted for possible short-term rehab placement.    Patient did become agitated during the hospital stay.  As the patient was waiting for short-term rehab placement, family decided to take him home so he can be more comfortable at home without agitation.  Patient will be continued on his previous medications without any change except for Lasix decreased to 20 mg with every other day especially while he is having diarrhea.  Patient is euvolemic and does not have any significant edema at this time and his 2D echocardiogram was within normal limits.  Patient already has a rolling walker at home and we will arrange bedside commode and hospital bed for him.    The patient has a medical condition which requires frequent changes in body positioning in ways not feasibly achieved with an ordinary bed.  The patient is physically incapable of utilizing regular toilet facilities. Use of a bedside commode is necessary as they are confined to one level of home  with no toileting facilities available on that level.      I discussed the patient's condition and discharge plan with his daughter Sandra this morning and his son-in-law Xavi this evening at the bedside.  Patient is going to stay with his daughter and not going back to the assisted living facility at this time.  Discussed with case management services.    Vital Signs:    Temp:  [97 øF (36.1 øC)-98.2 øF (36.8 øC)] 97.8 øF (36.6 øC)  Heart Rate:  [64-84] 72  Resp:  [16-22] 16  BP: (125-168)/(69-90) 142/81    Physical Exam:    General Appearance:  Alert and cooperative.    Head:  Atraumatic and normocephalic.   Eyes: Conjunctivae and sclerae normal, no icterus. No pallor.   Ears:  Ears with no abnormalities noted.   Throat: No oral lesions, no thrush, oral  mucosa moist.   Neck: Supple, trachea midline, no thyromegaly.   Back:   No kyphoscoliosis present. No tenderness to palpation.   Lungs:   Breath sounds heard bilaterally equally.  No crackles or wheezing. No Pleural rub or bronchial breathing.   Heart:  Normal S1 and S2, no murmur, no gallop, no rub. No JVD.  CABG scar noted.   Abdomen:   Normal bowel sounds, no masses, no organomegaly. Soft, nontender, nondistended, no rebound tenderness.  Midline surgical scar noted in the lower abdomen.   Extremities: Supple, no edema, no cyanosis, no clubbing.   Pulses: Pulses palpable bilaterally.   Skin: No bleeding.  Healing shingles rash noted on the right infra axillary area.   Neurologic: Alert and oriented x 3. No facial asymmetry. Moves all four limbs but does have generalized weakness. No tremors.     Pertinent Lab Results:     Results from last 7 days   Lab Units 08/25/23  1251 08/25/23  0701 08/25/23  0240 08/24/23  0712 08/23/23  1535   SODIUM mmol/L  --  141  --  142 137   POTASSIUM mmol/L 3.9 3.8 3.5 2.9* 2.8*   CHLORIDE mmol/L  --  111*  --  106 98   CO2 mmol/L  --  19.9*  --  25.0 26.9   BUN mg/dL  --  24*  --  25* 28*   CREATININE mg/dL  --  1.74*  --  1.78* 2.24*   CALCIUM mg/dL  --  8.8  --  8.4* 9.0   BILIRUBIN mg/dL  --   --   --   --  0.5   ALK PHOS U/L  --   --   --   --  82   ALT (SGPT) U/L  --   --   --   --  <5   AST (SGOT) U/L  --   --   --   --  14   GLUCOSE mg/dL  --  91  --  76 99     Results from last 7 days   Lab Units 08/24/23  0712 08/23/23  1535   WBC 10*3/mm3 6.25 7.09   HEMOGLOBIN g/dL 10.7* 10.7*   HEMATOCRIT % 33.0* 32.2*   PLATELETS 10*3/mm3 128* 129*         Results from last 7 days   Lab Units 08/24/23  0712 08/23/23  1746 08/23/23  1535   HSTROP T ng/L 69* 68* 75*     Results from last 7 days   Lab Units 08/23/23  1535   PROBNP pg/mL 2,549.0*     Pertinent Radiology Results:    Imaging Results (All)       Procedure Component Value Units Date/Time    CT Abdomen Pelvis Without Contrast  [[id number]] Collected: 08/23/23 1957     Updated: 08/23/23 1958    Narrative:      FINAL REPORT    TECHNIQUE:  Routine axial images through the abdomen and pelvis were  obtained.    CLINICAL HISTORY:  Persistent diarrhea, weakness    COMPARISON:  CXR earlier today    FINDINGS:  Abdomen: There are marked coronary artery calcifications.  There has been prior CABG.  There is a marked tortuous lower thoracic aorta extending to the right of the midline which accounts for the abnormal opacity at the cardiophrenic angle on the prior   chest x-ray.  The gallbladder is normal.  Pancreas is predominantly fatty replaced.  There are right greater than left renal cysts.  The solid abdominal organs and ureters are otherwise unremarkable.  There is increased fluid within the colon consistent   with history of diarrhea with no evidence of acute colitis.  The GI tract is otherwise unremarkable.  There has been a prior appendectomy.  The abdominal aorta is calcified and torturous with an infrarenal aneurysm measuring 6.1 cm in greatest diameter   on the coronal images.  There is lesser ectasia of the bilateral common iliac arteries.    Pelvis: The urinary bladder is normal. There is no pelvic or abdominal ascites, adenopathy or acute osseous abnormality.  There is scoliosis and severe degenerative changes of the thoracolumbar spine.      Impression:      Increased fluid within the colon consistent with diarrhea.  No acute abnormality of the abdomen and pelvis otherwise.    Markedly tortuous and dilated aorta with a 6.1 cm infrarenal abdominal aortic aneurysm.    Authenticated and Electronically Signed by Tu Villarreal M.D. on  08/23/2023 07:57:15 PM    XR Chest 1 View [[id number]] Collected: 08/23/23 1828     Updated: 08/23/23 1829    Narrative:      FINAL REPORT    TECHNIQUE:  An AP portable view of the chest was obtained.    CLINICAL HISTORY:  Weak/Dizzy/AMS triage protocol    COMPARISON:  None    FINDINGS:  There has been a prior  median sternotomy.  There are areas of  linear atelectasis in the left lung base.  There is an apparent  masslike opacity seen at the right cardiophrenic angle.  CT  chest could further evaluate unless the nature of this opacity  is known.  There is borderline cardiomegaly without pulmonary  edema. There is no pleural disease, adenopathy, or significant  osseous abnormality.      Impression:      Mild left basilar atelectasis.  Possible mass in the right  cardiophrenic angle, consider CT.    Authenticated and Electronically Signed by Tu Villarreal M.D. on  08/23/2023 06:28:41 PM     Echo:    Results for orders placed during the hospital encounter of 08/23/23    Adult Transthoracic Echo Complete W/ Cont if Necessary Per Protocol    Interpretation Summary  1.  Normal left ventricular size and systolic function, LVEF 55-60%.  2.  Mild to moderate concentric LVH.  3.  Grade 1 diastolic dysfunction.  4.  Normal right ventricular size and systolic function.  5.  Normal left atrial volume index.  6.  Moderate calcification of the aortic valve without significant stenosis.  7.  Trace aortic regurgitation.  8.  Borderline dilation of the aortic root.    Condition on Discharge:      Stable.    Code status during the hospital stay:    Code Status and Medical Interventions:   Ordered at: 08/23/23 1806     Code Status (Patient has no pulse and is not breathing):    CPR (Attempt to Resuscitate)     Medical Interventions (Patient has pulse or is breathing):    Full Support     Discharge Disposition:    Home or Self Care    Discharge Medications:       Discharge Medications        New Medications        Instructions Start Date   naloxone 4 MG/0.1ML nasal spray  Commonly known as: NARCAN   Call 911. Don't prime. [place] in 1 nostril for overdose. Repeat in 2-3 minutes in other nostril if no or minimal breathing/responsiveness.             Changes to Medications        Instructions Start Date   Diclofenac Sodium 1 % gel gel  Commonly known  as: VOLTAREN  What changed: reasons to take this   4 g, Topical, 4 Times Daily PRN, Apply to Right hip      furosemide 20 MG tablet  Commonly known as: LASIX  What changed:   how much to take  when to take this   20 mg, Oral, Every Other Day, And one tablet as needed for edema      saccharomyces boulardii 250 MG capsule  Commonly known as: FLORASTOR  What changed: when to take this   250 mg, Oral, 2 Times Daily             Continue These Medications        Instructions Start Date   acetaminophen 325 MG tablet  Commonly known as: TYLENOL   650 mg, Oral, Every 6 Hours PRN      amLODIPine 5 MG tablet  Commonly known as: NORVASC   2.5 mg, Oral, Daily      aspirin 81 MG EC tablet   81 mg, Oral, Daily      carbidopa-levodopa  MG per tablet  Commonly known as: SINEMET   2 tablets, Oral, 4 Times Daily      cholecalciferol 25 MCG (1000 UT) tablet  Commonly known as: VITAMIN D3   1,000 Units, Oral, Daily, 2 tablets daily       entacapone 200 MG tablet  Commonly known as: COMTAN   200 mg, Oral, 4 Times Daily      escitalopram 10 MG tablet  Commonly known as: LEXAPRO   10 mg, Oral, Daily      famotidine 10 MG tablet  Commonly known as: PEPCID   10 mg, Oral, 2 Times Daily PRN      HYDROcodone-acetaminophen 5-325 MG per tablet  Commonly known as: NORCO   1 tablet, Oral, Every 6 Hours PRN      hydrOXYzine 25 MG tablet  Commonly known as: ATARAX   25 mg, Oral, Nightly PRN      loperamide 2 MG capsule  Commonly known as: IMODIUM   4 mg, Oral, 4 Times Daily PRN, Do not exceed 8 capsules daily      nitroglycerin 0.4 MG SL tablet  Commonly known as: NITROSTAT   0.4 mg, Sublingual, Every 5 Minutes PRN, Take no more than 3 doses in 15 minutes.      ondansetron 4 MG tablet  Commonly known as: ZOFRAN   4 mg, Oral, Every 8 Hours PRN      rOPINIRole 0.5 MG tablet  Commonly known as: REQUIP   1 mg, Oral, 4 Times Daily, Take 1 hour before bedtime.      traZODone 50 MG tablet  Commonly known as: DESYREL   50 mg, Oral, Nightly      vitamin  B-12 1000 MCG tablet  Commonly known as: CYANOCOBALAMIN   1,000 mcg, Oral, Daily             Stop These Medications      acyclovir 5 % ointment  Commonly known as: Zovirax     atorvastatin 80 MG tablet  Commonly known as: LIPITOR     CoQ10 200 MG capsule     magnesium oxide 400 MG tablet  Commonly known as: MAG-OX     melatonin 5 MG tablet tablet     methocarbamol 500 MG tablet  Commonly known as: ROBAXIN     senna 8.6 MG tablet  Commonly known as: SENOKOT            Discharge Diet:     Diet Instructions       Diet: Cardiac Diets; Healthy Heart (2-3 Na+); Regular Texture (IDDSI 7); Thin (IDDSI 0)      Discharge Diet: Cardiac Diets    Cardiac Diet: Healthy Heart (2-3 Na+)    Texture: Regular Texture (IDDSI 7)    Fluid Consistency: Thin (IDDSI 0)          Activity at Discharge:     Activity Instructions       Activity as Tolerated            Follow-up Appointments:     Contact information for follow-up providers       [facility]    Contact information:  [place]             Provider, No Known .    Contact information:  [facility] [id number] [phone]                       Contact information for after-discharge care       Durable Medical Equipment       [facility] .    Service: Durable Medical Equipment  Contact information:  [address]  [facility] [address]  [phone]                                 Future Appointments   Date Time Provider Department Center   10/26/2023  9:45 AM Florencio Lawson MD [facility]     Test Results Pending at Discharge:    None.       Osvaldo Glass MD  08/25/23  16:31 EDT    Time: I spent 25 minutes on this discharge activity which included: face-to-face encounter with the patient, reviewing the data in the system, coordination of the care with the nursing staff as well as consultants, documentation, and entering orders.     Dictated utilizing Dragon dictation.

## 2023-08-25 NOTE — PLAN OF CARE
Goal Outcome Evaluation:  Plan of Care Reviewed With: patient        Progress: no change  Outcome Evaluation: Pt participated in PTx this date. Pt recevied getting up to BSC with nsg. pt required CGA and cues for t/f. Pt up on BSC. Pt transferred to standing with CGA and Rwx. Pt took steps over to the chair. Pt was then agreeable to ther exer in sitting, see f/s for details. Pt progressing, cont per POC      Anticipated Discharge Disposition (PT): inpatient rehabilitation facility, skilled nursing facility, home with assist, home with home health

## 2023-08-25 NOTE — THERAPY TREATMENT NOTE
Patient Name: Radha Varela  : 1935    MRN: [id number]                              Today's Date: 2023       Admit Date: 2023    Visit Dx:     ICD-10-CM ICD-9-CM   1. Diarrhea, unspecified type  R19.7 787.91   2. Hypokalemia  E87.6 276.8   3. Weakness generalized  R53.1 780.79   4. Elevated troponin  R77.8 790.6   5. Moderate dementia due to Parkinson's disease, without behavioral disturbance, psychotic disturbance, mood disturbance, or anxiety  G20 332.0    F02.B0 294.10     Patient Active Problem List   Diagnosis    Abdominal aortic aneurysm (AAA) without rupture    Generalized weakness    Hypokalemia    Diarrhea of presumed infectious origin    Parkinson's disease dementia    Coronary artery disease involving native coronary artery of native heart without angina pectoris     Past Medical History:   Diagnosis Date    Aneurysm     Arthritis     Cardiac murmur     Carotid stenosis     Celiac artery stenosis     Chest pain     CHF (congestive heart failure)     Chronic kidney disease     Coronary artery disease     Dyslipidemia     Elevated cholesterol     GERD (gastroesophageal reflux disease)     Hiatal hernia     History of exercise stress test     History of nuclear stress test     History of pneumonia     Hypertension     Lumbar degenerative disc disease     MI, old     Osteoarthritis     PAD (peripheral artery disease)     Parkinson's disease     Paroxysmal A-fib     Peptic ulceration     Psoriasis     Renal artery stenosis     Sleep apnea     uses o2 @ HS    Spinal stenosis     Tremor     Vertebral artery stenosis      Past Surgical History:   Procedure Laterality Date    ABDOMINAL AORTIC ANEURYSM REPAIR WITH ENDOGRAFT      APPENDECTOMY      CATARACT EXTRACTION W/ INTRAOCULAR LENS IMPLANT Left 12/10/2018    Procedure: CATARACT PHACO EXTRACTION WITH INTRAOCULAR LENS IMPLANT LEFT;  Surgeon: Charity Carrasco MD;  Location: [facility] OR;   Service: Ophthalmology    CATARACT EXTRACTION W/ INTRAOCULAR LENS IMPLANT Right 6/12/2023    Procedure: CATARACT PHACO EXTRACTION WITH INTRAOCULAR LENS IMPLANT RIGHT;  Surgeon: Charity Carrasco MD;  Location: [facility];  Service: Ophthalmology;  Laterality: Right;    COLONOSCOPY      CORONARY ARTERY BYPASS GRAFT  1995    ENDOSCOPY      SHOULDER ROTATOR CUFF REPAIR Right     SKIN BIOPSY        General Information     Row Name 08/25/23 1439          Physical Therapy Time and Intention    Document Type therapy note (daily note)  -MS     Mode of Treatment physical therapy  -MS     Row Name 08/25/23 1439          General Information    Patient Profile Reviewed yes  -MS           User Key  (r) = Recorded By, (t) = Taken By, (c) = Cosigned By    Initials Name Provider Type    MS Danial Santamaria, PT Physical Therapist               Mobility     Row Name 08/25/23 1442          Bed Mobility    Bed Mobility supine-sit  -MS     Supine-Sit [place] (Bed Mobility) modified independence  -MS     Assistive Device (Bed Mobility) bed rails;head of bed elevated  -MS     Row Name 08/25/23 1442          Bed-Chair Transfer    Bed-Chair [place] (Transfers) contact guard  -MS     Assistive Device (Bed-Chair Transfers) walker, front-wheeled  -MS     Comment, (Bed-Chair Transfer) 3' too and from the chair  -MS     Row Name 08/25/23 1442          Sit-Stand Transfer    Sit-Stand [place] (Transfers) contact guard  -MS     Assistive Device (Sit-Stand Transfers) walker, front-wheeled  -MS     Row Name 08/25/23 1442          Gait/Stairs (Locomotion)    [place]arrard Level (Gait) contact guard  -MS     Assistive Device (Gait) walker, front-wheeled  -MS     Distance in Feet (Gait) 3' x2  -MS     Deviations/Abnormal Patterns (Gait) marian decreased;festinating/shuffling  -MS     Bilateral Gait Deviations forward flexed posture  -MS           User Key  (r) = Recorded By, (t) = Taken By, (c) = Cosigned By    Initials Name Provider  Type    Danial Silvestre, JOAN Physical Therapist               Obj/Interventions     Row Name 08/25/23 1445          Motor Skills    Therapeutic Exercise hip;knee;ankle  -MS     Row Name 08/25/23 1445          Shoulder (Therapeutic Exercise)    Shoulder (Therapeutic Exercise) AROM (active range of motion)  -MS     Row Name 08/25/23 1445          Hip (Therapeutic Exercise)    Hip (Therapeutic Exercise) strengthening exercise  -MS     Hip Strengthening (Therapeutic Exercise) aBduction;aDduction;10 repetitions;marching while seated  -MS     Row Name 08/25/23 1445          Knee (Therapeutic Exercise)    Knee (Therapeutic Exercise) strengthening exercise  -MS     Knee Strengthening (Therapeutic Exercise) LAQ (long arc quad);10 repetitions  -MS           User Key  (r) = Recorded By, (t) = Taken By, (c) = Cosigned By    Initials Name Provider Type    Danial Silvestre, PT Physical Therapist               Goals/Plan    No documentation.                Clinical Impression     Row Name 08/25/23 1452          Pain    Pretreatment Pain Rating 0/10 - no pain  -MS     Posttreatment Pain Rating 0/10 - no pain  -MS     Row Name 08/25/23 1452          Plan of Care Review    Plan of Care Reviewed With patient  -MS     Progress no change  -MS     Outcome Evaluation Pt participated in PTx this date. Pt recevied getting up to BSC with nsg. pt required CGA and cues for t/f. Pt up on BSC. Pt transferred to standing with CGA and Rwx. Pt took steps over to the chair. Pt was then agreeable to ther exer in sitting, see f/s for details. Pt progressing, cont per POC  -MS     Row Name 08/25/23 1452          Therapy Assessment/Plan (PT)    Criteria for Skilled Interventions Met (PT) yes;meets criteria  -MS     Row Name 08/25/23 1452          Vital Signs    Pre Patient Position Standing  -MS     Intra Patient Position Standing  -MS     Post Patient Position Sitting  -MS     Row Name 08/25/23 1452          Positioning and Restraints     Pre-Treatment Position sitting in chair/recliner  -MS     Post Treatment Position chair  -MS     In Chair call light within reach;encouraged to call for assist;exit alarm on;reclined  -MS           User Key  (r) = Recorded By, (t) = Taken By, (c) = Cosigned By    Initials Name Provider Type    Danial Silvestre, PT Physical Therapist               Outcome Measures     Row Name 08/25/23 1509 08/25/23 0800       How much help from another person do you currently need...    Turning from your back to your side while in flat bed without using bedrails? 3  -MS 3  -SP    Moving from lying on back to sitting on the side of a flat bed without bedrails? 3  -MS 3  -SP    Moving to and from a bed to a chair (including a wheelchair)? 3  -MS 3  -SP    Standing up from a chair using your arms (e.g., wheelchair, bedside chair)? 3  -MS 3  -SP    Climbing 3-5 steps with a railing? 2  -MS 2  -SP    To walk in hospital room? 3  -MS 3  -SP    AM-PAC 6 Clicks Score (PT) 17  -MS 17  -SP    Highest level of mobility 5 --> Static standing  -MS 5 --> Static standing  -SP    Row Name 08/25/23 1345          Functional Assessment    Outcome Measure Options AM-PAC 6 Clicks Daily Activity (OT)  -DB           User Key  (r) = Recorded By, (t) = Taken By, (c) = Cosigned By    Initials Name Provider Type    Danial Silvestre, PT Physical Therapist    Mercedez Chappell, RN Registered Nurse    Bakari Lopez OT Occupational Therapist                             Physical Therapy Education     Title: PT OT SLP Therapies (In Progress)     Topic: Physical Therapy (In Progress)     Point: Mobility training (Done)     Learning Progress Summary           Patient Acceptance, E, VU by MS at 8/25/2023 1509    Comment: importance of mobility, ther exer    Acceptance, E, VU by MS at 8/24/2023 1253    Comment: importance of mobility                   Point: Home exercise program (Done)     Learning Progress Summary           Patient Acceptance, E,  VU by MS at 8/25/2023 1509    Comment: importance of mobility, ther exer    Acceptance, E, VU by MS at 8/24/2023 1253    Comment: importance of mobility                   Point: Body mechanics (Not Started)     Learner Progress:  Not documented in this visit.          Point: Precautions (Not Started)     Learner Progress:  Not documented in this visit.                      User Key     Initials Effective Dates Name Provider Type Discipline    MS 08/22/23 -  Danial Santamaria, PT Physical Therapist PT              PT Recommendation and Plan  Planned Therapy Interventions (PT): balance training, bed mobility training, gait training, transfer training, patient/family education, strengthening, stair training, ROM (range of motion), neuromuscular re-education, home exercise program  Plan of Care Reviewed With: patient  Progress: no change  Outcome Evaluation: Pt participated in PTx this date. Pt recevied getting up to BSC with nsg. pt required CGA and cues for t/f. Pt up on BSC. Pt transferred to standing with CGA and Rwx. Pt took steps over to the chair. Pt was then agreeable to ther exer in sitting, see f/s for details. Pt progressing, cont per POC     Time Calculation:   PT Evaluation Complexity  History, PT Evaluation Complexity: 1-2 personal factors and/or comorbidities  Examination of Body Systems (PT Eval Complexity): total of 3 or more elements  Clinical Presentation (PT Evaluation Complexity): evolving  Clinical Decision Making (PT Evaluation Complexity): moderate complexity  Overall Complexity (PT Evaluation Complexity): moderate complexity     PT Charges     Row Name 08/25/23 1509             Time Calculation    Start Time 1346  -MS      Stop Time 1409  -MS      Time Calculation (min) 23 min  -MS      PT Received On 08/25/23  -MS         Timed Charges    87448 - PT Therapeutic Exercise Minutes 13  -MS      01007 - PT Therapeutic Activity Minutes 10  -MS         Total Minutes    Timed Charges Total Minutes 23   -MS       Total Minutes 23  -MS            User Key  (r) = Recorded By, (t) = Taken By, (c) = Cosigned By    Initials Name Provider Type    Danial Silvestre, PT Physical Therapist              Therapy Charges for Today     Code Description Service Date Service Provider Modifiers Qty    21930802866  PT EVAL MOD COMPLEXITY 3 8/24/2023 Danial Santamaria, PT GP 1    [id number]  PT THERAPEUTIC ACT EA 15 MIN 8/25/2023 Danial Santamaria, PT GP 1    [id number]  PT THER PROC EA 15 MIN 8/25/2023 Danial Santamaria, PT GP 1          PT G-Codes  Outcome Measure Options: AM-PAC 6 Clicks Daily Activity (OT)  AM-PAC 6 Clicks Score (PT): 17  AM-PAC 6 Clicks Score (OT): 16  PT Discharge Summary  Anticipated Discharge Disposition (PT): inpatient rehabilitation facility, skilled nursing facility, home with assist, home with home health    Danial Santamaria, JOAN  8/25/2023

## 2023-08-26 ENCOUNTER — READMISSION MANAGEMENT (OUTPATIENT)
Dept: CALL CENTER | Facility: HOSPITAL | Age: 88
End: 2023-08-26
Payer: MEDICARE

## 2023-08-26 NOTE — OUTREACH NOTE
Prep Survey      Flowsheet Row Responses   [facility] facility patient discharged from? [facility]   Is LACE score < 7 ? No   Eligibility Readm Mgmt   Discharge diagnosis Generalized weakness   Does the patient have one of the following disease processes/diagnoses(primary or secondary)? Other   Does the patient have Home health ordered? No   Is there a DME ordered? Yes   What DME was ordered? Commode chair and bed   Prep survey completed? Yes            Celi KUMARI - Registered Nurse

## 2023-08-29 RX ORDER — HYDROXYZINE PAMOATE 25 MG/1
25 CAPSULE ORAL EVERY 12 HOURS PRN
Qty: 40 CAPSULE | Refills: 1 | Status: ON HOLD | OUTPATIENT
Start: 2023-08-29

## 2023-08-30 ENCOUNTER — READMISSION MANAGEMENT (OUTPATIENT)
Dept: CALL CENTER | Facility: HOSPITAL | Age: 88
End: 2023-08-30
Payer: MEDICARE

## 2023-08-30 NOTE — OUTREACH NOTE
Medical Week 1 Survey      Flowsheet Row Responses   [facility] patient discharged from? [facility]   Does the patient have one of the following disease processes/diagnoses(primary or secondary)? Other   Week 1 attempt successful? Yes   Call start time 1018   Call end time 1025   Discharge diagnosis Generalized weakness   Meds reviewed with patient/caregiver? Yes   Is the patient having any side effects they believe may be caused by any medication additions or changes? No   Does the patient have all medications ordered at discharge? Yes   Is the patient taking all medications as directed (includes completed medication regime)? Yes   Does the patient have a primary care provider?  Yes   Does the patient have an appointment with their PCP within 7 days of discharge? Yes   Has the patient kept scheduled appointments due by today? Yes   Psychosocial issues? No   Did the patient receive a copy of their discharge instructions? Yes   Nursing interventions Reviewed instructions with patient   What is the patient's perception of their health status since discharge? Improving   Is the patient/caregiver able to teach back signs and symptoms related to disease process for when to call PCP? Yes   Is the patient/caregiver able to teach back signs and symptoms related to disease process for when to call 911? Yes   Is the patient/caregiver able to teach back the hierarchy of who to call/visit for symptoms/problems? PCP, Specialist, Home health nurse, Urgent Care, ED, 911 Yes   If the patient is a current smoker, are they able to teach back resources for cessation? Not a smoker   Week 1 call completed? Yes   Graduated Yes   Did the patient feel the follow up calls were helpful during their recovery period? Yes   Was the number of calls appropriate? Yes   Does the patient have an Advance Directive or Living Will? Yes   Graduated/Revoked comments daughter stated he is doing great, moving back into his assisted living today.   Wrap  up additional comments daughter stated he is doing great, moving back into his assisted living today.   Call end time 1025            BRE DANIELS - Registered Nurse

## 2023-09-04 ENCOUNTER — HOSPITAL ENCOUNTER (OUTPATIENT)
Facility: HOSPITAL | Age: 88
Setting detail: OBSERVATION
Discharge: HOME | End: 2023-09-11
Attending: EMERGENCY MEDICINE | Admitting: INTERNAL MEDICINE
Payer: MEDICARE

## 2023-09-04 DIAGNOSIS — G20 PARKINSON DISEASE: ICD-10-CM
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
## 2023-09-04 DIAGNOSIS — E87.6 HYPOKALEMIA: ICD-10-CM

## 2023-09-04 DIAGNOSIS — R53.1 GENERALIZED WEAKNESS: ICD-10-CM

## 2023-09-04 DIAGNOSIS — R19.7 DIARRHEA OF PRESUMED INFECTIOUS ORIGIN: ICD-10-CM

## 2023-09-04 DIAGNOSIS — E86.9 VOLUME DEPLETION: ICD-10-CM

## 2023-09-04 PROBLEM — N17.9 ACUTE KIDNEY INJURY SUPERIMPOSED ON CKD: Status: ACTIVE | Noted: 2023-09-04

## 2023-09-04 PROBLEM — N18.9 ACUTE KIDNEY INJURY SUPERIMPOSED ON CKD: Status: ACTIVE | Noted: 2023-09-04

## 2023-09-04 PROBLEM — I71.40 ABDOMINAL AORTIC ANEURYSM (AAA) WITHOUT RUPTURE: Status: RESOLVED | Noted: 2018-07-02 | Resolved: 2023-09-04

## 2023-09-04 LAB
ADV 40+41 DNA STL QL NAA+NON-PROBE: NOT DETECTED
ALBUMIN SERPL-MCNC: 3.9 G/DL (ref 3.5–5.2)
ALBUMIN/GLOB SERPL: 1.6 G/DL
ALP SERPL-CCNC: 75 U/L (ref 39–117)
ALT SERPL W P-5'-P-CCNC: <5 U/L (ref 1–41)
ANION GAP SERPL CALCULATED.3IONS-SCNC: 10 MMOL/L (ref 5–15)
ANION GAP SERPL CALCULATED.3IONS-SCNC: 12 MMOL/L (ref 5–15)
AST SERPL-CCNC: 20 U/L (ref 1–40)
ASTRO TYP 1-8 RNA STL QL NAA+NON-PROBE: NOT DETECTED
BASOPHILS # BLD AUTO: 0.04 10*3/MM3 (ref 0–0.2)
BASOPHILS NFR BLD AUTO: 0.6 % (ref 0–1.5)
BILIRUB SERPL-MCNC: 0.6 MG/DL (ref 0–1.2)
BILIRUB UR QL STRIP: NEGATIVE
BUN SERPL-MCNC: 23 MG/DL (ref 8–23)
BUN SERPL-MCNC: 25 MG/DL (ref 8–23)
BUN/CREAT SERPL: 11.1 (ref 7–25)
BUN/CREAT SERPL: 11.4 (ref 7–25)
C CAYETANENSIS DNA STL QL NAA+NON-PROBE: NOT DETECTED
C COLI+JEJ+UPSA DNA STL QL NAA+NON-PROBE: NOT DETECTED
CALCIUM SPEC-SCNC: 8.3 MG/DL (ref 8.6–10.5)
CALCIUM SPEC-SCNC: 8.9 MG/DL (ref 8.6–10.5)
CHLORIDE SERPL-SCNC: 104 MMOL/L (ref 98–107)
CHLORIDE SERPL-SCNC: 105 MMOL/L (ref 98–107)
CLARITY UR: CLEAR
CO2 SERPL-SCNC: 24 MMOL/L (ref 22–29)
CO2 SERPL-SCNC: 25 MMOL/L (ref 22–29)
COLOR UR: YELLOW
CREAT SERPL-MCNC: 2.02 MG/DL (ref 0.76–1.27)
CREAT SERPL-MCNC: 2.26 MG/DL (ref 0.76–1.27)
CRP SERPL-MCNC: 1.62 MG/DL (ref 0–0.5)
CRYPTOSP DNA STL QL NAA+NON-PROBE: NOT DETECTED
D-LACTATE SERPL-SCNC: 1.4 MMOL/L (ref 0.5–2)
DEPRECATED RDW RBC AUTO: 51.3 FL (ref 37–54)
E HISTOLYT DNA STL QL NAA+NON-PROBE: NOT DETECTED
EAEC PAA PLAS AGGR+AATA ST NAA+NON-PRB: NOT DETECTED
EC STX1+STX2 GENES STL QL NAA+NON-PROBE: NOT DETECTED
EGFRCR SERPLBLD CKD-EPI 2021: 27.4 ML/MIN/1.73
EGFRCR SERPLBLD CKD-EPI 2021: 31.3 ML/MIN/1.73
EOSINOPHIL # BLD AUTO: 0.21 10*3/MM3 (ref 0–0.4)
EOSINOPHIL NFR BLD AUTO: 3.2 % (ref 0.3–6.2)
EPEC EAE GENE STL QL NAA+NON-PROBE: NOT DETECTED
ERYTHROCYTE [DISTWIDTH] IN BLOOD BY AUTOMATED COUNT: 13.8 % (ref 12.3–15.4)
ETEC LTA+ST1A+ST1B TOX ST NAA+NON-PROBE: NOT DETECTED
FERRITIN SERPL-MCNC: 253.2 NG/ML (ref 30–400)
FLUAV SUBTYP SPEC NAA+PROBE: NOT DETECTED
FLUBV RNA ISLT QL NAA+PROBE: NOT DETECTED
FOLATE SERPL-MCNC: 6.45 NG/ML (ref 4.78–24.2)
G LAMBLIA DNA STL QL NAA+NON-PROBE: NOT DETECTED
GEN 5 2HR TROPONIN T REFLEX: 89 NG/L
GLOBULIN UR ELPH-MCNC: 2.5 GM/DL
GLUCOSE SERPL-MCNC: 91 MG/DL (ref 65–99)
GLUCOSE SERPL-MCNC: 92 MG/DL (ref 65–99)
GLUCOSE UR STRIP-MCNC: NEGATIVE MG/DL
HCT VFR BLD AUTO: 30.8 % (ref 37.5–51)
HGB BLD-MCNC: 9.7 G/DL (ref 13–17.7)
HGB UR QL STRIP.AUTO: NEGATIVE
HOLD SPECIMEN: NORMAL
IMM GRANULOCYTES # BLD AUTO: 0.03 10*3/MM3 (ref 0–0.05)
IMM GRANULOCYTES NFR BLD AUTO: 0.5 % (ref 0–0.5)
IRON 24H UR-MRATE: 39 MCG/DL (ref 59–158)
IRON SATN MFR SERPL: 18 % (ref 20–50)
KETONES UR QL STRIP: NEGATIVE
LDH SERPL-CCNC: 237 U/L (ref 135–225)
LEUKOCYTE ESTERASE UR QL STRIP.AUTO: NEGATIVE
LYMPHOCYTES # BLD AUTO: 0.73 10*3/MM3 (ref 0.7–3.1)
LYMPHOCYTES NFR BLD AUTO: 11.1 % (ref 19.6–45.3)
MAGNESIUM SERPL-MCNC: 2.1 MG/DL (ref 1.6–2.4)
MCH RBC QN AUTO: 32.1 PG (ref 26.6–33)
MCHC RBC AUTO-ENTMCNC: 31.5 G/DL (ref 31.5–35.7)
MCV RBC AUTO: 102 FL (ref 79–97)
MONOCYTES # BLD AUTO: 0.75 10*3/MM3 (ref 0.1–0.9)
MONOCYTES NFR BLD AUTO: 11.5 % (ref 5–12)
NEUTROPHILS NFR BLD AUTO: 4.79 10*3/MM3 (ref 1.7–7)
NEUTROPHILS NFR BLD AUTO: 73.1 % (ref 42.7–76)
NITRITE UR QL STRIP: NEGATIVE
NOROVIRUS GI+II RNA STL QL NAA+NON-PROBE: NOT DETECTED
NRBC BLD AUTO-RTO: 0 /100 WBC (ref 0–0.2)
P SHIGELLOIDES DNA STL QL NAA+NON-PROBE: NOT DETECTED
PH UR STRIP.AUTO: 6 [PH] (ref 5–8)
PLATELET # BLD AUTO: 135 10*3/MM3 (ref 140–450)
PMV BLD AUTO: 9.5 FL (ref 6–12)
POTASSIUM SERPL-SCNC: 2.9 MMOL/L (ref 3.5–5.2)
POTASSIUM SERPL-SCNC: 3.2 MMOL/L (ref 3.5–5.2)
PROT SERPL-MCNC: 6.4 G/DL (ref 6–8.5)
PROT UR QL STRIP: NEGATIVE
RBC # BLD AUTO: 3.02 10*6/MM3 (ref 4.14–5.8)
RVA RNA STL QL NAA+NON-PROBE: NOT DETECTED
S ENT+BONG DNA STL QL NAA+NON-PROBE: NOT DETECTED
SAPO I+II+IV+V RNA STL QL NAA+NON-PROBE: NOT DETECTED
SARS-COV-2 RNA RESP QL NAA+PROBE: NOT DETECTED
SHIGELLA SP+EIEC IPAH ST NAA+NON-PROBE: NOT DETECTED
SODIUM SERPL-SCNC: 139 MMOL/L (ref 136–145)
SODIUM SERPL-SCNC: 141 MMOL/L (ref 136–145)
SP GR UR STRIP: 1.01 (ref 1–1.03)
TIBC SERPL-MCNC: 222 MCG/DL (ref 298–536)
TRANSFERRIN SERPL-MCNC: 149 MG/DL (ref 200–360)
TROPONIN T DELTA: -22 NG/L
TROPONIN T SERPL HS-MCNC: 111 NG/L
UROBILINOGEN UR QL STRIP: NORMAL
V CHOL+PARA+VUL DNA STL QL NAA+NON-PROBE: NOT DETECTED
V CHOLERAE DNA STL QL NAA+NON-PROBE: NOT DETECTED
VIT B12 BLD-MCNC: >2000 PG/ML (ref 211–946)
WBC NRBC COR # BLD: 6.55 10*3/MM3 (ref 3.4–10.8)
WHOLE BLOOD HOLD COAG: NORMAL
WHOLE BLOOD HOLD SPECIMEN: NORMAL
Y ENTEROCOL DNA STL QL NAA+NON-PROBE: NOT DETECTED

## 2023-09-04 PROCEDURE — 82607 VITAMIN B-12: CPT | Performed by: INTERNAL MEDICINE

## 2023-09-04 PROCEDURE — 87209 SMEAR COMPLEX STAIN: CPT | Performed by: INTERNAL MEDICINE

## 2023-09-04 PROCEDURE — 96365 THER/PROPH/DIAG IV INF INIT: CPT

## 2023-09-04 PROCEDURE — G0378 HOSPITAL OBSERVATION PER HR: HCPCS

## 2023-09-04 PROCEDURE — 82728 ASSAY OF FERRITIN: CPT | Performed by: INTERNAL MEDICINE

## 2023-09-04 PROCEDURE — 83735 ASSAY OF MAGNESIUM: CPT | Performed by: EMERGENCY MEDICINE

## 2023-09-04 PROCEDURE — 99284 EMERGENCY DEPT VISIT MOD MDM: CPT

## 2023-09-04 PROCEDURE — 83615 LACTATE (LD) (LDH) ENZYME: CPT | Performed by: INTERNAL MEDICINE

## 2023-09-04 PROCEDURE — 84484 ASSAY OF TROPONIN QUANT: CPT | Performed by: EMERGENCY MEDICINE

## 2023-09-04 PROCEDURE — 0 POTASSIUM CHLORIDE 10 MEQ/100ML SOLUTION: Performed by: EMERGENCY MEDICINE

## 2023-09-04 PROCEDURE — 99214 OFFICE O/P EST MOD 30 MIN: CPT | Performed by: INTERNAL MEDICINE

## 2023-09-04 PROCEDURE — 87177 OVA AND PARASITES SMEARS: CPT | Performed by: INTERNAL MEDICINE

## 2023-09-04 PROCEDURE — 83605 ASSAY OF LACTIC ACID: CPT | Performed by: EMERGENCY MEDICINE

## 2023-09-04 PROCEDURE — 93005 ELECTROCARDIOGRAM TRACING: CPT | Performed by: EMERGENCY MEDICINE

## 2023-09-04 PROCEDURE — 82746 ASSAY OF FOLIC ACID SERUM: CPT | Performed by: INTERNAL MEDICINE

## 2023-09-04 PROCEDURE — 87636 SARSCOV2 & INF A&B AMP PRB: CPT | Performed by: EMERGENCY MEDICINE

## 2023-09-04 PROCEDURE — 85025 COMPLETE CBC W/AUTO DIFF WBC: CPT | Performed by: EMERGENCY MEDICINE

## 2023-09-04 PROCEDURE — 80053 COMPREHEN METABOLIC PANEL: CPT | Performed by: EMERGENCY MEDICINE

## 2023-09-04 PROCEDURE — 81003 URINALYSIS AUTO W/O SCOPE: CPT | Performed by: EMERGENCY MEDICINE

## 2023-09-04 PROCEDURE — 96361 HYDRATE IV INFUSION ADD-ON: CPT

## 2023-09-04 PROCEDURE — 83540 ASSAY OF IRON: CPT | Performed by: INTERNAL MEDICINE

## 2023-09-04 PROCEDURE — 87507 IADNA-DNA/RNA PROBE TQ 12-25: CPT | Performed by: INTERNAL MEDICINE

## 2023-09-04 PROCEDURE — 84466 ASSAY OF TRANSFERRIN: CPT | Performed by: INTERNAL MEDICINE

## 2023-09-04 PROCEDURE — 86140 C-REACTIVE PROTEIN: CPT | Performed by: INTERNAL MEDICINE

## 2023-09-04 PROCEDURE — 99223 1ST HOSP IP/OBS HIGH 75: CPT | Performed by: INTERNAL MEDICINE

## 2023-09-04 PROCEDURE — 36415 COLL VENOUS BLD VENIPUNCTURE: CPT

## 2023-09-04 RX ORDER — AMLODIPINE BESYLATE 5 MG/1
2.5 TABLET ORAL DAILY
Status: DISCONTINUED | OUTPATIENT
Start: 2023-09-04 | End: 2023-09-11 | Stop reason: HOSPADM

## 2023-09-04 RX ORDER — POTASSIUM CHLORIDE 7.45 MG/ML
10 INJECTION INTRAVENOUS ONCE
Status: COMPLETED | OUTPATIENT
Start: 2023-09-04 | End: 2023-09-04

## 2023-09-04 RX ORDER — ACETAMINOPHEN 325 MG/1
650 TABLET ORAL EVERY 6 HOURS PRN
Status: DISCONTINUED | OUTPATIENT
Start: 2023-09-04 | End: 2023-09-11 | Stop reason: HOSPADM

## 2023-09-04 RX ORDER — MELATONIN
1000 DAILY
Status: DISCONTINUED | OUTPATIENT
Start: 2023-09-04 | End: 2023-09-11 | Stop reason: HOSPADM

## 2023-09-04 RX ORDER — ESCITALOPRAM OXALATE 10 MG/1
10 TABLET ORAL DAILY
Status: DISCONTINUED | OUTPATIENT
Start: 2023-09-04 | End: 2023-09-11 | Stop reason: HOSPADM

## 2023-09-04 RX ORDER — LANOLIN ALCOHOL/MO/W.PET/CERES
1000 CREAM (GRAM) TOPICAL DAILY
Status: DISCONTINUED | OUTPATIENT
Start: 2023-09-04 | End: 2023-09-11 | Stop reason: HOSPADM

## 2023-09-04 RX ORDER — POTASSIUM CHLORIDE 750 MG/1
40 CAPSULE, EXTENDED RELEASE ORAL EVERY 4 HOURS
Status: COMPLETED | OUTPATIENT
Start: 2023-09-04 | End: 2023-09-04

## 2023-09-04 RX ORDER — SODIUM CHLORIDE 0.9 % (FLUSH) 0.9 %
10 SYRINGE (ML) INJECTION AS NEEDED
Status: DISCONTINUED | OUTPATIENT
Start: 2023-09-04 | End: 2023-09-11 | Stop reason: HOSPADM

## 2023-09-04 RX ORDER — ENTACAPONE 200 MG/1
200 TABLET ORAL 4 TIMES DAILY
Status: DISCONTINUED | OUTPATIENT
Start: 2023-09-04 | End: 2023-09-04

## 2023-09-04 RX ORDER — FAMOTIDINE 20 MG/1
10 TABLET, FILM COATED ORAL 2 TIMES DAILY PRN
Status: DISCONTINUED | OUTPATIENT
Start: 2023-09-04 | End: 2023-09-11 | Stop reason: HOSPADM

## 2023-09-04 RX ORDER — POTASSIUM CHLORIDE 750 MG/1
40 CAPSULE, EXTENDED RELEASE ORAL DAILY
Status: DISCONTINUED | OUTPATIENT
Start: 2023-09-04 | End: 2023-09-04

## 2023-09-04 RX ORDER — TRAZODONE HYDROCHLORIDE 50 MG/1
50 TABLET ORAL NIGHTLY
Status: DISCONTINUED | OUTPATIENT
Start: 2023-09-04 | End: 2023-09-11 | Stop reason: HOSPADM

## 2023-09-04 RX ORDER — ROPINIROLE 1 MG/1
1 TABLET, FILM COATED ORAL 4 TIMES DAILY
Status: DISCONTINUED | OUTPATIENT
Start: 2023-09-04 | End: 2023-09-11 | Stop reason: HOSPADM

## 2023-09-04 RX ORDER — POTASSIUM CHLORIDE 750 MG/1
20 CAPSULE, EXTENDED RELEASE ORAL ONCE
Status: COMPLETED | OUTPATIENT
Start: 2023-09-04 | End: 2023-09-04

## 2023-09-04 RX ADMIN — POTASSIUM CHLORIDE 40 MEQ: 750 CAPSULE, EXTENDED RELEASE ORAL at 16:12

## 2023-09-04 RX ADMIN — CARBIDOPA AND LEVODOPA 2 TABLET: 25; 100 TABLET ORAL at 18:26

## 2023-09-04 RX ADMIN — Medication 10 ML: at 21:21

## 2023-09-04 RX ADMIN — Medication 1000 UNITS: at 14:55

## 2023-09-04 RX ADMIN — SODIUM CHLORIDE 1000 ML: 9 INJECTION, SOLUTION INTRAVENOUS at 08:41

## 2023-09-04 RX ADMIN — AMLODIPINE BESYLATE 2.5 MG: 5 TABLET ORAL at 14:56

## 2023-09-04 RX ADMIN — POTASSIUM CHLORIDE 40 MEQ: 750 CAPSULE, EXTENDED RELEASE ORAL at 21:20

## 2023-09-04 RX ADMIN — POTASSIUM CHLORIDE 10 MEQ: 7.46 INJECTION, SOLUTION INTRAVENOUS at 10:37

## 2023-09-04 RX ADMIN — POTASSIUM CHLORIDE 20 MEQ: 750 CAPSULE, EXTENDED RELEASE ORAL at 10:36

## 2023-09-04 RX ADMIN — ROPINIROLE HYDROCHLORIDE 1 MG: 1 TABLET, FILM COATED ORAL at 18:27

## 2023-09-04 RX ADMIN — ROPINIROLE HYDROCHLORIDE 1 MG: 1 TABLET, FILM COATED ORAL at 21:21

## 2023-09-04 RX ADMIN — ESCITALOPRAM OXALATE 10 MG: 10 TABLET ORAL at 14:55

## 2023-09-04 RX ADMIN — CARBIDOPA AND LEVODOPA 2 TABLET: 25; 100 TABLET ORAL at 21:20

## 2023-09-04 RX ADMIN — CYANOCOBALAMIN TAB 1000 MCG 1000 MCG: 1000 TAB at 14:55

## 2023-09-04 RX ADMIN — TRAZODONE HYDROCHLORIDE 50 MG: 50 TABLET ORAL at 21:21

## 2023-09-04 NOTE — CONSULTS
[facility]    Referring Provider: No ref. provider found    PCP: Provider, No Known    Reason for Consultation: diarrhea    Chief complaint: diarrhea    History of present illness:    Radha Varela is a 87 y.o. male w h/o CAD s/p CABG, aortic valve calcification, Carotid stenosis, Celiac artery stenosis, HFpEF, Chronic kidney disease, PAD, Parkinson's on carbidopa-levodopa and entacapone follows with  Neurology, 6.1 cm infrarenal AAA follows at  who presents with persistent diarrhea. Patient is accompanied by his daughter who helps with history.  Since early August, he has had diarrhea that is loose consistency.  He reports fecal urgency and up to 5-6 Bms per day.  He denies any abdominal pain.  No nausea or emesis.  No fever or chills.  He had falls at his home prompting presentation here.  He was seen in [place] for diarrhea and had negative C. Diff and comp gi panel.  TSH was normal.  No recent antibiotics.  He reports he was started on entacapone within the past few months (appears to have started in late June per review of records).  His daughter reports that his diarrhea improved when he stayed with her for about a week in August and notes that he was not taking his meds including entacapone when he was at her house and his diarrhea improved but returned when he returned to his assisted living facility and resumed his medications.   He has tried imodium at home without improvement.  Labs notable for ADITHYA and hypokalemia on admission.   CT abd/pelvis from Aug 23 reviewed that noted increased fluid within the colon consistent with diarrhea as well as markedly tortuous and dilated aorta with 6.1 cm infrarenal AAA.    Per his daughter, he has been evaluated at  for his AAA and deemed not a candidate for operative repair.     Allergies:  Celebrex [celecoxib] and Sulfa antibiotics    Scheduled Meds:  amLODIPine, 2.5 mg, Oral, Daily  carbidopa-levodopa, 2 tablet, Oral, 4x  Daily  cholecalciferol, 1,000 Units, Oral, Daily  entacapone, 200 mg, Oral, 4x Daily  escitalopram, 10 mg, Oral, Daily  rOPINIRole, 1 mg, Oral, 4x Daily  traZODone, 50 mg, Oral, Nightly  vitamin B-12, 1,000 mcg, Oral, Daily         Infusions:       PRN Meds:    acetaminophen    famotidine    Magnesium Low Dose Replacement - Follow Nurse / BPA Driven Protocol    Potassium Replacement - Follow Nurse / BPA Driven Protocol    sodium chloride    Home Meds:  Medications Prior to Admission   Medication Sig Dispense Refill Last Dose    acetaminophen (TYLENOL) 325 MG tablet Take 2 tablets by mouth Every 6 (Six) Hours As Needed for Mild Pain.       amLODIPine (NORVASC) 5 MG tablet Take 0.5 tablets by mouth Daily. 30 tablet 0     aspirin 81 MG EC tablet Take 1 tablet by mouth Daily. 30 tablet 0     carbidopa-levodopa (SINEMET)  MG per tablet Take 2 tablets by mouth 4 (Four) Times a Day for 30 days. 240 tablet 0     cholecalciferol (VITAMIN D3) 25 MCG (1000 UT) tablet Take 1 tablet by mouth Daily. 2 tablets daily 30 tablet 0     Diclofenac Sodium (VOLTAREN) 1 % gel gel Apply 4 g topically to the appropriate area as directed 4 (Four) Times a Day As Needed (Pain). Apply to Right hip 100 g 0     entacapone (COMTAN) 200 MG tablet Take 1 tablet by mouth 4 (Four) Times a Day. 120 tablet 0     escitalopram (LEXAPRO) 10 MG tablet Take 1 tablet by mouth Daily. 30 tablet 0     famotidine (PEPCID) 10 MG tablet Take 1 tablet by mouth 2 (Two) Times a Day As Needed for Heartburn. 30 tablet 5     furosemide (LASIX) 20 MG tablet Take 1 tablet by mouth Every Other Day. And 1 tablet as needed for edema. 30 tablet 0     HYDROcodone-acetaminophen (NORCO) 5-325 MG per tablet Take 1 tablet by mouth Every 6 (Six) Hours As Needed for Severe Pain. 20 tablet 0     hydrOXYzine pamoate (Vistaril) 25 MG capsule Take 1 capsule by mouth Every 12 (Twelve) Hours As Needed for Anxiety. 40 capsule 1     loperamide (IMODIUM) 2 MG capsule Take 2 capsules by  mouth 4 (Four) Times a Day As Needed for Diarrhea. Do not exceed 8 capsules daily 60 capsule 0     naloxone (NARCAN) 4 MG/0.1ML nasal spray Call 911. Don't prime. [place] in 1 nostril for overdose. Repeat in 2-3 minutes in other nostril if no or minimal breathing/responsiveness. 2 each 0     nitroglycerin (NITROSTAT) 0.4 MG SL tablet Place 1 tablet under the tongue Every 5 (Five) Minutes As Needed for Chest Pain. Take no more than 3 doses in 15 minutes.       ondansetron (ZOFRAN) 4 MG tablet Take 1 tablet by mouth Every 8 (Eight) Hours As Needed for Nausea or Vomiting. 30 tablet 0     rOPINIRole (REQUIP) 0.5 MG tablet Take 2 tablets by mouth 4 (Four) Times a Day. Take 1 hour before bedtime. 120 tablet 0     saccharomyces boulardii (FLORASTOR) 250 MG capsule Take 1 capsule by mouth 2 (Two) Times a Day. 60 capsule 0     traZODone (DESYREL) 50 MG tablet Take 1 tablet by mouth Every Night. 30 tablet 0     vitamin B-12 (CYANOCOBALAMIN) 1000 MCG tablet Take 1 tablet by mouth Daily. 30 tablet 0        ROS: Review of Systems   Constitutional:  Negative for chills and fever.   Respiratory:  Negative for cough and shortness of breath.    Gastrointestinal:  Positive for diarrhea. Negative for abdominal distention, abdominal pain, nausea and vomiting.   Skin:  Negative for color change and rash.   All other systems reviewed and are negative.    PAST MED HX:  Past Medical History:   Diagnosis Date    Abdominal aortic aneurysm (AAA) without rupture 07/02/2018    Aneurysm     Arthritis     Cardiac murmur     Carotid stenosis     Celiac artery stenosis     Chest pain     CHF (congestive heart failure)     Chronic kidney disease     Coronary artery disease     Dyslipidemia     Elevated cholesterol     GERD (gastroesophageal reflux disease)     Hiatal hernia     History of exercise stress test     History of nuclear stress test     History of pneumonia     Hypertension     Lumbar degenerative disc disease     MI, old 1995     "Osteoarthritis     PAD (peripheral artery disease)     Parkinson's disease     Paroxysmal A-fib     Peptic ulceration     Psoriasis     Renal artery stenosis     Sleep apnea     uses o2 @ HS    Spinal stenosis     Tremor     Vertebral artery stenosis        PAST SURG HX:  Past Surgical History:   Procedure Laterality Date    ABDOMINAL AORTIC ANEURYSM REPAIR WITH ENDOGRAFT      APPENDECTOMY      CATARACT EXTRACTION W/ INTRAOCULAR LENS IMPLANT Left 12/10/2018    Procedure: CATARACT PHACO EXTRACTION WITH INTRAOCULAR LENS IMPLANT LEFT;  Surgeon: Charity Carrasco MD;  Location: [facility] OR;  Service: Ophthalmology    CATARACT EXTRACTION W/ INTRAOCULAR LENS IMPLANT Right 2023    Procedure: CATARACT PHACO EXTRACTION WITH INTRAOCULAR LENS IMPLANT RIGHT;  Surgeon: Charity Carrasco MD;  Location: [facility] OR;  Service: Ophthalmology;  Laterality: Right;    COLONOSCOPY      CORONARY ARTERY BYPASS GRAFT      ENDOSCOPY      SHOULDER ROTATOR CUFF REPAIR Right     SKIN BIOPSY         FAM HX:  Family History   Problem Relation Age of Onset    Other Mother          of old age    No Known Problems Father        SOC HX:  Social History     Socioeconomic History    Marital status:     Number of children: 3   Tobacco Use    Smoking status: Former     Packs/day: 3.00     Years: 15.00     Pack years: 45.00     Types: Cigarettes     Quit date:      Years since quittin.7    Smokeless tobacco: Never    Tobacco comments:     quit 50 years ago   Vaping Use    Vaping Use: Never used   Substance and Sexual Activity    Alcohol use: Not Currently     Alcohol/week: 2.0 standard drinks     Types: 2 Glasses of wine per week     Comment: DAILY    Drug use: No    Sexual activity: Defer       PHYSICAL EXAM  /62 (BP Location: Left arm, Patient Position: Lying)   Pulse 61   Temp 98.1 °F (36.7 °C) (Oral)   Resp 18   Ht 170.2 cm (67\")   Wt 70.8 kg (156 lb)   SpO2 94%   BMI 24.43 kg/m²   Wt Readings from Last 3 " Encounters:   09/04/23 70.8 kg (156 lb)   08/25/23 75.5 kg (166 lb 7.2 oz)   08/13/23 73.5 kg (162 lb)   ,body mass index is 24.43 kg/m².  Physical Exam   General: Patient awake, elderly appearing   Eyes: Normal lids and lashes, no scleral icterus   Neck: Supple, normal ROM   Skin: Warm and dry, not jaundiced   Cardiovascular: Regular rate, well-perfused extremities   Pulm: Equal expansion bilaterally, no increased WOB   Abdomen: Soft, nontender, nondistended;    Extremities: No rash or edema              Neuro: Awake, interactive   Psychiatric: Normal mood and behavior; memory intact    Results Review:   I reviewed the patient's new clinical results.    Lab Results   Component Value Date    WBC 6.55 09/04/2023    HGB 9.7 (L) 09/04/2023    HGB 10.7 (L) 08/24/2023    HGB 10.7 (L) 08/23/2023    HCT 30.8 (L) 09/04/2023    .0 (H) 09/04/2023     (L) 09/04/2023       Lab Results   Component Value Date    INR 1.2 (H) 12/30/2022    INR 1.2 (H) 12/16/2022    INR 1.1 12/06/2022       Lab Results   Component Value Date    GLUCOSE 91 09/04/2023    BUN 23 09/04/2023    CREATININE 2.02 (H) 09/04/2023    EGFRIFNONA 35 (L) 03/31/2022    EGFRIFAFRI 42 (L) 03/31/2022    BCR 11.4 09/04/2023     09/04/2023    K 3.2 (L) 09/04/2023    CO2 24.0 09/04/2023    CALCIUM 8.3 (L) 09/04/2023    ALBUMIN 3.9 09/04/2023    ALKPHOS 75 09/04/2023    BILITOT 0.6 09/04/2023    BILIDIR <0.2 12/06/2022    ALT <5 09/04/2023    AST 20 09/04/2023       ASSESSMENTS/PLANS    Impression:  Diarrhea  ADITHYA  Hypokalemia  Parkinson's on on carbidopa-levodopa and entacapone  Elevated troponin  CAD s/p CABG   aortic valve calcification  Carotid stenosis  Celiac artery stenosis  HFpEF  Chronic kidney disease  PAD   6.1 cm infrarenal AAA follows at    Chronic anemia, macrocytic    Plan:  -B12,  folate pending  - Recent C. Diff negative  - Repeat comp GI panel and O&P pending  -Interestingly his symptoms improved for several days when he was off of  his entacapone that was recently started at , will hold this medication for now as this can be associated with diarrhea and microscopic colitis  -Close monitoring of stool output  -IV fluids and electrolyte replacement per primary team  -Monitor course off of entacapone and can consider Imodium as needed pending his repeat infectious studies, may also consider cholestyramine and/or bismuth if diarrhea persists.  Given his age and comorbidities colonoscopy for the sake of mucosal biopsies is higher risk so we will continue to treat medically at this time    I discussed the patient's findings and my recommendations with patient, family, and primary care team    Fernando Dsouza MD  09/04/23  13:11 EDT

## 2023-09-04 NOTE — Clinical Note
Level of Care: Telemetry [5]   Diagnosis: Hypokalemia [275610]   Admitting Physician: JOSSIE SANCHEZ [[id number]]   Attending Physician: JOSSIE SANCHEZ [[id number]]

## 2023-09-04 NOTE — ED PROVIDER NOTES
Subjective   History of Present Illness  Mr Varela arrives by ambulance from [facility] after falling.  He tells me he was on the way back from the bathroom when he fell and could not get up.  He denies pain anywhere and denies that he hurt himself in the fall.  He tells me he has had diarrhea for the last couple of weeks and has been feeling poorly due to that.  He denies blood in his diarrhea or abdominal pain.    Review of Systems    Past Medical History:   Diagnosis Date    Abdominal aortic aneurysm (AAA) without rupture 07/02/2018    Aneurysm     Arthritis     Cardiac murmur     Carotid stenosis     Celiac artery stenosis     Chest pain     CHF (congestive heart failure)     Chronic kidney disease     Coronary artery disease     Dyslipidemia     Elevated cholesterol     GERD (gastroesophageal reflux disease)     Hiatal hernia     History of exercise stress test     History of nuclear stress test     History of pneumonia     Hypertension     Lumbar degenerative disc disease     MI, old 1995    Osteoarthritis     PAD (peripheral artery disease)     Parkinson's disease     Paroxysmal A-fib     Peptic ulceration     Psoriasis     Renal artery stenosis     Sleep apnea     uses o2 @ HS    Spinal stenosis     Tremor     Vertebral artery stenosis        Allergies   Allergen Reactions    Celebrex [Celecoxib] Anaphylaxis    Sulfa Antibiotics Anaphylaxis       Past Surgical History:   Procedure Laterality Date    ABDOMINAL AORTIC ANEURYSM REPAIR WITH ENDOGRAFT  1994    APPENDECTOMY      CATARACT EXTRACTION W/ INTRAOCULAR LENS IMPLANT Left 12/10/2018    Procedure: CATARACT PHACO EXTRACTION WITH INTRAOCULAR LENS IMPLANT LEFT;  Surgeon: Charity Carrasco MD;  Location: [facility] OR;  Service: Ophthalmology    CATARACT EXTRACTION W/ INTRAOCULAR LENS IMPLANT Right 6/12/2023    Procedure: CATARACT PHACO EXTRACTION WITH INTRAOCULAR LENS IMPLANT RIGHT;  Surgeon: Charity Carrsaco MD;  Location: [facility];  Service: Ophthalmology;   Laterality: Right;    COLONOSCOPY      CORONARY ARTERY BYPASS GRAFT      ENDOSCOPY      SHOULDER ROTATOR CUFF REPAIR Right     SKIN BIOPSY         Family History   Problem Relation Age of Onset    Other Mother          of old age    No Known Problems Father        Social History     Socioeconomic History    Marital status:     Number of children: 3   Tobacco Use    Smoking status: Former     Packs/day: 3.00     Years: 15.00     Pack years: 45.00     Types: Cigarettes     Quit date:      Years since quittin.7    Smokeless tobacco: Never    Tobacco comments:     quit 50 years ago   Vaping Use    Vaping Use: Never used   Substance and Sexual Activity    Alcohol use: Not Currently     Alcohol/week: 2.0 standard drinks     Types: 2 Glasses of wine per week     Comment: DAILY    Drug use: No    Sexual activity: Defer           Objective   Physical Exam  Vitals and nursing note reviewed.   Constitutional:       General: He is not in acute distress.     Appearance: Normal appearance.      Comments: He is a pleasant gentleman who is awake and alert.  He exhibits paucity of movement consistent with his diagnosis of Parkinson's.   HENT:      Head: Normocephalic and atraumatic.      Nose: Nose normal. No congestion or rhinorrhea.      Mouth/Throat:      Mouth: Mucous membranes are dry.   Eyes:      General: No scleral icterus.     Conjunctiva/sclera: Conjunctivae normal.   Neck:      Comments: No JVD   Cardiovascular:      Rate and Rhythm: Normal rate and regular rhythm.      Heart sounds: No murmur heard.    No friction rub.   Pulmonary:      Effort: Pulmonary effort is normal.      Breath sounds: Normal breath sounds. No wheezing or rales.   Abdominal:      General: Bowel sounds are normal.      Palpations: Abdomen is soft.      Tenderness: There is no abdominal tenderness. There is no guarding or rebound.   Musculoskeletal:         General: No tenderness.      Cervical back: Normal range of motion and  neck supple.      Right lower leg: No edema.      Left lower leg: No edema.   Skin:     General: Skin is warm and dry.      Coloration: Skin is not pale.      Findings: No erythema.   Neurological:      General: No focal deficit present.      Mental Status: He is alert and oriented to person, place, and time.      Motor: No weakness.      Coordination: Coordination normal.   Psychiatric:         Mood and Affect: Mood normal.         Behavior: Behavior normal.         Thought Content: Thought content normal.       Procedures           ED Course                                           Medical Decision Making  Please see course notes.  I ordered and interpreted multiple labs showing hypokalemia and volume depletion.  I gave IV fluids.  Had multiple reevaluations of him.  Gave IV and oral potassium.  I reviewed and interpreted prior records showing baseline creatinine of 1.7 from 2 months ago.    Problems Addressed:  Diarrhea of presumed infectious origin: complicated acute illness or injury  Fall in home, initial encounter: complicated acute illness or injury that poses a threat to life or bodily functions  Generalized weakness: complicated acute illness or injury that poses a threat to life or bodily functions  Hypokalemia: complicated acute illness or injury that poses a threat to life or bodily functions  Volume depletion: complicated acute illness or injury    Amount and/or Complexity of Data Reviewed  Labs: ordered. Decision-making details documented in ED Course.  ECG/medicine tests: ordered. Decision-making details documented in ED Course.    Risk  Prescription drug management.  Decision regarding hospitalization.        Final diagnoses:   Fall in home, initial encounter   Generalized weakness   Volume depletion   Hypokalemia   Diarrhea of presumed infectious origin       ED Disposition  ED Disposition       ED Disposition   Decision to Admit    Condition   --    Comment   Level of Care: Telemetry [5]    Diagnosis: Hypokalemia [233416]   Admitting Physician: JOSSIE SANCHEZ [[id number]]   Attending Physician: JOSSIE SANCHEZ [[id number]]                 No follow-up provider specified.       Medication List      No changes were made to your prescriptions during this visit.            Guille Enamorado MD  09/04/23 1528

## 2023-09-04 NOTE — PLAN OF CARE
Goal Outcome Evaluation:  Plan of Care Reviewed With: patient           Outcome Evaluation: Pt admitted from the ER in no acute distress. Vitals are stable and tele shows BBB and A-fib. Pt is not on anticoagulation due to his anemia and frequent falls. Pt ws hypokalemia and potassium was replaced in the ER . An additional 40meq was given at 1600hrs and will be due at 2000hrs. PT has had two stools and stool specimen was sent to the lab. He has multiple bruises all over his body due to frequent falls. A left leg skin tear is noted on his lower left leg. Also, he has psoriasis on his buttocks so WO was consulted for topical cream..His daughter was at his bedside most of the afternoon.

## 2023-09-04 NOTE — H&P
[facility] Medicine Services  HISTORY AND PHYSICAL    Patient Name: Radha Varela  : 1935  MRN: [id number]  Primary Care Physician: Provider, No Known    Subjective   Subjective     Chief Complaint:fall with weakness and diarrhea    HPI:  Radha Varela is a 87 y.o. male who  has a past medical history of Abdominal aortic aneurysm (AAA), Arthritis, aortic valve calcification, Carotid stenosis, Celiac artery stenosis, HFpEF, Chronic kidney disease, Coronary artery disease s/p CABG, Dyslipidemia, Hiatal hernia, Hypertension, Lumbar degenerative disc disease, PAD (peripheral artery disease), Parkinson's disease with dementia, Paroxysmal A-fib, Peptic ulceration, Psoriasis, Renal artery stenosis, Sleep apnea, Spinal stenosis, and Vertebral artery stenosis who presented to the ED after a fall. Patient and wife report that he has had diarrhea for the last few weeks and fell after going to the BR today. HE only reports pain in his left hip. 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 He did not hit his head. His diarrhea has been ongoing for a month now. He reports 4-5 large volume stools per day.     Review of Systems   Patient denies weight loss, headaches, changes in vision, fever, chills, sore throat, shortness of breath, cough, nausea or vomiting, diarrhea, abdominal pain or distension, change in urine output or habits, joint pain, rash, itching, numbness/tingling, weakness or bleeding.    Otherwise complete ROS is negative except as mentioned in the HPI.    Personal History       PMH: He  has a past medical history of Abdominal aortic aneurysm (AAA) without rupture (2018), Aneurysm, Arthritis, Cardiac murmur, Carotid stenosis, Celiac artery stenosis, Chest pain, CHF (congestive heart failure), Chronic kidney disease, Coronary artery disease, Dyslipidemia, Elevated cholesterol, GERD (gastroesophageal reflux disease), Hiatal hernia,  History of exercise stress test, History of nuclear stress test, History of pneumonia, Hypertension, Lumbar degenerative disc disease, MI, old (1995), Osteoarthritis, PAD (peripheral artery disease), Parkinson's disease, Paroxysmal A-fib, Peptic ulceration, Psoriasis, Renal artery stenosis, Sleep apnea, Spinal stenosis, Tremor, and Vertebral artery stenosis.   PSxH: He  has a past surgical history that includes Coronary artery bypass graft (1995); AAA repair, endovascular (1994); Shoulder open rotator cuff repair (Right); Appendectomy; Colonoscopy; Esophagogastroduodenoscopy; Skin biopsy; Cataract extraction w/ intraocular lens implant (Left, 12/10/2018); and Cataract extraction w/ intraocular lens implant (Right, 6/12/2023).         FH: His family history includes No Known Problems in his father; Other in his mother. He father lived to be 88.   SH: He  reports that he quit smoking about 54 years ago. His smoking use included cigarettes. He has a 45.00 pack-year smoking history. He has never used smokeless tobacco. He reports that he does not currently use alcohol after a past usage of about 2.0 standard drinks per week. He reports that he does not use drugs.He is retired from [employer] 3 children lives with his wife at [facility].     Medications:  Diclofenac Sodium, HYDROcodone-acetaminophen, acetaminophen, amLODIPine, aspirin, carbidopa-levodopa, cholecalciferol, entacapone, escitalopram, famotidine, furosemide, hydrOXYzine pamoate, loperamide, naloxone, nitroglycerin, ondansetron, rOPINIRole, saccharomyces boulardii, traZODone, and vitamin B-12    Allergies   Allergen Reactions    Celebrex [Celecoxib] Anaphylaxis    Sulfa Antibiotics Anaphylaxis       Objective   Objective     Vital Signs:   Temp:  [97.9 °F (36.6 °C)] 97.9 °F (36.6 °C)  Heart Rate:  [53-65] 55  Resp:  [16] 16  BP: (137-149)/() 144/106    Constitutional: Awake, alert, interactive and pleasant, cute and sharp  Eyes: clear sclerae, no  conjunctival injection  HENT: NCAT, mucous membranes moist  Neck: no masses or lymphadenopathy, trachea midline  Respiratory: good breath sounds bilaterally, respirations unlabored  Cardiovascular: RRR, systolic murmur appreciated, palpable peripheral pulses  Abdomen:  soft, no HSM or masses palpable, not tender or distended  Musculoskeletal: No peripheral edema, clubbing or cyanosis-- good ROM of his left hip  Neurologic: Oriented x 3,                       Strength symmetric in all extremities                     Cranial Nerves grossly intact, speech clear  Skin: No rashes or jaundice  Psychiatric: Appropriate mood, insight      Result Review:  I have personally reviewed the results from the time of this admission   to 9/4/2023 10:15 EDT and agree with these findings:  [x]  Laboratory  [x]  Microbiology  [x]  Radiology  [x]  EKG/Telemetry   []  Cardiology/Vascular   []  Pathology  [x]  Old records  []  Other:  Most notable findings include: acute on chronic anemia and kidney disease, mactocytosis, low K normal mag, elevated troponin which is trending down   LAST admit Negative GI PCR      LAB RESULTS:      Lab 09/04/23  0809   WBC 6.55   HEMOGLOBIN 9.7*   HEMATOCRIT 30.8*   PLATELETS 135*   NEUTROS ABS 4.79   IMMATURE GRANS (ABS) 0.03   LYMPHS ABS 0.73   MONOS ABS 0.75   EOS ABS 0.21   .0*   LACTATE 1.4         Lab 09/04/23  0809   SODIUM 141   POTASSIUM 2.9*   CHLORIDE 104   CO2 25.0   ANION GAP 12.0   BUN 25*   CREATININE 2.26*   EGFR 27.4*   GLUCOSE 92   CALCIUM 8.9   MAGNESIUM 2.1         Lab 09/04/23  0809   TOTAL PROTEIN 6.4   ALBUMIN 3.9   GLOBULIN 2.5   ALT (SGPT) <5   AST (SGOT) 20   BILIRUBIN 0.6   ALK PHOS 75         Lab 09/04/23  1034 09/04/23  0809   HSTROP T 89* 111*                 Brief Urine Lab Results  (Last result in the past 365 days)        Color   Clarity   Blood   Leuk Est   Nitrite   Protein   CREAT   Urine HCG        08/24/23 0019 Dark Yellow   Clear   Negative   Negative    Negative   30 mg/dL (1+)                 COVID19   Date Value Ref Range Status   08/23/2023 Not Detected Not Detected - Ref. Range Final       No radiology results from the last 24 hrs    Results for orders placed during the hospital encounter of 08/23/23    Adult Transthoracic Echo Complete W/ Cont if Necessary Per Protocol    Interpretation Summary  1.  Normal left ventricular size and systolic function, LVEF 55-60%.  2.  Mild to moderate concentric LVH.  3.  Grade 1 diastolic dysfunction.  4.  Normal right ventricular size and systolic function.  5.  Normal left atrial volume index.  6.  Moderate calcification of the aortic valve without significant stenosis.  7.  Trace aortic regurgitation.  8.  Borderline dilation of the aortic root.      The patient has started, but not completed, their COVID-19 vaccination series.    Assessment & Plan   Assessment / Plan       Fall    Diarrhea    Abdominal aortic aneurysm (AAA) without rupture    Parkinson's disease dementia    Coronary artery disease involving native coronary artery of native heart without angina pectoris    Hypokalemia    Acute kidney injury superimposed on CKD      Assessment & Plan:  Delightful 88 yo man with HO PAD, AAA, HTN, CAD with CABG, parkinsons and one month of diarrhea now with a fall and hypokalemia.    Diarrhea  -consult GI to evaluate for microscopic colitis  -clear liquid diet until plan is made  -repeat GI PCR, check O&P     A/CKD  Hypokalemia  -replete and recheck tonight  -mag is normal, recheck in the [facility] Delirium  Left hip pain  -try to keep day night cycle intact cont trazodone  -cont sinemet, comtan, requip  -home asap  -PT consult  -hip exam is benign--  monitor    CAD  PAD  AAA  -followed at     Anemia of chronic disease  -check indices, consider procrit    DVT prophylaxis:SCDs for now      CODE STATUS:NO CPR per patient and the daughter     Expected Discharge  Expected Discharge Date: 9/6/2023; Expected  Discharge Time:     Electronically signed by Charlotte Richard MD 09/04/23 10:15 EDT

## 2023-09-05 LAB
ANION GAP SERPL CALCULATED.3IONS-SCNC: 10 MMOL/L (ref 5–15)
BASOPHILS # BLD AUTO: 0.03 10*3/MM3 (ref 0–0.2)
BASOPHILS NFR BLD AUTO: 0.5 % (ref 0–1.5)
BUN SERPL-MCNC: 19 MG/DL (ref 8–23)
BUN/CREAT SERPL: 11.7 (ref 7–25)
CALCIUM SPEC-SCNC: 8.3 MG/DL (ref 8.6–10.5)
CHLORIDE SERPL-SCNC: 109 MMOL/L (ref 98–107)
CO2 SERPL-SCNC: 24 MMOL/L (ref 22–29)
CREAT SERPL-MCNC: 1.63 MG/DL (ref 0.76–1.27)
DEPRECATED RDW RBC AUTO: 49.9 FL (ref 37–54)
EGFRCR SERPLBLD CKD-EPI 2021: 40.5 ML/MIN/1.73
EOSINOPHIL # BLD AUTO: 0.26 10*3/MM3 (ref 0–0.4)
EOSINOPHIL NFR BLD AUTO: 4.1 % (ref 0.3–6.2)
ERYTHROCYTE [DISTWIDTH] IN BLOOD BY AUTOMATED COUNT: 13.7 % (ref 12.3–15.4)
GLUCOSE SERPL-MCNC: 81 MG/DL (ref 65–99)
HCT VFR BLD AUTO: 29.4 % (ref 37.5–51)
HGB BLD-MCNC: 9.3 G/DL (ref 13–17.7)
IMM GRANULOCYTES # BLD AUTO: 0.03 10*3/MM3 (ref 0–0.05)
IMM GRANULOCYTES NFR BLD AUTO: 0.5 % (ref 0–0.5)
LYMPHOCYTES # BLD AUTO: 0.98 10*3/MM3 (ref 0.7–3.1)
LYMPHOCYTES NFR BLD AUTO: 15.3 % (ref 19.6–45.3)
MCH RBC QN AUTO: 31.8 PG (ref 26.6–33)
MCHC RBC AUTO-ENTMCNC: 31.6 G/DL (ref 31.5–35.7)
MCV RBC AUTO: 100.7 FL (ref 79–97)
MONOCYTES # BLD AUTO: 0.88 10*3/MM3 (ref 0.1–0.9)
MONOCYTES NFR BLD AUTO: 13.8 % (ref 5–12)
NEUTROPHILS NFR BLD AUTO: 4.22 10*3/MM3 (ref 1.7–7)
NEUTROPHILS NFR BLD AUTO: 65.8 % (ref 42.7–76)
NRBC BLD AUTO-RTO: 0 /100 WBC (ref 0–0.2)
PLATELET # BLD AUTO: 129 10*3/MM3 (ref 140–450)
PMV BLD AUTO: 9.8 FL (ref 6–12)
POTASSIUM SERPL-SCNC: 3.5 MMOL/L (ref 3.5–5.2)
POTASSIUM SERPL-SCNC: 4.1 MMOL/L (ref 3.5–5.2)
RBC # BLD AUTO: 2.92 10*6/MM3 (ref 4.14–5.8)
SODIUM SERPL-SCNC: 143 MMOL/L (ref 136–145)
WBC NRBC COR # BLD: 6.4 10*3/MM3 (ref 3.4–10.8)

## 2023-09-05 PROCEDURE — 80048 BASIC METABOLIC PNL TOTAL CA: CPT | Performed by: HOSPITALIST

## 2023-09-05 PROCEDURE — 99214 OFFICE O/P EST MOD 30 MIN: CPT | Performed by: PHYSICIAN ASSISTANT

## 2023-09-05 PROCEDURE — 97116 GAIT TRAINING THERAPY: CPT

## 2023-09-05 PROCEDURE — 84132 ASSAY OF SERUM POTASSIUM: CPT | Performed by: HOSPITALIST

## 2023-09-05 PROCEDURE — 85025 COMPLETE CBC W/AUTO DIFF WBC: CPT | Performed by: INTERNAL MEDICINE

## 2023-09-05 PROCEDURE — 97165 OT EVAL LOW COMPLEX 30 MIN: CPT

## 2023-09-05 PROCEDURE — G0378 HOSPITAL OBSERVATION PER HR: HCPCS

## 2023-09-05 PROCEDURE — 97161 PT EVAL LOW COMPLEX 20 MIN: CPT

## 2023-09-05 PROCEDURE — 99231 SBSQ HOSP IP/OBS SF/LOW 25: CPT | Performed by: HOSPITALIST

## 2023-09-05 RX ORDER — POTASSIUM CHLORIDE 20 MEQ/1
40 TABLET, EXTENDED RELEASE ORAL EVERY 4 HOURS
Status: COMPLETED | OUTPATIENT
Start: 2023-09-05 | End: 2023-09-05

## 2023-09-05 RX ADMIN — POTASSIUM CHLORIDE 40 MEQ: 1500 TABLET, EXTENDED RELEASE ORAL at 16:30

## 2023-09-05 RX ADMIN — CARBIDOPA AND LEVODOPA 2 TABLET: 25; 100 TABLET ORAL at 22:23

## 2023-09-05 RX ADMIN — TRAZODONE HYDROCHLORIDE 50 MG: 50 TABLET ORAL at 22:23

## 2023-09-05 RX ADMIN — POTASSIUM CHLORIDE 40 MEQ: 1500 TABLET, EXTENDED RELEASE ORAL at 12:09

## 2023-09-05 RX ADMIN — Medication 1000 UNITS: at 09:36

## 2023-09-05 RX ADMIN — ROPINIROLE HYDROCHLORIDE 1 MG: 1 TABLET, FILM COATED ORAL at 22:22

## 2023-09-05 RX ADMIN — CARBIDOPA AND LEVODOPA 2 TABLET: 25; 100 TABLET ORAL at 18:37

## 2023-09-05 RX ADMIN — CARBIDOPA AND LEVODOPA 2 TABLET: 25; 100 TABLET ORAL at 09:35

## 2023-09-05 RX ADMIN — ESCITALOPRAM OXALATE 10 MG: 10 TABLET ORAL at 09:36

## 2023-09-05 RX ADMIN — AMLODIPINE BESYLATE 2.5 MG: 5 TABLET ORAL at 09:35

## 2023-09-05 RX ADMIN — PSYLLIUM HUSK 1 PACKET: 3.4 POWDER ORAL at 16:32

## 2023-09-05 RX ADMIN — CYANOCOBALAMIN TAB 1000 MCG 1000 MCG: 1000 TAB at 12:09

## 2023-09-05 RX ADMIN — ROPINIROLE HYDROCHLORIDE 1 MG: 1 TABLET, FILM COATED ORAL at 09:35

## 2023-09-05 RX ADMIN — ROPINIROLE HYDROCHLORIDE 1 MG: 1 TABLET, FILM COATED ORAL at 18:37

## 2023-09-05 RX ADMIN — Medication 10 ML: at 22:23

## 2023-09-05 RX ADMIN — ROPINIROLE HYDROCHLORIDE 1 MG: 1 TABLET, FILM COATED ORAL at 12:09

## 2023-09-05 RX ADMIN — CARBIDOPA AND LEVODOPA 2 TABLET: 25; 100 TABLET ORAL at 12:09

## 2023-09-05 NOTE — PROGRESS NOTES
Clinical Nutrition   Nutrition Assessment  Reason for Visit: MST score 2+, Unintentional weight loss, Reduced oral intake    Patient Name: Radha Varela  YOB: 1935  MRN: [id number]  Date of Encounter: 09/05/23 09:18 EDT  Admission date: 9/4/2023    No apparent significant nutritional needs. Predict suboptimal intakes 2/2 hospital delirium in setting of dementia. Will add breeze with meals, assist with feeding as needed.     Nutrition Assessment   Admission Diagnosis:  Hypokalemia [E87.6]    Problem List:    Abdominal aortic aneurysm (AAA) without rupture    Diarrhea    Parkinson's disease dementia    Coronary artery disease involving native coronary artery of native heart without angina pectoris    Hypokalemia    Fall    Acute kidney injury superimposed on CKD      PMH:   He  has a past medical history of Abdominal aortic aneurysm (AAA) without rupture (07/02/2018), Aneurysm, Arthritis, Cardiac murmur, Carotid stenosis, Celiac artery stenosis, Chest pain, CHF (congestive heart failure), Chronic kidney disease, Coronary artery disease, Dyslipidemia, Elevated cholesterol, GERD (gastroesophageal reflux disease), Hiatal hernia, History of exercise stress test, History of nuclear stress test, History of pneumonia, Hypertension, Lumbar degenerative disc disease, MI, old (1995), Osteoarthritis, PAD (peripheral artery disease), Parkinson's disease, Paroxysmal A-fib, Peptic ulceration, Psoriasis, Renal artery stenosis, Sleep apnea, Spinal stenosis, Tremor, and Vertebral artery stenosis.    PSH:  He  has a past surgical history that includes Coronary artery bypass graft (1995); AAA repair, endovascular (1994); Shoulder open rotator cuff repair (Right); Appendectomy; Colonoscopy; Esophagogastroduodenoscopy; Skin biopsy; Cataract extraction w/ intraocular lens implant (Left, 12/10/2018); and Cataract extraction w/ intraocular lens implant (Right, 6/12/2023).    Applicable Nutrition  "Concerns:   Skin:  Oral:  GI:    Applicable Interval History:   (8/23) CT A/P - IMPRESSION:  Increased fluid within the colon consistent with diarrhea.  No acute abnormality of the abdomen and pelvis otherwise.    Markedly tortuous and dilated aorta with a 6.1 cm infrarenal abdominal aortic aneurysm.    Reported/Observed/Food/Nutrition Related History:     Pt presents with diarrhea of unclear etiology, possibly medication related and medication of suspicion (entacapone) being held. Cdiff negative. This has been ongoing 2 weeks, CT taken as OP on 8/23. Diarrhea is being medically managed 2/2 age and comorbidities.   Pt lying in bed in NAD, confused and unable to communicate effectively, does not seem to comprehend what is being said. Breakfast at bedside unconsumed. Has eaten about 25% other meals. No weight loss per EMR. Will add breeze at this time and monitor progress. NKFA listed in epic.     Anthropometrics     Height: Height: 170.2 cm (67\")  Last Filed Weight: Weight: 75.3 kg (166 lb) (09/05/23 0900)  Method: Weight Method: Bed scale  BMI: BMI (Calculated): 26  BMI classification: Overweight: 25.0-29.9kg/m2   IBW:   145 lb    UBW:     Weight      Weight (kg) Weight (lbs) Weight Method   7/22/2019 77.565 kg  171 lb     6/8/2023 72.576 kg  160 lb  Stated    6/9/2023 73.483 kg  162 lb  Stated    8/13/2023 73.483 kg  162 lb  Standing scale    8/23/2023 72.576 kg  160 lb  Stated     73.3 kg  161 lb 9.6 oz  Bed scale    8/24/2023 73.3 kg  161 lb 9.6 oz  Bed scale     73.3 kg  161 lb 9.6 oz     8/25/2023 75.5 kg  166 lb 7.2 oz  Bed scale     75.5 kg  166 lb 7.2 oz     9/4/2023 70.761 kg  156 lb  *assume inaccurate as outlier and no method documented   9/5/2023 75.297 kg  166 lb  Bed scale        Weight change:  no verifiable significant changes    Nutrition Focused Physical Exam     Date:  9/5       Unable to perform due to Pt unable to participate at time of visit unable to consent    Current Nutrition Prescription "   PO: Diet: Regular/House Diet; Texture: Regular Texture (IDDSI 7); Fluid Consistency: Thin (IDDSI 0)  Oral Nutrition Supplement:   Intake: 25% X 2 meals documented, RD observed 0% breakfast tray consumed    Nutrition Diagnosis   Date:  9/5            Updated:    Problem Predicted suboptimal energy intake    Etiology Per clinical status   Signs/Symptoms Hospital delirium in setting of parkinson's dementia   Status: new    Goal:   General: Nutrition to support treatment  PO: Maintain intake  EN/PN: N/A    Nutrition Intervention      Follow treatment progress, Care plan reviewed, Menu provided, Encourage intake, Supplement provided    Assistance with feeding as needed  Breeze BID    Monitoring/Evaluation:   Per protocol, I&O, PO intake, Supplement intake, Pertinent labs, Weight, Symptoms, POC/GOC, Swallow function      Pattie Paul RD, CNSC  Time Spent: 25m

## 2023-09-05 NOTE — PLAN OF CARE
Goal Outcome Evaluation:  Plan of Care Reviewed With: patient           Outcome Evaluation: PT eval completed. Patient alert and oriented to person only. Demonstrates deficits in general BLE strength, grossl motor coordination, standing balance, and functional edurance effecting functional mobility below baseline. Patient will benefit from skilled IP PT services to address impairments for return to PLOF. Recommend SNF at dc.      Anticipated Discharge Disposition (PT): skilled nursing facility

## 2023-09-05 NOTE — PLAN OF CARE
Goal Outcome Evaluation:  Plan of Care Reviewed With: patient        Progress: no change  Outcome Evaluation: Pt. presents below baseline with ADLs and functional mobility. Limited by decreased activity tolerance, generalized weakness, and balance. Skilled OT services warranted to promote return to PLOF. Recommend SNF at discharge.      Anticipated Discharge Disposition (OT): skilled nursing facility

## 2023-09-05 NOTE — THERAPY EVALUATION
Patient Name: Radha Varela  : 1935    MRN: [id number]                              Today's Date: 2023       Admit Date: 2023    Visit Dx:     ICD-10-CM ICD-9-CM   1. Fall in home, initial encounter  W19.XXXA E888.9    Y92.009 E849.0   2. Generalized weakness  R53.1 780.79   3. Volume depletion  E86.9 276.50   4. Hypokalemia  E87.6 276.8   5. Diarrhea of presumed infectious origin  R19.7 009.3     Patient Active Problem List   Diagnosis    Abdominal aortic aneurysm (AAA) without rupture    Generalized weakness    Diarrhea    Parkinson's disease dementia    Coronary artery disease involving native coronary artery of native heart without angina pectoris    Hypokalemia    Fall    Acute kidney injury superimposed on CKD     Past Medical History:   Diagnosis Date    Abdominal aortic aneurysm (AAA) without rupture 2018    Aneurysm     Arthritis     Cardiac murmur     Carotid stenosis     Celiac artery stenosis     Chest pain     CHF (congestive heart failure)     Chronic kidney disease     Coronary artery disease     Dyslipidemia     Elevated cholesterol     GERD (gastroesophageal reflux disease)     Hiatal hernia     History of exercise stress test     History of nuclear stress test     History of pneumonia     Hypertension     Lumbar degenerative disc disease     MI, old     Osteoarthritis     PAD (peripheral artery disease)     Parkinson's disease     Paroxysmal A-fib     Peptic ulceration     Psoriasis     Renal artery stenosis     Sleep apnea     uses o2 @ HS    Spinal stenosis     Tremor     Vertebral artery stenosis      Past Surgical History:   Procedure Laterality Date    ABDOMINAL AORTIC ANEURYSM REPAIR WITH ENDOGRAFT      APPENDECTOMY      CATARACT EXTRACTION W/ INTRAOCULAR LENS IMPLANT Left 12/10/2018    Procedure: CATARACT PHACO EXTRACTION WITH INTRAOCULAR LENS IMPLANT LEFT;  Surgeon: Charity Carrasco MD;  Location: [facility];  Service: Ophthalmology    CATARACT  EXTRACTION W/ INTRAOCULAR LENS IMPLANT Right 6/12/2023    Procedure: CATARACT PHACO EXTRACTION WITH INTRAOCULAR LENS IMPLANT RIGHT;  Surgeon: Charity Carrasco MD;  Location: [facility];  Service: Ophthalmology;  Laterality: Right;    COLONOSCOPY      CORONARY ARTERY BYPASS GRAFT  1995    ENDOSCOPY      SHOULDER ROTATOR CUFF REPAIR Right     SKIN BIOPSY        General Information       Row Name 09/05/23 1459          Physical Therapy Time and Intention    Document Type evaluation  -LO     Mode of Treatment individual therapy;physical therapy  -       Row Name 09/05/23 1459          General Information    Patient Profile Reviewed yes  -LO     Prior Level of Function independent:;all household mobility;gait;transfer;bed mobility  ambulated with walker short distances and used a motorized WC for longer distances.  -LO     Existing Precautions/Restrictions fall;other (see comments)  hx PD, urinary incontinance don brief for all OOB mobility  -LO     Barriers to Rehab previous functional deficit;medically complex;cognitive status  -       Row Name 09/05/23 1459          Living Environment    People in Home spouse;facility resident  -       Row Name 09/05/23 1459          Home Main Entrance    Number of Stairs, Main Entrance none  -       Row Name 09/05/23 1459          Stairs Within Home, Primary    Number of Stairs, Within Home, Primary none  -       Row Name 09/05/23 1459          Cognition    Orientation Status (Cognition) oriented to;person;disoriented to;place;situation;time  -       Row Name 09/05/23 1514 09/05/23 1459       Safety Issues, Functional Mobility    Safety Issues Affecting Function (Mobility) -- awareness of need for assistance;at risk behavior observed;friction/shear risk;insight into deficits/self-awareness;judgment;safety precaution awareness;safety precautions follow-through/compliance  -    Impairments Affecting Function (Mobility) -- balance;cognition;endurance/activity  tolerance;strength;motor planning;coordination  -LO    Cognitive Impairments, Mobility Safety/Performance -- awareness, need for assistance;insight into deficits/self-awareness;judgment;problem-solving/reasoning;safety precaution awareness;safety precaution follow-through;sequencing abilities  -LO    Comment, Safety Issues/Impairments (Mobility) recommend chair follow for safety  -LO --              User Key  (r) = Recorded By, (t) = Taken By, (c) = Cosigned By      Initials Name Provider Type     Erika Floyd, PT Physical Therapist                   Mobility       Row Name 09/05/23 1502          Bed Mobility    Comment, (Bed Mobility) UIC  -LO       Row Name 09/05/23 1502          Transfers    Comment, (Transfers) recliner>FWW>recliner; vc for HP  -LO       Row Name 09/05/23 1502          Bed-Chair Transfer    Bed-Chair [place] (Transfers) not tested  -       Row Name 09/05/23 1502          Sit-Stand Transfer    Sit-Stand [place] (Transfers) minimum assist (75% patient effort);verbal cues;nonverbal cues (demo/gesture)  -     Assistive Device (Sit-Stand Transfers) walker, front-wheeled  -LO       Row Name 09/05/23 1502          Gait/Stairs (Locomotion)    [place] Level (Gait) minimum assist (75% patient effort);verbal cues;nonverbal cues (demo/gesture)  -     Assistive Device (Gait) walker, front-wheeled  -LO     Distance in Feet (Gait) 18  -LO     Deviations/Abnormal Patterns (Gait) bilateral deviations;base of support, narrow;marian decreased;festinating/shuffling;gait speed decreased;steppage;stride length decreased  -LO     Bilateral Gait Deviations forward flexed posture;heel strike decreased  -LO     Comment, (Gait/Stairs) Ambulates with shuffling gait pattern with reduced step length, height, speed. Episodes of freezing noted at times. vc for upright position and fwd gaze required. Recommend chair follow for safety as patient is unable to isabell fatigue appropriately and required a chair  to be pulled up behind him.  -               User Key  (r) = Recorded By, (t) = Taken By, (c) = Cosigned By      Initials Name Provider Type    Erika Lieberman PT Physical Therapist                   Obj/Interventions       Row Name 09/05/23 1505          Range of Motion Comprehensive    General Range of Motion bilateral lower extremity ROM WNL  -       Row Name 09/05/23 1505          Strength Comprehensive (MMT)    General Manual Muscle Testing (MMT) Assessment lower extremity strength deficits identified  -LO     Comment, General Manual Muscle Testing (MMT) Assessment BLE grossly 4/5  -       Row Name 09/05/23 1505          Motor Skills    Motor Skills functional endurance  -LO     Functional Endurance fair, decreased from baseline  -       Row Name 09/05/23 1505          Balance    Balance Assessment sitting static balance;sitting dynamic balance;standing static balance;standing dynamic balance  -LO     Static Sitting Balance standby assist  -LO     Dynamic Sitting Balance contact guard  -LO     Position, Sitting Balance unsupported;sitting in chair  -LO     Static Standing Balance contact guard  -LO     Dynamic Standing Balance minimal assist  -LO     Position/Device Used, Standing Balance supported;walker, rolling  -LO     Comment, Balance FWW and CGA for safety in standing  -LO               User Key  (r) = Recorded By, (t) = Taken By, (c) = Cosigned By      Initials Name Provider Type    Erika Lieberman, JOAN Physical Therapist                   Goals/Plan       Row Name 09/05/23 1514          Bed Mobility Goal 1 (PT)    Activity/Assistive Device (Bed Mobility Goal 1, PT) rolling to left;rolling to right;sit to supine;supine to sit  -LO     [place] Level/Cues Needed (Bed Mobility Goal 1, PT) contact guard required  -LO     Time Frame (Bed Mobility Goal 1, PT) long term goal (LTG);10 days  -       Row Name 09/05/23 1514          Transfer Goal 1 (PT)    Activity/Assistive Device (Transfer Goal 1,  PT) sit-to-stand/stand-to-sit;bed-to-chair/chair-to-bed;car transfer  -LO     [place] Level/Cues Needed (Transfer Goal 1, PT) contact guard required  -LO     Time Frame (Transfer Goal 1, PT) long term goal (LTG);10 days  -LO       Row Name 09/05/23 1514          Gait Training Goal 1 (PT)    Activity/Assistive Device (Gait Training Goal 1, PT) gait (walking locomotion);diminish gait deviation;improve balance and speed;increase endurance/gait distance;walker, rolling  -LO     [place] Level (Gait Training Goal 1, PT) contact guard required  -LO     Distance (Gait Training Goal 1, PT) 50  -LO     Time Frame (Gait Training Goal 1, PT) long term goal (LTG);10 days  -LO       Row Name 09/05/23 1514          Therapy Assessment/Plan (PT)    Planned Therapy Interventions (PT) balance training;bed mobility training;gait training;home exercise program;motor coordination training;neuromuscular re-education;transfer training;strengthening;patient/family education  -LO               User Key  (r) = Recorded By, (t) = Taken By, (c) = Cosigned By      Initials Name Provider Type    Erika Lieberman, PT Physical Therapist                   Clinical Impression       Row Name 09/05/23 1507          Pain    Pretreatment Pain Rating 0/10 - no pain  -LO     Posttreatment Pain Rating 0/10 - no pain  -LO       Row Name 09/05/23 1507          Plan of Care Review    Plan of Care Reviewed With patient  -LO     Outcome Evaluation PT eval completed. Patient alert and oriented to person only. Demonstrates deficits in general BLE strength, grossl motor coordination, standing balance, and functional edurance effecting functional mobility below baseline. Patient will benefit from skilled IP PT services to address impairments for return to PLOF. Recommend SNF at [place].  -LO       Row Name 09/05/23 1507          Therapy Assessment/Plan (PT)    Rehab Potential (PT) good, to achieve stated therapy goals  -LO     Criteria for Skilled Interventions  Met (PT) yes;meets criteria;skilled treatment is necessary  -LO     Therapy Frequency (PT) daily  -LO       Row Name 09/05/23 1507          Vital Signs    Pre Systolic BP Rehab 111  -LO     Pre Treatment Diastolic BP 62  -LO     Post Systolic BP Rehab 109  -LO     Post Treatment Diastolic BP 58  -LO     Pretreatment Heart Rate (beats/min) 63  -LO     Posttreatment Heart Rate (beats/min) 74  -LO     Pre SpO2 (%) 96  -LO     O2 Delivery Pre Treatment room air  -LO     O2 Delivery Intra Treatment room air  -LO     Post SpO2 (%) 97  -LO     O2 Delivery Post Treatment room air  -LO     Pre Patient Position Sitting  -LO     Intra Patient Position Standing  -LO     Post Patient Position Sitting  -LO       Row Name 09/05/23 1507          Positioning and Restraints    Pre-Treatment Position sitting in chair/recliner  -LO     Post Treatment Position chair  -LO     In Chair notified nsg;sitting;call light within reach;encouraged to call for assist;exit alarm on;waffle cushion;with family/caregiver;on mechanical lift sling  -LO               User Key  (r) = Recorded By, (t) = Taken By, (c) = Cosigned By      Initials Name Provider Type    Erika Lieberman, PT Physical Therapist                   Outcome Measures       Row Name 09/05/23 1516 09/05/23 0850       How much help from another person do you currently need...    Turning from your back to your side while in flat bed without using bedrails? 3  -LO 3  -MK    Moving from lying on back to sitting on the side of a flat bed without bedrails? 3  -LO 3  -MK    Moving to and from a bed to a chair (including a wheelchair)? 3  -LO 3  -MK    Standing up from a chair using your arms (e.g., wheelchair, bedside chair)? 3  -LO 3  -MK    Climbing 3-5 steps with a railing? 2  -LO 2  -MK    To walk in hospital room? 3  -LO 2  -MK    AM-PAC 6 Clicks Score (PT) 17  -LO 16  -MK    Highest level of mobility 5 --> Static standing  -LO 5 --> Static standing  -MK      Row Name 09/05/23 1516  09/05/23 1338       Functional Assessment    Outcome Measure Options AM-PAC 6 Clicks Basic Mobility (PT)  -LO AM-PAC 6 Clicks Daily Activity (OT)  -              User Key  (r) = Recorded By, (t) = Taken By, (c) = Cosigned By      Initials Name Provider Type    LC Diana Nicholson, CARINA Occupational Therapist    Tina Law RN Registered Nurse    Erika Lieberman, PT Physical Therapist                                 Physical Therapy Education       Title: PT OT SLP Therapies (In Progress)       Topic: Physical Therapy (Done)       Point: Mobility training (Done)       Learning Progress Summary             Patient Acceptance, E, VU,DU,NR by  at 9/5/2023 1335    Comment: PT POC, importance for energy conservation   Family Acceptance, E, VU,DU,NR by  at 9/5/2023 1335    Comment: PT POC, importance for energy conservation                         Point: Home exercise program (Done)       Learning Progress Summary             Patient Acceptance, E, VU,DU,NR by  at 9/5/2023 1335    Comment: PT POC, importance for energy conservation   Family Acceptance, E, VU,DU,NR by  at 9/5/2023 1335    Comment: PT POC, importance for energy conservation                         Point: Body mechanics (Done)       Learning Progress Summary             Patient Acceptance, E, VU,DU,NR by  at 9/5/2023 1335    Comment: PT POC, importance for energy conservation   Family Acceptance, E, VU,DU,NR by  at 9/5/2023 1335    Comment: PT POC, importance for energy conservation                         Point: Precautions (Done)       Learning Progress Summary             Patient Acceptance, E, VU,DU,NR by  at 9/5/2023 1335    Comment: PT POC, importance for energy conservation   Family Acceptance, E, VU,DU,NR by  at 9/5/2023 1335    Comment: PT POC, importance for energy conservation                                         User Key       Initials Effective Dates Name Provider Type Discipline     06/16/21 -  Erika Floyd, JOAN Physical  Therapist PT                  PT Recommendation and Plan  Planned Therapy Interventions (PT): balance training, bed mobility training, gait training, home exercise program, motor coordination training, neuromuscular re-education, transfer training, strengthening, patient/family education  Plan of Care Reviewed With: patient  Outcome Evaluation: PT eval completed. Patient alert and oriented to person only. Demonstrates deficits in general BLE strength, grossl motor coordination, standing balance, and functional edurance effecting functional mobility below baseline. Patient will benefit from skilled IP PT services to address impairments for return to [place]. Recommend SNF at [place].     Time Calculation:   PT Evaluation Complexity  History, PT Evaluation Complexity: 1-2 personal factors and/or comorbidities  Examination of Body Systems (PT Eval Complexity): 1-2 elements  Clinical Presentation (PT Evaluation Complexity): evolving  Clinical Decision Making (PT Evaluation Complexity): low complexity  Overall Complexity (PT Evaluation Complexity): low complexity     PT Charges       Row Name 09/05/23 1335             Time Calculation    Start Time 1335  -LO      PT Received On 09/05/23  -LO      PT Goal Re-Cert Due Date 09/15/23  -LO         Timed Charges    58706 - Gait Training Minutes  9  -LO      11703 - PT Therapeutic Activity Minutes 9  -LO         Untimed Charges    PT Eval/Re-eval Minutes 40  -LO         Total Minutes    Timed Charges Total Minutes 18  -LO      Untimed Charges Total Minutes 40  -LO       Total Minutes 58  -LO                User Key  (r) = Recorded By, (t) = Taken By, (c) = Cosigned By      Initials Name Provider Type    LO Erika Floyd, PT Physical Therapist                  Therapy Charges for Today       Code Description Service Date Service Provider Modifiers Qty    00440393063 HC PT EVAL LOW COMPLEXITY 3 9/5/2023 Erika Floyd, PT GP 1    [id number] HC GAIT TRAINING EA 15 MIN 9/5/2023 Erika Floyd,  PT GP 1            PT G-Codes  Outcome Measure Options: AM-PAC 6 Clicks Basic Mobility (PT)  AM-PAC 6 Clicks Score (PT): 17  AM-PAC 6 Clicks Score (OT): 12  PT Discharge Summary  Anticipated Discharge Disposition (PT): skilled nursing facility    Erika Floyd, PT  9/5/2023

## 2023-09-05 NOTE — PROGRESS NOTES
[facility] Medicine Services  PROGRESS NOTE    Patient Name: Radha Varela  : 1935  MRN: [id number]    Date of Admission: 2023  Primary Care Physician: Provider, No Known    Subjective   Subjective     CC: Diarrhea    HPI: Diarrhea better per patient, persists, per nursing.     Review of Systems   Constitutional:  Positive for activity change and fatigue.   HENT:  Positive for congestion.    Respiratory:  Positive for cough.    Cardiovascular: Negative.    Gastrointestinal: Negative.        Objective   Objective     Vital Signs:   Temp:  [97.8 °F (36.6 °C)-98.4 °F (36.9 °C)] 98.4 °F (36.9 °C)  Heart Rate:  [53-75] 64  Resp:  [16-18] 16  BP: (121-153)/() 153/86  Flow (L/min):  [2] 2     Physical Exam:  NAD, alert and oriented  OP clear, MMM  Neck supple  No LAD  RRR  Decreased at bases  +BS, soft  MOJICA  Normal affect  No rashes    Results Reviewed:  LAB RESULTS:      Lab 23  0333 23  0809   WBC 6.40 6.55   HEMOGLOBIN 9.3* 9.7*   HEMATOCRIT 29.4* 30.8*   PLATELETS 129* 135*   NEUTROS ABS 4.22 4.79   IMMATURE GRANS (ABS) 0.03 0.03   LYMPHS ABS 0.98 0.73   MONOS ABS 0.88 0.75   EOS ABS 0.26 0.21   .7* 102.0*   CRP  --  1.62*   LACTATE  --  1.4   LDH  --  237*         Lab 23  1224 23  0809   SODIUM 139 141   POTASSIUM 3.2* 2.9*   CHLORIDE 105 104   CO2 24.0 25.0   ANION GAP 10.0 12.0   BUN 23 25*   CREATININE 2.02* 2.26*   EGFR 31.3* 27.4*   GLUCOSE 91 92   CALCIUM 8.3* 8.9   MAGNESIUM  --  2.1         Lab 23  0809   TOTAL PROTEIN 6.4   ALBUMIN 3.9   GLOBULIN 2.5   ALT (SGPT) <5   AST (SGOT) 20   BILIRUBIN 0.6   ALK PHOS 75         Lab 23  1034 23  0809   HSTROP T 89* 111*             Lab 23  0809   IRON 39*   IRON SATURATION (TSAT) 18*   TIBC 222*   TRANSFERRIN 149*   FERRITIN 253.20   FOLATE 6.45   VITAMIN B 12 >2,000*         Brief Urine Lab Results  (Last result in the past 365 days)        Color   Clarity    Blood   Leuk Est   Nitrite   Protein   CREAT   Urine HCG        09/04/23 1034 Yellow   Clear   Negative   Negative   Negative   Negative                   Microbiology Results Abnormal       Procedure Component Value - Date/Time    Gastrointestinal Panel, PCR - Stool, Per Rectum [[id number]]  (Normal) Collected: 09/04/23 1435    Lab Status: Final result Specimen: Stool from Per Rectum Updated: 09/04/23 1627     Campylobacter Not Detected     Plesiomonas shigelloides Not Detected     Salmonella Not Detected     Vibrio Not Detected     Vibrio cholerae Not Detected     Yersinia enterocolitica Not Detected     Enteroaggregative E. coli (EAEC) Not Detected     Enteropathogenic E. coli (EPEC) Not Detected     Enterotoxigenic E. coli (ETEC) lt/st Not Detected     Shiga-like toxin-producing E. coli (STEC) stx1/stx2 Not Detected     Shigella/Enteroinvasive E. coli (EIEC) Not Detected     Cryptosporidium Not Detected     Cyclospora cayetanensis Not Detected     Entamoeba histolytica Not Detected     Giardia lamblia Not Detected     Adenovirus F40/41 Not Detected     Astrovirus Not Detected     Norovirus GI/GII Not Detected     Rotavirus A Not Detected     Sapovirus (I, II, IV or V) Not Detected    COVID PRE-OP / PRE-PROCEDURE SCREENING ORDER (NO ISOLATION) - Swab, Nasopharynx [[id number]]  (Normal) Collected: 09/04/23 0842    Lab Status: Final result Specimen: Swab from Nasopharynx Updated: 09/04/23 1042    Narrative:      The following orders were created for panel order COVID PRE-OP / PRE-PROCEDURE SCREENING ORDER (NO ISOLATION) - Swab, Nasopharynx.  Procedure                               Abnormality         Status                     ---------                               -----------         ------                     COVID-19 and FLU A/B PCR...[[id number]]  Normal              Final result                 Please view results for these tests on the individual orders.    COVID-19 and FLU A/B PCR - Swab, Nasopharynx  [[id number]]  (Normal) Collected: 09/04/23 0842    Lab Status: Final result Specimen: Swab from Nasopharynx Updated: 09/04/23 1042     COVID19 Not Detected     Influenza A PCR Not Detected     Influenza B PCR Not Detected    Narrative:      Fact sheet for providers: https://www.fda.gov/media/541616/download    Fact sheet for patients: https://www.fda.gov/media/586693/download    Test performed by PCR.            No radiology results from the last 24 hrs    Results for orders placed during the hospital encounter of 08/23/23    Adult Transthoracic Echo Complete W/ Cont if Necessary Per Protocol    Interpretation Summary  1.  Normal left ventricular size and systolic function, LVEF 55-60%.  2.  Mild to moderate concentric LVH.  3.  Grade 1 diastolic dysfunction.  4.  Normal right ventricular size and systolic function.  5.  Normal left atrial volume index.  6.  Moderate calcification of the aortic valve without significant stenosis.  7.  Trace aortic regurgitation.  8.  Borderline dilation of the aortic root.      Current medications:  Scheduled Meds:amLODIPine, 2.5 mg, Oral, Daily  carbidopa-levodopa, 2 tablet, Oral, 4x Daily  cholecalciferol, 1,000 Units, Oral, Daily  escitalopram, 10 mg, Oral, Daily  rOPINIRole, 1 mg, Oral, 4x Daily  traZODone, 50 mg, Oral, Nightly  vitamin B-12, 1,000 mcg, Oral, Daily      Continuous Infusions:   PRN Meds:.  acetaminophen    famotidine    Magnesium Low Dose Replacement - Follow Nurse / BPA Driven Protocol    Potassium Replacement - Follow Nurse / BPA Driven Protocol    sodium chloride    Assessment & Plan   Assessment & Plan     Active Hospital Problems    Diagnosis  POA    Hypokalemia [E87.6]  Yes    Fall [W19.XXXA]  Yes    Acute kidney injury superimposed on CKD [N17.9, N18.9]  Yes    Diarrhea [R19.7]  Yes    Parkinson's disease dementia [G20, F02.80]  Yes    Coronary artery disease involving native coronary artery of native heart without angina pectoris [I25.10]  Yes    Abdominal  aortic aneurysm (AAA) without rupture [I71.40]  Yes      Resolved Hospital Problems   No resolved problems to display.        Brief Hospital Course to date:  Delightful 86 yo man with HO PAD, AAA, HTN, CAD with CABG, parkinsons and one month of diarrhea now with a fall and hypokalemia.     Diarrhea  -stool PCR negative  -patient notes better, but nursing noted diarrhea this AM, so likely the same  -monitor output today, follow up with GI    ADITHYA/CKD  -better    [facility] delirium  -continue sinement/requip  -comtan held for diarrhea  -home ASAP  -PT/OT    CAD  PAD  AAA  -followed at     Expected Discharge Location and Transportation: Home  Expected Discharge   Expected Discharge Date: 9/6/2023; Expected Discharge Time:      DVT prophylaxis:  No DVT prophylaxis order currently exists.     AM-PAC 6 Clicks Score (PT): 16 (09/04/23 2000)    CODE STATUS:   Code Status and Medical Interventions:   Ordered at: 09/04/23 1053     Code Status (Patient has no pulse and is not breathing):    No CPR (Do Not Attempt to Resuscitate)     Medical Interventions (Patient has pulse or is breathing):    Full Support       Felice Mitchell MD  09/05/23

## 2023-09-05 NOTE — PLAN OF CARE
Goal Outcome Evaluation:              Outcome Evaluation: Pt has sat up in chair. No complaints. Will continue to monitor.

## 2023-09-05 NOTE — PLAN OF CARE
Goal Outcome Evaluation:  Plan of Care Reviewed With: patient           Outcome Evaluation: Patient alert x3 but confuse to situation and having agitation because of senait frequent BM and not knowing/understanding why in the hospital. Patient RA, afib with BBB on the monitor. Not on anticoagulation due to his anemia and frequent falls. Potassium replaced, waiting for lab results. Patient had multiple loose stools with several episodes of incontinence. Skin with tears on left leg, and bruises all over his body. {soriasis on his buttocks. Patient probably having sundowing.

## 2023-09-05 NOTE — PROGRESS NOTES
Discharge Planning Assessment  [facility]     Patient Name: Radha Varela  MRN: [id number]  Today's Date: 9/5/2023    Admit Date: 9/4/2023        Discharge Needs Assessment       Row Name 09/05/23 1018       Living Environment    Current Living Arrangements home    Primary Care Provided by self    Provides Primary Care For no one, unable/limited ability to care for self    Family Caregiver if Needed child(luis), adult    Quality of Family Relationships helpful       Transition Planning    Patient/Family Anticipates Transition to home with family    Patient/Family Anticipated Services at Transition none    Transportation Anticipated family or friend will provide       Discharge Needs Assessment    Equipment Currently Used at Home walker, rolling;wheelchair    Concerns to be Addressed discharge planning    Equipment Needed After Discharge walker, rolling;wheelchair, manual                   Discharge Plan       Row Name 09/05/23 1020       Plan    Plan Comments Met with the patient who was sleeping and he was resting and the patients daughter who explained that the patienthad been living at assisted living [facility] but his daughter said he has been at [facility] and went home with his daughter recently and they may be interest in [facility] or if he could return to [facility]. He uses a rolling walker and wheelchair. Casemanagement is following for dicahrge planning needs.                  Continued Care and Services - Admitted Since 9/4/2023    Coordination has not been started for this encounter.       Selected Continued Care - Prior Encounters Includes continued care and service providers with selected services from prior encounters from 6/6/2023 to 9/5/2023      Discharged on 8/25/2023 Admission date: 8/23/2023 - Discharge disposition: Home or Self Care      Durable Medical Equipment       Service Provider Selected Services Address Phone Fax Patient Preferred    [facility]  [facility] [address] [address] [phone] --       Internal Comment last updated by Lynette De La Rosa RN 8/25/2023 1629    Hospital bed and bsc                                     Expected Discharge Date and Time       Expected Discharge Date Expected Discharge Time    Sep 6, 2023            Demographic Summary       Row Name 09/05/23 1018       General Information    Arrived From home    Referral Source patient    Reason for Consult discharge planning    Preferred Language English       Contact Information    Permission Granted to Share Info With     Contact Information Obtained for                    Functional Status       Row Name 09/05/23 1018       Functional Status    Usual Activity Tolerance moderate    Current Activity Tolerance moderate       Functional Status, IADL    Medications assistive equipment and person    Meal Preparation assistive equipment and person    Housekeeping assistive equipment and person    Laundry assistive equipment and person    Shopping assistive equipment and person                   Psychosocial    No documentation.                  Abuse/Neglect    No documentation.                  Legal    No documentation.                  Substance Abuse    No documentation.                  Patient Forms    No documentation.                     JOAO Raya

## 2023-09-05 NOTE — THERAPY EVALUATION
Patient Name: Radha Varela  : 1935    MRN: [id number]                              Today's Date: 2023       Admit Date: 2023    Visit Dx:     ICD-10-CM ICD-9-CM   1. Fall in home, initial encounter  W19.XXXA E888.9    Y92.009 E849.0   2. Generalized weakness  R53.1 780.79   3. Volume depletion  E86.9 276.50   4. Hypokalemia  E87.6 276.8   5. Diarrhea of presumed infectious origin  R19.7 009.3     Patient Active Problem List   Diagnosis    Abdominal aortic aneurysm (AAA) without rupture    Generalized weakness    Diarrhea    Parkinson's disease dementia    Coronary artery disease involving native coronary artery of native heart without angina pectoris    Hypokalemia    Fall    Acute kidney injury superimposed on CKD     Past Medical History:   Diagnosis Date    Abdominal aortic aneurysm (AAA) without rupture 2018    Aneurysm     Arthritis     Cardiac murmur     Carotid stenosis     Celiac artery stenosis     Chest pain     CHF (congestive heart failure)     Chronic kidney disease     Coronary artery disease     Dyslipidemia     Elevated cholesterol     GERD (gastroesophageal reflux disease)     Hiatal hernia     History of exercise stress test     History of nuclear stress test     History of pneumonia     Hypertension     Lumbar degenerative disc disease     MI, old     Osteoarthritis     PAD (peripheral artery disease)     Parkinson's disease     Paroxysmal A-fib     Peptic ulceration     Psoriasis     Renal artery stenosis     Sleep apnea     uses o2 @ HS    Spinal stenosis     Tremor     Vertebral artery stenosis      Past Surgical History:   Procedure Laterality Date    ABDOMINAL AORTIC ANEURYSM REPAIR WITH ENDOGRAFT      APPENDECTOMY      CATARACT EXTRACTION W/ INTRAOCULAR LENS IMPLANT Left 12/10/2018    Procedure: CATARACT PHACO EXTRACTION WITH INTRAOCULAR LENS IMPLANT LEFT;  Surgeon: Charity Carrasco MD;  Location: [facility];  Service: Ophthalmology    CATARACT  EXTRACTION W/ INTRAOCULAR LENS IMPLANT Right 6/12/2023    Procedure: CATARACT PHACO EXTRACTION WITH INTRAOCULAR LENS IMPLANT RIGHT;  Surgeon: Charity Carrasco MD;  Location: [facility];  Service: Ophthalmology;  Laterality: Right;    COLONOSCOPY      CORONARY ARTERY BYPASS GRAFT  1995    ENDOSCOPY      SHOULDER ROTATOR CUFF REPAIR Right     SKIN BIOPSY        General Information       Row Name 09/05/23 1309          OT Time and Intention    Document Type evaluation  -     Mode of Treatment occupational therapy  -       Row Name 09/05/23 1309          General Information    Patient Profile Reviewed yes  -     Prior Level of Function --   Daughter presents and reports pt. ambulates with RW at baseline and uses motorized w/c for longer distances. Performs ADLs independently.  -     Existing Precautions/Restrictions fall  -     Barriers to Rehab medically complex;previous functional deficit;cognitive status  -       Row Name 09/05/23 1309          Occupational Profile    Environmental Supports and Barriers (Occupational Profile) Pt[facility].  -       Row Name 09/05/23 1309          Living Environment    People in Home spouse;facility resident  -       Row Name 09/05/23 1309          Home Main Entrance    Number of Stairs, Main Entrance none  -       Row Name 09/05/23 1309          Stairs Within Home, Primary    Stair Railings, Within Home, Primary none  -       Row Name 09/05/23 1309          Cognition    Orientation Status (Cognition) oriented to;person;disoriented to;place;situation;time  -       Row Name 09/05/23 1309          Safety Issues, Functional Mobility    Safety Issues Affecting Function (Mobility) awareness of need for assistance;insight into deficits/self-awareness;judgment;problem-solving;safety precaution awareness;safety precautions follow-through/compliance;sequencing abilities  -     Impairments Affecting Function (Mobility)  balance;cognition;endurance/activity tolerance;strength;motor planning  -     Cognitive Impairments, Mobility Safety/Performance awareness, need for assistance;insight into deficits/self-awareness;judgment;problem-solving/reasoning;safety precaution awareness;safety precaution follow-through;sequencing abilities  -               User Key  (r) = Recorded By, (t) = Taken By, (c) = Cosigned By      Initials Name Provider Type     Diana Nicholson OT Occupational Therapist                     Mobility/ADL's       Row Name 09/05/23 1325          Bed Mobility    Bed Mobility scooting/bridging;supine-sit  -     Scooting/Bridging [place] (Bed Mobility) moderate assist (50% patient effort);2 person assist;verbal cues  -     Supine-Sit [place] (Bed Mobility) 2 person assist;verbal cues;moderate assist (50% patient effort)  -     Assistive Device (Bed Mobility) bed rails;draw sheet;head of bed elevated  -     Comment, (Bed Mobility) VC's to sequence and transition from supine to sit EOB.  -       Row Name 09/05/23 132          Transfers    Transfers sit-stand transfer;bed-chair transfer  -     Comment, (Transfers) VC's for hand placement and sequencing.  -       Row Name 09/05/23 1325          Bed-Chair Transfer    Bed-Chair [place] (Transfers) moderate assist (50% patient effort);2 person assist;verbal cues  -     Assistive Device (Bed-Chair Transfers) other (see comments)  BUE support  -       Row Name 09/05/23 1325          Sit-Stand Transfer    Sit-Stand [place] (Transfers) verbal cues;minimum assist (75% patient effort);moderate assist (50% patient effort);2 person assist  -     Assistive Device (Sit-Stand Transfers) other (see comments)  BUE support  -       Row Name 09/05/23 1325          Activities of Daily Living    BADL Assessment/Intervention lower body dressing;grooming;feeding  -       Row Name 09/05/23 132          Lower Body Dressing Assessment/Training     [place] Level (Lower Body Dressing) don;doff;socks;dependent (less than 25% patient effort)  -     Position (Lower Body Dressing) edge of bed sitting  -       Row Name 09/05/23 1325          Grooming Assessment/Training    [place] Level (Grooming) wash face, hands;set up;verbal cues  -     Comment, (Grooming) VC's to initiate  -[facility] Name 09/05/23 1325          Self-Feeding Assessment/Training    [place] Level (Feeding) prepare tray/open items;set up;verbal cues  -     Assistive Devices (Feeding) adapted cup  -               User Key  (r) = Recorded By, (t) = Taken By, (c) = Cosigned By      Initials Name Provider Type     Diana Nicholson OT Occupational Therapist                   Obj/Interventions       Row Name 09/05/23 1330          Sensory Assessment (Somatosensory)    Sensory Assessment (Somatosensory) UE sensation intact  -[facility] Name 09/05/23 1330          Vision Assessment/Intervention    Visual Impairment/Limitations unable/difficult to assess  -[facility] Name 09/05/23 1330          Range of Motion Comprehensive    General Range of Motion bilateral upper extremity ROM WFL  -[facility] Name 09/05/23 1330          Strength Comprehensive (MMT)    General Manual Muscle Testing (MMT) Assessment upper extremity strength deficits identified  -     Comment, General Manual Muscle Testing (MMT) Assessment Difficult to formally assess, anticipate B UE grossly 4-/5  -[facility] Name 09/05/23 1330          Motor Skills    Motor Skills functional endurance  -     Functional Endurance Decreased activity tolerance  -[facility] Name 09/05/23 1330          Balance    Balance Interventions sitting;standing;sit to stand;supported;occupation based/functional task;weight shifting activity  -     Comment, Balance VC's to correct upright posture and to widen ROQUE to improve balance in standing.  -               User Key  (r) = Recorded By, (t) = Taken By, (c) = Cosigned By       Initials Name Provider Type     Diana Nicholson, OT Occupational Therapist                   Goals/Plan       Row Name 09/05/23 2163          Transfer Goal 1 (OT)    Activity/Assistive Device (Transfer Goal 1, OT) sit-to-stand/stand-to-sit;bed-to-chair/chair-to-bed;walker, rolling  -     [place] Level/Cues Needed (Transfer Goal 1, OT) minimum assist (75% or more patient effort);verbal cues required  -     Time Frame (Transfer Goal 1, OT) long term goal (LTG);by discharge  -     Progress/Outcome (Transfer Goal 1, OT) goal ongoing  -       Row Name 09/05/23 1336          Dressing Goal 1 (OT)    Activity/Device (Dressing Goal 1, OT) lower body dressing  -     [place]/Cues Needed (Dressing Goal 1, OT) moderate assist (50-74% patient effort);verbal cues required  -     Time Frame (Dressing Goal 1, OT) long term goal (LTG);by discharge  -     Progress/Outcome (Dressing Goal 1, OT) goal ongoing  -       Row Name 09/05/23 0345          Toileting Goal 1 (OT)    Activity/Device (Toileting Goal 1, OT) adjust/manage clothing;perform perineal hygiene;commode;grab bar/safety frame  -     [place] Level/Cues Needed (Toileting Goal 1, OT) moderate assist (50-74% patient effort);verbal cues required  -     Time Frame (Toileting Goal 1, OT) long term goal (LTG);by discharge  -     Progress/Outcome (Toileting Goal 1, OT) goal ongoing  -               User Key  (r) = Recorded By, (t) = Taken By, (c) = Cosigned By      Initials Name Provider Type     Diana Nicholson, OT Occupational Therapist                   Clinical Impression       Row Name 09/05/23 3678          Pain Assessment    Pretreatment Pain Rating 0/10 - no pain  -     Posttreatment Pain Rating 0/10 - no pain  -     Pain Intervention(s) Repositioned;Ambulation/increased activity  -     Additional Documentation Pain Scale: Word Pre/Post-Treatment (Group)  -       Row Name 09/05/23 3820          Plan of Care Review    Plan  of Care Reviewed With patient  -     Progress no change  -     Outcome Evaluation Pt. presents below baseline with ADLs and functional mobility. Limited by decreased activity tolerance, generalized weakness, and balance. Skilled OT services warranted to promote return to PLOF. Recommend SNF at discharge.  -       Row Name 09/05/23 1332          Therapy Assessment/Plan (OT)    Patient/Family Therapy Goal Statement (OT) To return to PLOF  -     Therapy Frequency (OT) daily  -       Row Name 09/05/23 2077          Therapy Plan Review/Discharge Plan (OT)    Anticipated Discharge Disposition (OT) skilled nursing facility  -       Row Name 09/05/23 1338          Vital Signs    Pre Systolic BP Rehab 91  -LC     Pre Treatment Diastolic BP 69  -LC     Post Systolic BP Rehab 97  -LC     Post Treatment Diastolic BP 55  -LC     Pre Patient Position Supine  -     Intra Patient Position Standing  -     Post Patient Position Sitting  -       Row Name 09/05/23 0570          Positioning and Restraints    Pre-Treatment Position in bed  -LC     Post Treatment Position chair  -     In Chair notified nsg;reclined;call light within reach;encouraged to call for assist;exit alarm on;with family/caregiver;waffle cushion;on mechanical lift sling;legs elevated  -               User Key  (r) = Recorded By, (t) = Taken By, (c) = Cosigned By      Initials Name Provider Type     Diana Nicholson, CAR[place] Occupational Therapist                   Outcome Measures       Row Name 09/05/23 2434          How much help from another is currently needed...    Putting on and taking off regular lower body clothing? 1  -LC     Bathing (including washing, rinsing, and drying) 2  -LC     Toileting (which includes using toilet bed pan or urinal) 1  -LC     Putting on and taking off regular upper body clothing 2  -LC     Taking care of personal grooming (such as brushing teeth) 3  -LC     Eating meals 3  -LC     AM-PAC 6 Clicks Score (OT) 12   -       Row Name 09/05/23 0850          How much help from another person do you currently need...    Turning from your back to your side while in flat bed without using bedrails? 3  -MK     Moving from lying on back to sitting on the side of a flat bed without bedrails? 3  -MK     Moving to and from a bed to a chair (including a wheelchair)? 3  -MK     Standing up from a chair using your arms (e.g., wheelchair, bedside chair)? 3  -MK     Climbing 3-5 steps with a railing? 2  -MK     To walk in hospital room? 2  -MK     AM-PAC 6 Clicks Score (PT) 16  -MK     Highest level of mobility 5 --> Static standing  -       Row Name 09/05/23 1338          Functional Assessment    Outcome Measure Options AM-PAC 6 Clicks Daily Activity (OT)  -               User Key  (r) = Recorded By, (t) = Taken By, (c) = Cosigned By      Initials Name Provider Type     Diana Nicholson OT Occupational Therapist    Tina Law RN Registered Nurse                    Occupational Therapy Education       Title: PT OT SLP Therapies (In Progress)       Topic: Occupational Therapy (In Progress)       Point: ADL training (In Progress)       Description:   Instruct learner(s) on proper safety adaptation and remediation techniques during self care or transfers.   Instruct in proper use of assistive devices.                  Learning Progress Summary             Patient Acceptance, E, NR by  at 9/5/2023 1025                         Point: Home exercise program (Not Started)       Description:   Instruct learner(s) on appropriate technique for monitoring, assisting and/or progressing therapeutic exercises/activities.                  Learner Progress:  Not documented in this visit.              Point: Precautions (In Progress)       Description:   Instruct learner(s) on prescribed precautions during self-care and functional transfers.                  Learning Progress Summary             Patient Acceptance, E, NR by  at 9/5/2023 1025                          Point: Body mechanics (In Progress)       Description:   Instruct learner(s) on proper positioning and spine alignment during self-care, functional mobility activities and/or exercises.                  Learning Progress Summary             Patient Acceptance, E, NR by  at 9/5/2023 1025                                         User Key       Initials Effective Dates Name Provider Type [facility] 06/16/21 -  Diana Nicholson OT Occupational Therapist OT                  OT Recommendation and Plan  Therapy Frequency (OT): daily  Plan of Care Review  Plan of Care Reviewed With: patient  Progress: no change  Outcome Evaluation: Pt. presents below baseline with ADLs and functional mobility. Limited by decreased activity tolerance, generalized weakness, and balance. Skilled OT services warranted to promote return to PLOF. Recommend SNF at discharge.     Time Calculation:   Evaluation Complexity (OT)  Review Occupational Profile/Medical/Therapy History Complexity: brief/low complexity  Assessment, Occupational Performance/Identification of Deficit Complexity: 1-3 performance deficits  Clinical Decision Making Complexity (OT): problem focused assessment/low complexity  Overall Complexity of Evaluation (OT): low complexity     Time Calculation- OT       Row Name 09/05/23 1340             Time Calculation- OT    OT Start Time 1025  -      OT Received On 09/05/23  -      OT Goal Re-Cert Due Date 09/15/23  -         Untimed Charges    OT Eval/Re-eval Minutes 54  -         Total Minutes    Untimed Charges Total Minutes 54  -       Total Minutes 54  -LC                User Key  (r) = Recorded By, (t) = Taken By, (c) = Cosigned By      Initials Name Provider Type     Diana Nicholson OT Occupational Therapist                  Therapy Charges for Today       Code Description Service Date Service Provider Modifiers Qty    [id number]  OT EVAL LOW COMPLEXITY 4 9/5/2023 Diana Nicholson OT GO  1    [id number]  OT THER SUPP EA 15 MIN 9/5/2023 Diana Nicholson OT GO 3                 Diana Nicholson OT  9/5/2023

## 2023-09-06 LAB
ANION GAP SERPL CALCULATED.3IONS-SCNC: 7 MMOL/L (ref 5–15)
BUN SERPL-MCNC: 17 MG/DL (ref 8–23)
BUN/CREAT SERPL: 10.6 (ref 7–25)
CALCIUM SPEC-SCNC: 8.5 MG/DL (ref 8.6–10.5)
CHLORIDE SERPL-SCNC: 112 MMOL/L (ref 98–107)
CO2 SERPL-SCNC: 25 MMOL/L (ref 22–29)
CREAT SERPL-MCNC: 1.61 MG/DL (ref 0.76–1.27)
DEPRECATED RDW RBC AUTO: 50.7 FL (ref 37–54)
EGFRCR SERPLBLD CKD-EPI 2021: 41.1 ML/MIN/1.73
ERYTHROCYTE [DISTWIDTH] IN BLOOD BY AUTOMATED COUNT: 13.7 % (ref 12.3–15.4)
GLUCOSE SERPL-MCNC: 82 MG/DL (ref 65–99)
HCT VFR BLD AUTO: 28.2 % (ref 37.5–51)
HGB BLD-MCNC: 8.7 G/DL (ref 13–17.7)
MCH RBC QN AUTO: 31.2 PG (ref 26.6–33)
MCHC RBC AUTO-ENTMCNC: 30.9 G/DL (ref 31.5–35.7)
MCV RBC AUTO: 101.1 FL (ref 79–97)
PLATELET # BLD AUTO: 130 10*3/MM3 (ref 140–450)
PMV BLD AUTO: 9.5 FL (ref 6–12)
POTASSIUM SERPL-SCNC: 4.3 MMOL/L (ref 3.5–5.2)
RBC # BLD AUTO: 2.79 10*6/MM3 (ref 4.14–5.8)
SODIUM SERPL-SCNC: 144 MMOL/L (ref 136–145)
WBC NRBC COR # BLD: 5.26 10*3/MM3 (ref 3.4–10.8)

## 2023-09-06 PROCEDURE — 99214 OFFICE O/P EST MOD 30 MIN: CPT | Performed by: PHYSICIAN ASSISTANT

## 2023-09-06 PROCEDURE — 99231 SBSQ HOSP IP/OBS SF/LOW 25: CPT | Performed by: NURSE PRACTITIONER

## 2023-09-06 PROCEDURE — 85027 COMPLETE CBC AUTOMATED: CPT | Performed by: HOSPITALIST

## 2023-09-06 PROCEDURE — 80048 BASIC METABOLIC PNL TOTAL CA: CPT | Performed by: HOSPITALIST

## 2023-09-06 PROCEDURE — G0378 HOSPITAL OBSERVATION PER HR: HCPCS

## 2023-09-06 RX ADMIN — ROPINIROLE HYDROCHLORIDE 1 MG: 1 TABLET, FILM COATED ORAL at 18:23

## 2023-09-06 RX ADMIN — TRAZODONE HYDROCHLORIDE 50 MG: 50 TABLET ORAL at 23:16

## 2023-09-06 RX ADMIN — CARBIDOPA AND LEVODOPA 2 TABLET: 25; 100 TABLET ORAL at 08:35

## 2023-09-06 RX ADMIN — CARBIDOPA AND LEVODOPA 2 TABLET: 25; 100 TABLET ORAL at 11:25

## 2023-09-06 RX ADMIN — PSYLLIUM HUSK 1 PACKET: 3.4 POWDER ORAL at 08:36

## 2023-09-06 RX ADMIN — CYANOCOBALAMIN TAB 1000 MCG 1000 MCG: 1000 TAB at 08:35

## 2023-09-06 RX ADMIN — AMLODIPINE BESYLATE 2.5 MG: 5 TABLET ORAL at 08:35

## 2023-09-06 RX ADMIN — ESCITALOPRAM OXALATE 10 MG: 10 TABLET ORAL at 08:41

## 2023-09-06 RX ADMIN — ROPINIROLE HYDROCHLORIDE 1 MG: 1 TABLET, FILM COATED ORAL at 08:35

## 2023-09-06 RX ADMIN — ROPINIROLE HYDROCHLORIDE 1 MG: 1 TABLET, FILM COATED ORAL at 11:25

## 2023-09-06 RX ADMIN — Medication 1000 UNITS: at 08:35

## 2023-09-06 RX ADMIN — ROPINIROLE HYDROCHLORIDE 1 MG: 1 TABLET, FILM COATED ORAL at 23:16

## 2023-09-06 RX ADMIN — CARBIDOPA AND LEVODOPA 2 TABLET: 25; 100 TABLET ORAL at 23:16

## 2023-09-06 RX ADMIN — CARBIDOPA AND LEVODOPA 2 TABLET: 25; 100 TABLET ORAL at 18:23

## 2023-09-06 NOTE — PROGRESS NOTES
"GI Daily Progress Note  Subjective:    Chief Complaint:  Follow up diarrhea     No bowel movement today.   Had 1 loose bowel movement yesterday.    Denies abdominal pain.   Daughter is at the bedside.       Objective:    /79 (BP Location: Left arm, Patient Position: Sitting)   Pulse 69   Temp 98 °F (36.7 °C) (Oral)   Resp 14   Ht 170.2 cm (67\")   Wt 75.3 kg (166 lb)   SpO2 96%   BMI 26.00 kg/m²     Physical Exam  Constitutional:       General: He is not in acute distress.  Cardiovascular:      Rate and Rhythm: Normal rate and regular rhythm.   Pulmonary:      Effort: Pulmonary effort is normal. No respiratory distress.   Abdominal:      General: Bowel sounds are normal. There is no distension.      Palpations: Abdomen is soft.      Tenderness: There is no abdominal tenderness.   Neurological:      Mental Status: He is alert.      Comments: Alert, oriented to self.   Mildly confused        Lab  Lab Results   Component Value Date    WBC 5.26 09/06/2023    HGB 8.7 (L) 09/06/2023    HGB 9.3 (L) 09/05/2023    HGB 9.7 (L) 09/04/2023    .1 (H) 09/06/2023     (L) 09/06/2023    INR 1.2 (H) 12/30/2022    INR 1.2 (H) 12/16/2022    INR 1.1 12/06/2022    INR 1.1 10/19/2022     Lab Results   Component Value Date    GLUCOSE 82 09/06/2023    BUN 17 09/06/2023    CREATININE 1.61 (H) 09/06/2023    EGFRIFNONA 35 (L) 03/31/2022    EGFRIFAFRI 42 (L) 03/31/2022    BCR 10.6 09/06/2023     09/06/2023    K 4.3 09/06/2023    CO2 25.0 09/06/2023    CALCIUM 8.5 (L) 09/06/2023    ALBUMIN 3.9 09/04/2023    ALKPHOS 75 09/04/2023    BILITOT 0.6 09/04/2023    BILIDIR <0.2 12/06/2022    ALT <5 09/04/2023    AST 20 09/04/2023     Assessment:    Diarrhea, possible iatrogenic from Entacopone use.   Improving.     Parkinson's disease  AAA, infrarenal 6.1 cm    Chronic macrocytic anemia, stable  Chronic thrombocytopenia     Plan:    GI panel PCR and C. Difficile negative.   O&P pending.        Diarrhea improving with " discontinuation of Entacapone and addition of fiber supplementation.       >> Continue once daily Metamucil       Will sign off.  Please call for any questions or concerns.      KERI Avila  09/06/23  10:45 EDT

## 2023-09-06 NOTE — PROGRESS NOTES
Continued Stay Note  [facility]     Patient Name: Radha Varela  MRN: [id number]  Today's Date: 9/6/2023    Admit Date: 9/4/2023        Discharge Plan       Row Name 09/06/23 1552       Plan    Plan Comments Case management met with the patients daughter who would like the patient to be referred to [facility] and the patient is in observation status and case management is following for discharge planning and will continue to see if the patient meets inpatient status.                   Discharge Codes    No documentation.                 Expected Discharge Date and Time       Expected Discharge Date Expected Discharge Time    Sep 6, 2023               JOAO Raya

## 2023-09-06 NOTE — NURSING NOTE
Possible pressure injury found on prevalence study today:left medial leg, bilateral gluteal. Will follow.

## 2023-09-06 NOTE — PROGRESS NOTES
[facility] Medicine Services  PROGRESS NOTE    Patient Name: Radha Varela  : 1935  MRN: [id number]    Date of Admission: 2023  Primary Care Physician: Provider, No Known    Subjective   Subjective     CC: Diarrhea    HPI:   Pt sitting up in bed with daughter at the bedside. Potassium has improved to 4.3 today. Diarrhea improving after Entacopone was stopped. Metamucil also likely helping.         Objective   Objective     Vital Signs:   Temp:  [97.6 °F (36.4 °C)-98.2 °F (36.8 °C)] 98 °F (36.7 °C)  Heart Rate:  [55-75] 69  Resp:  [14-16] 14  BP: (112-154)/(54-87) 152/79     Physical Exam:  Constitutional: Awake, alert, NAD, chronically ill appearing  HENT: NCAT, mucous membranes moist  Respiratory: Clear to auscultation bilaterally, nonlabored respirations   Cardiovascular: RRR, no murmurs, rubs, or gallops  Gastrointestinal: Positive bowel sounds, soft, nontender, nondistended  Musculoskeletal: No bilateral ankle edema  Psychiatric: Flat affect, cooperative  Neurologic: Oriented to self, [place], speech clear  Skin: No rashes      Results Reviewed:  LAB RESULTS:      Lab 23  0333 23  0809   WBC 5.26 6.40 6.55   HEMOGLOBIN 8.7* 9.3* 9.7*   HEMATOCRIT 28.2* 29.4* 30.8*   PLATELETS 130* 129* 135*   NEUTROS ABS  --  4.22 4.79   IMMATURE GRANS (ABS)  --  0.03 0.03   LYMPHS ABS  --  0.98 0.73   MONOS ABS  --  0.88 0.75   EOS ABS  --  0.26 0.21   .1* 100.7* 102.0*   CRP  --   --  1.62*   LACTATE  --   --  1.4   LDH  --   --  237*           Lab 236 23  0849 23  1224 23  0809   SODIUM 144  --  143 139 141   POTASSIUM 4.3 4.1 3.5 3.2* 2.9*   CHLORIDE 112*  --  109* 105 104   CO2 25.0  --  24.0 24.0 25.0   ANION GAP 7.0  --  10.0 10.0 12.0   BUN 17  --  19 23 25*   CREATININE 1.61*  --  1.63* 2.02* 2.26*   EGFR 41.1*  --  40.5* 31.3* 27.4*   GLUCOSE 82  --  81 91 92   CALCIUM 8.5*  --  8.3* 8.3* 8.9    MAGNESIUM  --   --   --   --  2.1           Lab 09/04/23  0809   TOTAL PROTEIN 6.4   ALBUMIN 3.9   GLOBULIN 2.5   ALT (SGPT) <5   AST (SGOT) 20   BILIRUBIN 0.6   ALK PHOS 75           Lab 09/04/23  1034 09/04/23  0809   HSTROP T 89* 111*               Lab 09/04/23  0809   IRON 39*   IRON SATURATION (TSAT) 18*   TIBC 222*   TRANSFERRIN 149*   FERRITIN 253.20   FOLATE 6.45   VITAMIN B 12 >2,000*           Brief Urine Lab Results  (Last result in the past 365 days)        Color   Clarity   Blood   Leuk Est   Nitrite   Protein   CREAT   Urine HCG        09/04/23 1034 Yellow   Clear   Negative   Negative   Negative   Negative                   Microbiology Results Abnormal       Procedure Component Value - Date/Time    Gastrointestinal Panel, PCR - Stool, Per Rectum [[id number]]  (Normal) Collected: 09/04/23 1435    Lab Status: Final result Specimen: Stool from Per Rectum Updated: 09/04/23 1627     Campylobacter Not Detected     Plesiomonas shigelloides Not Detected     Salmonella Not Detected     Vibrio Not Detected     Vibrio cholerae Not Detected     Yersinia enterocolitica Not Detected     Enteroaggregative E. coli (EAEC) Not Detected     Enteropathogenic E. coli (EPEC) Not Detected     Enterotoxigenic E. coli (ETEC) lt/st Not Detected     Shiga-like toxin-producing E. coli (STEC) stx1/stx2 Not Detected     Shigella/Enteroinvasive E. coli (EIEC) Not Detected     Cryptosporidium Not Detected     Cyclospora cayetanensis Not Detected     Entamoeba histolytica Not Detected     Giardia lamblia Not Detected     Adenovirus F40/41 Not Detected     Astrovirus Not Detected     Norovirus GI/GII Not Detected     Rotavirus A Not Detected     Sapovirus (I, II, IV or V) Not Detected    COVID PRE-OP / PRE-PROCEDURE SCREENING ORDER (NO ISOLATION) - Swab, Nasopharynx [[id number]]  (Normal) Collected: 09/04/23 0842    Lab Status: Final result Specimen: Swab from Nasopharynx Updated: 09/04/23 1042    Narrative:      The following orders  were created for panel order COVID PRE-OP / PRE-PROCEDURE SCREENING ORDER (NO ISOLATION) - Swab, Nasopharynx.  Procedure                               Abnormality         Status                     ---------                               -----------         ------                     COVID-19 and FLU A/B PCR...[[id number]]  Normal              Final result                 Please view results for these tests on the individual orders.    COVID-19 and FLU A/B PCR - Swab, Nasopharynx [[id number]]  (Normal) Collected: 09/04/23 0842    Lab Status: Final result Specimen: Swab from Nasopharynx Updated: 09/04/23 1042     COVID19 Not Detected     Influenza A PCR Not Detected     Influenza B PCR Not Detected    Narrative:      Fact sheet for providers: https://www.fda.gov/media/738292/download    Fact sheet for patients: https://www.fda.gov/media/765475/download    Test performed by PCR.            No radiology results from the last 24 hrs    Results for orders placed during the hospital encounter of 08/23/23    Adult Transthoracic Echo Complete W/ Cont if Necessary Per Protocol    Interpretation Summary  1.  Normal left ventricular size and systolic function, LVEF 55-60%.  2.  Mild to moderate concentric LVH.  3.  Grade 1 diastolic dysfunction.  4.  Normal right ventricular size and systolic function.  5.  Normal left atrial volume index.  6.  Moderate calcification of the aortic valve without significant stenosis.  7.  Trace aortic regurgitation.  8.  Borderline dilation of the aortic root.      Current medications:  Scheduled Meds:amLODIPine, 2.5 mg, Oral, Daily  carbidopa-levodopa, 2 tablet, Oral, 4x Daily  cholecalciferol, 1,000 Units, Oral, Daily  escitalopram, 10 mg, Oral, Daily  psyllium, 1 packet, Oral, Daily  rOPINIRole, 1 mg, Oral, 4x Daily  traZODone, 50 mg, Oral, Nightly  vitamin B-12, 1,000 mcg, Oral, Daily      Continuous Infusions:   PRN Meds:.  acetaminophen    famotidine    Magnesium Low Dose Replacement -  Follow Nurse / BPA Driven Protocol    Potassium Replacement - Follow Nurse / BPA Driven Protocol    sodium chloride    Assessment & Plan   Assessment & Plan     Active Hospital Problems    Diagnosis  POA    Hypokalemia [E87.6]  Yes    Fall [W19.XXXA]  Yes    Acute kidney injury superimposed on CKD [N17.9, N18.9]  Yes    Diarrhea [R19.7]  Yes    Parkinson's disease dementia [G20, F02.80]  Yes    Coronary artery disease involving native coronary artery of native heart without angina pectoris [I25.10]  Yes    Abdominal aortic aneurysm (AAA) without rupture [I71.40]  Yes      Resolved Hospital Problems   No resolved problems to display.        Brief Hospital Course to date:  Delightful 86 yo man with HO PAD, AAA, HTN, CAD with CABG, parkinsons and one month of diarrhea now with a fall and hypokalemia.     Copied text in this note has been reviewed and is accurate as of 09/06/23.       Diarrhea  -stool PCR negative  -GI consulted. diarrhea improving after stopping Entacapone and adding metamucil     Hypokalemia  -K+ improving as diarrhea is improving      ADITHYA/CKD  - ADITHYA resolved/CKD stable    [facility]  [facility] delirium  -continue sinement/requip  -comtan held for diarrhea  -home ASAP  -PT/OT    CAD  PAD  AAA  -followed at     Expected Discharge Location and Transportation: Home  Expected Discharge   09/08/2023    DVT prophylaxis:  No DVT prophylaxis order currently exists.     AM-PAC 6 Clicks Score (PT): 17 (09/05/23 8946)    CODE STATUS:   Code Status and Medical Interventions:   Ordered at: 09/04/23 1053     Code Status (Patient has no pulse and is not breathing):    No CPR (Do Not Attempt to Resuscitate)     Medical Interventions (Patient has pulse or is breathing):    Full Support       Rosa Sparks, TRENTON  09/06/23

## 2023-09-07 LAB
QT INTERVAL: 512 MS
QTC INTERVAL: 519 MS

## 2023-09-07 PROCEDURE — 97110 THERAPEUTIC EXERCISES: CPT

## 2023-09-07 PROCEDURE — G0378 HOSPITAL OBSERVATION PER HR: HCPCS

## 2023-09-07 PROCEDURE — 97116 GAIT TRAINING THERAPY: CPT

## 2023-09-07 PROCEDURE — 99232 SBSQ HOSP IP/OBS MODERATE 35: CPT | Performed by: NURSE PRACTITIONER

## 2023-09-07 RX ADMIN — ESCITALOPRAM OXALATE 10 MG: 10 TABLET ORAL at 09:40

## 2023-09-07 RX ADMIN — ROPINIROLE HYDROCHLORIDE 1 MG: 1 TABLET, FILM COATED ORAL at 13:10

## 2023-09-07 RX ADMIN — CARBIDOPA AND LEVODOPA 2 TABLET: 25; 100 TABLET ORAL at 18:23

## 2023-09-07 RX ADMIN — ROPINIROLE HYDROCHLORIDE 1 MG: 1 TABLET, FILM COATED ORAL at 21:25

## 2023-09-07 RX ADMIN — Medication 1000 UNITS: at 09:40

## 2023-09-07 RX ADMIN — ROPINIROLE HYDROCHLORIDE 1 MG: 1 TABLET, FILM COATED ORAL at 09:40

## 2023-09-07 RX ADMIN — AMLODIPINE BESYLATE 2.5 MG: 5 TABLET ORAL at 09:40

## 2023-09-07 RX ADMIN — CARBIDOPA AND LEVODOPA 2 TABLET: 25; 100 TABLET ORAL at 21:25

## 2023-09-07 RX ADMIN — CYANOCOBALAMIN TAB 1000 MCG 1000 MCG: 1000 TAB at 09:40

## 2023-09-07 RX ADMIN — CARBIDOPA AND LEVODOPA 2 TABLET: 25; 100 TABLET ORAL at 09:39

## 2023-09-07 RX ADMIN — TRAZODONE HYDROCHLORIDE 50 MG: 50 TABLET ORAL at 21:25

## 2023-09-07 RX ADMIN — ROPINIROLE HYDROCHLORIDE 1 MG: 1 TABLET, FILM COATED ORAL at 18:23

## 2023-09-07 RX ADMIN — CARBIDOPA AND LEVODOPA 2 TABLET: 25; 100 TABLET ORAL at 13:09

## 2023-09-07 RX ADMIN — PSYLLIUM HUSK 1 PACKET: 3.4 POWDER ORAL at 09:39

## 2023-09-07 NOTE — NURSING NOTE
WOC follow-up for suspected pressure injury of the left medial leg and bilateral gluteals found during prevalence study on 9/6/2023.    The wound to the left lateral leg is a skin tear/abrasion that the patient sustained from falling.  After cleansing the wound I attempted to realign the skin flap that was left, covered with multilayer Xeroform and secured with an Optifoam dressing.  This can be changed and dressed by nursing staff moving forward.  Please follow wound care orders.        Patient has evidence of moisture related breakdown at his bilateral gluteals.  Skin is pinkish-purple, slightly excoriated and does christelle.  By definition this is not a pressure injury.  Barrier cream was applied to the bilateral gluteals.  Nursing staff should continue to cleanse patient's bottom daily with pH foam cleanser and barrier wipes, apply barrier cream twice daily and as needed and maintain pressure injury prevention measures during patient's hospitalization.        WOC will sign off.    Nolan Hoang RN, BSN, CCRN, CWOCN  Wound, Ostomy and Continence (WOC) Department  [facility]

## 2023-09-07 NOTE — PLAN OF CARE
Goal Outcome Evaluation:  Plan of Care Reviewed With: patient        Progress: no change  Outcome Evaluation: Patient completed supine>sit with min assist x1, cues for sequencing and increased time and effort to complete. Patient ambulated 14 feet with Min assist x1 and rolling walker for support, tech support for chair. Verbal cues for walker placement, improvement in posture and increased step length bilaterally. Distance limited by weakness, fatigue and lethargy. Recommend SNF at D/C for best functional outcome.

## 2023-09-07 NOTE — PLAN OF CARE
Problem: Adult Inpatient Plan of Care  Goal: Plan of Care Review  Outcome: Ongoing, Progressing  Goal: Patient-Specific Goal (Individualized)  Outcome: Ongoing, Progressing  Goal: Absence of Hospital-Acquired Illness or Injury  Outcome: Ongoing, Progressing  Intervention: Identify and Manage Fall Risk  Recent Flowsheet Documentation  Taken 9/7/2023 0400 by Diana John RN  Safety Promotion/Fall Prevention:   safety round/check completed   room organization consistent   fall prevention program maintained   clutter free environment maintained   assistive device/personal items within reach   activity supervised  Taken 9/7/2023 0200 by Diana John RN  Safety Promotion/Fall Prevention:   safety round/check completed   room organization consistent   fall prevention program maintained   clutter free environment maintained   activity supervised   assistive device/personal items within reach  Taken 9/7/2023 0000 by Diana John RN  Safety Promotion/Fall Prevention:   safety round/check completed   room organization consistent   fall prevention program maintained   clutter free environment maintained   assistive device/personal items within reach   activity supervised  Taken 9/6/2023 2200 by Diana John RN  Safety Promotion/Fall Prevention:   assistive device/personal items within reach   activity supervised   clutter free environment maintained   fall prevention program maintained   room organization consistent   safety round/check completed  Intervention: Prevent Skin Injury  Recent Flowsheet Documentation  Taken 9/7/2023 0400 by Diana John RN  Body Position:   left   turned   legs elevated  Taken 9/7/2023 0200 by Diana John RN  Body Position:   turned   right   legs elevated  Skin Protection:   adhesive use limited   incontinence pads utilized   transparent dressing maintained   tubing/devices free from skin contact  Taken 9/7/2023 0000 by Diana John RN  Body Position:    supine   legs elevated   weight shifting  Taken 9/6/2023 2200 by Diana John, RN  Body Position: position changed independently  Skin Protection:   adhesive use limited   incontinence pads utilized   transparent dressing maintained   tubing/devices free from skin contact  Intervention: Prevent and Manage VTE (Venous Thromboembolism) Risk  Recent Flowsheet Documentation  Taken 9/7/2023 0400 by Diana John, RN  Activity Management: activity encouraged  Taken 9/7/2023 0200 by Diana John, RN  Activity Management: activity encouraged  Taken 9/7/2023 0000 by Diana John RN  Activity Management: activity encouraged  Taken 9/6/2023 2200 by Diana John RN  Activity Management: activity encouraged  Range of Motion: active ROM (range of motion) encouraged  Goal: Optimal Comfort and Wellbeing  Outcome: Ongoing, Progressing  Intervention: Provide Person-Centered Care  Recent Flowsheet Documentation  Taken 9/6/2023 2200 by Diana John RN  Trust Relationship/Rapport:   care explained   choices provided   emotional support provided  Goal: Readiness for Transition of Care  Outcome: Ongoing, Progressing     Problem: Fall Injury Risk  Goal: Absence of Fall and Fall-Related Injury  Outcome: Ongoing, Progressing  Intervention: Promote Injury-Free Environment  Recent Flowsheet Documentation  Taken 9/7/2023 0400 by Diana John, RN  Safety Promotion/Fall Prevention:   safety round/check completed   room organization consistent   fall prevention program maintained   clutter free environment maintained   assistive device/personal items within reach   activity supervised  Taken 9/7/2023 0200 by Diana John, RN  Safety Promotion/Fall Prevention:   safety round/check completed   room organization consistent   fall prevention program maintained   clutter free environment maintained   activity supervised   assistive device/personal items within reach  Taken 9/7/2023 0000 by Diana John, JIGNESH  Safety  Promotion/Fall Prevention:   safety round/check completed   room organization consistent   fall prevention program maintained   clutter free environment maintained   assistive device/personal items within reach   activity supervised  Taken 9/6/2023 2200 by Diana John RN  Safety Promotion/Fall Prevention:   assistive device/personal items within reach   activity supervised   clutter free environment maintained   fall prevention program maintained   room organization consistent   safety round/check completed     Problem: Skin Injury Risk Increased  Goal: Skin Health and Integrity  Outcome: Ongoing, Progressing  Intervention: Optimize Skin Protection  Recent Flowsheet Documentation  Taken 9/7/2023 0400 by Diana John RN  Head of Bed (HOB) Positioning: HOB elevated  Taken 9/7/2023 0200 by Diana John RN  Pressure Reduction Techniques:   frequent weight shift encouraged   weight shift assistance provided  Head of Bed (HOB) Positioning: HOB elevated  Pressure Reduction Devices:   pressure-redistributing mattress utilized   positioning supports utilized  Skin Protection:   adhesive use limited   incontinence pads utilized   transparent dressing maintained   tubing/devices free from skin contact  Taken 9/7/2023 0000 by Diana Jhon RN  Head of Bed (HOB) Positioning: HOB elevated  Taken 9/6/2023 2200 by Diana John RN  Pressure Reduction Techniques:   frequent weight shift encouraged   weight shift assistance provided  Head of Bed (HOB) Positioning: HOB elevated  Pressure Reduction Devices:   pressure-redistributing mattress utilized   heel offloading device utilized   positioning supports utilized  Skin Protection:   adhesive use limited   incontinence pads utilized   transparent dressing maintained   tubing/devices free from skin contact     Problem: Behavioral Health Comorbidity  Goal: Maintenance of Behavioral Health Symptom Control  Outcome: Ongoing, Progressing     Problem: Hypertension  Comorbidity  Goal: Blood Pressure in Desired Range  Outcome: Ongoing, Progressing     Problem: Osteoarthritis Comorbidity  Goal: Maintenance of Osteoarthritis Symptom Control  Outcome: Ongoing, Progressing  Intervention: Maintain Osteoarthritis Symptom Control  Recent Flowsheet Documentation  Taken 9/7/2023 0400 by Diana John, RN  Activity Management: activity encouraged  Taken 9/7/2023 0200 by Diana John, RN  Activity Management: activity encouraged  Taken 9/7/2023 0000 by Diana John, RN  Activity Management: activity encouraged  Taken 9/6/2023 2200 by Diana John, RN  Activity Management: activity encouraged     Problem: Pain Chronic (Persistent) (Comorbidity Management)  Goal: Acceptable Pain Control and Functional Ability  Outcome: Ongoing, Progressing  Intervention: Manage Persistent Pain  Recent Flowsheet Documentation  Taken 9/6/2023 2200 by Diana John, RN  Sleep/Rest Enhancement:   awakenings minimized   regular sleep/rest pattern promoted  Intervention: Optimize Psychosocial Wellbeing  Recent Flowsheet Documentation  Taken 9/6/2023 2200 by Diana John, RN  Diversional Activities:   television   smartphone  Family/Support System Care: support provided

## 2023-09-07 NOTE — PROGRESS NOTES
[facility] Medicine Services  PROGRESS NOTE    Patient Name: Radha Varela  : 1935  MRN: [id number]    Date of Admission: 2023  Primary Care Physician: Provider, No Known    Subjective   Subjective     CC:  Diarrhea     HPI:  Patient is resting in bed in NAD. He states he is tired. States diarrhea better. Appetite fair.     ROS:  Gen- No fevers, chills  CV- No chest pain, palpitations  Resp- No cough, dyspnea  GI- No N/V/D, abd pain       Objective   Objective     Vital Signs:   Temp:  [98.2 °F (36.8 °C)-98.7 °F (37.1 °C)] 98.7 °F (37.1 °C)  Heart Rate:  [57-70] 69  Resp:  [16] 16  BP: (122-157)/(64-87) 135/87     Physical Exam:  Constitutional: No acute distress, awake, alert  HENT: NCAT, mucous membranes moist  Respiratory: Clear to auscultation bilaterally, respiratory effort normal 2L 99%  Cardiovascular: RRR, no murmurs, rubs, or gallops  Gastrointestinal: Positive bowel sounds, soft, nontender, nondistended  Musculoskeletal: No bilateral ankle edema  Psychiatric: Appropriate affect, cooperative  Neurologic: Oriented x 3, strength symmetric in all extremities, Cranial Nerves grossly intact to confrontation, speech clear  Skin: No rashes, bruising major arms       Results Reviewed:  LAB RESULTS:      Lab 23  0333 23  0809   WBC 5.26 6.40 6.55   HEMOGLOBIN 8.7* 9.3* 9.7*   HEMATOCRIT 28.2* 29.4* 30.8*   PLATELETS 130* 129* 135*   NEUTROS ABS  --  4.22 4.79   IMMATURE GRANS (ABS)  --  0.03 0.03   LYMPHS ABS  --  0.98 0.73   MONOS ABS  --  0.88 0.75   EOS ABS  --  0.26 0.21   .1* 100.7* 102.0*   CRP  --   --  1.62*   LACTATE  --   --  1.4   LDH  --   --  237*         Lab 23  0356 236 23  0849 23  1224 23  0809   SODIUM 144  --  143 139 141   POTASSIUM 4.3 4.1 3.5 3.2* 2.9*   CHLORIDE 112*  --  109* 105 104   CO2 25.0  --  24.0 24.0 25.0   ANION GAP 7.0  --  10.0 10.0 12.0   BUN 17  --  19 23 25*    CREATININE 1.61*  --  1.63* 2.02* 2.26*   EGFR 41.1*  --  40.5* 31.3* 27.4*   GLUCOSE 82  --  81 91 92   CALCIUM 8.5*  --  8.3* 8.3* 8.9   MAGNESIUM  --   --   --   --  2.1         Lab 09/04/23  0809   TOTAL PROTEIN 6.4   ALBUMIN 3.9   GLOBULIN 2.5   ALT (SGPT) <5   AST (SGOT) 20   BILIRUBIN 0.6   ALK PHOS 75         Lab 09/04/23  1034 09/04/23  0809   HSTROP T 89* 111*             Lab 09/04/23  0809   IRON 39*   IRON SATURATION (TSAT) 18*   TIBC 222*   TRANSFERRIN 149*   FERRITIN 253.20   FOLATE 6.45   VITAMIN B 12 >2,000*         Brief Urine Lab Results  (Last result in the past 365 days)        Color   Clarity   Blood   Leuk Est   Nitrite   Protein   CREAT   Urine HCG        09/04/23 1034 Yellow   Clear   Negative   Negative   Negative   Negative                   Microbiology Results Abnormal       Procedure Component Value - Date/Time    Gastrointestinal Panel, PCR - Stool, Per Rectum [[id number]]  (Normal) Collected: 09/04/23 1435    Lab Status: Final result Specimen: Stool from Per Rectum Updated: 09/04/23 1627     Campylobacter Not Detected     Plesiomonas shigelloides Not Detected     Salmonella Not Detected     Vibrio Not Detected     Vibrio cholerae Not Detected     Yersinia enterocolitica Not Detected     Enteroaggregative E. coli (EAEC) Not Detected     Enteropathogenic E. coli (EPEC) Not Detected     Enterotoxigenic E. coli (ETEC) lt/st Not Detected     Shiga-like toxin-producing E. coli (STEC) stx1/stx2 Not Detected     Shigella/Enteroinvasive E. coli (EIEC) Not Detected     Cryptosporidium Not Detected     Cyclospora cayetanensis Not Detected     Entamoeba histolytica Not Detected     Giardia lamblia Not Detected     Adenovirus F40/41 Not Detected     Astrovirus Not Detected     Norovirus GI/GII Not Detected     Rotavirus A Not Detected     Sapovirus (I, II, IV or V) Not Detected    COVID PRE-OP / PRE-PROCEDURE SCREENING ORDER (NO ISOLATION) - Swab, Nasopharynx [[id number]]  (Normal) Collected:  09/04/23 0842    Lab Status: Final result Specimen: Swab from Nasopharynx Updated: 09/04/23 1042    Narrative:      The following orders were created for panel order COVID PRE-OP / PRE-PROCEDURE SCREENING ORDER (NO ISOLATION) - Swab, Nasopharynx.  Procedure                               Abnormality         Status                     ---------                               -----------         ------                     COVID-19 and FLU A/B PCR...[[id number]]  Normal              Final result                 Please view results for these tests on the individual orders.    COVID-19 and FLU A/B PCR - Swab, Nasopharynx [[id number]]  (Normal) Collected: 09/04/23 0842    Lab Status: Final result Specimen: Swab from Nasopharynx Updated: 09/04/23 1042     COVID19 Not Detected     Influenza A PCR Not Detected     Influenza B PCR Not Detected    Narrative:      Fact sheet for providers: https://www.fda.gov/media/698128/download    Fact sheet for patients: https://www.fda.gov/media/893484/download    Test performed by PCR.            No radiology results from the last 24 hrs    Results for orders placed during the hospital encounter of 08/23/23    Adult Transthoracic Echo Complete W/ Cont if Necessary Per Protocol    Interpretation Summary  1.  Normal left ventricular size and systolic function, LVEF 55-60%.  2.  Mild to moderate concentric LVH.  3.  Grade 1 diastolic dysfunction.  4.  Normal right ventricular size and systolic function.  5.  Normal left atrial volume index.  6.  Moderate calcification of the aortic valve without significant stenosis.  7.  Trace aortic regurgitation.  8.  Borderline dilation of the aortic root.      Current medications:  Scheduled Meds:amLODIPine, 2.5 mg, Oral, Daily  carbidopa-levodopa, 2 tablet, Oral, 4x Daily  cholecalciferol, 1,000 Units, Oral, Daily  escitalopram, 10 mg, Oral, Daily  psyllium, 1 packet, Oral, Daily  rOPINIRole, 1 mg, Oral, 4x Daily  traZODone, 50 mg, Oral, Nightly  vitamin  B-12, 1,000 mcg, Oral, Daily      Continuous Infusions:   PRN Meds:.  acetaminophen    famotidine    Magnesium Low Dose Replacement - Follow Nurse / BPA Driven Protocol    Potassium Replacement - Follow Nurse / BPA Driven Protocol    sodium chloride    Assessment & Plan   Assessment & Plan     Active Hospital Problems    Diagnosis  POA    Hypokalemia [E87.6]  Yes    Fall [W19.XXXA]  Yes    Acute kidney injury superimposed on CKD [N17.9, N18.9]  Yes    Diarrhea [R19.7]  Yes    Parkinson's disease dementia [G20, F02.80]  Yes    Coronary artery disease involving native coronary artery of native heart without angina pectoris [I25.10]  Yes    Abdominal aortic aneurysm (AAA) without rupture [I71.40]  Yes      Resolved Hospital Problems   No resolved problems to display.        Brief Hospital Course to date:  Radha Varela is a 87 y.o. male  with HO PAD, AAA, HTN, CAD with CABG, parkinsons and one month of diarrhea now with a fall and hypokalemia.      Copied text in this note has been reviewed and is accurate as of 09/07/23.         Diarrhea  -stool PCR negative  -GI consulted. diarrhea improving after stopping Entacapone and adding metamucil      Hypokalemia  -K+ improving as diarrhea is improving        ADITHYA/CKD  - baseline creatinine 1.7-2  -- stable      Parkinson's  Hospital delirium  -continue sinement/requip  -comtan held for diarrhea  -PT/OT, plan for rehab      CAD  PAD  AAA  -followed at     Expected Discharge Location and Transportation: rehab   Expected Discharge   Expected Discharge Date: 9/8/2023; Expected Discharge Time:      DVT prophylaxis:  No DVT prophylaxis order currently exists.     AM-PAC 6 Clicks Score (PT): 17 (09/07/23 1118)    CODE STATUS:   Code Status and Medical Interventions:   Ordered at: 09/04/23 1053     Code Status (Patient has no pulse and is not breathing):    No CPR (Do Not Attempt to Resuscitate)     Medical Interventions (Patient has pulse or is breathing):    Full  Support       Lizbet Flores, APRN  09/07/23

## 2023-09-07 NOTE — THERAPY TREATMENT NOTE
Patient Name: Radha Varela  : 1935    MRN: [id number]                              Today's Date: 2023       Admit Date: 2023    Visit Dx:     ICD-10-CM ICD-9-CM   1. Fall in home, initial encounter  W19.XXXA E888.9    Y92.009 E849.0   2. Generalized weakness  R53.1 780.79   3. Volume depletion  E86.9 276.50   4. Hypokalemia  E87.6 276.8   5. Diarrhea of presumed infectious origin  R19.7 009.3     Patient Active Problem List   Diagnosis    Abdominal aortic aneurysm (AAA) without rupture    Generalized weakness    Diarrhea    Parkinson's disease dementia    Coronary artery disease involving native coronary artery of native heart without angina pectoris    Hypokalemia    Fall    Acute kidney injury superimposed on CKD     Past Medical History:   Diagnosis Date    Abdominal aortic aneurysm (AAA) without rupture 2018    Aneurysm     Arthritis     Cardiac murmur     Carotid stenosis     Celiac artery stenosis     Chest pain     CHF (congestive heart failure)     Chronic kidney disease     Coronary artery disease     Dyslipidemia     Elevated cholesterol     GERD (gastroesophageal reflux disease)     Hiatal hernia     History of exercise stress test     History of nuclear stress test     History of pneumonia     Hypertension     Lumbar degenerative disc disease     MI, old     Osteoarthritis     PAD (peripheral artery disease)     Parkinson's disease     Paroxysmal A-fib     Peptic ulceration     Psoriasis     Renal artery stenosis     Sleep apnea     uses o2 @ HS    Spinal stenosis     Tremor     Vertebral artery stenosis      Past Surgical History:   Procedure Laterality Date    ABDOMINAL AORTIC ANEURYSM REPAIR WITH ENDOGRAFT      APPENDECTOMY      CATARACT EXTRACTION W/ INTRAOCULAR LENS IMPLANT Left 12/10/2018    Procedure: CATARACT PHACO EXTRACTION WITH INTRAOCULAR LENS IMPLANT LEFT;  Surgeon: Charity Carrasco MD;  Location: [facility];  Service: Ophthalmology    CATARACT  EXTRACTION W/ INTRAOCULAR LENS IMPLANT Right 6/12/2023    Procedure: CATARACT PHACO EXTRACTION WITH INTRAOCULAR LENS IMPLANT RIGHT;  Surgeon: Charity Carrasco MD;  Location: [facility];  Service: Ophthalmology;  Laterality: Right;    COLONOSCOPY      CORONARY ARTERY BYPASS GRAFT  1995    ENDOSCOPY      SHOULDER ROTATOR CUFF REPAIR Right     SKIN BIOPSY        General Information       Row Name 09/07/23 1108          Physical Therapy Time and Intention    Document Type therapy note (daily note)  -AS     Mode of Treatment physical therapy  -AS       Row Name 09/07/23 1108          General Information    Patient Profile Reviewed yes  -AS     Existing Precautions/Restrictions fall;other (see comments)  h/o PD, incontinent-brief when OOB  -AS     Barriers to Rehab medically complex;previous functional deficit;cognitive status  -AS       Row Name 09/07/23 1108          Cognition    Orientation Status (Cognition) oriented to;person;verbal cues/prompts needed for orientation;place;time  -AS       Row Name 09/07/23 1108          Safety Issues, Functional Mobility    Safety Issues Affecting Function (Mobility) awareness of need for assistance;insight into deficits/self-awareness;safety precaution awareness;safety precautions follow-through/compliance;sequencing abilities;positioning of assistive device;judgment;impulsivity  -AS     Impairments Affecting Function (Mobility) balance;cognition;endurance/activity tolerance;strength;motor planning;coordination  -AS     Cognitive Impairments, Mobility Safety/Performance awareness, need for assistance;insight into deficits/self-awareness;safety precaution awareness;safety precaution follow-through;sequencing abilities  -AS     Comment, Safety Issues/Impairments (Mobility) decreased safety awareness, attempted to stand prior to gown and gait belt donned and walker available  -AS               User Key  (r) = Recorded By, (t) = Taken By, (c) = Cosigned By      Initials Name Provider Type     AS Lauryn Preciado PTA Physical Therapist Assistant                   Mobility       Row Name 09/07/23 1110          Bed Mobility    Rolling Left [place] (Bed Mobility) verbal cues;minimum assist (75% patient effort);1 person assist  -AS     Rolling Right [place] (Bed Mobility) verbal cues;minimum assist (75% patient effort);1 person assist  -AS     Supine-Sit [place] (Bed Mobility) verbal cues;moderate assist (50% patient effort);1 person assist  -AS     Assistive Device (Bed Mobility) head of bed elevated;bed rails  -AS     Comment, (Bed Mobility) cues for sequencing, rolled side to side to shagufta brief prior to OOB  -AS       Row Name 09/07/23 1110          Transfers    Comment, (Transfers) cues for hand placement, assist to block B LE from sliding  -AS       Row Name 09/07/23 1110          Bed-Chair Transfer    Bed-Chair [place] (Transfers) verbal cues;minimum assist (75% patient effort);2 person assist  -AS     Assistive Device (Bed-Chair Transfers) walker, front-wheeled  -AS     Comment, (Bed-Chair Transfer) 14  -AS       Row Name 09/07/23 1110          Sit-Stand Transfer    Sit-Stand [place] (Transfers) minimum assist (75% patient effort);verbal cues;nonverbal cues (demo/gesture)  -AS     Assistive Device (Sit-Stand Transfers) walker, front-wheeled  -AS     Comment, (Sit-Stand Transfer) cues for sequencing  -AS       Row Name 09/07/23 1110          Gait/Stairs (Locomotion)    [place] Level (Gait) minimum assist (75% patient effort);verbal cues;1 person to manage equipment;1 person assist  -AS     Assistive Device (Gait) walker, front-wheeled  -AS     Distance in Feet (Gait) 14  -AS     Deviations/Abnormal Patterns (Gait) bilateral deviations;base of support, narrow;marian decreased;festinating/shuffling;gait speed decreased;steppage;stride length decreased  -AS     Bilateral Gait Deviations forward flexed posture;heel strike decreased  -AS     Comment, (Gait/Stairs)  patient ambulated 14 feet with Min assist x1 and rolling walker for support, tech support for chair. Verbal cues for walker placement, improvement in posture and increased step length bilaterally. Distance limited by weakness, fatigue and lethargy.  -AS               User Key  (r) = Recorded By, (t) = Taken By, (c) = Cosigned By      Initials Name Provider Type    AS Lauryn Preciado PTA Physical Therapist Assistant                   Obj/Interventions       Row Name 09/07/23 1114          Motor Skills    Therapeutic Exercise shoulder;knee;ankle  -AS       [facility] Name 09/07/23 1114          Shoulder (Therapeutic Exercise)    Shoulder (Therapeutic Exercise) AROM (active range of motion)  -AS     Shoulder AROM (Therapeutic Exercise) bilateral;flexion;extension;aBduction;aDduction;sitting;10 repetitions  bicep curls  -AS       [facility] Name 09/07/23 1114          Knee (Therapeutic Exercise)    Knee (Therapeutic Exercise) strengthening exercise  -AS     Knee Strengthening (Therapeutic Exercise) bilateral;marching while seated;sitting;10 repetitions;LAQ (long arc quad)  -AS       [facility] Name 09/07/23 1114          Ankle (Therapeutic Exercise)    Ankle (Therapeutic Exercise) AROM (active range of motion)  -AS     Ankle AROM (Therapeutic Exercise) bilateral;dorsiflexion;plantarflexion;sitting;10 repetitions  -AS       [facility] Name 09/07/23 1114          Balance    Dynamic Standing Balance verbal cues;minimal assist;1-person assist;1 person to manage equipment  -AS     Position/Device Used, Standing Balance supported;walker, front-wheeled  -AS     Comment, Balance mild instability, min assist for safe mobility  -AS               User Key  (r) = Recorded By, (t) = Taken By, (c) = Cosigned By      Initials Name Provider Type    AS Lauryn Preciado PTA Physical Therapist Assistant                   Goals/Plan    No documentation.                  Clinical Impression       Row Name 09/07/23 1115          Pain    Pretreatment Pain  Rating 0/10 - no pain  -AS     Posttreatment Pain Rating 0/10 - no pain  -AS       [facility] Name 09/07/23 1115          Plan of Care Review    Plan of Care Reviewed With patient  -AS     Progress no change  -AS     Outcome Evaluation Patient completed supine>sit with min assist x1, cues for sequencing and increased time and effort to complete. Patient ambulated 14 feet with Min assist x1 and rolling walker for support, tech support for chair. Verbal cues for walker placement, improvement in posture and increased step length bilaterally. Distance limited by weakness, fatigue and lethargy. Recommend SNF at D/C for best functional outcome.  -AS       [facility] Name 09/07/23 1115          Positioning and Restraints    Pre-Treatment Position in bed  -AS     Post Treatment Position chair  -AS     In Chair reclined;call light within reach;encouraged to call for assist;exit alarm on;waffle cushion;legs elevated  -AS               User Key  (r) = Recorded By, (t) = Taken By, (c) = Cosigned By      Initials Name Provider Type    AS Lauryn Preciado, DAGOBERTO Physical Therapist Assistant                   Outcome Measures       [facility] Name 09/07/23 1118          How much help from another person do you currently need...    Turning from your back to your side while in flat bed without using bedrails? 3  -AS     Moving from lying on back to sitting on the side of a flat bed without bedrails? 3  -AS     Moving to and from a bed to a chair (including a wheelchair)? 3  -AS     Standing up from a chair using your arms (e.g., wheelchair, bedside chair)? 3  -AS     Climbing 3-5 steps with a railing? 2  -AS     To walk in hospital room? 3  -AS     AM-PAC 6 Clicks Score (PT) 17  -AS     Highest level of mobility 5 --> Static standing  -AS       [facility] Name 09/07/23 1118          Functional Assessment    Outcome Measure Options AM-PAC 6 Clicks Basic Mobility (PT)  -AS               User Key  (r) = Recorded By, (t) = Taken By, (c) = Cosigned By       Initials Name Provider Type    AS Lauryn Preciado, DAGOBERTO Physical Therapist Assistant                                 Physical Therapy Education       Title: PT OT SLP Therapies (In Progress)       Topic: Physical Therapy (In Progress)       Point: Mobility training (In Progress)       Learning Progress Summary             Patient Acceptance, E, NR by AS at 9/7/2023 1118    Acceptance, E, VU,DU,NR by LO at 9/5/2023 1335    Comment: PT POC, importance for energy conservation   Family Acceptance, E, VU,DU,NR by LO at 9/5/2023 1335    Comment: PT POC, importance for energy conservation                         Point: Home exercise program (In Progress)       Learning Progress Summary             Patient Acceptance, E, NR by AS at 9/7/2023 1118    Acceptance, E, VU,DU,NR by LO at 9/5/2023 1335    Comment: PT POC, importance for energy conservation   Family Acceptance, E, VU,DU,NR by LO at 9/5/2023 1335    Comment: PT POC, importance for energy conservation                         Point: Body mechanics (In Progress)       Learning Progress Summary             Patient Acceptance, E, NR by AS at 9/7/2023 1118    Acceptance, E, VU,DU,NR by LO at 9/5/2023 1335    Comment: PT POC, importance for energy conservation   Family Acceptance, E, VU,DU,NR by LO at 9/5/2023 1335    Comment: PT POC, importance for energy conservation                         Point: Precautions (In Progress)       Learning Progress Summary             Patient Acceptance, E, NR by AS at 9/7/2023 1118    Acceptance, E, VU,DU,NR by LO at 9/5/2023 1335    Comment: PT POC, importance for energy conservation   Family Acceptance, E, VU,DU,NR by LO at 9/5/2023 1335    Comment: PT POC, importance for energy conservation                                         User Key       Initials Effective Dates Name Provider Type Discipline    AS 04/28/23 -  Lauryn Preciado, PTA Physical Therapist Assistant PT    LO 06/16/21 -  Erika Floyd PT Physical Therapist PT                   PT Recommendation and Plan     Plan of Care Reviewed With: patient  Progress: no change  Outcome Evaluation: Patient completed supine>sit with min assist x1, cues for sequencing and increased time and effort to complete. Patient ambulated 14 feet with Min assist x1 and rolling walker for support, tech support for chair. Verbal cues for walker placement, improvement in posture and increased step length bilaterally. Distance limited by weakness, fatigue and lethargy. Recommend SNF at D/C for best functional outcome.     Time Calculation:         PT Charges       Row Name 09/07/23 1119             Time Calculation    Start Time 1012  -AS      PT Received On 09/07/23  -AS      PT Goal Re-Cert Due Date 09/15/23  -AS         Timed Charges    96443 - PT Therapeutic Exercise Minutes 10  -AS      61463 - Gait Training Minutes  13  -AS         Total Minutes    Timed Charges Total Minutes 23  -AS       Total Minutes 23  -AS                User Key  (r) = Recorded By, (t) = Taken By, (c) = Cosigned By      Initials Name Provider Type    AS Lauryn Preciado PTA Physical Therapist Assistant                  Therapy Charges for Today       Code Description Service Date Service Provider Modifiers Qty    73604404424 HC PT THER PROC EA 15 MIN 9/7/2023 Lauryn Preciado PTA GP 1    17371784481 HC GAIT TRAINING EA 15 MIN 9/7/2023 Lauryn Preciado PTA GP 1    38516435125 HC PT THER SUPP EA 15 MIN 9/7/2023 Lauryn Preciado PTA GP 2            PT G-Codes  Outcome Measure Options: AM-PAC 6 Clicks Basic Mobility (PT)  AM-PAC 6 Clicks Score (PT): 17  AM-PAC 6 Clicks Score (OT): 12       Lauryn Preciado PTA  9/7/2023

## 2023-09-07 NOTE — PLAN OF CARE
Goal Outcome Evaluation:  Plan of Care Reviewed With: patient, daughter        Progress: improving       Patient had no acute changes throughout the day. No complaints of pain or discomfort. Patient's daughter attentive at bedside.     . Celi Hyman RN

## 2023-09-08 PROCEDURE — G0378 HOSPITAL OBSERVATION PER HR: HCPCS

## 2023-09-08 PROCEDURE — 99232 SBSQ HOSP IP/OBS MODERATE 35: CPT | Performed by: NURSE PRACTITIONER

## 2023-09-08 RX ORDER — HYDROXYZINE HYDROCHLORIDE 25 MG/1
50 TABLET, FILM COATED ORAL NIGHTLY
Status: DISCONTINUED | OUTPATIENT
Start: 2023-09-08 | End: 2023-09-11 | Stop reason: HOSPADM

## 2023-09-08 RX ADMIN — ROPINIROLE HYDROCHLORIDE 1 MG: 1 TABLET, FILM COATED ORAL at 18:58

## 2023-09-08 RX ADMIN — ROPINIROLE HYDROCHLORIDE 1 MG: 1 TABLET, FILM COATED ORAL at 13:55

## 2023-09-08 RX ADMIN — CYANOCOBALAMIN TAB 1000 MCG 1000 MCG: 1000 TAB at 10:15

## 2023-09-08 RX ADMIN — CARBIDOPA AND LEVODOPA 2 TABLET: 25; 100 TABLET ORAL at 13:55

## 2023-09-08 RX ADMIN — AMLODIPINE BESYLATE 2.5 MG: 5 TABLET ORAL at 10:15

## 2023-09-08 RX ADMIN — CARBIDOPA AND LEVODOPA 2 TABLET: 25; 100 TABLET ORAL at 10:16

## 2023-09-08 RX ADMIN — ESCITALOPRAM OXALATE 10 MG: 10 TABLET ORAL at 10:16

## 2023-09-08 RX ADMIN — ROPINIROLE HYDROCHLORIDE 1 MG: 1 TABLET, FILM COATED ORAL at 10:14

## 2023-09-08 RX ADMIN — ROPINIROLE HYDROCHLORIDE 1 MG: 1 TABLET, FILM COATED ORAL at 21:49

## 2023-09-08 RX ADMIN — HYDROXYZINE HYDROCHLORIDE 50 MG: 25 TABLET, FILM COATED ORAL at 21:49

## 2023-09-08 RX ADMIN — PSYLLIUM HUSK 1 PACKET: 3.4 POWDER ORAL at 10:17

## 2023-09-08 RX ADMIN — CARBIDOPA AND LEVODOPA 2 TABLET: 25; 100 TABLET ORAL at 18:58

## 2023-09-08 RX ADMIN — Medication 1000 UNITS: at 10:16

## 2023-09-08 RX ADMIN — CARBIDOPA AND LEVODOPA 2 TABLET: 25; 100 TABLET ORAL at 21:49

## 2023-09-08 RX ADMIN — TRAZODONE HYDROCHLORIDE 50 MG: 50 TABLET ORAL at 21:49

## 2023-09-08 NOTE — PLAN OF CARE
Problem: Adult Inpatient Plan of Care  Goal: Plan of Care Review  Outcome: Ongoing, Progressing  Goal: Patient-Specific Goal (Individualized)  Outcome: Ongoing, Progressing  Goal: Absence of Hospital-Acquired Illness or Injury  Outcome: Ongoing, Progressing  Intervention: Identify and Manage Fall Risk  Recent Flowsheet Documentation  Taken 9/8/2023 0600 by Diana John, RN  Safety Promotion/Fall Prevention:   activity supervised   assistive device/personal items within reach   clutter free environment maintained   fall prevention program maintained   safety round/check completed   room organization consistent  Taken 9/8/2023 0400 by Diana John RN  Safety Promotion/Fall Prevention:   activity supervised   assistive device/personal items within reach   clutter free environment maintained   fall prevention program maintained   room organization consistent   safety round/check completed  Taken 9/8/2023 0200 by Diana John RN  Safety Promotion/Fall Prevention:   activity supervised   assistive device/personal items within reach   clutter free environment maintained   fall prevention program maintained   safety round/check completed   room organization consistent  Taken 9/7/2023 2200 by Diana John RN  Safety Promotion/Fall Prevention:   activity supervised   assistive device/personal items within reach   clutter free environment maintained   fall prevention program maintained   safety round/check completed   room organization consistent  Taken 9/7/2023 2000 by Diana John, RN  Safety Promotion/Fall Prevention:   activity supervised   assistive device/personal items within reach   clutter free environment maintained   fall prevention program maintained   room organization consistent   safety round/check completed  Intervention: Prevent Skin Injury  Recent Flowsheet Documentation  Taken 9/8/2023 0600 by Diana John, RN  Body Position: weight shifting  Taken 9/8/2023 0400 by Alvaro  JIGNESH Ortiz  Body Position: weight shifting  Skin Protection:   adhesive use limited   incontinence pads utilized   transparent dressing maintained   tubing/devices free from skin contact  Taken 9/8/2023 0200 by Diana John RN  Body Position: weight shifting  Taken 9/8/2023 0000 by Diana John RN  Skin Protection:   adhesive use limited   incontinence pads utilized   tubing/devices free from skin contact   transparent dressing maintained  Taken 9/7/2023 2200 by Diana John RN  Body Position: weight shifting  Taken 9/7/2023 2000 by Diana John RN  Body Position: weight shifting  Skin Protection:   adhesive use limited   incontinence pads utilized   transparent dressing maintained   tubing/devices free from skin contact  Intervention: Prevent and Manage VTE (Venous Thromboembolism) Risk  Recent Flowsheet Documentation  Taken 9/8/2023 0600 by Diana John RN  Activity Management: activity minimized  Taken 9/8/2023 0400 by Diana John RN  Activity Management: activity minimized  Taken 9/8/2023 0200 by Diana John RN  Activity Management: activity minimized  Taken 9/7/2023 2200 by Diana John RN  Activity Management: activity minimized  Taken 9/7/2023 2000 by Diana John RN  Activity Management: activity minimized  Range of Motion: active ROM (range of motion) encouraged  Goal: Optimal Comfort and Wellbeing  Outcome: Ongoing, Progressing  Intervention: Provide Person-Centered Care  Recent Flowsheet Documentation  Taken 9/7/2023 2000 by Diana John RN  Trust Relationship/Rapport:   care explained   choices provided   emotional support provided  Goal: Readiness for Transition of Care  Outcome: Ongoing, Progressing     Problem: Fall Injury Risk  Goal: Absence of Fall and Fall-Related Injury  Outcome: Ongoing, Progressing  Intervention: Promote Injury-Free Environment  Recent Flowsheet Documentation  Taken 9/8/2023 0600 by Diana John RN  Safety Promotion/Fall  Prevention:   activity supervised   assistive device/personal items within reach   clutter free environment maintained   fall prevention program maintained   safety round/check completed   room organization consistent  Taken 9/8/2023 0400 by Diana John, RN  Safety Promotion/Fall Prevention:   activity supervised   assistive device/personal items within reach   clutter free environment maintained   fall prevention program maintained   room organization consistent   safety round/check completed  Taken 9/8/2023 0200 by Diana John, RN  Safety Promotion/Fall Prevention:   activity supervised   assistive device/personal items within reach   clutter free environment maintained   fall prevention program maintained   safety round/check completed   room organization consistent  Taken 9/7/2023 2200 by Diana John, RN  Safety Promotion/Fall Prevention:   activity supervised   assistive device/personal items within reach   clutter free environment maintained   fall prevention program maintained   safety round/check completed   room organization consistent  Taken 9/7/2023 2000 by Diana John, RN  Safety Promotion/Fall Prevention:   activity supervised   assistive device/personal items within reach   clutter free environment maintained   fall prevention program maintained   room organization consistent   safety round/check completed     Problem: Skin Injury Risk Increased  Goal: Skin Health and Integrity  Outcome: Ongoing, Progressing  Intervention: Optimize Skin Protection  Recent Flowsheet Documentation  Taken 9/8/2023 0600 by Diana John, RN  Head of Bed (HOB) Positioning: HOB elevated  Taken 9/8/2023 0400 by Diana John, RN  Pressure Reduction Techniques:   frequent weight shift encouraged   weight shift assistance provided  Head of Bed (HOB) Positioning: HOB elevated  Pressure Reduction Devices:   specialty bed utilized   pressure-redistributing mattress utilized  Skin Protection:   adhesive  use limited   incontinence pads utilized   transparent dressing maintained   tubing/devices free from skin contact  Taken 9/8/2023 0200 by Diana John RN  Head of Bed ([facility]) Positioning: HOB elevated  Taken 9/8/2023 0000 by Diana John RN  Pressure Reduction Techniques:   frequent weight shift encouraged   weight shift assistance provided  Pressure Reduction Devices:   specialty bed utilized   pressure-redistributing mattress utilized  Skin Protection:   adhesive use limited   incontinence pads utilized   tubing/devices free from skin contact   transparent dressing maintained  Taken 9/7/2023 2200 by Diana John RN  Head of Bed ([facility]) Positioning: HOB elevated  Taken 9/7/2023 2000 by Diana John RN  Pressure Reduction Techniques:   heels elevated off bed   weight shift assistance provided  Head of Bed ([facility]) Positioning: HOB elevated  Pressure Reduction Devices:   pressure-redistributing mattress utilized   specialty bed utilized  Skin Protection:   adhesive use limited   incontinence pads utilized   transparent dressing maintained   tubing/devices free from skin contact     Problem: Behavioral Health Comorbidity  Goal: Maintenance of Behavioral Health Symptom Control  Outcome: Ongoing, Progressing     Problem: Hypertension Comorbidity  Goal: Blood Pressure in Desired Range  Outcome: Ongoing, Progressing     Problem: Osteoarthritis Comorbidity  Goal: Maintenance of Osteoarthritis Symptom Control  Outcome: Ongoing, Progressing  Intervention: Maintain Osteoarthritis Symptom Control  Recent Flowsheet Documentation  Taken 9/8/2023 0600 by Diana John RN  Activity Management: activity minimized  Taken 9/8/2023 0400 by Diana John RN  Activity Management: activity minimized  Taken 9/8/2023 0200 by Diana Jhon, RN  Activity Management: activity minimized  Taken 9/7/2023 2200 by Diana John, RN  Activity Management: activity minimized  Taken 9/7/2023 2000 by Diana John  RN  Activity Management: activity minimized     Problem: Pain Chronic (Persistent) (Comorbidity Management)  Goal: Acceptable Pain Control and Functional Ability  Outcome: Ongoing, Progressing  Intervention: Optimize Psychosocial Wellbeing  Recent Flowsheet Documentation  Taken 9/7/2023 2000 by Diana John, RN  Supportive Measures: self-care encouraged  Diversional Activities: television  Spiritual Activities Assistance: music provided  Family/Support System Care: support provided

## 2023-09-08 NOTE — ED PROCEDURE NOTE
Continued Stay Note  [facility]     Patient Name: Radha Vaerla  MRN: [id number]  Today's Date: 9/8/2023    Admit Date: 9/4/2023        Discharge Plan       Row Name 09/08/23 1533       Plan    Plan Comments Case management spoke with [facility] admissions at cell [phone] and they do have a respite with therapy that could be an option that is not covered by insurance at [facility] and this information has been given to the patients daughter on Friday. Case management will continue to follow for discharge planning needs.                   Discharge Codes    No documentation.                 Expected Discharge Date and Time       Expected Discharge Date Expected Discharge Time    Sep 8, 2023               JOAO Raya

## 2023-09-08 NOTE — PROGRESS NOTES
[facility] Medicine Services  PROGRESS NOTE    Patient Name: Radha Varela  : 1935  MRN: [id number]    Date of Admission: 2023  Primary Care Physician: Provider, No Known    Subjective   Subjective     CC:  Follow-up diarrhea    HPI:  Patient seen resting in bed no apparent distress.  No acute events overnight per nursing.  He is concerned about when he will be allowed to leave the hospital.  We discussed that case management is attempting to arrange placement.  He notes that diarrhea has improved.  No new complaints at this time.    ROS:  Unable to reliably obtain at this time.    Objective   Objective     Vital Signs:   Temp:  [97.6 °F (36.4 °C)-98.7 °F (37.1 °C)] 97.6 °F (36.4 °C)  Heart Rate:  [63-73] 63  Resp:  [16] 16  BP: (107-159)/(72-88) 138/88  Flow (L/min):  [2] 2     Physical Exam:  Constitutional: No acute distress, awake, alert, frail, chronically ill-appearing  HENT: NCAT, mucous membranes moist  Respiratory: Clear to auscultation bilaterally, respiratory effort normal   Cardiovascular: RRR, no murmurs, palpable pedal pulses bilaterally, cap refill brisk   Gastrointestinal: Positive bowel sounds, soft, nontender, nondistended  Musculoskeletal: No BLE edema   Psychiatric: Flat affect, cooperative  Neurologic: Oriented to self, moves all extremities, speech clear  Skin: warm, dry, no visible rash       Results Reviewed:  LAB RESULTS:      Lab 23  0809   WBC 5.26 6.40 6.55   HEMOGLOBIN 8.7* 9.3* 9.7*   HEMATOCRIT 28.2* 29.4* 30.8*   PLATELETS 130* 129* 135*   NEUTROS ABS  --  4.22 4.79   IMMATURE GRANS (ABS)  --  0.03 0.03   LYMPHS ABS  --  0.98 0.73   MONOS ABS  --  0.88 0.75   EOS ABS  --  0.26 0.21   .1* 100.7* 102.0*   CRP  --   --  1.62*   LACTATE  --   --  1.4   LDH  --   --  237*         Lab 23  0356 2305/23  0849 23  1224 23  0809   SODIUM 144  --  143 139 141    POTASSIUM 4.3 4.1 3.5 3.2* 2.9*   CHLORIDE 112*  --  109* 105 104   CO2 25.0  --  24.0 24.0 25.0   ANION GAP 7.0  --  10.0 10.0 12.0   BUN 17  --  19 23 25*   CREATININE 1.61*  --  1.63* 2.02* 2.26*   EGFR 41.1*  --  40.5* 31.3* 27.4*   GLUCOSE 82  --  81 91 92   CALCIUM 8.5*  --  8.3* 8.3* 8.9   MAGNESIUM  --   --   --   --  2.1         Lab 09/04/23  0809   TOTAL PROTEIN 6.4   ALBUMIN 3.9   GLOBULIN 2.5   ALT (SGPT) <5   AST (SGOT) 20   BILIRUBIN 0.6   ALK PHOS 75         Lab 09/04/23  1034 09/04/23  0809   HSTROP T 89* 111*             Lab 09/04/23  0809   IRON 39*   IRON SATURATION (TSAT) 18*   TIBC 222*   TRANSFERRIN 149*   FERRITIN 253.20   FOLATE 6.45   VITAMIN B 12 >2,000*         Brief Urine Lab Results  (Last result in the past 365 days)        Color   Clarity   Blood   Leuk Est   Nitrite   Protein   CREAT   Urine HCG        09/04/23 1034 Yellow   Clear   Negative   Negative   Negative   Negative                   Microbiology Results Abnormal       Procedure Component Value - Date/Time    Gastrointestinal Panel, PCR - Stool, Per Rectum [[id number]]  (Normal) Collected: 09/04/23 1435    Lab Status: Final result Specimen: Stool from Per Rectum Updated: 09/04/23 1627     Campylobacter Not Detected     Plesiomonas shigelloides Not Detected     Salmonella Not Detected     Vibrio Not Detected     Vibrio cholerae Not Detected     Yersinia enterocolitica Not Detected     Enteroaggregative E. coli (EAEC) Not Detected     Enteropathogenic E. coli (EPEC) Not Detected     Enterotoxigenic E. coli (ETEC) lt/st Not Detected     Shiga-like toxin-producing E. coli (STEC) stx1/stx2 Not Detected     Shigella/Enteroinvasive E. coli (EIEC) Not Detected     Cryptosporidium Not Detected     Cyclospora cayetanensis Not Detected     Entamoeba histolytica Not Detected     Giardia lamblia Not Detected     Adenovirus F40/41 Not Detected     Astrovirus Not Detected     Norovirus GI/GII Not Detected     Rotavirus A Not Detected      Sapovirus (I, II, IV or V) Not Detected    COVID PRE-OP / PRE-PROCEDURE SCREENING ORDER (NO ISOLATION) - Swab, Nasopharynx [[id number]]  (Normal) Collected: 09/04/23 0842    Lab Status: Final result Specimen: Swab from Nasopharynx Updated: 09/04/23 1042    Narrative:      The following orders were created for panel order COVID PRE-OP / PRE-PROCEDURE SCREENING ORDER (NO ISOLATION) - Swab, Nasopharynx.  Procedure                               Abnormality         Status                     ---------                               -----------         ------                     COVID-19 and FLU A/B PCR...[[id number]]  Normal              Final result                 Please view results for these tests on the individual orders.    COVID-19 and FLU A/B PCR - Swab, Nasopharynx [[id number]]  (Normal) Collected: 09/04/23 0842    Lab Status: Final result Specimen: Swab from Nasopharynx Updated: 09/04/23 1042     COVID19 Not Detected     Influenza A PCR Not Detected     Influenza B PCR Not Detected    Narrative:      Fact sheet for providers: https://www.fda.gov/media/864444/download    Fact sheet for patients: https://www.fda.gov/media/994367/download    Test performed by PCR.            No radiology results from the last 24 hrs    Results for orders placed during the hospital encounter of 08/23/23    Adult Transthoracic Echo Complete W/ Cont if Necessary Per Protocol    Interpretation Summary  1.  Normal left ventricular size and systolic function, LVEF 55-60%.  2.  Mild to moderate concentric LVH.  3.  Grade 1 diastolic dysfunction.  4.  Normal right ventricular size and systolic function.  5.  Normal left atrial volume index.  6.  Moderate calcification of the aortic valve without significant stenosis.  7.  Trace aortic regurgitation.  8.  Borderline dilation of the aortic root.      Current medications:  Scheduled Meds:amLODIPine, 2.5 mg, Oral, Daily  carbidopa-levodopa, 2 tablet, Oral, 4x Daily  cholecalciferol, 1,000 Units,  Oral, Daily  escitalopram, 10 mg, Oral, Daily  psyllium, 1 packet, Oral, Daily  rOPINIRole, 1 mg, Oral, 4x Daily  traZODone, 50 mg, Oral, Nightly  vitamin B-12, 1,000 mcg, Oral, Daily      Continuous Infusions:   PRN Meds:.  acetaminophen    famotidine    Magnesium Low Dose Replacement - Follow Nurse / BPA Driven Protocol    Potassium Replacement - Follow Nurse / BPA Driven Protocol    sodium chloride    Assessment & Plan   Assessment & Plan     Active Hospital Problems    Diagnosis  POA    Hypokalemia [E87.6]  Yes    Fall [W19.XXXA]  Yes    Acute kidney injury superimposed on CKD [N17.9, N18.9]  Yes    Diarrhea [R19.7]  Yes    Parkinson's disease dementia [G20, F02.80]  Yes    Coronary artery disease involving native coronary artery of native heart without angina pectoris [I25.10]  Yes    Abdominal aortic aneurysm (AAA) without rupture [I71.40]  Yes      Resolved Hospital Problems   No resolved problems to display.        Brief Hospital Course to date:  Radha Varela is a 87 y.o. male   with HO PAD, AAA, HTN, CAD with past medical history significant for CABG, and parkinsons who presented to the ED on 9/4/2023 with one month of diarrhea now with a fall and hypokalemia.      This patient's problems and plans were partially entered by my partner and updated as appropriate by me 09/08/23.      Diarrhea  -stool PCR negative  -GI consulted. diarrhea improved after stopping Entacapone and adding metamucil   -AM labs      Hypokalemia  -K+ improving as diarrhea is improving  -Resolved     ADITHYA/CKD  - baseline creatinine 1.7-2  -- stable      Parkinson's  Hospital delirium  -continue sinement/requip  -comtan held for diarrhea  -PT/OT, plan for rehab      CAD  PAD  AAA  -followed at      Expected Discharge Location and Transportation: rehab   Expected Discharge   Expected Discharge Date: 9/8/2023; Expected Discharge Time:       DVT prophylaxis:  No DVT prophylaxis order currently exists.     AM-PAC 6 Clicks Score  (PT): 17 (09/07/23 1118)    CODE STATUS:   Code Status and Medical Interventions:   Ordered at: 09/04/23 1053     Code Status (Patient has no pulse and is not breathing):    No CPR (Do Not Attempt to Resuscitate)     Medical Interventions (Patient has pulse or is breathing):    Full Support       Regino Randolph, [place]  09/08/23

## 2023-09-09 LAB
ANION GAP SERPL CALCULATED.3IONS-SCNC: 7 MMOL/L (ref 5–15)
BUN SERPL-MCNC: 16 MG/DL (ref 8–23)
BUN/CREAT SERPL: 11.6 (ref 7–25)
CALCIUM SPEC-SCNC: 8.5 MG/DL (ref 8.6–10.5)
CHLORIDE SERPL-SCNC: 108 MMOL/L (ref 98–107)
CO2 SERPL-SCNC: 24 MMOL/L (ref 22–29)
CREAT SERPL-MCNC: 1.38 MG/DL (ref 0.76–1.27)
DEPRECATED RDW RBC AUTO: 52.4 FL (ref 37–54)
EGFRCR SERPLBLD CKD-EPI 2021: 49.5 ML/MIN/1.73
ERYTHROCYTE [DISTWIDTH] IN BLOOD BY AUTOMATED COUNT: 14.2 % (ref 12.3–15.4)
GLUCOSE SERPL-MCNC: 85 MG/DL (ref 65–99)
HCT VFR BLD AUTO: 26.9 % (ref 37.5–51)
HGB BLD-MCNC: 8.4 G/DL (ref 13–17.7)
MCH RBC QN AUTO: 31.8 PG (ref 26.6–33)
MCHC RBC AUTO-ENTMCNC: 31.2 G/DL (ref 31.5–35.7)
MCV RBC AUTO: 101.9 FL (ref 79–97)
PLATELET # BLD AUTO: 136 10*3/MM3 (ref 140–450)
PMV BLD AUTO: 9.6 FL (ref 6–12)
POTASSIUM SERPL-SCNC: 4.4 MMOL/L (ref 3.5–5.2)
RBC # BLD AUTO: 2.64 10*6/MM3 (ref 4.14–5.8)
SODIUM SERPL-SCNC: 139 MMOL/L (ref 136–145)
WBC NRBC COR # BLD: 6.49 10*3/MM3 (ref 3.4–10.8)

## 2023-09-09 PROCEDURE — 85027 COMPLETE CBC AUTOMATED: CPT | Performed by: NURSE PRACTITIONER

## 2023-09-09 PROCEDURE — G0378 HOSPITAL OBSERVATION PER HR: HCPCS

## 2023-09-09 PROCEDURE — 80048 BASIC METABOLIC PNL TOTAL CA: CPT | Performed by: NURSE PRACTITIONER

## 2023-09-09 PROCEDURE — 99231 SBSQ HOSP IP/OBS SF/LOW 25: CPT | Performed by: NURSE PRACTITIONER

## 2023-09-09 RX ADMIN — Medication 1000 UNITS: at 09:13

## 2023-09-09 RX ADMIN — ESCITALOPRAM OXALATE 10 MG: 10 TABLET ORAL at 09:17

## 2023-09-09 RX ADMIN — ROPINIROLE HYDROCHLORIDE 1 MG: 1 TABLET, FILM COATED ORAL at 14:34

## 2023-09-09 RX ADMIN — PSYLLIUM HUSK 1 PACKET: 3.4 POWDER ORAL at 09:19

## 2023-09-09 RX ADMIN — CYANOCOBALAMIN TAB 1000 MCG 1000 MCG: 1000 TAB at 09:13

## 2023-09-09 RX ADMIN — CARBIDOPA AND LEVODOPA 2 TABLET: 25; 100 TABLET ORAL at 17:37

## 2023-09-09 RX ADMIN — HYDROXYZINE HYDROCHLORIDE 50 MG: 25 TABLET, FILM COATED ORAL at 20:26

## 2023-09-09 RX ADMIN — ROPINIROLE HYDROCHLORIDE 1 MG: 1 TABLET, FILM COATED ORAL at 20:26

## 2023-09-09 RX ADMIN — ROPINIROLE HYDROCHLORIDE 1 MG: 1 TABLET, FILM COATED ORAL at 09:13

## 2023-09-09 RX ADMIN — CARBIDOPA AND LEVODOPA 2 TABLET: 25; 100 TABLET ORAL at 20:26

## 2023-09-09 RX ADMIN — Medication 10 ML: at 20:26

## 2023-09-09 RX ADMIN — TRAZODONE HYDROCHLORIDE 50 MG: 50 TABLET ORAL at 20:26

## 2023-09-09 RX ADMIN — AMLODIPINE BESYLATE 2.5 MG: 5 TABLET ORAL at 09:14

## 2023-09-09 RX ADMIN — ROPINIROLE HYDROCHLORIDE 1 MG: 1 TABLET, FILM COATED ORAL at 17:38

## 2023-09-09 RX ADMIN — CARBIDOPA AND LEVODOPA 2 TABLET: 25; 100 TABLET ORAL at 14:34

## 2023-09-09 NOTE — PROGRESS NOTES
[facility] Medicine Services  PROGRESS NOTE    Patient Name: Radha Varela  : 1935  MRN: [id number]    Date of Admission: 2023  Primary Care Physician: Provider, No Known    Subjective   Subjective     CC:  Follow-up diarrhea    HPI:  Pt resting in bed stating he thinks the diarrhea is getting better. Last recorded BM was 2 days ago. He denies nausea, vomiting, abdominal pain. Appears Entacapone was causing diarrhea. Cr improved today from 1.61 yesterday to 1.38 today.     ROS:  Gen- No fevers, chills  CV- No chest pain, palpitations  Resp- No cough, dyspnea  GI- No N/V/D, abd pain      Objective   Objective     Vital Signs:   Temp:  [97.5 °F (36.4 °C)-98.7 °F (37.1 °C)] 97.8 °F (36.6 °C)  Heart Rate:  [62-73] 68  Resp:  [16-18] 18  BP: (134-159)/(69-80) 152/72  Flow (L/min):  [2] 2     Physical Exam:  Constitutional: Awake, alert, NAD  HENT: NCAT, mucous membranes moist  Respiratory: Clear to auscultation bilaterally, nonlabored respirations   Cardiovascular: RRR, no murmurs, rubs, or gallops  Gastrointestinal: Positive bowel sounds, soft, nontender, nondistended  Musculoskeletal: No bilateral ankle edema  Psychiatric: Appropriate affect, cooperative  Neurologic: Nonfocal, [place], speech clear  Skin: No rashes        Results Reviewed:  LAB RESULTS:      Lab 23  0321 23  0356 23  0333 23  0809   WBC 6.49 5.26 6.40 6.55   HEMOGLOBIN 8.4* 8.7* 9.3* 9.7*   HEMATOCRIT 26.9* 28.2* 29.4* 30.8*   PLATELETS 136* 130* 129* 135*   NEUTROS ABS  --   --  4.22 4.79   IMMATURE GRANS (ABS)  --   --  0.03 0.03   LYMPHS ABS  --   --  0.98 0.73   MONOS ABS  --   --  0.88 0.75   EOS ABS  --   --  0.26 0.21   .9* 101.1* 100.7* 102.0*   CRP  --   --   --  1.62*   LACTATE  --   --   --  1.4   LDH  --   --   --  237*           Lab 23  0321 23  0356 23  2036 23  0849 23  1224 23  0809   SODIUM 139 144  --  143 139 141    POTASSIUM 4.4 4.3 4.1 3.5 3.2* 2.9*   CHLORIDE 108* 112*  --  109* 105 104   CO2 24.0 25.0  --  24.0 24.0 25.0   ANION GAP 7.0 7.0  --  10.0 10.0 12.0   BUN 16 17  --  19 23 25*   CREATININE 1.38* 1.61*  --  1.63* 2.02* 2.26*   EGFR 49.5* 41.1*  --  40.5* 31.3* 27.4*   GLUCOSE 85 82  --  81 91 92   CALCIUM 8.5* 8.5*  --  8.3* 8.3* 8.9   MAGNESIUM  --   --   --   --   --  2.1           Lab 09/04/23  0809   TOTAL PROTEIN 6.4   ALBUMIN 3.9   GLOBULIN 2.5   ALT (SGPT) <5   AST (SGOT) 20   BILIRUBIN 0.6   ALK PHOS 75           Lab 09/04/23  1034 09/04/23  0809   HSTROP T 89* 111*               Lab 09/04/23  0809   IRON 39*   IRON SATURATION (TSAT) 18*   TIBC 222*   TRANSFERRIN 149*   FERRITIN 253.20   FOLATE 6.45   VITAMIN B 12 >2,000*           Brief Urine Lab Results  (Last result in the past 365 days)        Color   Clarity   Blood   Leuk Est   Nitrite   Protein   CREAT   Urine HCG        09/04/23 1034 Yellow   Clear   Negative   Negative   Negative   Negative                   Microbiology Results Abnormal       Procedure Component Value - Date/Time    Gastrointestinal Panel, PCR - Stool, Per Rectum [[id number]]  (Normal) Collected: 09/04/23 1435    Lab Status: Final result Specimen: Stool from Per Rectum Updated: 09/04/23 1627     Campylobacter Not Detected     Plesiomonas shigelloides Not Detected     Salmonella Not Detected     Vibrio Not Detected     Vibrio cholerae Not Detected     Yersinia enterocolitica Not Detected     Enteroaggregative E. coli (EAEC) Not Detected     Enteropathogenic E. coli (EPEC) Not Detected     Enterotoxigenic E. coli (ETEC) lt/st Not Detected     Shiga-like toxin-producing E. coli (STEC) stx1/stx2 Not Detected     Shigella/Enteroinvasive E. coli (EIEC) Not Detected     Cryptosporidium Not Detected     Cyclospora cayetanensis Not Detected     Entamoeba histolytica Not Detected     Giardia lamblia Not Detected     Adenovirus F40/41 Not Detected     Astrovirus Not Detected     Norovirus  GI/GII Not Detected     Rotavirus A Not Detected     Sapovirus (I, II, IV or V) Not Detected    COVID PRE-OP / PRE-PROCEDURE SCREENING ORDER (NO ISOLATION) - Swab, Nasopharynx [[id number]]  (Normal) Collected: 09/04/23 0842    Lab Status: Final result Specimen: Swab from Nasopharynx Updated: 09/04/23 1042    Narrative:      The following orders were created for panel order COVID PRE-OP / PRE-PROCEDURE SCREENING ORDER (NO ISOLATION) - Swab, Nasopharynx.  Procedure                               Abnormality         Status                     ---------                               -----------         ------                     COVID-19 and FLU A/B PCR...[[id number]]  Normal              Final result                 Please view results for these tests on the individual orders.    COVID-19 and FLU A/B PCR - Swab, Nasopharynx [[id number]]  (Normal) Collected: 09/04/23 0842    Lab Status: Final result Specimen: Swab from Nasopharynx Updated: 09/04/23 1042     COVID19 Not Detected     Influenza A PCR Not Detected     Influenza B PCR Not Detected    Narrative:      Fact sheet for providers: https://www.fda.gov/media/393739/download    Fact sheet for patients: https://www.fda.gov/media/225588/download    Test performed by PCR.            No radiology results from the last 24 hrs    Results for orders placed during the hospital encounter of 08/23/23    Adult Transthoracic Echo Complete W/ Cont if Necessary Per Protocol    Interpretation Summary  1.  Normal left ventricular size and systolic function, LVEF 55-60%.  2.  Mild to moderate concentric LVH.  3.  Grade 1 diastolic dysfunction.  4.  Normal right ventricular size and systolic function.  5.  Normal left atrial volume index.  6.  Moderate calcification of the aortic valve without significant stenosis.  7.  Trace aortic regurgitation.  8.  Borderline dilation of the aortic root.      Current medications:  Scheduled Meds:amLODIPine, 2.5 mg, Oral, Daily  carbidopa-levodopa, 2  tablet, Oral, 4x Daily  cholecalciferol, 1,000 Units, Oral, Daily  escitalopram, 10 mg, Oral, Daily  hydrOXYzine, 50 mg, Oral, Nightly  psyllium, 1 packet, Oral, Daily  rOPINIRole, 1 mg, Oral, 4x Daily  traZODone, 50 mg, Oral, Nightly  vitamin B-12, 1,000 mcg, Oral, Daily      Continuous Infusions:   PRN Meds:.  acetaminophen    famotidine    Magnesium Low Dose Replacement - Follow Nurse / BPA Driven Protocol    Potassium Replacement - Follow Nurse / BPA Driven Protocol    sodium chloride    Assessment & Plan   Assessment & Plan     Active Hospital Problems    Diagnosis  POA    Hypokalemia [E87.6]  Yes    Fall [W19.XXXA]  Yes    Acute kidney injury superimposed on CKD [N17.9, N18.9]  Yes    Diarrhea [R19.7]  Yes    Parkinson's disease dementia [G20, F02.80]  Yes    Coronary artery disease involving native coronary artery of native heart without angina pectoris [I25.10]  Yes    Abdominal aortic aneurysm (AAA) without rupture [I71.40]  Yes      Resolved Hospital Problems   No resolved problems to display.        Brief Hospital Course to date:  Radha Varela is a 87 y.o. male   with HO PAD, AAA, HTN, CAD with past medical history significant for CABG, and parkinsons who presented to the ED on 9/4/2023 with one month of diarrhea now with a fall and hypokalemia. GI consulted. Stool PCR negative. Diarrhea resolved after stopping Entacapone and adding metamucil. Potassium was supplemented and corrected after the diarrhea resolved. Creatinine returned to baseline.      This patient's problems and plans were partially entered by my partner and updated as appropriate by me 09/09/23.      Diarrhea  -stool PCR negative  -GI consulted. diarrhea improved after stopping Entacapone and adding metamucil   -AM labs      Hypokalemia  -K+ improving as diarrhea is improving  -Resolved     ADITHYA/CKD  - baseline creatinine 1.7-2  -- stable      Parkinson's  Hospital delirium  -continue sinement/requip  -comtan held for  diarrhea  -PT/OT, plan for rehab      CAD  PAD  AAA  -followed at      Expected Discharge Location and Transportation: rehab   Expected Discharge   09/11/2023     DVT prophylaxis:  No DVT prophylaxis order currently exists.     AM-PAC 6 Clicks Score (PT): 16 (09/08/23 0800)    CODE STATUS:   Code Status and Medical Interventions:   Ordered at: 09/04/23 1053     Code Status (Patient has no pulse and is not breathing):    No CPR (Do Not Attempt to Resuscitate)     Medical Interventions (Patient has pulse or is breathing):    Full Support       Rosa Sparks, [place]  09/09/23

## 2023-09-09 NOTE — PLAN OF CARE
Problem: Adult Inpatient Plan of Care  Goal: Plan of Care Review  Outcome: Ongoing, Not Progressing  Goal: Patient-Specific Goal (Individualized)  Outcome: Ongoing, Not Progressing  Goal: Absence of Hospital-Acquired Illness or Injury  Outcome: Ongoing, Not Progressing  Intervention: Identify and Manage Fall Risk  Recent Flowsheet Documentation  Taken 9/9/2023 0600 by Diana John, RN  Safety Promotion/Fall Prevention:   safety round/check completed   room organization consistent   fall prevention program maintained   clutter free environment maintained   assistive device/personal items within reach   activity supervised  Taken 9/9/2023 0400 by Diana John, RN  Safety Promotion/Fall Prevention:   safety round/check completed   room organization consistent   fall prevention program maintained   clutter free environment maintained   assistive device/personal items within reach   activity supervised  Taken 9/9/2023 0200 by Diana John, RN  Safety Promotion/Fall Prevention:   safety round/check completed   room organization consistent   fall prevention program maintained   clutter free environment maintained   assistive device/personal items within reach   activity supervised  Taken 9/9/2023 0000 by Diana John, RN  Safety Promotion/Fall Prevention:   safety round/check completed   room organization consistent   fall prevention program maintained   clutter free environment maintained   assistive device/personal items within reach   activity supervised  Taken 9/8/2023 2200 by Diana John, RN  Safety Promotion/Fall Prevention:   safety round/check completed   room organization consistent   fall prevention program maintained   clutter free environment maintained   assistive device/personal items within reach   activity supervised  Taken 9/8/2023 2000 by Diana John, RN  Safety Promotion/Fall Prevention:   activity supervised   assistive device/personal items within reach   clutter free  environment maintained   fall prevention program maintained   safety round/check completed   room organization consistent  Intervention: Prevent Skin Injury  Recent Flowsheet Documentation  Taken 9/9/2023 0600 by Diana John RN  Body Position: position changed independently  Taken 9/9/2023 0400 by Diana John RN  Body Position: position changed independently  Taken 9/9/2023 0200 by Diana John RN  Body Position: weight shifting  Taken 9/9/2023 0000 by Diana John RN  Body Position: weight shifting  Skin Protection:   adhesive use limited   incontinence pads utilized   transparent dressing maintained   tubing/devices free from skin contact  Taken 9/8/2023 2200 by Diana John RN  Body Position: weight shifting  Taken 9/8/2023 2000 by Diana John RN  Body Position: weight shifting  Intervention: Prevent and Manage VTE (Venous Thromboembolism) Risk  Recent Flowsheet Documentation  Taken 9/9/2023 0600 by Diana John RN  Activity Management: activity minimized  Taken 9/9/2023 0400 by Diana John RN  Activity Management: activity minimized  Taken 9/9/2023 0200 by Diana John RN  Activity Management: activity minimized  Taken 9/9/2023 0000 by Diana John RN  Activity Management: activity minimized  Taken 9/8/2023 2200 by Diana John RN  Activity Management: activity minimized  Taken 9/8/2023 2000 by Diana John RN  Activity Management: activity encouraged  Range of Motion: active ROM (range of motion) encouraged  Goal: Optimal Comfort and Wellbeing  Outcome: Ongoing, Not Progressing  Goal: Readiness for Transition of Care  Outcome: Ongoing, Not Progressing     Problem: Fall Injury Risk  Goal: Absence of Fall and Fall-Related Injury  Outcome: Ongoing, Not Progressing  Intervention: Identify and Manage Contributors  Recent Flowsheet Documentation  Taken 9/8/2023 2000 by Diana John RN  Medication Review/Management: medications  reviewed  Intervention: Promote Injury-Free Environment  Recent Flowsheet Documentation  Taken 9/9/2023 0600 by Diana John, RN  Safety Promotion/Fall Prevention:   safety round/check completed   room organization consistent   fall prevention program maintained   clutter free environment maintained   assistive device/personal items within reach   activity supervised  Taken 9/9/2023 0400 by Diana John, RN  Safety Promotion/Fall Prevention:   safety round/check completed   room organization consistent   fall prevention program maintained   clutter free environment maintained   assistive device/personal items within reach   activity supervised  Taken 9/9/2023 0200 by Diana John, RN  Safety Promotion/Fall Prevention:   safety round/check completed   room organization consistent   fall prevention program maintained   clutter free environment maintained   assistive device/personal items within reach   activity supervised  Taken 9/9/2023 0000 by Diana John, RN  Safety Promotion/Fall Prevention:   safety round/check completed   room organization consistent   fall prevention program maintained   clutter free environment maintained   assistive device/personal items within reach   activity supervised  Taken 9/8/2023 2200 by Diana John, RN  Safety Promotion/Fall Prevention:   safety round/check completed   room organization consistent   fall prevention program maintained   clutter free environment maintained   assistive device/personal items within reach   activity supervised  Taken 9/8/2023 2000 by Diana John, RN  Safety Promotion/Fall Prevention:   activity supervised   assistive device/personal items within reach   clutter free environment maintained   fall prevention program maintained   safety round/check completed   room organization consistent     Problem: Skin Injury Risk Increased  Goal: Skin Health and Integrity  Outcome: Ongoing, Not Progressing  Intervention: Optimize Skin  Protection  Recent Flowsheet Documentation  Taken 9/9/2023 0600 by Diana John RN  Head of Bed ([facility]) Positioning: HOB elevated  Taken 9/9/2023 0400 by Diana John RN  Head of Bed ([facility]) Positioning: HOB elevated  Taken 9/9/2023 0200 by Diana John RN  Head of Bed ([facility]) Positioning: HOB elevated  Taken 9/9/2023 0000 by Diana John RN  Pressure Reduction Techniques:   frequent weight shift encouraged   weight shift assistance provided  Head of Bed ([facility]) Positioning: HOB elevated  Pressure Reduction Devices:   specialty bed utilized   pressure-redistributing mattress utilized  Skin Protection:   adhesive use limited   incontinence pads utilized   transparent dressing maintained   tubing/devices free from skin contact  Taken 9/8/2023 2200 by Diana John RN  Head of Bed ([facility]) Positioning: HOB elevated  Taken 9/8/2023 2000 by Diana John RN  Head of Bed ([facility]) Positioning: HOB elevated     Problem: Behavioral Health Comorbidity  Goal: Maintenance of Behavioral Health Symptom Control  Outcome: Ongoing, Not Progressing  Intervention: Maintain Behavioral Health Symptom Control  Recent Flowsheet Documentation  Taken 9/8/2023 2000 by Diana John RN  Medication Review/Management: medications reviewed     Problem: Hypertension Comorbidity  Goal: Blood Pressure in Desired Range  Outcome: Ongoing, Not Progressing  Intervention: Maintain Blood Pressure Management  Recent Flowsheet Documentation  Taken 9/8/2023 2000 by Diana John RN  Medication Review/Management: medications reviewed     Problem: Osteoarthritis Comorbidity  Goal: Maintenance of Osteoarthritis Symptom Control  Outcome: Ongoing, Not Progressing  Intervention: Maintain Osteoarthritis Symptom Control  Recent Flowsheet Documentation  Taken 9/9/2023 0600 by Diana John, RN  Activity Management: activity minimized  Taken 9/9/2023 0400 by Diana John, RN  Activity Management: activity minimized  Taken 9/9/2023 0200 by  Diana John, RN  Activity Management: activity minimized  Taken 9/9/2023 0000 by Diana John, RN  Activity Management: activity minimized  Taken 9/8/2023 2200 by Diana John, RN  Activity Management: activity minimized  Taken 9/8/2023 2000 by Diana John, RN  Activity Management: activity encouraged  Medication Review/Management: medications reviewed     Problem: Pain Chronic (Persistent) (Comorbidity Management)  Goal: Acceptable Pain Control and Functional Ability  Outcome: Ongoing, Not Progressing  Intervention: Manage Persistent Pain  Recent Flowsheet Documentation  Taken 9/8/2023 2000 by Diana John, RN  Medication Review/Management: medications reviewed  Intervention: Optimize Psychosocial Wellbeing  Recent Flowsheet Documentation  Taken 9/8/2023 2000 by Diana John, RN  Diversional Activities: television  Family/Support System Care: support provided  \

## 2023-09-10 LAB
ANION GAP SERPL CALCULATED.3IONS-SCNC: 9 MMOL/L (ref 5–15)
BASOPHILS # BLD AUTO: 0.03 10*3/MM3 (ref 0–0.2)
BASOPHILS NFR BLD AUTO: 0.5 % (ref 0–1.5)
BUN SERPL-MCNC: 19 MG/DL (ref 8–23)
BUN/CREAT SERPL: 13.2 (ref 7–25)
CALCIUM SPEC-SCNC: 8.3 MG/DL (ref 8.6–10.5)
CHLORIDE SERPL-SCNC: 99 MMOL/L (ref 98–107)
CO2 SERPL-SCNC: 22 MMOL/L (ref 22–29)
CREAT SERPL-MCNC: 1.44 MG/DL (ref 0.76–1.27)
DEPRECATED RDW RBC AUTO: 55.5 FL (ref 37–54)
EGFRCR SERPLBLD CKD-EPI 2021: 47 ML/MIN/1.73
EOSINOPHIL # BLD AUTO: 0.34 10*3/MM3 (ref 0–0.4)
EOSINOPHIL NFR BLD AUTO: 6 % (ref 0.3–6.2)
ERYTHROCYTE [DISTWIDTH] IN BLOOD BY AUTOMATED COUNT: 14.3 % (ref 12.3–15.4)
GLUCOSE SERPL-MCNC: 84 MG/DL (ref 65–99)
HCT VFR BLD AUTO: 28.8 % (ref 37.5–51)
HGB BLD-MCNC: 8.8 G/DL (ref 13–17.7)
IMM GRANULOCYTES # BLD AUTO: 0.05 10*3/MM3 (ref 0–0.05)
IMM GRANULOCYTES NFR BLD AUTO: 0.9 % (ref 0–0.5)
LYMPHOCYTES # BLD AUTO: 0.8 10*3/MM3 (ref 0.7–3.1)
LYMPHOCYTES NFR BLD AUTO: 14.1 % (ref 19.6–45.3)
MCH RBC QN AUTO: 32.5 PG (ref 26.6–33)
MCHC RBC AUTO-ENTMCNC: 30.6 G/DL (ref 31.5–35.7)
MCV RBC AUTO: 106.3 FL (ref 79–97)
MONOCYTES # BLD AUTO: 0.69 10*3/MM3 (ref 0.1–0.9)
MONOCYTES NFR BLD AUTO: 12.2 % (ref 5–12)
NEUTROPHILS NFR BLD AUTO: 3.76 10*3/MM3 (ref 1.7–7)
NEUTROPHILS NFR BLD AUTO: 66.3 % (ref 42.7–76)
NRBC BLD AUTO-RTO: 0 /100 WBC (ref 0–0.2)
O+P SPEC MICRO: NORMAL
O+P STL TRI STN: NORMAL
PLATELET # BLD AUTO: 133 10*3/MM3 (ref 140–450)
PMV BLD AUTO: 9.6 FL (ref 6–12)
POTASSIUM SERPL-SCNC: 4.3 MMOL/L (ref 3.5–5.2)
RBC # BLD AUTO: 2.71 10*6/MM3 (ref 4.14–5.8)
SODIUM SERPL-SCNC: 130 MMOL/L (ref 136–145)
WBC NRBC COR # BLD: 5.67 10*3/MM3 (ref 3.4–10.8)

## 2023-09-10 PROCEDURE — 85025 COMPLETE CBC W/AUTO DIFF WBC: CPT | Performed by: NURSE PRACTITIONER

## 2023-09-10 PROCEDURE — G0378 HOSPITAL OBSERVATION PER HR: HCPCS

## 2023-09-10 PROCEDURE — 80048 BASIC METABOLIC PNL TOTAL CA: CPT | Performed by: NURSE PRACTITIONER

## 2023-09-10 PROCEDURE — 99232 SBSQ HOSP IP/OBS MODERATE 35: CPT | Performed by: NURSE PRACTITIONER

## 2023-09-10 RX ADMIN — CARBIDOPA AND LEVODOPA 2 TABLET: 25; 100 TABLET ORAL at 12:47

## 2023-09-10 RX ADMIN — CARBIDOPA AND LEVODOPA 2 TABLET: 25; 100 TABLET ORAL at 08:27

## 2023-09-10 RX ADMIN — CARBIDOPA AND LEVODOPA 2 TABLET: 25; 100 TABLET ORAL at 21:28

## 2023-09-10 RX ADMIN — ROPINIROLE HYDROCHLORIDE 1 MG: 1 TABLET, FILM COATED ORAL at 21:28

## 2023-09-10 RX ADMIN — ROPINIROLE HYDROCHLORIDE 1 MG: 1 TABLET, FILM COATED ORAL at 08:27

## 2023-09-10 RX ADMIN — ROPINIROLE HYDROCHLORIDE 1 MG: 1 TABLET, FILM COATED ORAL at 18:28

## 2023-09-10 RX ADMIN — CYANOCOBALAMIN TAB 1000 MCG 1000 MCG: 1000 TAB at 08:27

## 2023-09-10 RX ADMIN — TRAZODONE HYDROCHLORIDE 50 MG: 50 TABLET ORAL at 21:29

## 2023-09-10 RX ADMIN — ROPINIROLE HYDROCHLORIDE 1 MG: 1 TABLET, FILM COATED ORAL at 12:47

## 2023-09-10 RX ADMIN — HYDROXYZINE HYDROCHLORIDE 50 MG: 25 TABLET, FILM COATED ORAL at 21:28

## 2023-09-10 RX ADMIN — ESCITALOPRAM OXALATE 10 MG: 10 TABLET ORAL at 08:27

## 2023-09-10 RX ADMIN — Medication 1000 UNITS: at 08:27

## 2023-09-10 RX ADMIN — AMLODIPINE BESYLATE 2.5 MG: 5 TABLET ORAL at 08:28

## 2023-09-10 RX ADMIN — CARBIDOPA AND LEVODOPA 2 TABLET: 25; 100 TABLET ORAL at 18:28

## 2023-09-10 NOTE — PROGRESS NOTES
[facility] Medicine Services  PROGRESS NOTE    Patient Name: Radha Varela  : 1935  MRN: [id number]    Date of Admission: 2023  Primary Care Physician: Provider, No Known    Subjective   Subjective     CC:  Follow-up diarrhea    HPI:  Patient seen resting in bed no apparent distress.  No acute vents overnight per nursing.  Family at bedside.  Patient feels that he has gotten weaker since coming into the hospital and feels he definitely needs rehab.    Objective   Objective     Vital Signs:   Temp:  [97.8 °F (36.6 °C)-98.4 °F (36.9 °C)] 98 °F (36.7 °C)  Heart Rate:  [61-73] 67  Resp:  [16-18] 18  BP: (109-145)/(57-76) 109/60  Flow (L/min):  [2] 2     Physical Exam:  Constitutional: No acute distress, awake, alert, chronically ill-appearing  HENT: NCAT, mucous membranes moist  Respiratory: Clear to auscultation bilaterally, respiratory effort normal on room air  Cardiovascular: RRR, no murmurs, cap refill brisk   Gastrointestinal: Positive bowel sounds, soft, nontender, nondistended  Musculoskeletal: No BLE edema   Psychiatric: Flat affect, cooperative  Neurologic: Alert, moves all extremities, speech clear  Skin: warm, dry, no visible rash       Results Reviewed:  LAB RESULTS:      Lab 09/10/23  0750 23  0321 23  0356 23  0333 23  0809   WBC 5.67 6.49 5.26 6.40 6.55   HEMOGLOBIN 8.8* 8.4* 8.7* 9.3* 9.7*   HEMATOCRIT 28.8* 26.9* 28.2* 29.4* 30.8*   PLATELETS 133* 136* 130* 129* 135*   NEUTROS ABS 3.76  --   --  4.22 4.79   IMMATURE GRANS (ABS) 0.05  --   --  0.03 0.03   LYMPHS ABS 0.80  --   --  0.98 0.73   MONOS ABS 0.69  --   --  0.88 0.75   EOS ABS 0.34  --   --  0.26 0.21   .3* 101.9* 101.1* 100.7* 102.0*   CRP  --   --   --   --  1.62*   LACTATE  --   --   --   --  1.4   LDH  --   --   --   --  237*         Lab 09/10/23  0750 23  0321 23  0356 23  2036 23  0849 23  1224 23  0809   SODIUM 130* 139  144  --  143 139 141   POTASSIUM 4.3 4.4 4.3 4.1 3.5 3.2* 2.9*   CHLORIDE 99 108* 112*  --  109* 105 104   CO2 22.0 24.0 25.0  --  24.0 24.0 25.0   ANION GAP 9.0 7.0 7.0  --  10.0 10.0 12.0   BUN 19 16 17  --  19 23 25*   CREATININE 1.44* 1.38* 1.61*  --  1.63* 2.02* 2.26*   EGFR 47.0* 49.5* 41.1*  --  40.5* 31.3* 27.4*   GLUCOSE 84 85 82  --  81 91 92   CALCIUM 8.3* 8.5* 8.5*  --  8.3* 8.3* 8.9   MAGNESIUM  --   --   --   --   --   --  2.1         Lab 09/04/23  0809   TOTAL PROTEIN 6.4   ALBUMIN 3.9   GLOBULIN 2.5   ALT (SGPT) <5   AST (SGOT) 20   BILIRUBIN 0.6   ALK PHOS 75         Lab 09/04/23  1034 09/04/23  0809   HSTROP T 89* 111*             Lab 09/04/23  0809   IRON 39*   IRON SATURATION (TSAT) 18*   TIBC 222*   TRANSFERRIN 149*   FERRITIN 253.20   FOLATE 6.45   VITAMIN B 12 >2,000*         Brief Urine Lab Results  (Last result in the past 365 days)        Color   Clarity   Blood   Leuk Est   Nitrite   Protein   CREAT   Urine HCG        09/04/23 1034 Yellow   Clear   Negative   Negative   Negative   Negative                   Microbiology Results Abnormal       Procedure Component Value - Date/Time    Gastrointestinal Panel, PCR - Stool, Per Rectum [[id number]]  (Normal) Collected: 09/04/23 1435    Lab Status: Final result Specimen: Stool from Per Rectum Updated: 09/04/23 1627     Campylobacter Not Detected     Plesiomonas shigelloides Not Detected     Salmonella Not Detected     Vibrio Not Detected     Vibrio cholerae Not Detected     Yersinia enterocolitica Not Detected     Enteroaggregative E. coli (EAEC) Not Detected     Enteropathogenic E. coli (EPEC) Not Detected     Enterotoxigenic E. coli (ETEC) lt/st Not Detected     Shiga-like toxin-producing E. coli (STEC) stx1/stx2 Not Detected     Shigella/Enteroinvasive E. coli (EIEC) Not Detected     Cryptosporidium Not Detected     Cyclospora cayetanensis Not Detected     Entamoeba histolytica Not Detected     Giardia lamblia Not Detected     Adenovirus F40/41  Not Detected     Astrovirus Not Detected     Norovirus GI/GII Not Detected     Rotavirus A Not Detected     Sapovirus (I, II, IV or V) Not Detected    COVID PRE-OP / PRE-PROCEDURE SCREENING ORDER (NO ISOLATION) - Swab, Nasopharynx [[id number]]  (Normal) Collected: 09/04/23 0842    Lab Status: Final result Specimen: Swab from Nasopharynx Updated: 09/04/23 1042    Narrative:      The following orders were created for panel order COVID PRE-OP / PRE-PROCEDURE SCREENING ORDER (NO ISOLATION) - Swab, Nasopharynx.  Procedure                               Abnormality         Status                     ---------                               -----------         ------                     COVID-19 and FLU A/B PCR...[[id number]]  Normal              Final result                 Please view results for these tests on the individual orders.    COVID-19 and FLU A/B PCR - Swab, Nasopharynx [[id number]]  (Normal) Collected: 09/04/23 0842    Lab Status: Final result Specimen: Swab from Nasopharynx Updated: 09/04/23 1042     COVID19 Not Detected     Influenza A PCR Not Detected     Influenza B PCR Not Detected    Narrative:      Fact sheet for providers: https://www.fda.gov/media/283288/download    Fact sheet for patients: https://www.fda.gov/media/382012/download    Test performed by PCR.            No radiology results from the last 24 hrs    Results for orders placed during the hospital encounter of 08/23/23    Adult Transthoracic Echo Complete W/ Cont if Necessary Per Protocol    Interpretation Summary  1.  Normal left ventricular size and systolic function, LVEF 55-60%.  2.  Mild to moderate concentric LVH.  3.  Grade 1 diastolic dysfunction.  4.  Normal right ventricular size and systolic function.  5.  Normal left atrial volume index.  6.  Moderate calcification of the aortic valve without significant stenosis.  7.  Trace aortic regurgitation.  8.  Borderline dilation of the aortic root.      Current medications:  Scheduled  Meds:amLODIPine, 2.5 mg, Oral, Daily  carbidopa-levodopa, 2 tablet, Oral, 4x Daily  cholecalciferol, 1,000 Units, Oral, Daily  escitalopram, 10 mg, Oral, Daily  hydrOXYzine, 50 mg, Oral, Nightly  psyllium, 1 packet, Oral, Daily  rOPINIRole, 1 mg, Oral, 4x Daily  traZODone, 50 mg, Oral, Nightly  vitamin B-12, 1,000 mcg, Oral, Daily      Continuous Infusions:   PRN Meds:.  acetaminophen    famotidine    Magnesium Low Dose Replacement - Follow Nurse / BPA Driven Protocol    Potassium Replacement - Follow Nurse / BPA Driven Protocol    sodium chloride    Assessment & Plan   Assessment & Plan     Active Hospital Problems    Diagnosis  POA    Hypokalemia [E87.6]  Yes    Fall [W19.XXXA]  Yes    Acute kidney injury superimposed on CKD [N17.9, N18.9]  Yes    Diarrhea [R19.7]  Yes    Parkinson's disease dementia [G20, F02.80]  Yes    Coronary artery disease involving native coronary artery of native heart without angina pectoris [I25.10]  Yes    Abdominal aortic aneurysm (AAA) without rupture [I71.40]  Yes      Resolved Hospital Problems   No resolved problems to display.        Brief Hospital Course to date:  Radha Varela is a 87 y.o. male   with HO PAD, AAA, HTN, CAD with past medical history significant for CABG, and parkinsons who presented to the ED on 9/4/2023 with one month of diarrhea now with a fall and hypokalemia. GI consulted. Stool PCR negative. Diarrhea resolved after stopping Entacapone and adding metamucil. Potassium was supplemented and corrected after the diarrhea resolved. Creatinine returned to baseline.  PT/OT evaluated and recommended inpatient rehab.  Case management is following for placement.     This patient's problems and plans were partially entered by my partner and updated as appropriate by me 09/10/23.    Diarrhea  -stool PCR negative  -GI consulted. diarrhea improved after stopping Entacapone and adding metamucil   -AM labs      Hypokalemia  -K+ improving as diarrhea is  improving  -Resolved     ADITHYA/CKD  - baseline creatinine 1.7-2  -- Cr improved to 1.44 today, stable      [facility] delirium  -continue sinement/requip  -comtan held for diarrhea  -PT/OT, plan for rehab      CAD  PAD  AAA  -followed at      Expected Discharge Location and Transportation: rehab   Expected Discharge   09/12/2023     DVT prophylaxis:  No DVT prophylaxis order currently exists.     AM-PAC 6 Clicks Score (PT): 14 (09/10/23 3054)    CODE STATUS:   Code Status and Medical Interventions:   Ordered at: 09/04/23 1053     Code Status (Patient has no pulse and is not breathing):    No CPR (Do Not Attempt to Resuscitate)     Medical Interventions (Patient has pulse or is breathing):    Full Support       Regino Randolph, [place]  09/10/23

## 2023-09-10 NOTE — PLAN OF CARE
Goal Outcome Evaluation:      Pt resting comfortably in chair. Wife and daughter visited this shift. VSS on RA. One BM this shift. A fib on tele. Pt eager to leave hospital as waiting for rehab placement. Call bell within reach.

## 2023-09-11 ENCOUNTER — READMISSION MANAGEMENT (OUTPATIENT)
Dept: CALL CENTER | Facility: HOSPITAL | Age: 88
End: 2023-09-11
Payer: MEDICARE

## 2023-09-11 VITALS
HEIGHT: 67 IN | HEART RATE: 71 BPM | RESPIRATION RATE: 18 BRPM | DIASTOLIC BLOOD PRESSURE: 66 MMHG | WEIGHT: 166 LBS | TEMPERATURE: 97.5 F | BODY MASS INDEX: 26.06 KG/M2 | OXYGEN SATURATION: 95 % | SYSTOLIC BLOOD PRESSURE: 138 MMHG

## 2023-09-11 PROBLEM — R19.7 DIARRHEA: Status: RESOLVED | Noted: 2023-08-23 | Resolved: 2023-09-11

## 2023-09-11 PROBLEM — E87.6 HYPOKALEMIA: Status: ACTIVE | Noted: 2023-09-11

## 2023-09-11 PROBLEM — E87.6 HYPOKALEMIA: Status: RESOLVED | Noted: 2023-09-04 | Resolved: 2023-09-11

## 2023-09-11 PROBLEM — N18.9 ACUTE KIDNEY INJURY SUPERIMPOSED ON CKD: Status: RESOLVED | Noted: 2023-09-04 | Resolved: 2023-09-11

## 2023-09-11 PROBLEM — N17.9 ACUTE KIDNEY INJURY SUPERIMPOSED ON CKD: Status: RESOLVED | Noted: 2023-09-04 | Resolved: 2023-09-11

## 2023-09-11 LAB
ANION GAP SERPL CALCULATED.3IONS-SCNC: 6 MMOL/L (ref 5–15)
BUN SERPL-MCNC: 21 MG/DL (ref 8–23)
BUN/CREAT SERPL: 12.5 (ref 7–25)
CALCIUM SPEC-SCNC: 8.7 MG/DL (ref 8.6–10.5)
CHLORIDE SERPL-SCNC: 104 MMOL/L (ref 98–107)
CO2 SERPL-SCNC: 27 MMOL/L (ref 22–29)
CREAT SERPL-MCNC: 1.68 MG/DL (ref 0.76–1.27)
DEPRECATED RDW RBC AUTO: 52.4 FL (ref 37–54)
EGFRCR SERPLBLD CKD-EPI 2021: 39.1 ML/MIN/1.73
ERYTHROCYTE [DISTWIDTH] IN BLOOD BY AUTOMATED COUNT: 14.1 % (ref 12.3–15.4)
GLUCOSE SERPL-MCNC: 89 MG/DL (ref 65–99)
HCT VFR BLD AUTO: 29.3 % (ref 37.5–51)
HGB BLD-MCNC: 9.2 G/DL (ref 13–17.7)
MAGNESIUM SERPL-MCNC: 2 MG/DL (ref 1.6–2.4)
MCH RBC QN AUTO: 31.9 PG (ref 26.6–33)
MCHC RBC AUTO-ENTMCNC: 31.4 G/DL (ref 31.5–35.7)
MCV RBC AUTO: 101.7 FL (ref 79–97)
PLATELET # BLD AUTO: 133 10*3/MM3 (ref 140–450)
PMV BLD AUTO: 9.3 FL (ref 6–12)
POTASSIUM SERPL-SCNC: 4.2 MMOL/L (ref 3.5–5.2)
RBC # BLD AUTO: 2.88 10*6/MM3 (ref 4.14–5.8)
SODIUM SERPL-SCNC: 137 MMOL/L (ref 136–145)
WBC NRBC COR # BLD: 5.7 10*3/MM3 (ref 3.4–10.8)

## 2023-09-11 PROCEDURE — G0378 HOSPITAL OBSERVATION PER HR: HCPCS

## 2023-09-11 PROCEDURE — 80048 BASIC METABOLIC PNL TOTAL CA: CPT | Performed by: NURSE PRACTITIONER

## 2023-09-11 PROCEDURE — 85027 COMPLETE CBC AUTOMATED: CPT | Performed by: NURSE PRACTITIONER

## 2023-09-11 PROCEDURE — 97116 GAIT TRAINING THERAPY: CPT

## 2023-09-11 PROCEDURE — 97110 THERAPEUTIC EXERCISES: CPT

## 2023-09-11 PROCEDURE — 99239 HOSP IP/OBS DSCHRG MGMT >30: CPT | Performed by: NURSE PRACTITIONER

## 2023-09-11 PROCEDURE — 83735 ASSAY OF MAGNESIUM: CPT | Performed by: NURSE PRACTITIONER

## 2023-09-11 RX ORDER — POLYETHYLENE GLYCOL 3350 17 G/17G
17 POWDER, FOR SOLUTION ORAL DAILY PRN
Start: 2023-09-11

## 2023-09-11 RX ORDER — HYDROXYZINE PAMOATE 25 MG/1
25 CAPSULE ORAL NIGHTLY
Qty: 40 CAPSULE | Refills: 1 | Status: SHIPPED | OUTPATIENT
Start: 2023-09-11

## 2023-09-11 RX ADMIN — PSYLLIUM HUSK 1 PACKET: 3.4 POWDER ORAL at 08:44

## 2023-09-11 RX ADMIN — Medication 1000 UNITS: at 08:43

## 2023-09-11 RX ADMIN — Medication 10 ML: at 08:47

## 2023-09-11 RX ADMIN — CARBIDOPA AND LEVODOPA 2 TABLET: 25; 100 TABLET ORAL at 12:53

## 2023-09-11 RX ADMIN — ROPINIROLE HYDROCHLORIDE 1 MG: 1 TABLET, FILM COATED ORAL at 12:53

## 2023-09-11 RX ADMIN — AMLODIPINE BESYLATE 2.5 MG: 5 TABLET ORAL at 08:43

## 2023-09-11 RX ADMIN — CYANOCOBALAMIN TAB 1000 MCG 1000 MCG: 1000 TAB at 08:43

## 2023-09-11 RX ADMIN — ROPINIROLE HYDROCHLORIDE 1 MG: 1 TABLET, FILM COATED ORAL at 08:43

## 2023-09-11 RX ADMIN — ESCITALOPRAM OXALATE 10 MG: 10 TABLET ORAL at 08:43

## 2023-09-11 RX ADMIN — CARBIDOPA AND LEVODOPA 2 TABLET: 25; 100 TABLET ORAL at 08:44

## 2023-09-11 NOTE — THERAPY TREATMENT NOTE
Patient Name: Radha Varela  : 1935    MRN: [id number]                              Today's Date: 2023       Admit Date: 2023    Visit Dx:     ICD-10-CM ICD-9-CM   1. Fall in home, initial encounter  W19.XXXA E888.9    Y92.009 E849.0   2. Generalized weakness  R53.1 780.79   3. Volume depletion  E86.9 276.50   4. Hypokalemia  E87.6 276.8   5. Diarrhea of presumed infectious origin  R19.7 009.3     Patient Active Problem List   Diagnosis    Abdominal aortic aneurysm (AAA) without rupture    Generalized weakness    Diarrhea    Parkinson's disease dementia    Coronary artery disease involving native coronary artery of native heart without angina pectoris    Hypokalemia    Fall    Acute kidney injury superimposed on CKD     Past Medical History:   Diagnosis Date    Abdominal aortic aneurysm (AAA) without rupture 2018    Aneurysm     Arthritis     Cardiac murmur     Carotid stenosis     Celiac artery stenosis     Chest pain     CHF (congestive heart failure)     Chronic kidney disease     Coronary artery disease     Dyslipidemia     Elevated cholesterol     GERD (gastroesophageal reflux disease)     Hiatal hernia     History of exercise stress test     History of nuclear stress test     History of pneumonia     Hypertension     Lumbar degenerative disc disease     MI, old     Osteoarthritis     PAD (peripheral artery disease)     Parkinson's disease     Paroxysmal A-fib     Peptic ulceration     Psoriasis     Renal artery stenosis     Sleep apnea     uses o2 @ HS    Spinal stenosis     Tremor     Vertebral artery stenosis      Past Surgical History:   Procedure Laterality Date    ABDOMINAL AORTIC ANEURYSM REPAIR WITH ENDOGRAFT      APPENDECTOMY      CATARACT EXTRACTION W/ INTRAOCULAR LENS IMPLANT Left 12/10/2018    Procedure: CATARACT PHACO EXTRACTION WITH INTRAOCULAR LENS IMPLANT LEFT;  Surgeon: Charity Carrasco MD;  Location: [facility];  Service: Ophthalmology    CATARACT  EXTRACTION W/ INTRAOCULAR LENS IMPLANT Right 6/12/2023    Procedure: CATARACT PHACO EXTRACTION WITH INTRAOCULAR LENS IMPLANT RIGHT;  Surgeon: Charity Carrasco MD;  Location: [facility];  Service: Ophthalmology;  Laterality: Right;    COLONOSCOPY      CORONARY ARTERY BYPASS GRAFT  1995    ENDOSCOPY      SHOULDER ROTATOR CUFF REPAIR Right     SKIN BIOPSY        General Information       Row Name 09/11/23 0950          Physical Therapy Time and Intention    Document Type therapy note (daily note)  -AS     Mode of Treatment physical therapy  -AS       Row Name 09/11/23 0950          General Information    Patient Profile Reviewed yes  -AS     Existing Precautions/Restrictions fall;other (see comments)  PD, incontinence(brief on when OOB)  -AS     Barriers to Rehab medically complex;previous functional deficit;cognitive status  -AS       Row Name 09/11/23 0950          Cognition    Orientation Status (Cognition) oriented to;person;verbal cues/prompts needed for orientation;place;time  -AS       Row Name 09/11/23 0950          Safety Issues, Functional Mobility    Safety Issues Affecting Function (Mobility) awareness of need for assistance;insight into deficits/self-awareness;safety precaution awareness;safety precautions follow-through/compliance;sequencing abilities;positioning of assistive device;judgment  -AS     Impairments Affecting Function (Mobility) balance;cognition;endurance/activity tolerance;strength;motor planning;coordination  -AS     Cognitive Impairments, Mobility Safety/Performance awareness, need for assistance;insight into deficits/self-awareness;judgment;problem-solving/reasoning;safety precaution awareness;safety precaution follow-through;sequencing abilities  -AS     Comment, Safety Issues/Impairments (Mobility) decreased safety awareness, follow with chair for safety  -AS               User Key  (r) = Recorded By, (t) = Taken By, (c) = Cosigned By      Initials Name Provider Type    AS Lauryn Preciado  DAGOBERTO Mendoza Physical Therapist Assistant                   Mobility       Row Name 09/11/23 0951          Bed Mobility    Rolling Left [place] (Bed Mobility) verbal cues;minimum assist (75% patient effort);1 person assist  -AS     Rolling Right [place] (Bed Mobility) verbal cues;minimum assist (75% patient effort);1 person assist  -AS     Supine-Sit [place] (Bed Mobility) verbal cues;minimum assist (75% patient effort);1 person assist  -AS     Assistive Device (Bed Mobility) head of bed elevated;bed rails;draw sheet  -AS     Comment, (Bed Mobility) rolled several times to place brief for OOB activity  -AS       Row Name 09/11/23 0951          Transfers    Comment, (Transfers) cues to push up from recliner and not to pull up on walker, also cues to reach back with controlled siting completed 2 sit<>stands from recliner to replace soiled linen and gown  -AS       Row Name 09/11/23 0951          Bed-Chair Transfer    Bed-Chair [place]off[place] (Transfers) verbal cues;minimum assist (75% patient effort);1 person assist;1 person to manage equipment  -AS     Assistive Device (Bed-Chair Transfers) walker, front-wheeled  -AS       Row Name 09/11/23 0951          Sit-Stand Transfer    Sit-Stand M[place] (Transfers) verbal cues;minimum assist (75% patient effort);1 person assist  -AS     Assistive Device (Sit-Stand Transfers) walker, front-wheeled  -AS     Comment, (Sit-Stand Transfer) cues for hand placement  -AS       Row Name 09/11/23 0951          Gait/Stairs (Locomotion)    Moffit Level (Gait) verbal cues;minimum assist (75% patient effort);1 person assist;1 person to manage equipment  -AS     Assistive Device (Gait) walker, front-wheeled  -AS     Distance in Feet (Gait) 43  -AS     Deviations/Abnormal Patterns (Gait) bilateral deviations;base of support, narrow;marian decreased;festinating/shuffling;gait speed decreased;steppage;stride length decreased  -AS     Bilateral Gait Deviations forward  flexed posture;heel strike decreased  -AS     Comment, (Gait/Stairs) patient ambulated 43' with min assist x1, rolling walker for support and chair follow for safety, patient fatigues quickly. Verbal cues to increase step length, improve posture and to stay close to walker. Assist to control walker. Distance limited by weakness and fatigue.  -AS               User Key  (r) = Recorded By, (t) = Taken By, (c) = Cosigned By      Initials Name Provider Type    AS Lauryn Preciado PTA Physical Therapist Assistant                   Obj/Interventions       Row Name 09/11/23 0954          Motor Skills    Therapeutic Exercise ankle;knee  -AS       Row Name 09/11/23 0954          Knee (Therapeutic Exercise)    Knee (Therapeutic Exercise) strengthening exercise  -AS     Knee Strengthening (Therapeutic Exercise) heel slides;bilateral;supine;10 repetitions  -AS       Row Name 09/11/23 0954          Ankle (Therapeutic Exercise)    Ankle (Therapeutic Exercise) AROM (active range of motion)  -AS     Ankle AROM (Therapeutic Exercise) bilateral;dorsiflexion;plantarflexion;supine;10 repetitions  -AS       Row Name 09/11/23 0954          Balance    Dynamic Standing Balance verbal cues;minimal assist;1-person assist;1 person to manage equipment  -AS     Position/Device Used, Standing Balance supported;walker, front-wheeled  -AS     Comment, Balance follow with chair for safety, mild instability  -AS               User Key  (r) = Recorded By, (t) = Taken By, (c) = Cosigned By      Initials Name Provider Type    AS Lauryn Preciado PTA Physical Therapist Assistant                   Goals/Plan    No documentation.                  Clinical Impression       Row Name 09/11/23 0955          Pain    Pretreatment Pain Rating 0/10 - no pain  -AS     Posttreatment Pain Rating 0/10 - no pain  -AS       Row Name 09/11/23 0955          Plan of Care Review    Plan of Care Reviewed With patient  -AS     Progress improving  -AS     Outcome  Evaluation patient ambulated 43' with min assist x1, rolling walker for support and chair follow for safety, patient fatigues quickly. Verbal cues to increase step length, improve posture and to stay close to walker. Assist to control walker. Distance limited by weakness and fatigue. Recommend SNF at D/C for best functional outcome.  -AS       Row Name 09/11/23 0955          Positioning and Restraints    Pre-Treatment Position in bed  -AS     Post Treatment Position chair  -AS     In Chair reclined;call light within reach;encouraged to call for assist;exit alarm on;with family/caregiver;waffle cushion;legs elevated  -AS               User Key  (r) = Recorded By, (t) = Taken By, (c) = Cosigned By      Initials Name Provider Type    AS Lauryn Preciado PTA Physical Therapist Assistant                   Outcome Measures       Row Name 09/11/23 0956          How much help from another person do you currently need...    Turning from your back to your side while in flat bed without using bedrails? 3  -AS     Moving from lying on back to sitting on the side of a flat bed without bedrails? 3  -AS     Moving to and from a bed to a chair (including a wheelchair)? 2  -AS     Standing up from a chair using your arms (e.g., wheelchair, bedside chair)? 2  -AS     Climbing 3-5 steps with a railing? 2  -AS     To walk in hospital room? 2  -AS     AM-PAC 6 Clicks Score (PT) 14  -AS     Highest level of mobility 4 --> Transferred to chair/commode  -AS       Row Name 09/11/23 0956          Functional Assessment    Outcome Measure Options AM-PAC 6 Clicks Basic Mobility (PT)  -AS               User Key  (r) = Recorded By, (t) = Taken By, (c) = Cosigned By      Initials Name Provider Type    AS Lauryn Preciado PTA Physical Therapist Assistant                                 Physical Therapy Education       Title: PT OT SLP Therapies (In Progress)       Topic: Physical Therapy (In Progress)       Point: Mobility training (In  Progress)       Learning Progress Summary             Patient Acceptance, E, NR by AS at 9/11/2023 0956    Acceptance, E, NR by AS at 9/7/2023 1118    Acceptance, E, VU,DU,NR by LO at 9/5/2023 1335    Comment: PT POC, importance for energy conservation   Family Acceptance, E, VU,DU,NR by LO at 9/5/2023 1335    Comment: PT POC, importance for energy conservation                         Point: Home exercise program (In Progress)       Learning Progress Summary             Patient Acceptance, E, NR by AS at 9/11/2023 0956    Acceptance, E, NR by AS at 9/7/2023 1118    Acceptance, E, VU,DU,NR by LO at 9/5/2023 1335    Comment: PT POC, importance for energy conservation   Family Acceptance, E, VU,DU,NR by LO at 9/5/2023 1335    Comment: PT POC, importance for energy conservation                         Point: Body mechanics (In Progress)       Learning Progress Summary             Patient Acceptance, E, NR by AS at 9/11/2023 0956    Acceptance, E, NR by AS at 9/7/2023 1118    Acceptance, E, VU,DU,NR by LO at 9/5/2023 1335    Comment: PT POC, importance for energy conservation   Family Acceptance, E, VU,DU,NR by LO at 9/5/2023 1335    Comment: PT POC, importance for energy conservation                         Point: Precautions (In Progress)       Learning Progress Summary             Patient Acceptance, E, NR by AS at 9/11/2023 0956    Acceptance, E, NR by AS at 9/7/2023 1118    Acceptance, E, VU,DU,NR by LO at 9/5/2023 1335    Comment: PT POC, importance for energy conservation   Family Acceptance, E, VU,DU,NR by LO at 9/5/2023 1335    Comment: PT POC, importance for energy conservation                                         User Key       Initials Effective Dates Name Provider Type Discipline    AS 04/28/23 -  Lauryn Preciado, PTA Physical Therapist Assistant PT    LO 06/16/21 -  Erika Floyd, PT Physical Therapist PT                  PT Recommendation and Plan     Plan of Care Reviewed With: patient  Progress:  improving  Outcome Evaluation: patient ambulated 43' with min assist x1, rolling walker for support and chair follow for safety, patient fatigues quickly. Verbal cues to increase step length, improve posture and to stay close to walker. Assist to control walker. Distance limited by weakness and fatigue. Recommend SNF at D/C for best functional outcome.     Time Calculation:         PT Charges       Row Name 09/11/23 0956             Time Calculation    Start Time 0917  -AS      PT Received On 09/11/23  -AS      PT Goal Re-Cert Due Date 09/15/23  -AS         Timed Charges    69536 - PT Therapeutic Exercise Minutes 11  -AS      06435 - Gait Training Minutes  14  -AS         Total Minutes    Timed Charges Total Minutes 25  -AS       Total Minutes 25  -AS                User Key  (r) = Recorded By, (t) = Taken By, (c) = Cosigned By      Initials Name Provider Type    AS Lauryn Preciado PTA Physical Therapist Assistant                  Therapy Charges for Today       Code Description Service Date Service Provider Modifiers Qty    56982162317 HC PT THER PROC EA 15 MIN 9/11/2023 Lauryn Preciado PTA GP 1    46958996679 HC GAIT TRAINING EA 15 MIN 9/11/2023 Lauryn Preciado PTA GP 1            PT G-Codes  Outcome Measure Options: AM-PAC 6 Clicks Basic Mobility (PT)  AM-PAC 6 Clicks Score (PT): 14  AM-PAC 6 Clicks Score (OT): 12       Lauryn Preciado PTA  9/11/2023

## 2023-09-11 NOTE — PLAN OF CARE
Goal Outcome Evaluation:  Plan of Care Reviewed With: patient        Progress: improving  Outcome Evaluation: patient ambulated 43' with min assist x1, rolling walker for support and chair follow for safety, patient fatigues quickly. Verbal cues to increase step length, improve posture and to stay close to walker. Assist to control walker. Distance limited by weakness and fatigue. Recommend SNF at D/C for best functional outcome.

## 2023-09-11 NOTE — PLAN OF CARE
Goal Outcome Evaluation:  Plan of Care Reviewed With: patient, daughter        Progress: improving       Education and care plan goals met - patient discharging with daughter.

## 2023-09-11 NOTE — PROGRESS NOTES
Case Management Discharge Note      Final Note: [facility] can accept Mr. Bloom to the assisted living and the nurse report number is [phone] ext. [phone] and Case management will fax the discharge summary to [phone]. Josué Bender uses [facility] for prescriptions.         Selected Continued Care - Admitted Since 9/4/2023       Destination    No services have been selected for the patient.                Durable Medical Equipment    No services have been selected for the patient.                Dialysis/Infusion    No services have been selected for the patient.                Home Medical Care    No services have been selected for the patient.                Therapy    No services have been selected for the patient.                Community Resources    No services have been selected for the patient.                Community & DME    No services have been selected for the patient.                    Selected Continued Care - Prior Encounters Includes continued care and service providers with selected services from prior encounters from 6/6/2023 to 9/11/2023      Discharged on 8/25/2023 Admission date: 8/23/2023 - Discharge disposition: Home or Self Care      Durable Medical Equipment       Service Provider Selected Services Address Phone Fax Patient Preferred    [facility] [address] [place] [id number] [phone] --       Internal Comment last updated by Lynette De La Rosa RN 8/25/2023 1629    Hospital bed and bsc                                          Final Discharge Disposition Code: 01 - home or self-care

## 2023-09-11 NOTE — PLAN OF CARE
Problem: Adult Inpatient Plan of Care  Goal: Plan of Care Review  Outcome: Ongoing, Progressing  Goal: Patient-Specific Goal (Individualized)  Outcome: Ongoing, Progressing  Goal: Absence of Hospital-Acquired Illness or Injury  Outcome: Ongoing, Progressing  Intervention: Identify and Manage Fall Risk  Recent Flowsheet Documentation  Taken 9/11/2023 0400 by Huey Thompson RN  Safety Promotion/Fall Prevention:   assistive device/personal items within reach   clutter free environment maintained   safety round/check completed  Taken 9/11/2023 0200 by Huey Thompson RN  Safety Promotion/Fall Prevention:   clutter free environment maintained   assistive device/personal items within reach   safety round/check completed  Taken 9/11/2023 0000 by Huey Thompson RN  Safety Promotion/Fall Prevention:   safety round/check completed   clutter free environment maintained   assistive device/personal items within reach  Taken 9/10/2023 2200 by Huey Thompson RN  Safety Promotion/Fall Prevention:   assistive device/personal items within reach   clutter free environment maintained   safety round/check completed  Taken 9/10/2023 2000 by Huey Thompson RN  Safety Promotion/Fall Prevention:   safety round/check completed   assistive device/personal items within reach   clutter free environment maintained  Intervention: Prevent Skin Injury  Recent Flowsheet Documentation  Taken 9/11/2023 0400 by Huey Thompson RN  Body Position: weight shifting  Taken 9/11/2023 0200 by Huey Thompson RN  Body Position: weight shifting  Skin Protection: adhesive use limited  Taken 9/11/2023 0000 by Huey Thompson RN  Body Position: weight shifting  Skin Protection: adhesive use limited  Taken 9/10/2023 2200 by Huey Thompson RN  Body Position: weight shifting  Skin Protection: adhesive use limited  Taken 9/10/2023 2000 by Huey Thompson RN  Body Position: weight shifting  Skin Protection: adhesive use limited  Intervention: Prevent  and Manage VTE (Venous Thromboembolism) Risk  Recent Flowsheet Documentation  Taken 9/11/2023 0400 by Huey Thompson RN  Activity Management: activity minimized  Taken 9/11/2023 0200 by Huey Thompson RN  Activity Management: activity minimized  Taken 9/11/2023 0000 by Huey Thompson RN  Activity Management: activity encouraged  Taken 9/10/2023 2200 by Huey Thompson RN  Activity Management: activity encouraged  Taken 9/10/2023 2000 by Huey Thompson RN  Activity Management: activity encouraged  Range of Motion: active ROM (range of motion) encouraged  Intervention: Prevent Infection  Recent Flowsheet Documentation  Taken 9/11/2023 0400 by Huey Thompson RN  Infection Prevention:   environmental surveillance performed   hand hygiene promoted   rest/sleep promoted  Taken 9/11/2023 0200 by Huey Thompson RN  Infection Prevention:   environmental surveillance performed   hand hygiene promoted   rest/sleep promoted  Taken 9/11/2023 0000 by Huey Thompson RN  Infection Prevention:   environmental surveillance performed   hand hygiene promoted   rest/sleep promoted  Goal: Optimal Comfort and Wellbeing  Outcome: Ongoing, Progressing  Intervention: Provide Person-Centered Care  Recent Flowsheet Documentation  Taken 9/10/2023 2000 by Huey Thompson RN  Trust Relationship/Rapport: care explained  Goal: Readiness for Transition of Care  Outcome: Ongoing, Progressing     Problem: Fall Injury Risk  Goal: Absence of Fall and Fall-Related Injury  Outcome: Ongoing, Progressing  Intervention: Promote Injury-Free Environment  Recent Flowsheet Documentation  Taken 9/11/2023 0400 by Huey Thompson RN  Safety Promotion/Fall Prevention:   assistive device/personal items within reach   clutter free environment maintained   safety round/check completed  Taken 9/11/2023 0200 by Huey Thompson RN  Safety Promotion/Fall Prevention:   clutter free environment maintained   assistive device/personal items within reach    safety round/check completed  Taken 9/11/2023 0000 by Huey Thompson RN  Safety Promotion/Fall Prevention:   safety round/check completed   clutter free environment maintained   assistive device/personal items within reach  Taken 9/10/2023 2200 by Huey Thompson RN  Safety Promotion/Fall Prevention:   assistive device/personal items within reach   clutter free environment maintained   safety round/check completed  Taken 9/10/2023 2000 by Huey Thompson RN  Safety Promotion/Fall Prevention:   safety round/check completed   assistive device/personal items within reach   clutter free environment maintained     Problem: Skin Injury Risk Increased  Goal: Skin Health and Integrity  Outcome: Ongoing, Progressing  Intervention: Optimize Skin Protection  Recent Flowsheet Documentation  Taken 9/11/2023 0400 by Huey Thompson RN  Head of Bed (HOB) Positioning: HOB elevated  Taken 9/11/2023 0200 by Huey Thompson RN  Pressure Reduction Techniques: frequent weight shift encouraged  Head of Bed (HOB) Positioning: HOB elevated  Pressure Reduction Devices: pressure-redistributing mattress utilized  Skin Protection: adhesive use limited  Taken 9/11/2023 0000 by Huey Thompson RN  Pressure Reduction Techniques: frequent weight shift encouraged  Head of Bed (HOB) Positioning: HOB elevated  Pressure Reduction Devices: pressure-redistributing mattress utilized  Skin Protection: adhesive use limited  Taken 9/10/2023 2200 by Huey Thompson RN  Pressure Reduction Techniques: frequent weight shift encouraged  Head of Bed (HOB) Positioning: HOB elevated  Pressure Reduction Devices: pressure-redistributing mattress utilized  Skin Protection: adhesive use limited  Taken 9/10/2023 2000 by Huey Thompson RN  Pressure Reduction Techniques: frequent weight shift encouraged  Head of Bed (HOB) Positioning: HOB elevated  Pressure Reduction Devices: pressure-redistributing mattress utilized  Skin Protection: adhesive use limited      Problem: Behavioral Health Comorbidity  Goal: Maintenance of Behavioral Health Symptom Control  Outcome: Ongoing, Progressing     Problem: Hypertension Comorbidity  Goal: Blood Pressure in Desired Range  Outcome: Ongoing, Progressing     Problem: Osteoarthritis Comorbidity  Goal: Maintenance of Osteoarthritis Symptom Control  Outcome: Ongoing, Progressing  Intervention: Maintain Osteoarthritis Symptom Control  Recent Flowsheet Documentation  Taken 9/11/2023 0400 by Huey Thompson, RN  Activity Management: activity minimized  Taken 9/11/2023 0200 by Huey Thompson, RN  Activity Management: activity minimized  Taken 9/11/2023 0000 by Huey Thompson RN  Activity Management: activity encouraged  Taken 9/10/2023 2200 by Huey Thompson, RN  Activity Management: activity encouraged  Taken 9/10/2023 2000 by Huey Thompson RN  Activity Management: activity encouraged     Problem: Pain Chronic (Persistent) (Comorbidity Management)  Goal: Acceptable Pain Control and Functional Ability  Outcome: Ongoing, Progressing  Intervention: Optimize Psychosocial Wellbeing  Recent Flowsheet Documentation  Taken 9/10/2023 2000 by Huey Thompson RN  Supportive Measures: active listening utilized

## 2023-09-12 ENCOUNTER — TRANSITIONAL CARE MANAGEMENT TELEPHONE ENCOUNTER (OUTPATIENT)
Dept: CALL CENTER | Facility: HOSPITAL | Age: 88
End: 2023-09-12
Payer: MEDICARE

## 2023-09-12 NOTE — OUTREACH NOTE
Call Center TCM Note      Flowsheet Row Responses   [facility] patient discharged from? [facility]   Does the patient have one of the following disease processes/diagnoses(primary or secondary)? Other   TCM attempt successful? Yes   Call start time 0937   Call end time 0944   General alerts for this patient Josué RENEE   Discharge diagnosis hypokalemia, diarrhea, ADITHYA on CKD, Parkinson's dementia   Person spoke with today (if not patient) and relationship daughter Sandra Castro reviewed with patient/caregiver? Yes   Is the patient having any side effects they believe may be caused by any medication additions or changes? No   Does the patient have all medications ordered at discharge? Yes   Is the patient taking all medications as directed (includes completed medication regime)? Yes   Comments Patient has a hospital followup scheduled on 9/28/2023 as a new patient on 9/28/2023. This is outside the TCM timeframe.   Does the patient have an appointment with their PCP within 7-14 days of discharge? Other   Nursing Interventions Confirmed date/time of appointment   What DME was ordered? Commode chair and bed   Has all DME been delivered? Yes   Psychosocial issues? No   Did the patient receive a copy of their discharge instructions? Yes   Nursing interventions Reviewed instructions with patient   What is the patient's perception of their health status since discharge? Improving   Is the patient/caregiver able to teach back signs and symptoms related to disease process for when to call PCP? Yes   Is the patient/caregiver able to teach back signs and symptoms related to disease process for when to call 911? Yes   Is the patient/caregiver able to teach back the hierarchy of who to call/visit for symptoms/problems? PCP, Specialist, Home health nurse, Urgent Care, ED, 911 Yes   If the patient is a current smoker, are they able to teach back resources for cessation? Not a smoker   TCM call completed? Yes   Wrap up  additional comments Daughter stated he is doing well.   Call end time 0944   Would this patient benefit from a Referral to [facility] Social Work? No   Is the patient interested in additional calls from an ambulatory ? No            Ramy Crouch RN    9/12/2023, 09:45 EDT

## 2023-09-12 NOTE — OUTREACH NOTE
Prep Survey      Flowsheet Row Responses   [facility] facility patient discharged from? [place]   Is LACE score < 7 ? No   Eligibility [facility]   Date of Admission 09/04/23   Date of Discharge 09/11/23   Discharge Disposition Home or Self Care   Discharge diagnosis hypokalemia, diarrhea, ADITHYA on CKD, Parkinson's dementia   Does the patient have one of the following disease processes/diagnoses(primary or secondary)? Other   Does the patient have Home health ordered? No   Is there a DME ordered? No   Comments regarding appointments new PCP appt   General alerts for this patient Josué RENEE   Prep survey completed? Yes            Maria Elena MATHIS - Registered Nurse

## 2024-01-08 ENCOUNTER — APPOINTMENT (OUTPATIENT)
Dept: GENERAL RADIOLOGY | Facility: HOSPITAL | Age: 89
End: 2024-01-08
Payer: MEDICARE

## 2024-01-08 ENCOUNTER — APPOINTMENT (OUTPATIENT)
Dept: CT IMAGING | Facility: HOSPITAL | Age: 89
End: 2024-01-08
Payer: MEDICARE

## 2024-01-08 ENCOUNTER — HOSPITAL ENCOUNTER (EMERGENCY)
Facility: HOSPITAL | Age: 89
Discharge: HOME | End: 2024-01-08
Attending: EMERGENCY MEDICINE | Admitting: EMERGENCY MEDICINE
Payer: MEDICARE

## 2024-01-08 VITALS
HEART RATE: 66 BPM | OXYGEN SATURATION: 98 % | HEIGHT: 67 IN | DIASTOLIC BLOOD PRESSURE: 83 MMHG | TEMPERATURE: 97.7 F | BODY MASS INDEX: 26.06 KG/M2 | RESPIRATION RATE: 16 BRPM | WEIGHT: 166.01 LBS | SYSTOLIC BLOOD PRESSURE: 165 MMHG
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
## 2024-01-08 DIAGNOSIS — M25.552 PAIN OF LEFT HIP: Primary | ICD-10-CM

## 2024-01-08 PROCEDURE — 73502 X-RAY EXAM HIP UNI 2-3 VIEWS: CPT

## 2024-01-08 PROCEDURE — 72125 CT NECK SPINE W/O DYE: CPT

## 2024-01-08 PROCEDURE — 99284 EMERGENCY DEPT VISIT MOD MDM: CPT

## 2024-01-08 PROCEDURE — 73030 X-RAY EXAM OF SHOULDER: CPT

## 2024-01-08 PROCEDURE — 70450 CT HEAD/BRAIN W/O DYE: CPT

## 2024-01-08 NOTE — ED PROVIDER NOTES
TRIAGE CHIEF COMPLAINT:     Nursing and triage notes reviewed    Chief Complaint   Patient presents with    Fall    Hip Pain      HPI: Radha Varela is a 88 y.o. male who presents to the emergency department complaining of left hip pain following a fall.  Per report patient had fallen out of the wheelchair at the nursing facility.  Patient has a history of dementia and is a poor historian.  Patient denies any complaints to me.  It is not thought that patient hit his head.  Patient tells me that he might have but is uncertain.  He denies shortness of breath or chest discomfort.    REVIEW OF SYSTEMS: All other systems reviewed and are negative     PAST MEDICAL HISTORY:   Past Medical History:   Diagnosis Date    Abdominal aortic aneurysm (AAA) without rupture 2018    Aneurysm     Arthritis     Cardiac murmur     Carotid stenosis     Celiac artery stenosis     Chest pain     CHF (congestive heart failure)     Chronic kidney disease     Coronary artery disease     Dyslipidemia     Elevated cholesterol     GERD (gastroesophageal reflux disease)     Hiatal hernia     History of exercise stress test     History of nuclear stress test     History of pneumonia     Hypertension     Lumbar degenerative disc disease     MI, old     Osteoarthritis     PAD (peripheral artery disease)     Parkinson's disease     Paroxysmal A-fib     Peptic ulceration     Psoriasis     Renal artery stenosis     Sleep apnea     uses o2 @ HS    Spinal stenosis     Tremor     Vertebral artery stenosis         FAMILY HISTORY:   Family History   Problem Relation Age of Onset    Other Mother          of old age    No Known Problems Father         SOCIAL HISTORY:   Social History     Socioeconomic History    Marital status:     Number of children: 3   Tobacco Use    Smoking status: Former     Packs/day: 3.00     Years: 15.00     Additional pack years: 0.00     Total pack years: 45.00     Types: Cigarettes     Quit date:  1969     Years since quittin.0    Smokeless tobacco: Never    Tobacco comments:     quit 50 years ago   Vaping Use    Vaping Use: Never used   Substance and Sexual Activity    Alcohol use: Not Currently     Alcohol/week: 2.0 standard drinks of alcohol     Types: 2 Glasses of wine per week     Comment: DAILY    Drug use: No    Sexual activity: Defer        SURGICAL HISTORY:   Past Surgical History:   Procedure Laterality Date    ABDOMINAL AORTIC ANEURYSM REPAIR WITH ENDOGRAFT      APPENDECTOMY      CATARACT EXTRACTION W/ INTRAOCULAR LENS IMPLANT Left 12/10/2018    Procedure: CATARACT PHACO EXTRACTION WITH INTRAOCULAR LENS IMPLANT LEFT;  Surgeon: Charity Carrasco MD;  Location: [facility] OR;  Service: Ophthalmology    CATARACT EXTRACTION W/ INTRAOCULAR LENS IMPLANT Right 2023    Procedure: CATARACT PHACO EXTRACTION WITH INTRAOCULAR LENS IMPLANT RIGHT;  Surgeon: Chairty Carrasco MD;  Location: [facility] OR;  Service: Ophthalmology;  Laterality: Right;    COLONOSCOPY      CORONARY ARTERY BYPASS GRAFT      ENDOSCOPY      SHOULDER ROTATOR CUFF REPAIR Right     SKIN BIOPSY          CURRENT MEDICATIONS:      Medication List        ASK your doctor about these medications      acetaminophen 325 MG tablet  Commonly known as: TYLENOL     amLODIPine 5 MG tablet  Commonly known as: NORVASC  Take 0.5 tablets by mouth Daily.     aspirin 81 MG EC tablet  Take 1 tablet by mouth Daily.     carbidopa-levodopa  MG per tablet  Commonly known as: SINEMET  Take 2 tablets by mouth 4 (Four) Times a Day for 30 days.     cholecalciferol 25 MCG (1000 UT) tablet  Commonly known as: VITAMIN D3  Take 1 tablet by mouth Daily. 2 tablets daily     Diclofenac Sodium 1 % gel gel  Commonly known as: VOLTAREN  Apply 4 g topically to the appropriate area as directed 4 (Four) Times a Day As Needed (Pain). Apply to Right hip     escitalopram 10 MG tablet  Commonly known as: LEXAPRO  Take 1 tablet by mouth Daily.     famotidine 10 MG  tablet  Commonly known as: PEPCID  Take 1 tablet by mouth 2 (Two) Times a Day As Needed for Heartburn.     Florastor 250 MG capsule  Generic drug: saccharomyces boulardii  Take 1 capsule by mouth 2 (Two) Times a Day.     furosemide 20 MG tablet  Commonly known as: LASIX  Take 1 tablet by mouth Every Other Day. And 1 tablet as needed for edema.     hydrOXYzine pamoate 25 MG capsule  Commonly known as: Vistaril  Take 1 capsule by mouth Every Night.     loperamide 2 MG capsule  Commonly known as: IMODIUM  Take 2 capsules by mouth 4 (Four) Times a Day As Needed for Diarrhea. Do not exceed 8 capsules daily     nitroglycerin 0.4 MG SL tablet  Commonly known as: NITROSTAT     ondansetron 4 MG tablet  Commonly known as: ZOFRAN  Take 1 tablet by mouth Every 8 (Eight) Hours As Needed for Nausea or Vomiting.     polyethylene glycol 17 g packet  Commonly known as: MIRALAX  Take 17 g by mouth Daily As Needed (constipation).     psyllium 58.12 % packet  Commonly known as: METAMUCIL MULTIHEALTH FIBER  Take 1 packet by mouth Daily.     rOPINIRole 0.5 MG tablet  Commonly known as: REQUIP  Take 2 tablets by mouth 4 (Four) Times a Day. Take 1 hour before bedtime.     vitamin B-12 1000 MCG tablet  Commonly known as: CYANOCOBALAMIN  Take 1 tablet by mouth Daily.               ALLERGIES: Celebrex [celecoxib] and Sulfa antibiotics     PHYSICAL EXAM:   VITAL SIGNS:   Vitals:    01/08/24 0741   BP: 177/97   Pulse: 65   Resp: 16   Temp: 97.6 °F (36.4 °C)   SpO2: 95%      CONSTITUTIONAL: Awake, pleasantly confused, appears nontoxic   HENT: Atraumatic, normocephalic, oral mucosa pink and moist, airway patent. Nares patent without drainage. External ears normal.   EYES: Conjunctivae clear   NECK: Trachea midline, nontender, supple   CARDIOVASCULAR: Normal heart rate, Normal rhythm, No murmurs, rubs, gallops   PULMONARY/CHEST: Clear to auscultation, no rhonchi, wheezes, or rales. Symmetrical breath sounds.  ABDOMINAL: Nondistended, soft,  nontender - no rebound or guarding.   NEUROLOGIC: Nonfocal, moving all four extremities, no gross sensory or motor deficits.   EXTREMITIES: No clubbing, cyanosis, or edema.  There is no significant tenderness at the left hip or left shoulder.  SKIN: Warm, Dry, No erythema, No rash     ED COURSE / MEDICAL DECISION MAKING:   Radha Varela is a 88 y.o. male who presents to the emergency department for evaluation of pain following a fall.  Patient is nondistressed on arrival in the emergency department.  Patient is resting comfortably.  No significant evidence of trauma no obvious tenderness to palpation.    Differential diagnosis includes, fracture among other etiologies.    Edging of the head, cervical spine, left shoulder and left hip was ordered for further evaluation of the patient's presentation.    Diagnostic information from other sources: EMS, nursing facility, chart review    Interventions: None    Narrative: Patient presents with pain following a fall from a wheelchair.  CT scan of the head and cervical spine per radiology interpretation reveals some chronic findings but no acute process.  X-rays of the left shoulder reveals degenerative changes and chronic findings but no acute fracture.  X-ray of the left hip per radiology interpretation reveals no acute fracture.  Will discharge with return precautions.      DECISION TO DISCHARGE/ADMIT: see ED care timeline     FINAL IMPRESSION:   1 --hip pain  2 --fall  3 --     Electronically signed by: Birdie Elizabeth MD, 1/8/2024 07:57 Birdie Bergeron MD  01/08/24 1036

## 2024-01-27 ENCOUNTER — APPOINTMENT (OUTPATIENT)
Dept: GENERAL RADIOLOGY | Facility: HOSPITAL | Age: 89
End: 2024-01-27
Payer: MEDICARE

## 2024-01-27 ENCOUNTER — APPOINTMENT (OUTPATIENT)
Dept: CT IMAGING | Facility: HOSPITAL | Age: 89
End: 2024-01-27
Payer: MEDICARE

## 2024-01-27 ENCOUNTER — HOSPITAL ENCOUNTER (INPATIENT)
Facility: HOSPITAL | Age: 89
LOS: 3 days | Discharge: NURSING, REHAB OR LONG-TERM CARE | End: 2024-01-30
Attending: EMERGENCY MEDICINE | Admitting: INTERNAL MEDICINE
Payer: MEDICARE

## 2024-01-27 DIAGNOSIS — N28.9 RENAL INSUFFICIENCY: ICD-10-CM

## 2024-01-27 DIAGNOSIS — N39.0 URINARY TRACT INFECTION WITHOUT HEMATURIA, SITE UNSPECIFIED: ICD-10-CM

## 2024-01-27 DIAGNOSIS — R41.82 ALTERED MENTAL STATUS, UNSPECIFIED ALTERED MENTAL STATUS TYPE: Primary | ICD-10-CM

## 2024-01-27 DIAGNOSIS — D64.9 ANEMIA, UNSPECIFIED TYPE: ICD-10-CM

## 2024-01-27 PROBLEM — G93.41 METABOLIC ENCEPHALOPATHY: Status: ACTIVE | Noted: 2024-01-27

## 2024-01-27 PROBLEM — I48.0 PAROXYSMAL ATRIAL FIBRILLATION: Status: ACTIVE | Noted: 2024-01-27

## 2024-01-27 PROBLEM — G93.41 ACUTE METABOLIC ENCEPHALOPATHY: Status: ACTIVE | Noted: 2024-01-27

## 2024-01-27 PROBLEM — N18.30 ACUTE RENAL FAILURE SUPERIMPOSED ON STAGE 3 CHRONIC KIDNEY DISEASE: Status: ACTIVE | Noted: 2023-09-04

## 2024-01-27 PROBLEM — E86.0 DEHYDRATION: Status: ACTIVE | Noted: 2024-01-27

## 2024-01-27 PROBLEM — B95.2 UTI (URINARY TRACT INFECTION) DUE TO ENTEROCOCCUS: Status: ACTIVE | Noted: 2024-01-27

## 2024-01-27 LAB
ABO GROUP BLD: NORMAL
ABO GROUP BLD: NORMAL
ALBUMIN SERPL-MCNC: 3.6 G/DL (ref 3.5–5.2)
ALBUMIN/GLOB SERPL: 1.4 G/DL
ALP SERPL-CCNC: 129 U/L (ref 39–117)
ALT SERPL W P-5'-P-CCNC: <5 U/L (ref 1–41)
ANION GAP SERPL CALCULATED.3IONS-SCNC: 9.9 MMOL/L (ref 5–15)
AST SERPL-CCNC: 19 U/L (ref 1–40)
BACTERIA UR QL AUTO: ABNORMAL /HPF
BASOPHILS # BLD AUTO: 0.04 10*3/MM3 (ref 0–0.2)
BASOPHILS NFR BLD AUTO: 0.4 % (ref 0–1.5)
BILIRUB SERPL-MCNC: 0.6 MG/DL (ref 0–1.2)
BILIRUB UR QL STRIP: NEGATIVE
BLD GP AB SCN SERPL QL: NEGATIVE
BUN SERPL-MCNC: 31 MG/DL (ref 8–23)
BUN/CREAT SERPL: 13.8 (ref 7–25)
CALCIUM SPEC-SCNC: 8.1 MG/DL (ref 8.6–10.5)
CHLORIDE SERPL-SCNC: 94 MMOL/L (ref 98–107)
CLARITY UR: CLEAR
CO2 SERPL-SCNC: 24.1 MMOL/L (ref 22–29)
COLOR UR: YELLOW
CREAT SERPL-MCNC: 2.25 MG/DL (ref 0.76–1.27)
D-LACTATE SERPL-SCNC: 1.2 MMOL/L (ref 0.5–2)
DEPRECATED RDW RBC AUTO: 48.4 FL (ref 37–54)
EGFRCR SERPLBLD CKD-EPI 2021: 27.4 ML/MIN/1.73
EOSINOPHIL # BLD AUTO: 0.66 10*3/MM3 (ref 0–0.4)
EOSINOPHIL NFR BLD AUTO: 6.9 % (ref 0.3–6.2)
ERYTHROCYTE [DISTWIDTH] IN BLOOD BY AUTOMATED COUNT: 14.5 % (ref 12.3–15.4)
FLUAV RNA RESP QL NAA+PROBE: NOT DETECTED
FLUBV RNA RESP QL NAA+PROBE: NOT DETECTED
GLOBULIN UR ELPH-MCNC: 2.5 GM/DL
GLUCOSE SERPL-MCNC: 99 MG/DL (ref 65–99)
GLUCOSE UR STRIP-MCNC: NEGATIVE MG/DL
HCT VFR BLD AUTO: 23.8 % (ref 37.5–51)
HGB BLD-MCNC: 7.7 G/DL (ref 13–17.7)
HGB UR QL STRIP.AUTO: NEGATIVE
HOLD SPECIMEN: NORMAL
HOLD SPECIMEN: NORMAL
HYALINE CASTS UR QL AUTO: ABNORMAL /LPF
IMM GRANULOCYTES # BLD AUTO: 0.09 10*3/MM3 (ref 0–0.05)
IMM GRANULOCYTES NFR BLD AUTO: 0.9 % (ref 0–0.5)
KETONES UR QL STRIP: NEGATIVE
LEUKOCYTE ESTERASE UR QL STRIP.AUTO: ABNORMAL
LYMPHOCYTES # BLD AUTO: 1.06 10*3/MM3 (ref 0.7–3.1)
LYMPHOCYTES NFR BLD AUTO: 11 % (ref 19.6–45.3)
MAGNESIUM SERPL-MCNC: 1.7 MG/DL (ref 1.6–2.4)
MCH RBC QN AUTO: 30.1 PG (ref 26.6–33)
MCHC RBC AUTO-ENTMCNC: 32.4 G/DL (ref 31.5–35.7)
MCV RBC AUTO: 93 FL (ref 79–97)
MONOCYTES # BLD AUTO: 0.75 10*3/MM3 (ref 0.1–0.9)
MONOCYTES NFR BLD AUTO: 7.8 % (ref 5–12)
NEUTROPHILS NFR BLD AUTO: 7 10*3/MM3 (ref 1.7–7)
NEUTROPHILS NFR BLD AUTO: 73 % (ref 42.7–76)
NITRITE UR QL STRIP: NEGATIVE
NRBC BLD AUTO-RTO: 0 /100 WBC (ref 0–0.2)
PH UR STRIP.AUTO: 7 [PH] (ref 5–8)
PLATELET # BLD AUTO: 134 10*3/MM3 (ref 140–450)
PMV BLD AUTO: 10.9 FL (ref 6–12)
POTASSIUM SERPL-SCNC: 4.4 MMOL/L (ref 3.5–5.2)
PROCALCITONIN SERPL-MCNC: 0.14 NG/ML (ref 0–0.25)
PROT SERPL-MCNC: 6.1 G/DL (ref 6–8.5)
PROT UR QL STRIP: NEGATIVE
RBC # BLD AUTO: 2.56 10*6/MM3 (ref 4.14–5.8)
RBC # UR STRIP: ABNORMAL /HPF
REF LAB TEST METHOD: ABNORMAL
RH BLD: POSITIVE
RH BLD: POSITIVE
SARS-COV-2 RNA RESP QL NAA+PROBE: NOT DETECTED
SODIUM SERPL-SCNC: 128 MMOL/L (ref 136–145)
SP GR UR STRIP: 1.01 (ref 1–1.03)
SQUAMOUS #/AREA URNS HPF: ABNORMAL /HPF
T&S EXPIRATION DATE: NORMAL
T4 FREE SERPL-MCNC: 1.36 NG/DL (ref 0.93–1.7)
TROPONIN T SERPL HS-MCNC: 45 NG/L
TSH SERPL DL<=0.05 MIU/L-ACNC: 1.62 UIU/ML (ref 0.27–4.2)
UROBILINOGEN UR QL STRIP: ABNORMAL
WBC # UR STRIP: ABNORMAL /HPF
WBC NRBC COR # BLD AUTO: 9.6 10*3/MM3 (ref 3.4–10.8)
WHOLE BLOOD HOLD COAG: NORMAL
WHOLE BLOOD HOLD SPECIMEN: NORMAL

## 2024-01-27 PROCEDURE — 87641 MR-STAPH DNA AMP PROBE: CPT | Performed by: FAMILY MEDICINE

## 2024-01-27 PROCEDURE — 36415 COLL VENOUS BLD VENIPUNCTURE: CPT

## 2024-01-27 PROCEDURE — 87040 BLOOD CULTURE FOR BACTERIA: CPT | Performed by: EMERGENCY MEDICINE

## 2024-01-27 PROCEDURE — 86850 RBC ANTIBODY SCREEN: CPT | Performed by: EMERGENCY MEDICINE

## 2024-01-27 PROCEDURE — 86900 BLOOD TYPING SEROLOGIC ABO: CPT | Performed by: EMERGENCY MEDICINE

## 2024-01-27 PROCEDURE — 86900 BLOOD TYPING SEROLOGIC ABO: CPT

## 2024-01-27 PROCEDURE — 70450 CT HEAD/BRAIN W/O DYE: CPT

## 2024-01-27 PROCEDURE — 71045 X-RAY EXAM CHEST 1 VIEW: CPT

## 2024-01-27 PROCEDURE — 93005 ELECTROCARDIOGRAM TRACING: CPT

## 2024-01-27 PROCEDURE — 25810000003 SODIUM CHLORIDE 0.9 % SOLUTION: Performed by: EMERGENCY MEDICINE

## 2024-01-27 PROCEDURE — 84145 PROCALCITONIN (PCT): CPT

## 2024-01-27 PROCEDURE — 25010000002 AMPICILLIN-SULBACTAM PER 1.5 G: Performed by: INTERNAL MEDICINE

## 2024-01-27 PROCEDURE — 99285 EMERGENCY DEPT VISIT HI MDM: CPT

## 2024-01-27 PROCEDURE — 85025 COMPLETE CBC W/AUTO DIFF WBC: CPT

## 2024-01-27 PROCEDURE — 84484 ASSAY OF TROPONIN QUANT: CPT

## 2024-01-27 PROCEDURE — 87636 SARSCOV2 & INF A&B AMP PRB: CPT | Performed by: EMERGENCY MEDICINE

## 2024-01-27 PROCEDURE — 80053 COMPREHEN METABOLIC PANEL: CPT

## 2024-01-27 PROCEDURE — 87086 URINE CULTURE/COLONY COUNT: CPT | Performed by: EMERGENCY MEDICINE

## 2024-01-27 PROCEDURE — 25010000002 ENOXAPARIN PER 10 MG: Performed by: INTERNAL MEDICINE

## 2024-01-27 PROCEDURE — 81001 URINALYSIS AUTO W/SCOPE: CPT

## 2024-01-27 PROCEDURE — 86901 BLOOD TYPING SEROLOGIC RH(D): CPT

## 2024-01-27 PROCEDURE — 87081 CULTURE SCREEN ONLY: CPT | Performed by: FAMILY MEDICINE

## 2024-01-27 PROCEDURE — 86901 BLOOD TYPING SEROLOGIC RH(D): CPT | Performed by: EMERGENCY MEDICINE

## 2024-01-27 PROCEDURE — 25010000002 CEFTRIAXONE SODIUM-DEXTROSE 1-3.74 GM-%(50ML) RECONSTITUTED SOLUTION: Performed by: EMERGENCY MEDICINE

## 2024-01-27 PROCEDURE — 84439 ASSAY OF FREE THYROXINE: CPT | Performed by: INTERNAL MEDICINE

## 2024-01-27 PROCEDURE — 87102 FUNGUS ISOLATION CULTURE: CPT | Performed by: FAMILY MEDICINE

## 2024-01-27 PROCEDURE — 83605 ASSAY OF LACTIC ACID: CPT

## 2024-01-27 PROCEDURE — 83735 ASSAY OF MAGNESIUM: CPT

## 2024-01-27 PROCEDURE — 84443 ASSAY THYROID STIM HORMONE: CPT | Performed by: INTERNAL MEDICINE

## 2024-01-27 RX ORDER — ENOXAPARIN SODIUM 100 MG/ML
30 INJECTION SUBCUTANEOUS EVERY 24 HOURS
Status: DISCONTINUED | OUTPATIENT
Start: 2024-01-27 | End: 2024-01-29

## 2024-01-27 RX ORDER — CHOLECALCIFEROL (VITAMIN D3) 125 MCG
1000 CAPSULE ORAL DAILY
Status: DISCONTINUED | OUTPATIENT
Start: 2024-01-27 | End: 2024-01-30 | Stop reason: HOSPADM

## 2024-01-27 RX ORDER — ROPINIROLE 1 MG/1
1 TABLET, FILM COATED ORAL 4 TIMES DAILY
Status: DISCONTINUED | OUTPATIENT
Start: 2024-01-27 | End: 2024-01-30 | Stop reason: HOSPADM

## 2024-01-27 RX ORDER — CEFTRIAXONE 1 G/50ML
1000 INJECTION, SOLUTION INTRAVENOUS ONCE
Status: COMPLETED | OUTPATIENT
Start: 2024-01-27 | End: 2024-01-27

## 2024-01-27 RX ORDER — UREA 10 %
3 LOTION (ML) TOPICAL NIGHTLY
COMMUNITY
End: 2024-01-30 | Stop reason: HOSPADM

## 2024-01-27 RX ORDER — ONDANSETRON 4 MG/1
4 TABLET, FILM COATED ORAL EVERY 8 HOURS PRN
Status: DISCONTINUED | OUTPATIENT
Start: 2024-01-27 | End: 2024-01-30 | Stop reason: HOSPADM

## 2024-01-27 RX ORDER — NITROGLYCERIN 0.4 MG/1
0.4 TABLET SUBLINGUAL
Status: DISCONTINUED | OUTPATIENT
Start: 2024-01-27 | End: 2024-01-30 | Stop reason: HOSPADM

## 2024-01-27 RX ORDER — POLYETHYLENE GLYCOL 3350 17 G/17G
17 POWDER, FOR SOLUTION ORAL DAILY PRN
Status: DISCONTINUED | OUTPATIENT
Start: 2024-01-27 | End: 2024-01-30 | Stop reason: HOSPADM

## 2024-01-27 RX ORDER — PANTOPRAZOLE SODIUM 40 MG/1
40 TABLET, DELAYED RELEASE ORAL DAILY
Status: ON HOLD | COMMUNITY

## 2024-01-27 RX ORDER — SODIUM CHLORIDE 0.9 % (FLUSH) 0.9 %
10 SYRINGE (ML) INJECTION AS NEEDED
Status: DISCONTINUED | OUTPATIENT
Start: 2024-01-27 | End: 2024-01-30 | Stop reason: HOSPADM

## 2024-01-27 RX ORDER — SODIUM CHLORIDE 9 MG/ML
100 INJECTION, SOLUTION INTRAVENOUS CONTINUOUS
Status: DISCONTINUED | OUTPATIENT
Start: 2024-01-27 | End: 2024-01-30 | Stop reason: HOSPADM

## 2024-01-27 RX ORDER — ACETAMINOPHEN 325 MG/1
650 TABLET ORAL EVERY 6 HOURS PRN
Status: DISCONTINUED | OUTPATIENT
Start: 2024-01-27 | End: 2024-01-30 | Stop reason: HOSPADM

## 2024-01-27 RX ORDER — FAMOTIDINE 20 MG/1
10 TABLET, FILM COATED ORAL 2 TIMES DAILY PRN
Status: DISCONTINUED | OUTPATIENT
Start: 2024-01-27 | End: 2024-01-30 | Stop reason: HOSPADM

## 2024-01-27 RX ORDER — SACCHAROMYCES BOULARDII 250 MG
250 CAPSULE ORAL 2 TIMES DAILY
Status: DISCONTINUED | OUTPATIENT
Start: 2024-01-27 | End: 2024-01-30 | Stop reason: HOSPADM

## 2024-01-27 RX ORDER — METOPROLOL SUCCINATE 25 MG/1
12.5 TABLET, EXTENDED RELEASE ORAL DAILY
Status: ON HOLD | COMMUNITY
End: 2024-01-30 | Stop reason: SDUPTHER

## 2024-01-27 RX ORDER — ALBUTEROL SULFATE 90 UG/1
1 AEROSOL, METERED RESPIRATORY (INHALATION) EVERY 4 HOURS PRN
Status: ON HOLD | COMMUNITY

## 2024-01-27 RX ORDER — CARBIDOPA/LEVODOPA 25MG-250MG
1 TABLET ORAL ONCE
Status: COMPLETED | OUTPATIENT
Start: 2024-01-27 | End: 2024-01-27

## 2024-01-27 RX ADMIN — SODIUM CHLORIDE 3 G: 900 INJECTION, SOLUTION INTRAVENOUS at 18:05

## 2024-01-27 RX ADMIN — CEFTRIAXONE 1000 MG: 1 INJECTION, SOLUTION INTRAVENOUS at 16:37

## 2024-01-27 RX ADMIN — SODIUM CHLORIDE 100 ML/HR: 9 INJECTION, SOLUTION INTRAVENOUS at 16:05

## 2024-01-27 RX ADMIN — ROPINIROLE HYDROCHLORIDE 1 MG: 1 TABLET, FILM COATED ORAL at 21:52

## 2024-01-27 RX ADMIN — CARBIDOPA AND LEVODOPA 2 TABLET: 25; 100 TABLET ORAL at 21:52

## 2024-01-27 RX ADMIN — CARBIDOPA AND LEVODOPA 1 TABLET: 25; 250 TABLET ORAL at 15:32

## 2024-01-27 RX ADMIN — CYANOCOBALAMIN TAB 500 MCG 1000 MCG: 500 TAB at 18:42

## 2024-01-27 RX ADMIN — Medication 250 MG: at 21:51

## 2024-01-27 RX ADMIN — ENOXAPARIN SODIUM 30 MG: 30 INJECTION SUBCUTANEOUS at 18:42

## 2024-01-27 NOTE — H&P
[facility]   HISTORY AND PHYSICAL      Name:  Radha Varela   Age:  88 y.o.  Sex:  male  :  1935  MRN:  [id number]   Visit Number:  [id number]  Admission Date:  2024  Date Of Service:  24  Primary Care Physician:  Dieudonne Jensen MD     Admitting diagnosis:      Metabolic encephalopathy    Dehydration    UTI (urinary tract infection) due to Enterococcus    Altered mental status    Abdominal aortic aneurysm (AAA) without rupture    Generalized weakness    Parkinson's disease dementia    Fall    Acute renal failure superimposed on stage 3 chronic kidney disease    Acute metabolic encephalopathy    Paroxysmal atrial fibrillation  Anemia    History Of Presenting Illness:      88-year-old patient with past medical history of Parkinson's disease with her dementia, chronic kidney disease stage III, hypertension, history of coronary artery disease status post CABG, PAD, aortic aneurysm s/p repair in  with degeneration of the graft and aneurysmal dilatation more than 6 cm in 2022, who has had a functional decline over the last week with decreased oral intake and was found to have UTI due to Enterococcus.  He has had labs done which showed that there was acute on chronic renal failure with UTI and so empirically was started on Cipro.  The cultures did come back yesterday showing Enterococcus faecalis and patient has worsened with further acute confusion changes in mentation and lethargy.  So it was decided for him to be evaluated in the ER and the workup showed that he has been having acute changes in mentation with encephalopathy and is being admitted for further IV fluid management and UTI which is due to Enterococcus faecalis     At present he is going to be admitted for further inpatient management and started IV fluid  Upon examination he has been lethargic and is unable to be aroused initially with painful stimuli.  He did wake up after  grimacing opened his eyes but has gone back to sleep.  He is not able to focus follow commands and answer simple questions.  As per the discussion with the daughter who is at bedside she states that there has been a functional decline in his oral intake has been poor and he has not been eating or drinking for the last week.  There have been 2 falls at the nursing home with no serious injuries.  After he was diagnosed to have UTI he was encourage oral fluids and he did perk up 2 days ago but then again since last night he has been lethargic with more confusion  At present review of systems could not be obtained further from the patient due to the above    Review Of Systems:     The following systems were reviewed and negative;  constitution, eyes, ENT, respiratory, cardiovascular, gastrointestinal, genitourinary, musculoskeletal, neurological and behavioral/psych,  Skin except as above.     Past Medical History:    Past Medical History:   Diagnosis Date    Abdominal aortic aneurysm (AAA) without rupture 07/02/2018    Aneurysm     Arthritis     Cardiac murmur     Carotid stenosis     Celiac artery stenosis     Chest pain     CHF (congestive heart failure)     Chronic kidney disease     Coronary artery disease     Dyslipidemia     Elevated cholesterol     GERD (gastroesophageal reflux disease)     Hiatal hernia     History of exercise stress test     History of nuclear stress test     History of pneumonia     Hypertension     Lumbar degenerative disc disease     MI, old 1995    Osteoarthritis     PAD (peripheral artery disease)     Parkinson's disease     Paroxysmal A-fib     Peptic ulceration     Psoriasis     Renal artery stenosis     Sleep apnea     uses o2 @ HS    Spinal stenosis     Tremor     Vertebral artery stenosis        Past Surgical history:    Past Surgical History:   Procedure Laterality Date    ABDOMINAL AORTIC ANEURYSM REPAIR WITH ENDOGRAFT  1994    APPENDECTOMY      CATARACT EXTRACTION W/ INTRAOCULAR  LENS IMPLANT Left 12/10/2018    Procedure: CATARACT PHACO EXTRACTION WITH INTRAOCULAR LENS IMPLANT LEFT;  Surgeon: Charity Carrasco MD;  Location: [facility] OR;  Service: Ophthalmology    CATARACT EXTRACTION W/ INTRAOCULAR LENS IMPLANT Right 2023    Procedure: CATARACT PHACO EXTRACTION WITH INTRAOCULAR LENS IMPLANT RIGHT;  Surgeon: Charity Carrasco MD;  Location: [facility] OR;  Service: Ophthalmology;  Laterality: Right;    COLONOSCOPY      CORONARY ARTERY BYPASS GRAFT      ENDOSCOPY      SHOULDER ROTATOR CUFF REPAIR Right     SKIN BIOPSY         Social History:    Social History     Socioeconomic History    Marital status:     Number of children: 3   Tobacco Use    Smoking status: Former     Packs/day: 3.00     Years: 15.00     Additional pack years: 0.00     Total pack years: 45.00     Types: Cigarettes     Quit date:      Years since quittin.1    Smokeless tobacco: Never    Tobacco comments:     quit 50 years ago   Vaping Use    Vaping Use: Never used   Substance and Sexual Activity    Alcohol use: Not Currently     Alcohol/week: 2.0 standard drinks of alcohol     Types: 2 Glasses of wine per week     Comment: DAILY    Drug use: No    Sexual activity: Defer       Family History:    Family History   Problem Relation Age of Onset    Other Mother          of old age    No Known Problems Father          Allergies:      Celebrex [celecoxib] and Sulfa antibiotics    Home Medications:    Prior to Admission Medications       Prescriptions Last Dose Informant Patient Reported? Taking?    acetaminophen (TYLENOL) 325 MG tablet   Yes No    Take 2 tablets by mouth Every 6 (Six) Hours As Needed for Mild Pain.    amLODIPine (NORVASC) 5 MG tablet   No No    Take 0.5 tablets by mouth Daily.    aspirin 81 MG EC tablet   No No    Take 1 tablet by mouth Daily.    carbidopa-levodopa (SINEMET)  MG per tablet   No No    Take 2 tablets by mouth 4 (Four) Times a Day for 30 days.    cholecalciferol (VITAMIN D3) 25  MCG (1000 UT) tablet   No No    Take 1 tablet by mouth Daily. 2 tablets daily    Diclofenac Sodium (VOLTAREN) 1 % gel gel   No No    Apply 4 g topically to the appropriate area as directed 4 (Four) Times a Day As Needed (Pain). Apply to Right hip    escitalopram (LEXAPRO) 10 MG tablet   No No    Take 1 tablet by mouth Daily.    famotidine (PEPCID) 10 MG tablet   No No    Take 1 tablet by mouth 2 (Two) Times a Day As Needed for Heartburn.    furosemide (LASIX) 20 MG tablet   No No    Take 1 tablet by mouth Every Other Day. And 1 tablet as needed for edema.    hydrOXYzine pamoate (Vistaril) 25 MG capsule   No No    Take 1 capsule by mouth Every Night.    loperamide (IMODIUM) 2 MG capsule   No No    Take 2 capsules by mouth 4 (Four) Times a Day As Needed for Diarrhea. Do not exceed 8 capsules daily    nitroglycerin (NITROSTAT) 0.4 MG SL tablet   Yes No    Place 1 tablet under the tongue Every 5 (Five) Minutes As Needed for Chest Pain. Take no more than 3 doses in 15 minutes.  [facility]    ondansetron (ZOFRAN) 4 MG tablet   No No    Take 1 tablet by mouth Every 8 (Eight) Hours As Needed for Nausea or Vomiting.    polyethylene glycol (MIRALAX) 17 g packet   No No    Take 17 g by mouth Daily As Needed (constipation).    psyllium (METAMUCIL MULTIHEALTH FIBER) 58.12 % packet   No No    Take 1 packet by mouth Daily.    rOPINIRole (REQUIP) 0.5 MG tablet   No No    Take 2 tablets by mouth 4 (Four) Times a Day. Take 1 hour before bedtime.    saccharomyces boulardii (FLORASTOR) 250 MG capsule   No No    Take 1 capsule by mouth 2 (Two) Times a Day.    vitamin B-12 (CYANOCOBALAMIN) 1000 MCG tablet   No No    Take 1 tablet by mouth Daily.                   Vital Signs:    Temp:  [97.6 °F (36.4 °C)] 97.6 °F (36.4 °C)  Heart Rate:  [58-69] 59  Resp:  [25] 25  BP: (103-149)/() 105/66    There were no vitals filed for this visit.    There is no height or weight on file to calculate BMI.    Physical  Exam:      General Appearance:  Lethargic and arousable but goes back to sleep and unable to follow commands   Head:    Atraumatic and normocephalic, without obvious abnormality.   Eyes:            PERRLA, conjunctivae and sclerae normal,    Ears:    Ears appear intact with no abnormalities noted.   Throat:   No oral lesions, no thrush, oral mucosa moist.   Neck:   Supple, trachea midline, no thyromegaly, no carotid bruit, no lymphadenopathy   Lungs:    Breath sounds heard bilaterally equally.  No crackles or wheezing. No Pleural rub or bronchial breathing.       Heart:    Normal S1 and S2, no murmur, no gallop, no rub. No JVD   Abdomen:     Normal bowel sounds, no masses, no organomegaly. Soft   nontender, nondistended, no guarding, no rebound    tenderness   Extremities:   no edema, no cyanosis, no          clubbing   Skin:   No  bruising or rash   Neurologic: He is lethargic neuro evaluation is difficult.  He does have baseline dementia and resting tremors at his baseline but not at present there is minimal rigidity noted.       EKG:      A-fib with rate in the 60s    Labs:    Lab Results (last 24 hours)       Procedure Component Value Units Date/Time    Blood Culture - Blood, Arm, Left [[id number]] Collected: 01/27/24 1622    Specimen: Blood from Arm, Left Updated: 01/27/24 1638    Blood Culture - Blood, Hand, Left [[id number]] Collected: 01/27/24 1634    Specimen: Blood from Hand, Left Updated: 01/27/24 1638    Urine Culture - Urine, Urine, Clean Catch [[id number]] Collected: 01/27/24 1241    Specimen: Urine, Clean Catch Updated: 01/27/24 1536    Procalcitonin [[id number]]  (Normal) Collected: 01/27/24 1159    Specimen: Blood Updated: 01/27/24 1317     Procalcitonin 0.14 ng/mL     Narrative:      As a Marker for Sepsis (Non-Neonates):    1. <0.5 ng/mL represents a low risk of severe sepsis and/or septic shock.  2. >2 ng/mL represents a high risk of severe sepsis and/or septic shock.    As a Marker for Lower  "Respiratory Tract Infections that require antibiotic therapy:    PCT on Admission    Antibiotic Therapy       6-12 Hrs later    >0.5                Strongly Recommended  >0.25 - <0.5        Recommended   0.1 - 0.25          Discouraged              Remeasure/reassess PCT  <0.1                Strongly Discouraged     Remeasure/reassess PCT    As 28 day mortality risk marker: \"Change in Procalcitonin Result\" (>80% or <=80%) if Day 0 (or Day 1) and Day 4 values are available. Refer to http://www.[email or web address] [place]pct-calculator.com    Change in PCT <=80%  A decrease of PCT levels below or equal to 80% defines a positive change in PCT test result representing a higher risk for 28-day all-cause mortality of patients diagnosed with severe sepsis for septic shock.    Change in PCT >80%  A decrease of PCT levels of more than 80% defines a negative change in PCT result representing a lower risk for 28-day all-cause mortality of patients diagnosed with severe sepsis or septic shock.       COVID-19 and FLU A/B PCR, 1 HR TAT - Swab, Nasopharynx [[id number]]  (Normal) Collected: 01/27/24 1241    Specimen: Swab from Nasopharynx Updated: 01/27/24 1309     COVID19 Not Detected     Influenza A PCR Not Detected     Influenza B PCR Not Detected    Narrative:      Fact sheet for providers: https://www.fda.gov/media/341863/download    Fact sheet for patients: https://www.fda.gov/media/785271/download    Test performed by PCR.    Urinalysis, Microscopic Only - Urine, Clean Catch [[id number]]  (Abnormal) Collected: 01/27/24 1241    Specimen: Urine, Clean Catch Updated: 01/27/24 1304     RBC, UA None Seen /HPF      WBC, UA 11-20 /HPF      Bacteria, UA Trace /HPF      Squamous Epithelial Cells, UA 0-2 /HPF      Hyaline Casts, UA None Seen /LPF      Methodology Manual Light Microscopy    [place] Draw [[id number]] Collected: 01/27/24 1159    Specimen: Blood Updated: 01/27/24 1301    Narrative:      The following orders were created for panel order [place] " Draw.  Procedure                               Abnormality         Status                     ---------                               -----------         ------                     Green Top (Gel)[[id number]]                                  Final result               Lavender Top[[id number]]                                     Final result               Gold Top - SST[[id number]]                                   Final result               Light Blue Top[[id number]]                                   Final result                 Please view results for these tests on the individual orders.    Green Top (Gel) [[id number]] Collected: 01/27/24 1159    Specimen: Blood Updated: 01/27/24 1301     Extra Tube Hold for add-ons.     Comment: Auto resulted.       Lavender Top [[id number]] Collected: 01/27/24 1159    Specimen: Blood Updated: 01/27/24 1301     Extra Tube hold for add-on     Comment: Auto resulted       Gold Top - SST [[id number]] Collected: 01/27/24 1159    Specimen: Blood Updated: 01/27/24 1301     Extra Tube Hold for add-ons.     Comment: Auto resulted.       Light Blue Top [[id number]] Collected: 01/27/24 1159    Specimen: Blood Updated: 01/27/24 1301     Extra Tube Hold for add-ons.     Comment: Auto resulted       Urinalysis With Microscopic If Indicated (No Culture) - Urine, Clean Catch [[id number]]  (Abnormal) Collected: 01/27/24 1241    Specimen: Urine, Clean Catch Updated: 01/27/24 1255     Color, UA Yellow     Appearance, UA Clear     pH, UA 7.0     Specific Gravity, UA 1.006     Glucose, UA Negative     Ketones, UA Negative     Bilirubin, UA Negative     Blood, UA Negative     Protein, UA Negative     Leuk Esterase, UA Large (3+)     Nitrite, UA Negative     Urobilinogen, UA 0.2 E.U./dL    Comprehensive Metabolic Panel [[id number]]  (Abnormal) Collected: 01/27/24 1159    Specimen: Blood Updated: 01/27/24 1239     Glucose 99 mg/dL      BUN 31 mg/dL      Creatinine 2.25 mg/dL      Sodium 128 mmol/L      Potassium  4.4 mmol/L      Chloride 94 mmol/L      CO2 24.1 mmol/L      Calcium 8.1 mg/dL      Total Protein 6.1 g/dL      Albumin 3.6 g/dL      ALT (SGPT) <5 U/L      AST (SGOT) 19 U/L      Alkaline Phosphatase 129 U/L      Total Bilirubin 0.6 mg/dL      Globulin 2.5 gm/dL      A/G Ratio 1.4 g/dL      BUN/Creatinine Ratio 13.8     Anion Gap 9.9 mmol/L      eGFR 27.4 mL/min/1.73     Narrative:      GFR Normal >60  Chronic Kidney Disease <60  Kidney Failure <15    The GFR formula is only valid for adults with stable renal function between ages 18 and 70.    Magnesium [[id number]]  (Normal) Collected: 01/27/24 1159    Specimen: Blood Updated: 01/27/24 1239     Magnesium 1.7 mg/dL     Single High Sensitivity Troponin T [[id number]]  (Abnormal) Collected: 01/27/24 1159    Specimen: Blood Updated: 01/27/24 1238     HS Troponin T 45 ng/L     Narrative:      High Sensitive Troponin T Reference Range:  <14.0 ng/L- Negative Female for AMI  <22.0 ng/L- Negative Male for AMI  >=14 - Abnormal Female indicating possible myocardial injury.  >=22 - Abnormal Male indicating possible myocardial injury.   Clinicians would have to utilize clinical acumen, EKG, Troponin, and serial changes to determine if it is an Acute Myocardial Infarction or myocardial injury due to an underlying chronic condition.         Lactic Acid, Plasma [[id number]]  (Normal) Collected: 01/27/24 1159    Specimen: Blood Updated: 01/27/24 1236     Lactate 1.2 mmol/L     CBC & Differential [[id number]]  (Abnormal) Collected: 01/27/24 1159    Specimen: Blood Updated: 01/27/24 1218    Narrative:      The following orders were created for panel order CBC & Differential.  Procedure                               Abnormality         Status                     ---------                               -----------         ------                     CBC Auto Differential[[id number]]        Abnormal            Final result               Scan Slide[[id number]]                                                                     Please view results for these tests on the individual orders.    CBC Auto Differential [[id number]]  (Abnormal) Collected: 01/27/24 1159    Specimen: Blood Updated: 01/27/24 1218     WBC 9.60 10*3/mm3      RBC 2.56 10*6/mm3      Hemoglobin 7.7 g/dL      Hematocrit 23.8 %      MCV 93.0 fL      MCH 30.1 pg      MCHC 32.4 g/dL      RDW 14.5 %      RDW-SD 48.4 fl      MPV 10.9 fL      Platelets 134 10*3/mm3      Neutrophil % 73.0 %      Lymphocyte % 11.0 %      Monocyte % 7.8 %      Eosinophil % 6.9 %      Basophil % 0.4 %      Immature Grans % 0.9 %      Neutrophils, Absolute 7.00 10*3/mm3      Lymphocytes, Absolute 1.06 10*3/mm3      Monocytes, Absolute 0.75 10*3/mm3      Eosinophils, Absolute 0.66 10*3/mm3      Basophils, Absolute 0.04 10*3/mm3      Immature Grans, Absolute 0.09 10*3/mm3      nRBC 0.0 /100 WBC             Radiology:    Imaging Results (Last 72 Hours)       Procedure Component Value Units Date/Time    CT Head Without Contrast [[id number]] Collected: 01/27/24 1332     Updated: 01/27/24 1335    Narrative:      CT HEAD WITHOUT CONTRAST     HISTORY: Altered mental status     TECHNIQUE: Noncontrast exam     FINDINGS: Limited by skull base streak artifact. Atrophic and chronic  white matter changes. Brain parenchyma is otherwise homogeneous without  evidence of hemorrhage, mass effect or edema. No extra-axial abnormality  is noted. Ventricles and cisterns appear normal.     The visualized sinuses, orbits and petrous temporal bones appear  unremarkable.       Impression:      No acute intracranial abnormality           CTDI: 35.83 mGy  DLP:631.22 mGy.cm        This study was performed using dose reduction techniques to achieve  radiation exposure as low as reasonably achievable (ALARA)     This report was signed and finalized on 1/27/2024 1:33 PM by Lauren Lopez MD.       XR Chest 1 View [[id number]] Collected: 01/27/24 1232     Updated: 01/27/24 1235    Narrative:       PORTABLE CHEST  1/27/2024 12:29 PM     HISTORY: Shortness of breath     COMPARISON:   None     FINDINGS: Prior median sternotomy the cardiac silhouette is enlarged.  The mediastinal and hilar contours are unremarkable. Bibasilar  atelectasis. There is no pneumothorax. The visualized osseous structures  demonstrate no acute abnormalities.       Impression:      No acute cardiopulmonary process.                       This report was signed and finalized on 1/27/2024 12:33 PM by Lauren Lopez MD.               Assessment:    Assessment & Plan       Metabolic encephalopathy    Dehydration    UTI (urinary tract infection) due to Enterococcus    Altered mental status    Abdominal aortic aneurysm (AAA) without rupture    Generalized weakness    Parkinson's disease dementia    Fall    Acute renal failure superimposed on stage 3 chronic kidney disease    Acute metabolic encephalopathy    Paroxysmal atrial fibrillation        Plan:     1.  Acute metabolic encephalopathy-this seems to be multifactorial because of underlying prerenal azotemia with mild dehydration as well as a UTI and he does have Parkinson's dementia which has been progressive.  Will treat with hydration and antibiotic and and most likely can improve to his baseline    2.  UTI due to Enterococcus faecalis-the initial urine did grow Enterococcus faecalis and at present sensitivities not back.  Will treat with Unasyn along with IV fluids and repeat urine cultures and also do blood cultures to rule out occult bacteremia    3.  Acute on chronic renal failure-he does have mild prerenal azotemia and clinically dry.  At present we will hydrate him overnight as an probably for 24 hours to see the response and renal function should improve to baseline    4.  Parkinson's dementia-he has gradual progression of his disease but because of UTI there has been further worsening.  Will treat the current acute etiology and see if he can improve.  If there is not much  improvement then may think about more palliative hospice care    5.  Paroxysmal [id number] of his risk of falls and has decided not to do chronic anticoagulation and at present his rate is controlled    6.  Anemia of chronic disease-his hydration has helped some and his hemoglobin has dropped from 8.5-7.7 today.  Continue with hydration and if hemoglobin drops further than 7 we will transfuse 1 unit.  Will also check stool for occult blood    Patient is DNR at present.  Discussed case with her daughter who is the POA and agrees with acute management and further if there is improvement to the baseline then we will continue more aggressive treatment.  Following treatment for his UTI and after hydration if he does not start eating well and drink with his nutritional status if there is not much improvement then it could be mainly related to the disease progression and would recommend palliative and hospice care       Dieudonne Jensen MD  01/27/24  17:50 EST    Please note that portions of this note were completed with a voice recognition program.

## 2024-01-27 NOTE — ED PROVIDER NOTES
Subjective  History of Present Illness:    Chief Complaint: Altered mental status poor intake,    History of Present Illness: 88-year-old male from assisted living, here with altered mental status, on antibiotics for UTI, had improved initially but has declined in terms of mental status and intake over the last 2 days.  History of paroxysmal A-fib    Nurses Notes reviewed and agree, including vitals, allergies, social history and prior medical history.     REVIEW OF SYSTEMS: All systems reviewed and not pertinent unless noted.  Review of Systems      Positive for: Altered mental status poor p.o. intake, recent UTI    Negative for: Fever vomiting diarrhea    Past Medical History:   Diagnosis Date    Abdominal aortic aneurysm (AAA) without rupture 07/02/2018    Aneurysm     Arthritis     Cardiac murmur     Carotid stenosis     Celiac artery stenosis     Chest pain     CHF (congestive heart failure)     Chronic kidney disease     Coronary artery disease     Dyslipidemia     Elevated cholesterol     GERD (gastroesophageal reflux disease)     Hiatal hernia     History of exercise stress test     History of nuclear stress test     History of pneumonia     Hypertension     Lumbar degenerative disc disease     MI, old 1995    Osteoarthritis     PAD (peripheral artery disease)     Parkinson's disease     Paroxysmal A-fib     Peptic ulceration     Psoriasis     Renal artery stenosis     Sleep apnea     uses o2 @ HS    Spinal stenosis     Tremor     Vertebral artery stenosis        Allergies:    Celebrex [celecoxib] and Sulfa antibiotics      Past Surgical History:   Procedure Laterality Date    ABDOMINAL AORTIC ANEURYSM REPAIR WITH ENDOGRAFT  1994    APPENDECTOMY      CATARACT EXTRACTION W/ INTRAOCULAR LENS IMPLANT Left 12/10/2018    Procedure: CATARACT PHACO EXTRACTION WITH INTRAOCULAR LENS IMPLANT LEFT;  Surgeon: Charity Carrasco MD;  Location: [facility];  Service: Ophthalmology    CATARACT EXTRACTION W/ INTRAOCULAR LENS  IMPLANT Right 2023    Procedure: CATARACT PHACO EXTRACTION WITH INTRAOCULAR LENS IMPLANT RIGHT;  Surgeon: Charity Carrasco MD;  Location: [facility];  Service: Ophthalmology;  Laterality: Right;    COLONOSCOPY      CORONARY ARTERY BYPASS GRAFT      ENDOSCOPY      SHOULDER ROTATOR CUFF REPAIR Right     SKIN BIOPSY           Social History     Socioeconomic History    Marital status:     Number of children: 3   Tobacco Use    Smoking status: Former     Packs/day: 3.00     Years: 15.00     Additional pack years: 0.00     Total pack years: 45.00     Types: Cigarettes     Quit date:      Years since quittin.1    Smokeless tobacco: Never    Tobacco comments:     quit 50 years ago   Vaping Use    Vaping Use: Never used   Substance and Sexual Activity    Alcohol use: Not Currently     Alcohol/week: 2.0 standard drinks of alcohol     Types: 2 Glasses of wine per week     Comment: DAILY    Drug use: No    Sexual activity: Defer         Family History   Problem Relation Age of Onset    Other Mother          of old age    No Known Problems Father        Objective  Physical Exam:  /82   Pulse 58   Temp 97.6 °F (36.4 °C)   Resp 25   SpO2 94%      Physical Exam    CONSTITUTIONAL: Well developed, nontoxic elderly 88-year-old male,  in no acute distress.  VITAL SIGNS: per nursing, reviewed and noted  SKIN: exposed skin with no rashes, ulcerations or petechiae  EYES: Grossly EOMI, no icterus  ENT: Normal voice.  Moist mucous membranes   RESPIRATORY:  No increased work of breathing. No retractions.  Chest clear  CARDIOVASCULAR:   Extremities pink and warm.  Good cap refill to extremities.   GI: Abdomen without distention   MUSCULOSKELETAL: Age-appropriate bulk and tone, moves all 4 extremities  NEUROLOGIC: Somnolent, will follow commands, some confusion.  Opens eyes to light touch  PSYCH: Initially flat affect.  : no bladder tenderness or distention, no CVA tenderness    Procedures  No attending  physician procedures were performed on this patient.  ED Course:    Lab Results (last 24 hours)       Procedure Component Value Units Date/Time    CBC & Differential [[id number]]  (Abnormal) Collected: 01/27/24 1159    Specimen: Blood Updated: 01/27/24 1218    Narrative:      The following orders were created for panel order CBC & Differential.  Procedure                               Abnormality         Status                     ---------                               -----------         ------                     CBC Auto Differential[[id number]]        Abnormal            Final result               Scan Slide[[id number]]                                                                    Please view results for these tests on the individual orders.    Comprehensive Metabolic Panel [[id number]]  (Abnormal) Collected: 01/27/24 1159    Specimen: Blood Updated: 01/27/24 1239     Glucose 99 mg/dL      BUN 31 mg/dL      Creatinine 2.25 mg/dL      Sodium 128 mmol/L      Potassium 4.4 mmol/L      Chloride 94 mmol/L      CO2 24.1 mmol/L      Calcium 8.1 mg/dL      Total Protein 6.1 g/dL      Albumin 3.6 g/dL      ALT (SGPT) <5 U/L      AST (SGOT) 19 U/L      Alkaline Phosphatase 129 U/L      Total Bilirubin 0.6 mg/dL      Globulin 2.5 gm/dL      A/G Ratio 1.4 g/dL      BUN/Creatinine Ratio 13.8     Anion Gap 9.9 mmol/L      eGFR 27.4 mL/min/1.73     Narrative:      GFR Normal >60  Chronic Kidney Disease <60  Kidney Failure <15    The GFR formula is only valid for adults with stable renal function between ages 18 and 70.    Single High Sensitivity Troponin T [[id number]]  (Abnormal) Collected: 01/27/24 1159    Specimen: Blood Updated: 01/27/24 1238     HS Troponin T 45 ng/L     Narrative:      High Sensitive Troponin T Reference Range:  <14.0 ng/L- Negative Female for AMI  <22.0 ng/L- Negative Male for AMI  >=14 - Abnormal Female indicating possible myocardial injury.  >=22 - Abnormal Male indicating possible myocardial injury.  "  Clinicians would have to utilize clinical acumen, EKG, Troponin, and serial changes to determine if it is an Acute Myocardial Infarction or myocardial injury due to an underlying chronic condition.         Magnesium [[id number]]  (Normal) Collected: 01/27/24 1159    Specimen: Blood Updated: 01/27/24 1239     Magnesium 1.7 mg/dL     Lactic Acid, Plasma [[id number]]  (Normal) Collected: 01/27/24 1159    Specimen: Blood Updated: 01/27/24 1236     Lactate 1.2 mmol/L     Procalcitonin [[id number]]  (Normal) Collected: 01/27/24 1159    Specimen: Blood Updated: 01/27/24 1317     Procalcitonin 0.14 ng/mL     Narrative:      As a Marker for Sepsis (Non-Neonates):    1. <0.5 ng/mL represents a low risk of severe sepsis and/or septic shock.  2. >2 ng/mL represents a high risk of severe sepsis and/or septic shock.    As a Marker for Lower Respiratory Tract Infections that require antibiotic therapy:    PCT on Admission    Antibiotic Therapy       6-12 Hrs later    >0.5                Strongly Recommended  >0.25 - <0.5        Recommended   0.1 - 0.25          Discouraged              Remeasure/reassess PCT  <0.1                Strongly Discouraged     Remeasure/reassess PCT    As 28 day mortality risk marker: \"Change in Procalcitonin Result\" (>80% or <=80%) if Day 0 (or Day 1) and Day 4 values are available. Refer to http://www.[facility]-pct-calculator.com    Change in PCT <=80%  A decrease of PCT levels below or equal to 80% defines a positive change in PCT test result representing a higher risk for 28-day all-cause mortality of patients diagnosed with severe sepsis for septic shock.    Change in PCT >80%  A decrease of PCT levels of more than 80% defines a negative change in PCT result representing a lower risk for 28-day all-cause mortality of patients diagnosed with severe sepsis or septic shock.       CBC Auto Differential [[id number]]  (Abnormal) Collected: 01/27/24 1159    Specimen: Blood Updated: 01/27/24 1218     WBC 9.60 " 10*3/mm3      RBC 2.56 10*6/mm3      Hemoglobin 7.7 g/dL      Hematocrit 23.8 %      MCV 93.0 fL      MCH 30.1 pg      MCHC 32.4 g/dL      RDW 14.5 %      RDW-SD 48.4 fl      MPV 10.9 fL      Platelets 134 10*3/mm3      Neutrophil % 73.0 %      Lymphocyte % 11.0 %      Monocyte % 7.8 %      Eosinophil % 6.9 %      Basophil % 0.4 %      Immature Grans % 0.9 %      Neutrophils, Absolute 7.00 10*3/mm3      Lymphocytes, Absolute 1.06 10*3/mm3      Monocytes, Absolute 0.75 10*3/mm3      Eosinophils, Absolute 0.66 10*3/mm3      Basophils, Absolute 0.04 10*3/mm3      Immature Grans, Absolute 0.09 10*3/mm3      nRBC 0.0 /100 WBC     Urinalysis With Microscopic If Indicated (No Culture) - Urine, Clean Catch [[id number]]  (Abnormal) Collected: 01/27/24 1241    Specimen: Urine, Clean Catch Updated: 01/27/24 1255     Color, UA Yellow     Appearance, UA Clear     pH, UA 7.0     Specific Gravity, UA 1.006     Glucose, UA Negative     Ketones, UA Negative     Bilirubin, UA Negative     Blood, UA Negative     Protein, UA Negative     Leuk Esterase, UA Large (3+)     Nitrite, UA Negative     Urobilinogen, UA 0.2 E.U./dL    COVID-19 and FLU A/B PCR, 1 HR TAT - Swab, Nasopharynx [[id number]]  (Normal) Collected: 01/27/24 1241    Specimen: Swab from Nasopharynx Updated: 01/27/24 1309     COVID19 Not Detected     Influenza A PCR Not Detected     Influenza B PCR Not Detected    Narrative:      Fact sheet for providers: https://www.fda.gov/media/081097/download    Fact sheet for patients: https://www.fda.gov/media/989481/download    Test performed by PCR.    Urinalysis, Microscopic Only - Urine, Clean Catch [[id number]]  (Abnormal) Collected: 01/27/24 1241    Specimen: Urine, Clean Catch Updated: 01/27/24 1304     RBC, UA None Seen /HPF      WBC, UA 11-20 /HPF      Bacteria, UA Trace /HPF      Squamous Epithelial Cells, UA 0-2 /HPF      Hyaline Casts, UA None Seen /LPF      Methodology Manual Light Microscopy    Urine Culture - Urine, Urine,  Clean Catch [[id number]] Collected: 01/27/24 1241    Specimen: Urine, Clean Catch Updated: 01/27/24 1536             CT Head Without Contrast    Result Date: 1/27/2024  CT HEAD WITHOUT CONTRAST  HISTORY: Altered mental status  TECHNIQUE: Noncontrast exam  FINDINGS: Limited by skull base streak artifact. Atrophic and chronic white matter changes. Brain parenchyma is otherwise homogeneous without evidence of hemorrhage, mass effect or edema. No extra-axial abnormality is noted. Ventricles and cisterns appear normal.  The visualized sinuses, orbits and petrous temporal bones appear unremarkable.      Impression: No acute intracranial abnormality    CTDI: 35.83 mGy DLP:631.22 mGy.cm   This study was performed using dose reduction techniques to achieve radiation exposure as low as reasonably achievable (ALARA)  This report was signed and finalized on 1/27/2024 1:33 PM by Lauren Lopez MD.      XR Chest 1 View    Result Date: 1/27/2024  PORTABLE CHEST  1/27/2024 12:29 PM  HISTORY: Shortness of breath  COMPARISON:   None  FINDINGS: Prior median sternotomy the cardiac silhouette is enlarged. The mediastinal and hilar contours are unremarkable. Bibasilar atelectasis. There is no pneumothorax. The visualized osseous structures demonstrate no acute abnormalities.      Impression: No acute cardiopulmonary process.        This report was signed and finalized on 1/27/2024 12:33 PM by Lauren Lopez MD.        MDM     Amount and/or Complexity of Data Reviewed  Clinical lab tests: reviewed  Tests in the radiology section of CPT®: reviewed  Tests in the medicine section of CPT®: reviewed        ED Course as of 01/27/24 1540   Sat Jan 27, 2024   1209 EKG interpreted by me reveals sinus rhythm at a rate of 60 versus atrial rhythm, no ischemic changes [PF]      ED Course User Index  [PF] Leland Castellanos,        Medical Decision Making:    Initial impression of presenting illness: 88-year-old male from assisted living, here  with altered mental status, on antibiotics for UTI, had improved initially but has declined in terms of mental status and intake over the last 2 days.  History of paroxysmal A-fib    DDX: includes but is not limited to: Infectious etiologies, electrolyte derangement dehydration, sepsis,         Patient arrives hypertensive afebrile no tachycardia sats 94% with vitals interpreted by myself.     Pertinent features from physical exam: Flat affect, somnolent, confused, opens eyes to light touch.    Initial diagnostic plan: CBC CMP lactate procalcitonin blood cultures flu COVID UA troponin magnesium EKG chest x-ray CT head    Results from initial plan were reviewed and interpreted by me revealing chest x-ray head CT without acute findings per radiology, BUN 31 creatinine 2.25 sodium 128 hemoglobin 7.7 macro 23.8 lactate 1.2 flu and COVID-negative UA with 11-20 white cells trace bacteria large leukocytes negative nitrite,    Diagnostic information from other sources: Family members at the bedside    Interventions / Re-evaluation: IV fluids, Rocephin, provided maintenance medication of Sinemet.    Results/clinical rationale were discussed with patient and family    Consultations/Discussion of results with other physicians: Dr. Jensen, patient to PCP, will admit, requested Rocephin cultures, IV hydration, will repeat labs in the morning to assess for possible transfusion.    Disposition plan: Admission  -----      Final diagnoses:   Altered mental status, unspecified altered mental status type   Renal insufficiency   Anemia, unspecified type   Urinary tract infection without hematuria, site unspecified            Leland Castellanos,   01/27/24 2380

## 2024-01-27 NOTE — ED NOTES
Pt family stated he has no taken his medication for parkinson's since last night at midnight. Attempting to call juan whitehead, no answer att

## 2024-01-27 NOTE — Clinical Note
Level of Care: Med/Surg [1]   Diagnosis: Altered mental status [780.97.ICD-9-CM]   Admitting Physician: VIOLETTE HAYES [[id number]]

## 2024-01-28 LAB
ALBUMIN SERPL-MCNC: 3.4 G/DL (ref 3.5–5.2)
ALBUMIN/GLOB SERPL: 1.3 G/DL
ALP SERPL-CCNC: 120 U/L (ref 39–117)
ALT SERPL W P-5'-P-CCNC: <5 U/L (ref 1–41)
ANION GAP SERPL CALCULATED.3IONS-SCNC: 11.6 MMOL/L (ref 5–15)
AST SERPL-CCNC: 18 U/L (ref 1–40)
BACTERIA SPEC AEROBE CULT: NO GROWTH
BILIRUB SERPL-MCNC: 0.6 MG/DL (ref 0–1.2)
BUN SERPL-MCNC: 26 MG/DL (ref 8–23)
BUN/CREAT SERPL: 13.6 (ref 7–25)
CALCIUM SPEC-SCNC: 8.1 MG/DL (ref 8.6–10.5)
CHLORIDE SERPL-SCNC: 100 MMOL/L (ref 98–107)
CO2 SERPL-SCNC: 21.4 MMOL/L (ref 22–29)
CREAT SERPL-MCNC: 1.91 MG/DL (ref 0.76–1.27)
DEPRECATED RDW RBC AUTO: 49.2 FL (ref 37–54)
EGFRCR SERPLBLD CKD-EPI 2021: 33.3 ML/MIN/1.73
ERYTHROCYTE [DISTWIDTH] IN BLOOD BY AUTOMATED COUNT: 14.6 % (ref 12.3–15.4)
GLOBULIN UR ELPH-MCNC: 2.6 GM/DL
GLUCOSE SERPL-MCNC: 74 MG/DL (ref 65–99)
HCT VFR BLD AUTO: 24.5 % (ref 37.5–51)
HGB BLD-MCNC: 7.9 G/DL (ref 13–17.7)
MCH RBC QN AUTO: 30.5 PG (ref 26.6–33)
MCHC RBC AUTO-ENTMCNC: 32.2 G/DL (ref 31.5–35.7)
MCV RBC AUTO: 94.6 FL (ref 79–97)
MRSA DNA SPEC QL NAA+PROBE: NORMAL
PLATELET # BLD AUTO: 139 10*3/MM3 (ref 140–450)
PMV BLD AUTO: 11 FL (ref 6–12)
POTASSIUM SERPL-SCNC: 4.1 MMOL/L (ref 3.5–5.2)
PROT SERPL-MCNC: 6 G/DL (ref 6–8.5)
RBC # BLD AUTO: 2.59 10*6/MM3 (ref 4.14–5.8)
SODIUM SERPL-SCNC: 133 MMOL/L (ref 136–145)
WBC NRBC COR # BLD AUTO: 7.47 10*3/MM3 (ref 3.4–10.8)

## 2024-01-28 PROCEDURE — 25810000003 SODIUM CHLORIDE 0.9 % SOLUTION: Performed by: INTERNAL MEDICINE

## 2024-01-28 PROCEDURE — 80053 COMPREHEN METABOLIC PANEL: CPT | Performed by: INTERNAL MEDICINE

## 2024-01-28 PROCEDURE — 25010000002 AMPICILLIN-SULBACTAM PER 1.5 G: Performed by: INTERNAL MEDICINE

## 2024-01-28 PROCEDURE — 25010000002 ENOXAPARIN PER 10 MG: Performed by: INTERNAL MEDICINE

## 2024-01-28 PROCEDURE — 85027 COMPLETE CBC AUTOMATED: CPT | Performed by: INTERNAL MEDICINE

## 2024-01-28 RX ADMIN — SODIUM CHLORIDE 3 G: 900 INJECTION, SOLUTION INTRAVENOUS at 17:32

## 2024-01-28 RX ADMIN — ROPINIROLE HYDROCHLORIDE 1 MG: 1 TABLET, FILM COATED ORAL at 09:47

## 2024-01-28 RX ADMIN — CARBIDOPA AND LEVODOPA 2 TABLET: 25; 100 TABLET ORAL at 09:47

## 2024-01-28 RX ADMIN — SODIUM CHLORIDE 100 ML/HR: 9 INJECTION, SOLUTION INTRAVENOUS at 02:18

## 2024-01-28 RX ADMIN — SODIUM CHLORIDE 3 G: 900 INJECTION, SOLUTION INTRAVENOUS at 05:49

## 2024-01-28 RX ADMIN — ROPINIROLE HYDROCHLORIDE 1 MG: 1 TABLET, FILM COATED ORAL at 13:21

## 2024-01-28 RX ADMIN — Medication 250 MG: at 09:47

## 2024-01-28 RX ADMIN — CYANOCOBALAMIN TAB 500 MCG 1000 MCG: 500 TAB at 09:47

## 2024-01-28 RX ADMIN — CARBIDOPA AND LEVODOPA 2 TABLET: 25; 100 TABLET ORAL at 13:21

## 2024-01-28 RX ADMIN — CARBIDOPA AND LEVODOPA 2 TABLET: 25; 100 TABLET ORAL at 22:14

## 2024-01-28 RX ADMIN — ROPINIROLE HYDROCHLORIDE 1 MG: 1 TABLET, FILM COATED ORAL at 17:32

## 2024-01-28 RX ADMIN — SODIUM CHLORIDE 100 ML/HR: 9 INJECTION, SOLUTION INTRAVENOUS at 15:09

## 2024-01-28 RX ADMIN — ACETAMINOPHEN 650 MG: 325 TABLET, FILM COATED ORAL at 09:55

## 2024-01-28 RX ADMIN — ROPINIROLE HYDROCHLORIDE 1 MG: 1 TABLET, FILM COATED ORAL at 22:14

## 2024-01-28 RX ADMIN — CARBIDOPA AND LEVODOPA 2 TABLET: 25; 100 TABLET ORAL at 17:32

## 2024-01-28 RX ADMIN — Medication 250 MG: at 22:14

## 2024-01-28 RX ADMIN — ENOXAPARIN SODIUM 30 MG: 30 INJECTION SUBCUTANEOUS at 17:32

## 2024-01-28 NOTE — THERAPY EVALUATION
PT order received. PT held this date due to awaiting ortho consult. PT to follow up as appropriate.

## 2024-01-28 NOTE — PROGRESS NOTES
[facility]  INTERNAL MEDICINE PROGRESS NOTE    Name:  Radha Varela   Age:  88 y.o.  Sex:  male  :  1935  MRN:  [id number]   Visit Number:  [id number]  Admission Date:  2024  Date Of Service:  24  Primary Care Physician:  Dieudonne Jensen MD     LOS: 1 day :  Patient Care Team:  Dieudonne Jensen MD as PCP - General (Internal Medicine):      Subjective / Interval History:     8-year-old patient with past medical history of Parkinson's disease with her dementia, chronic kidney disease stage III, hypertension, history of coronary artery disease status post CABG, PAD, aortic aneurysm s/p repair in  with degeneration of the graft and aneurysmal dilatation more than 6 cm in 2022, who has had a functional decline over the last week with decreased oral intake and was found to have UTI due to Enterococcus.  He has had labs done which showed that there was acute on chronic renal failure with UTI and so empirically was started on Cipro.  The cultures did come back yesterday showing Enterococcus faecalis and patient has worsened with further acute confusion changes in mentation and lethargy.  So it was decided for him to be evaluated in the ER and the workup showed that he has been having acute changes in mentation with encephalopathy and is being admitted for further IV fluid management and UTI which is due to Enterococcus faecalis     Patient seen on .  He is still lethargic today morning.  He is arousable and but has been confused.  He has not had much to eat and stated his pill out of his breakfast .  He has been on IV fluids with IV antibiotic and lab results looking better compared to yesterday      Vital Signs:    Temp:  [96.4 °F (35.8 °C)-97.6 °F (36.4 °C)] 97.6 °F (36.4 °C)  Heart Rate:  [51-69] 63  Resp:  [16-25] 16  BP: (103-154)/() 125/67    Intake and output:    I/O last 3 completed shifts:  In: 50 [IV Piggyback:50]  Out: -   I/O this  shift:  In: 120 [P.O.:120]  Out: -     Physical Examination:    General Appearance:  Lethargic and cooperative, not in any acute distress.   Head:    Atraumatic and normocephalic, without obvious abnormality.   Eyes:            PERRLA,  No pallor. Extraocular movements are within normal limits.   Neck:   Supple,  No lymph glands, no bruit   Lungs:     Chest shape is normal. Breath sounds heard bilaterally equally.  No crackles or wheezing.     Heart:    Normal S1 and S2, no murmur,  No JVD   Abdomen:     Normal bowel sounds, no masses, no organomegaly. Soft     nontender, no guarding, no rebound tenderness   Extremities:   Moves all extremities well, no edema, no cyanosis,    Skin:   No  bruising or rash.   Neurologic:   Grossly nonfocal and moves all extremities..  He is lethargic he does have baseline Parkinson's disease     Laboratory results:  Results from last 7 days   Lab Units 01/28/24  0652 01/27/24  1159 01/23/24  1610   SODIUM mmol/L 133* 128* 138   POTASSIUM mmol/L 4.1 4.4 4.2   CHLORIDE mmol/L 100 94* 99   CO2 mmol/L 21.4* 24.1 24.8   BUN mg/dL 26* 31* 56*   CREATININE mg/dL 1.91* 2.25* 3.00*   CALCIUM mg/dL 8.1* 8.1* 8.5*   BILIRUBIN mg/dL 0.6 0.6 0.8   ALK PHOS U/L 120* 129* 129*   ALT (SGPT) U/L <5 <5 <5   AST (SGOT) U/L 18 19 15   GLUCOSE mg/dL 74 99 94     Results from last 7 days   Lab Units 01/28/24  0652 01/27/24  1159 01/23/24  1610   WBC 10*3/mm3 7.47 9.60 8.28   HEMOGLOBIN g/dL 7.9* 7.7* 8.5*   HEMATOCRIT % 24.5* 23.8* 26.4*   PLATELETS 10*3/mm3 139* 134* 135*         Results from last 7 days   Lab Units 01/27/24  1159   HSTROP T ng/L 45*     Results from last 7 days   Lab Units 01/23/24  1610   URINECX  50,000 CFU/mL Mixed Katiana Isolated       Radiology results:    Imaging Results (Last 24 Hours)       Procedure Component Value Units Date/Time    CT Head Without Contrast [[id number]] Collected: 01/27/24 1332     Updated: 01/27/24 1335    Narrative:      CT HEAD WITHOUT CONTRAST     HISTORY:  Altered mental status     TECHNIQUE: Noncontrast exam     FINDINGS: Limited by skull base streak artifact. Atrophic and chronic  white matter changes. Brain parenchyma is otherwise homogeneous without  evidence of hemorrhage, mass effect or edema. No extra-axial abnormality  is noted. Ventricles and cisterns appear normal.     The visualized sinuses, orbits and petrous temporal bones appear  unremarkable.       Impression:      No acute intracranial abnormality           CTDI: 35.83 mGy  DLP:631.22 mGy.cm        This study was performed using dose reduction techniques to achieve  radiation exposure as low as reasonably achievable (ALARA)     This report was signed and finalized on 1/27/2024 1:33 PM by Lauren Lopez MD.       XR Chest 1 View [[id number]] Collected: 01/27/24 1232     Updated: 01/27/24 1235    Narrative:      PORTABLE CHEST  1/27/2024 12:29 PM     HISTORY: Shortness of breath     COMPARISON:   None     FINDINGS: Prior median sternotomy the cardiac silhouette is enlarged.  The mediastinal and hilar contours are unremarkable. Bibasilar  atelectasis. There is no pneumothorax. The visualized osseous structures  demonstrate no acute abnormalities.       Impression:      No acute cardiopulmonary process.                       This report was signed and finalized on 1/27/2024 12:33 PM by Laurne Lopez MD.               I have reviewed the patient's radiology reports.    Medication Review:     I have reviewed the patients active and prn medications.     Assessment:      Metabolic encephalopathy    Dehydration    UTI (urinary tract infection) due to Enterococcus    Altered mental status    Abdominal aortic aneurysm (AAA) without rupture    Generalized weakness    Parkinson's disease dementia    Fall    Acute renal failure superimposed on stage 3 chronic kidney disease    Acute metabolic encephalopathy    Paroxysmal atrial fibrillation          Plan:    1.  Acute metabolic encephalopathy-this seems to  be multifactorial because of underlying prerenal azotemia with mild dehydration as well as a UTI and he does have Parkinson's dementia which has been progressive.  Will treat with hydration and antibiotic and and most likely can improve to his baseline  --  This morning there has been not much change and will continue close follow-up     2.  UTI due to Enterococcus faecalis-he is on Unasyn based on the sensitivity and will switch over to Augmentin with oral conversion after 48 hours     3.  Acute on chronic renal failure-he is slightly better his creatinine has come down to 1.91 and will continue normal saline for 24 more hours and reevaluate     4.  Parkinson's dementia-he has gradual progression of his disease but because of UTI there has been further worsening.  Will treat the current acute etiology and see if he can improve.  If there is not much improvement then may think about more palliative hospice care     5.  Paroxysmal [id number] of his risk of falls and has decided not to do chronic anticoagulation and at present his rate is controlled     6.  Anemia of chronic disease-his hydration has helped some and his hemoglobin has dropped  7.7 continue with hydration and if hemoglobin drops further than 7 we will transfuse 1 unit.  Will also check stool for occult blood    Case discussed with the next of kin the daughter and Dr. Hurley who is the son-in-law and agreeable with the plan of care    Dieudonne Jensen MD  01/28/24  11:51 EST      Please note that portions of this note were completed with a voice recognition program.

## 2024-01-28 NOTE — CASE MANAGEMENT/SOCIAL WORK
Discharge Planning Assessment   [place]     Patient Name: Radha Varela  MRN: [id number]  Today's Date: 1/28/2024    Admit Date: 1/27/2024    Plan: DCP   Discharge Needs Assessment       Row Name 01/28/24 1030       Living Environment    People in Home facility resident;spouse    Current Living Arrangements residential facility    Potentially Unsafe Housing Conditions none    Provides Primary Care For no one, unable/limited ability to care for self    Family Caregiver if Needed child(luis), adult    Quality of Family Relationships helpful;involved    Able to Return to Prior Arrangements yes       Resource/Environmental Concerns    Resource/Environmental Concerns none    Transportation Concerns none       Transition Planning    Patient/Family Anticipates Transition to long-term care facility    Patient/Family Anticipated Services at Transition none    Transportation Anticipated agency       Discharge Needs Assessment    Readmission Within the Last 30 Days no previous admission in last 30 days    Equipment Currently Used at Home wheelchair;walker, rolling;oxygen    Concerns to be Addressed discharge planning    Anticipated Changes Related to Illness none    Discharge Facility/Level of Care Needs nursing facility, intermediate                   Discharge Plan       Row Name 01/28/24 1031       Plan    Plan DCP    Patient/Family in Agreement with Plan yes    Plan Comments SW met with daughter/Sandra at bedside while pt was getting changed by staff. Pt resides at [facility]/personal care w/ spouse. Pt is mostly dependent on care for ADL's. Pt has walker and w/c when able to use. Pt uses home O2 at night. Pt is current with PCP. [facility]. Goal is back to LTC w/ EMS transport. SW/CM will continue to follow for any dc needs.    Final Discharge Disposition Code 04 - intermediate care facility                  Continued Care and Services - Admitted Since 1/27/2024       Destination Coordination  complete.      Service Provider Request Status Selected Services Address Phone Fax Patient Preferred    [facility]  [facility] [address] [place] [address] [phone] --                     Demographic Summary       Row Name 01/28/24 1029       General Information    Admission Type inpatient    Arrived From long-term [facility]    Required Notices Provided Important Message from Medicare    Referral Source admission list    Reason for Consult discharge planning    Preferred Language English                   Functional Status       Row Name 01/28/24 1030       Functional Status, IADL    Medications assistive person    Meal Preparation completely dependent    Housekeeping completely dependent    Laundry completely dependent    Shopping completely dependent       Mental Status Summary    Recent Changes in Mental Status/Cognitive Functioning unable to assess       Employment/    Employment Status retired                   Psychosocial       Row Name 01/28/24 1030       Developmental Stage (Eriksson's)    Developmental Stage Stage 8 (65 years-death/Late Adulthood) Integrity vs. Despair                   Abuse/Neglect    No documentation.                  Legal    No documentation.                  Substance Abuse    No documentation.                  Patient Forms    No documentation.                     JOAO Lazaro

## 2024-01-28 NOTE — THERAPY EVALUATION
PT order received. PT evaluation held this date due to pt falling asleep during evaluation x2 separate occasions this date. PT to follow up tomorrow as appropriate.

## 2024-01-28 NOTE — PLAN OF CARE
Goal Outcome Evaluation:  Plan of Care Reviewed With: patient           Outcome Evaluation: Patient had no acute events today. Patient pleasantly confused

## 2024-01-29 LAB
ACINETOBACTER SCREEN CX: NORMAL
ALBUMIN SERPL-MCNC: 3.4 G/DL (ref 3.5–5.2)
ALBUMIN/GLOB SERPL: 1.5 G/DL
ALP SERPL-CCNC: 126 U/L (ref 39–117)
ALT SERPL W P-5'-P-CCNC: <5 U/L (ref 1–41)
ANION GAP SERPL CALCULATED.3IONS-SCNC: 12.1 MMOL/L (ref 5–15)
AST SERPL-CCNC: 19 U/L (ref 1–40)
BILIRUB SERPL-MCNC: 0.4 MG/DL (ref 0–1.2)
BUN SERPL-MCNC: 21 MG/DL (ref 8–23)
BUN/CREAT SERPL: 13.5 (ref 7–25)
CALCIUM SPEC-SCNC: 7.7 MG/DL (ref 8.6–10.5)
CHLORIDE SERPL-SCNC: 103 MMOL/L (ref 98–107)
CO2 SERPL-SCNC: 18.9 MMOL/L (ref 22–29)
CREAT SERPL-MCNC: 1.56 MG/DL (ref 0.76–1.27)
DEPRECATED RDW RBC AUTO: 50.9 FL (ref 37–54)
EGFRCR SERPLBLD CKD-EPI 2021: 42.5 ML/MIN/1.73
ERYTHROCYTE [DISTWIDTH] IN BLOOD BY AUTOMATED COUNT: 14.8 % (ref 12.3–15.4)
GLOBULIN UR ELPH-MCNC: 2.2 GM/DL
GLUCOSE SERPL-MCNC: 69 MG/DL (ref 65–99)
HCT VFR BLD AUTO: 25.4 % (ref 37.5–51)
HGB BLD-MCNC: 7.8 G/DL (ref 13–17.7)
MCH RBC QN AUTO: 30.2 PG (ref 26.6–33)
MCHC RBC AUTO-ENTMCNC: 30.7 G/DL (ref 31.5–35.7)
MCV RBC AUTO: 98.4 FL (ref 79–97)
PLATELET # BLD AUTO: 148 10*3/MM3 (ref 140–450)
PMV BLD AUTO: 10.4 FL (ref 6–12)
POTASSIUM SERPL-SCNC: 3.7 MMOL/L (ref 3.5–5.2)
PROT SERPL-MCNC: 5.6 G/DL (ref 6–8.5)
RBC # BLD AUTO: 2.58 10*6/MM3 (ref 4.14–5.8)
SODIUM SERPL-SCNC: 134 MMOL/L (ref 136–145)
VRE SPEC QL CULT: NORMAL
WBC NRBC COR # BLD AUTO: 7.92 10*3/MM3 (ref 3.4–10.8)

## 2024-01-29 PROCEDURE — 85027 COMPLETE CBC AUTOMATED: CPT | Performed by: INTERNAL MEDICINE

## 2024-01-29 PROCEDURE — 25010000002 AMPICILLIN-SULBACTAM PER 1.5 G: Performed by: INTERNAL MEDICINE

## 2024-01-29 PROCEDURE — 97162 PT EVAL MOD COMPLEX 30 MIN: CPT

## 2024-01-29 PROCEDURE — 80053 COMPREHEN METABOLIC PANEL: CPT | Performed by: INTERNAL MEDICINE

## 2024-01-29 PROCEDURE — 25010000002 ENOXAPARIN PER 10 MG: Performed by: INTERNAL MEDICINE

## 2024-01-29 PROCEDURE — 25810000003 SODIUM CHLORIDE 0.9 % SOLUTION: Performed by: INTERNAL MEDICINE

## 2024-01-29 RX ORDER — CIPROFLOXACIN 500 MG/1
500 TABLET, FILM COATED ORAL 2 TIMES DAILY
COMMUNITY
End: 2024-01-30 | Stop reason: HOSPADM

## 2024-01-29 RX ORDER — ATORVASTATIN CALCIUM 40 MG/1
40 TABLET, FILM COATED ORAL NIGHTLY
COMMUNITY
End: 2024-01-30 | Stop reason: HOSPADM

## 2024-01-29 RX ORDER — IODINE/SODIUM IODIDE 2 %
TINCTURE TOPICAL EVERY 12 HOURS PRN
COMMUNITY
End: 2024-01-30 | Stop reason: HOSPADM

## 2024-01-29 RX ORDER — ENOXAPARIN SODIUM 100 MG/ML
40 INJECTION SUBCUTANEOUS EVERY 24 HOURS
Status: DISCONTINUED | OUTPATIENT
Start: 2024-01-29 | End: 2024-01-30 | Stop reason: HOSPADM

## 2024-01-29 RX ADMIN — Medication 250 MG: at 21:29

## 2024-01-29 RX ADMIN — SODIUM CHLORIDE 100 ML/HR: 9 INJECTION, SOLUTION INTRAVENOUS at 02:00

## 2024-01-29 RX ADMIN — SODIUM CHLORIDE 3 G: 900 INJECTION, SOLUTION INTRAVENOUS at 05:00

## 2024-01-29 RX ADMIN — ROPINIROLE HYDROCHLORIDE 1 MG: 1 TABLET, FILM COATED ORAL at 12:51

## 2024-01-29 RX ADMIN — CYANOCOBALAMIN TAB 500 MCG 1000 MCG: 500 TAB at 09:40

## 2024-01-29 RX ADMIN — CARBIDOPA AND LEVODOPA 2 TABLET: 25; 100 TABLET ORAL at 09:41

## 2024-01-29 RX ADMIN — SODIUM CHLORIDE 3 G: 900 INJECTION, SOLUTION INTRAVENOUS at 23:52

## 2024-01-29 RX ADMIN — SODIUM CHLORIDE 3 G: 900 INJECTION, SOLUTION INTRAVENOUS at 11:28

## 2024-01-29 RX ADMIN — CARBIDOPA AND LEVODOPA 2 TABLET: 25; 100 TABLET ORAL at 12:50

## 2024-01-29 RX ADMIN — SODIUM CHLORIDE 100 ML/HR: 9 INJECTION, SOLUTION INTRAVENOUS at 11:29

## 2024-01-29 RX ADMIN — CARBIDOPA AND LEVODOPA 2 TABLET: 25; 100 TABLET ORAL at 18:09

## 2024-01-29 RX ADMIN — Medication 250 MG: at 09:41

## 2024-01-29 RX ADMIN — CARBIDOPA AND LEVODOPA 2 TABLET: 25; 100 TABLET ORAL at 21:29

## 2024-01-29 RX ADMIN — ROPINIROLE HYDROCHLORIDE 1 MG: 1 TABLET, FILM COATED ORAL at 18:09

## 2024-01-29 RX ADMIN — ROPINIROLE HYDROCHLORIDE 1 MG: 1 TABLET, FILM COATED ORAL at 09:40

## 2024-01-29 RX ADMIN — ROPINIROLE HYDROCHLORIDE 1 MG: 1 TABLET, FILM COATED ORAL at 21:29

## 2024-01-29 RX ADMIN — ENOXAPARIN SODIUM 40 MG: 100 INJECTION SUBCUTANEOUS at 18:09

## 2024-01-29 RX ADMIN — SODIUM CHLORIDE 3 G: 900 INJECTION, SOLUTION INTRAVENOUS at 18:09

## 2024-01-29 NOTE — CASE MANAGEMENT/SOCIAL WORK
1030: CM at bedside. Pt with eyes closed and did not awaken when name called.      1540: CM confirmed with Karol/Lowell that bed will be available when ready for discharge.

## 2024-01-29 NOTE — PLAN OF CARE
Goal Outcome Evaluation:  Plan of Care Reviewed With: daughter           Outcome Evaluation: Patient lethargic at beginning of shift. Patient becoming more alert through night. Daughter at bedside.

## 2024-01-29 NOTE — PLAN OF CARE
Goal Outcome Evaluation:            Interventions implemented as appropriate.

## 2024-01-29 NOTE — PROGRESS NOTES
Dietitian Assessment    Patient Name: Radha Varela  YOB: 1935  MRN: [id number]  Admission date: 1/27/2024    Comment:    Will order mighty shake BID. RD to follow-up.     Clinical Nutrition Assessment      Reason for Assessment MST    H&P  Past Medical History:   Diagnosis Date    Abdominal aortic aneurysm (AAA) without rupture 07/02/2018    Aneurysm     Arthritis     Cardiac murmur     Carotid stenosis     Celiac artery stenosis     Chest pain     CHF (congestive heart failure)     Chronic kidney disease     Coronary artery disease     Dyslipidemia     Elevated cholesterol     GERD (gastroesophageal reflux disease)     Hiatal hernia     History of exercise stress test     History of nuclear stress test     History of pneumonia     Hypertension     Lumbar degenerative disc disease     MI, old 1995    Osteoarthritis     PAD (peripheral artery disease)     Parkinson's disease     Paroxysmal A-fib     Peptic ulceration     Psoriasis     Renal artery stenosis     Sleep apnea     uses o2 @ HS    Spinal stenosis     Tremor     Vertebral artery stenosis        Past Surgical History:   Procedure Laterality Date    ABDOMINAL AORTIC ANEURYSM REPAIR WITH ENDOGRAFT  1994    APPENDECTOMY      CATARACT EXTRACTION W/ INTRAOCULAR LENS IMPLANT Left 12/10/2018    Procedure: CATARACT PHACO EXTRACTION WITH INTRAOCULAR LENS IMPLANT LEFT;  Surgeon: Charity Carrasco MD;  Location: [facility] OR;  Service: Ophthalmology    CATARACT EXTRACTION W/ INTRAOCULAR LENS IMPLANT Right 6/12/2023    Procedure: CATARACT PHACO EXTRACTION WITH INTRAOCULAR LENS IMPLANT RIGHT;  Surgeon: Charity Carrasco MD;  Location: [facility] OR;  Service: Ophthalmology;  Laterality: Right;    COLONOSCOPY      CORONARY ARTERY BYPASS GRAFT  1995    ENDOSCOPY      SHOULDER ROTATOR CUFF REPAIR Right     SKIN BIOPSY              Current Problems   Metabolic encephalopathy  Dehydration  UTI  AMS  Generalized weakness  Parkinson's disease  Dementia  CKD stage  "3     Encounter Information        Trending Narrative     1/29: Pt w/ MST score of 2 w/ reported decreased PO intake over the last week. PO intake averaging 58% x 3 meals. Pt w/ normal BMI for age. Will order supplementation to encourage intake and provide additional calories and protein.      Anthropometrics        Current Height, Weight Height: 170.2 cm (67\")  Weight: 69.4 kg (153 lb) (01/29/24 0400)   Trending Weight Hx     This admission:              PTA:     Wt Readings from Last 30 Encounters:   01/29/24 0400 69.4 kg (153 lb)   01/27/24 2029 67.4 kg (148 lb 9.4 oz)   01/08/24 0741 75.3 kg (166 lb 0.1 oz)   09/05/23 0900 75.3 kg (166 lb)   09/04/23 0756 70.8 kg (156 lb)   08/25/23 0500 75.5 kg (166 lb 7.2 oz)   08/25/23 0300 75.5 kg (166 lb 7.2 oz)   08/24/23 1104 73.3 kg (161 lb 9.6 oz)   08/24/23 0500 73.3 kg (161 lb 9.6 oz)   08/23/23 1843 73.3 kg (161 lb 9.6 oz)   08/23/23 1543 72.6 kg (160 lb)   08/13/23 0909 73.5 kg (162 lb)   06/09/23 0835 73.5 kg (162 lb)   06/08/23 1527 72.6 kg (160 lb)   07/22/19 0812 77.6 kg (171 lb)   12/07/18 1141 73.5 kg (162 lb)   12/05/18 1221 73.5 kg (162 lb)   07/16/18 0921 76.7 kg (169 lb)   07/02/18 0658 76.7 kg (169 lb)      BMI kg/m2 Body mass index is 23.96 kg/m².     Labs        Pertinent Labs     Results from last 7 days   Lab Units 01/29/24  0552 01/28/24  0652 01/27/24  1159   SODIUM mmol/L 134* 133* 128*   POTASSIUM mmol/L 3.7 4.1 4.4   CHLORIDE mmol/L 103 100 94*   CO2 mmol/L 18.9* 21.4* 24.1   BUN mg/dL 21 26* 31*   CREATININE mg/dL 1.56* 1.91* 2.25*   CALCIUM mg/dL 7.7* 8.1* 8.1*   BILIRUBIN mg/dL 0.4 0.6 0.6   ALK PHOS U/L 126* 120* 129*   ALT (SGPT) U/L <5 <5 <5   AST (SGOT) U/L 19 18 19   GLUCOSE mg/dL 69 74 99       Results from last 7 days   Lab Units 01/29/24  0552 01/28/24  0652 01/27/24  1159   MAGNESIUM mg/dL  --   --  1.7   HEMOGLOBIN g/dL 7.8*   < > 7.7*   HEMATOCRIT % 25.4*   < > 23.8*    < > = values in this interval not displayed.       Lab Results "   Component Value Date    HGBA1C 5.7 (H) 03/31/2022            Medications       Scheduled Medications ampicillin-sulbactam, 3 g, Intravenous, Q12H  carbidopa-levodopa, 2 tablet, Oral, 4x Daily  enoxaparin, 30 mg, Subcutaneous, Q24H  rOPINIRole, 1 mg, Oral, 4x Daily  saccharomyces boulardii, 250 mg, Oral, BID  sodium chloride, 1,000 mL, Intravenous, Once  vitamin B-12, 1,000 mcg, Oral, Daily        Infusions sodium chloride, 100 mL/hr, Last Rate: 100 mL/hr (01/29/24 0200)         PRN Medications   acetaminophen    famotidine    nitroglycerin    ondansetron    polyethylene glycol    sodium chloride     Physical Findings        Trending Physical   Appearance, NFPE    --  Edema  None reported    Bowel Function Last bm 1/28   Tubes Peripheral IV    Chewing/Swallowing WNL    Skin WNL      Estimated/Assessed Needs       Energy Requirements    EST Needs, Method, Wt used 1735-2082kcals/day using 25-30kcals/kg        Protein Requirements    EST Needs, Method, Wt used 69g protein per day using 1g/kg       Fluid Requirements     Estimated Needs (mL/day) 2082mL per day        Current Nutrition Orders & Evaluation of Intake       Oral Nutrition     Food Allergies    Current PO Diet Diet: Regular/House Diet; Texture: Regular Texture (IDDSI 7); Fluid Consistency: Thin (IDDSI 0)   Supplement    PO Evaluation     Trending % PO Intake 1/29: 58% x 3 meals      Enteral Nutrition    Enteral Route    Order, Modulars, Flushes    Residual/Tolerance    TF Observation         Parenteral Nutrition     TPN Route    Total # Days on TPN    TPN Order, Lipid Details    MVI & Trace Element Freq    TPN Observation       Nutrition Diagnosis         Nutrition Dx Problem 1 Increased estimated needs r/t CKD as evidenced by need for oral nutrition supplement      Nutrition Dx Problem 2        Intervention Goal         Intervention Goal(s) PO intake to meet >50% of estimated needs  Adhere to supplement ordered  Maintain current body weight      Nutrition  Intervention        RD Action Will order supplementation     Nutrition Prescription          Diet Prescription Regular w/ thin liquids   Supplement Prescription Mighty shake BID      Enteral Prescription        TPN Prescription      Monitor/Evaluation        Monitor Per protocol, I&O, PO intake, Supplement intake, Pertinent labs, Weight, Skin status, GI status, Symptoms, Hemodynamic stability     RD to follow-up.     Electronically signed by:  Issa Kaba RD  01/29/24 09:35 EST         none at this time

## 2024-01-29 NOTE — THERAPY EVALUATION
Patient Name: Radha Varela  : 1935    MRN: [id number]                              Today's Date: 2024       Admit Date: 2024    Visit Dx:     ICD-10-CM ICD-9-CM   1. Altered mental status, unspecified altered mental status type  R41.82 780.97   2. Renal insufficiency  N28.9 593.9   3. Anemia, unspecified type  D64.9 285.9   4. Urinary tract infection without hematuria, site unspecified  N39.0 599.0     Patient Active Problem List   Diagnosis    Abdominal aortic aneurysm (AAA) without rupture    Generalized weakness    Parkinson's disease dementia    Coronary artery disease involving native coronary artery of native heart without angina pectoris    Fall    Acute renal failure superimposed on stage 3 chronic kidney disease    Hypokalemia    Altered mental status    Dehydration    Metabolic encephalopathy    UTI (urinary tract infection) due to Enterococcus    Acute metabolic encephalopathy    Paroxysmal atrial fibrillation     Past Medical History:   Diagnosis Date    Abdominal aortic aneurysm (AAA) without rupture 2018    Aneurysm     Arthritis     Cardiac murmur     Carotid stenosis     Celiac artery stenosis     Chest pain     CHF (congestive heart failure)     Chronic kidney disease     Coronary artery disease     Dyslipidemia     Elevated cholesterol     GERD (gastroesophageal reflux disease)     Hiatal hernia     History of exercise stress test     History of nuclear stress test     History of pneumonia     Hypertension     Lumbar degenerative disc disease     MI, old     Osteoarthritis     PAD (peripheral artery disease)     Parkinson's disease     Paroxysmal A-fib     Peptic ulceration     Psoriasis     Renal artery stenosis     Sleep apnea     uses o2 @ HS    Spinal stenosis     Tremor     Vertebral artery stenosis      Past Surgical History:   Procedure Laterality Date    ABDOMINAL AORTIC ANEURYSM REPAIR WITH ENDOGRAFT      APPENDECTOMY      CATARACT EXTRACTION W/  INTRAOCULAR LENS IMPLANT Left 12/10/2018    Procedure: CATARACT PHACO EXTRACTION WITH INTRAOCULAR LENS IMPLANT LEFT;  Surgeon: Charity Carrasco MD;  Location: [facility] OR;  Service: Ophthalmology    CATARACT EXTRACTION W/ INTRAOCULAR LENS IMPLANT Right 6/12/2023    Procedure: CATARACT PHACO EXTRACTION WITH INTRAOCULAR LENS IMPLANT RIGHT;  Surgeon: Chartiy Carrasco MD;  Location: [facility] OR;  Service: Ophthalmology;  Laterality: Right;    COLONOSCOPY      CORONARY ARTERY BYPASS GRAFT  1995    ENDOSCOPY      SHOULDER ROTATOR CUFF REPAIR Right     SKIN BIOPSY        General Information       Row Name 01/29/24 1605          Physical Therapy Time and Intention    Document Type evaluation  -MS     Mode of Treatment physical therapy  -MS       Row Name 01/29/24 1605          General Information    Patient Profile Reviewed yes  -MS     Prior Level of Function min assist:;transfer;gait  -MS     Existing Precautions/Restrictions fall  -MS     Barriers to Rehab medically complex;previous functional deficit  -MS       Row Name 01/29/24 1605          Living Environment    People in Home facility resident  -MS       Row Name 01/29/24 1605          Cognition    Orientation Status (Cognition) oriented to;person  -MS       Row Name 01/29/24 1605          Safety Issues, Functional Mobility    Safety Issues Affecting Function (Mobility) insight into deficits/self-awareness;safety precautions follow-through/compliance;awareness of need for assistance;positioning of assistive device;sequencing abilities;safety precaution awareness;ability to follow commands  -MS     Impairments Affecting Function (Mobility) balance;endurance/activity tolerance;shortness of breath;strength  -MS               User Key  (r) = Recorded By, (t) = Taken By, (c) = Cosigned By      Initials Name Provider Type    MS Danial Santamaria PT Physical Therapist                   Mobility       Row Name 01/29/24 1609          Bed Mobility    Bed Mobility supine-sit  -MS      Supine-Sit [place] (Bed Mobility) minimum assist (75% patient effort)  -MS     Assistive Device (Bed Mobility) bed rails;draw sheet;head of bed elevated  -MS       Row Name 01/29/24 1609          Sit-Stand Transfer    Sit-Stand [place] (Transfers) minimum assist (75% patient effort)  -MS     Assistive Device (Sit-Stand Transfers) walker, front-wheeled  -MS       Row Name 01/29/24 1609          Gait/Stairs (Locomotion)    [place] Level (Gait) minimum assist (75% patient effort)  -MS     Assistive Device (Gait) walker, front-wheeled  -MS     Distance in Feet (Gait) 4  -MS     Deviations/Abnormal Patterns (Gait) festinating/shuffling;gait speed decreased  -MS     Bilateral Gait Deviations forward flexed posture  -MS               User Key  (r) = Recorded By, (t) = Taken By, (c) = Cosigned By      Initials Name Provider Type    Danial Silvestre, PT Physical Therapist                   Obj/Interventions       Row Name 01/29/24 1611          Range of Motion Comprehensive    General Range of Motion lower extremity range of motion deficits identified  -MS       Row Name 01/29/24 1611          Strength Comprehensive (MMT)    General Manual Muscle Testing (MMT) Assessment lower extremity strength deficits identified  -MS     Comment, General Manual Muscle Testing (MMT) Assessment B LE 4-/5  -MS       Row Name 01/29/24 1611          Sensory Assessment (Somatosensory)    Sensory Assessment (Somatosensory) sensation intact  -MS               User Key  (r) = Recorded By, (t) = Taken By, (c) = Cosigned By      Initials Name Provider Type    Danial Silvestre, PT Physical Therapist                   Goals/Plan       Row Name 01/29/24 1618          Bed Mobility Goal 1 (PT)    Activity/Assistive Device (Bed Mobility Goal 1, PT) bed mobility activities, all  -MS     [place] Level/Cues Needed (Bed Mobility Goal 1, PT) contact guard required  -MS     Time Frame (Bed Mobility Goal 1, PT) long term goal (LTG);2  weeks  -MS       Row Name 01/29/24 1618          Transfer Goal 1 (PT)    Activity/Assistive Device (Transfer Goal 1, PT) transfers, all;sit-to-stand/stand-to-sit  -MS     [place] Level/Cues Needed (Transfer Goal 1, PT) contact guard required  -MS     Time Frame (Transfer Goal 1, PT) long term goal (LTG);2 weeks  -MS       Row Name 01/29/24 1618          Gait Training Goal 1 (PT)    Activity/Assistive Device (Gait Training Goal 1, PT) gait (walking locomotion);assistive device use  -MS     [place] Level (Gait Training Goal 1, PT) contact guard required  -MS     Distance (Gait Training Goal 1, PT) 50ft  -MS     Time Frame (Gait Training Goal 1, PT) long term goal (LTG);2 weeks  -MS       Row Name 01/29/24 1618          Patient Education Goal (PT)    Activity (Patient Education Goal, PT) ther exer x15 reps ea  -MS     [place]/Cues/Accuracy (Memory Goal 2, PT) demonstrates adequately  -MS     Time Frame (Patient Education Goal, PT) short term goal (STG);1 week  -MS       Row Name 01/29/24 1618          Therapy Assessment/Plan (PT)    Planned Therapy Interventions (PT) balance training;bed mobility training;gait training;home exercise program;stair training;neuromuscular re-education;strengthening;patient/family education;transfer training;ROM (range of motion)  -MS               User Key  (r) = Recorded By, (t) = Taken By, (c) = Cosigned By      Initials Name Provider Type    MS Danial Santamaria, PT Physical Therapist                   Clinical Impression       Row Name 01/29/24 1611          Pain    Pretreatment Pain Rating 0/10 - no pain  -MS     Posttreatment Pain Rating 0/10 - no pain  -MS       Row Name 01/29/24 1611          Plan of Care Review    Plan of Care Reviewed With patient  -MS     Progress no change  -MS     Outcome Evaluation Pt participated in PT eval this date. Pt transferred to EOB with Megan. Pt then stood with Rwx and Megan. Pt was able to t/f to the chair 4 ft with Rwx and Megan. Pt  left up in chair, recliend with call light in reach, encouraged to call for assist with chair alarm in place. notified nsg. Pt would benefit from cont skilled PT tx during this inpatient stay.  -MS       Row Name 01/29/24 1611          Therapy Assessment/Plan (PT)    Patient/Family Therapy Goals Statement (PT) to walk  -MS     Rehab Potential (PT) good, to achieve stated therapy goals  -MS     Criteria for Skilled Interventions Met (PT) yes;meets criteria  -MS     Therapy Frequency (PT) 5 times/wk  -MS       Row Name 01/29/24 1611          Vital Signs    O2 Delivery Pre Treatment room air  -MS     O2 Delivery Intra Treatment room air  -MS     O2 Delivery Post Treatment room air  -MS     Pre Patient Position Supine  -MS     Intra Patient Position Standing  -MS     Post Patient Position Sitting  -MS       Row Name 01/29/24 1611          Positioning and Restraints    Pre-Treatment Position in bed  -MS     Post Treatment Position chair  -MS     In Chair call light within reach;encouraged to call for assist;exit alarm on;reclined  -MS               User Key  (r) = Recorded By, (t) = Taken By, (c) = Cosigned By      Initials Name Provider Type    Danial Silvestre, PT Physical Therapist                   Outcome Measures       Row Name 01/29/24 1619 01/29/24 0830       How much help from another person do you currently need...    Turning from your back to your side while in flat bed without using bedrails? 3  -MS 3  -MM    Moving from lying on back to sitting on the side of a flat bed without bedrails? 3  -MS 3  -MM    Moving to and from a bed to a chair (including a wheelchair)? 3  -MS 2  -MM    Standing up from a chair using your arms (e.g., wheelchair, bedside chair)? 3  -MS 2  -MM    Climbing 3-5 steps with a railing? 2  -MS 2  -MM    To walk in hospital room? 3  -MS 2  -MM    AM-PAC 6 Clicks Score (PT) 17  -MS 14  -MM    Highest Level of Mobility Goal 5 --> Static standing  -MS 4 --> Transfer to chair/commode  -MM               User Key  (r) = Recorded By, (t) = Taken By, (c) = Cosigned By      Initials Name Provider Type    MM Makeda Gould, RN Registered Nurse    Danial Silvestre, PT Physical Therapist                                 Physical Therapy Education       Title: PT OT SLP Therapies (In Progress)       Topic: Physical Therapy (In Progress)       Point: Mobility training (Done)       Learning Progress Summary             Patient Acceptance, E, VU by MS at 1/29/2024 1619    Comment: importance of mobility                         Point: Home exercise program (Done)       Learning Progress Summary             Patient Acceptance, E, VU by MS at 1/29/2024 1619    Comment: importance of mobility                         Point: Body mechanics (Not Started)       Learner Progress:  Not documented in this visit.              Point: Precautions (Not Started)       Learner Progress:  Not documented in this visit.                              User Key       Initials Effective Dates Name Provider Type Discipline    MS 08/22/23 -  Danial Santamaria PT Physical Therapist PT                  PT Recommendation and Plan  Planned Therapy Interventions (PT): balance training, bed mobility training, gait training, home exercise program, stair training, neuromuscular re-education, strengthening, patient/family education, transfer training, ROM (range of motion)  Plan of Care Reviewed With: patient  Progress: no change  Outcome Evaluation: Pt participated in PT eval this date. Pt transferred to EOB with Megan. Pt then stood with Rwx and Megan. Pt was able to t/f to the chair 4 ft with Rwx and Megan. Pt left up in chair, recliend with call light in reach, encouraged to call for assist with chair alarm in place. notified nsg. Pt would benefit from cont skilled PT tx during this inpatient stay.     Time Calculation:   PT Evaluation Complexity  History, PT Evaluation Complexity: 1-2 personal factors and/or comorbidities  Examination of Body  Systems (PT Eval Complexity): total of 3 or more elements  Clinical Presentation (PT Evaluation Complexity): evolving  Clinical Decision Making (PT Evaluation Complexity): moderate complexity  Overall Complexity (PT Evaluation Complexity): moderate complexity     PT Charges       Row Name 01/29/24 1620             Time Calculation    Start Time 1535  -MS      PT Received On 01/29/24  -MS      PT Goal Re-Cert Due Date 02/08/24  -MS         Untimed Charges    PT Eval/Re-eval Minutes 55  -MS         Total Minutes    Untimed Charges Total Minutes 55  -MS       Total Minutes 55  -MS                User Key  (r) = Recorded By, (t) = Taken By, (c) = Cosigned By      Initials Name Provider Type    MS Danial Santamaria, PT Physical Therapist                  Therapy Charges for Today       Code Description Service Date Service Provider Modifiers Qty    [id number] HC PT EVAL MOD COMPLEXITY 4 1/29/2024 aDnial Santamaria PT GP 1            PT G-Codes  AM-PAC 6 Clicks Score (PT): 17  PT Discharge Summary  Anticipated Discharge Disposition (PT): skilled nursing facility    Danial Santamaria PT  1/29/2024

## 2024-01-29 NOTE — PLAN OF CARE
Goal Outcome Evaluation:  Plan of Care Reviewed With: patient        Progress: no change  Outcome Evaluation: Pt participated in PT eval this date. Pt transferred to EOB with Megan. Pt then stood with Rwx and Megan. Pt was able to t/f to the chair 4 ft with Rwx and Megan. Pt left up in chair, recliend with call light in reach, encouraged to call for assist with chair alarm in place. notified nsg. Pt would benefit from cont skilled PT tx during this inpatient stay.      Anticipated Discharge Disposition (PT): skilled nursing facility

## 2024-01-29 NOTE — PROGRESS NOTES
[facility]  INTERNAL MEDICINE PROGRESS NOTE    Name:  Radha Varela   Age:  88 y.o.  Sex:  male  :  1935  MRN:  [id number]   Visit Number:  [id number]  Admission Date:  2024  Date Of Service:  24  Primary Care Physician:  Dieudonne Jensen MD     LOS: 2 days :  Patient Care Team:  Dieudonne Jensen MD as PCP - General (Internal Medicine):      Subjective / Interval History:     8-year-old patient with past medical history of Parkinson's disease with her dementia, chronic kidney disease stage III, hypertension, history of coronary artery disease status post CABG, PAD, aortic aneurysm s/p repair in  with degeneration of the graft and aneurysmal dilatation more than 6 cm in 2022, who has had a functional decline over the last week with decreased oral intake and was found to have UTI due to Enterococcus.  He has had labs done which showed that there was acute on chronic renal failure with UTI and so empirically was started on Cipro.  The cultures did come back yesterday showing Enterococcus faecalis and patient has worsened with further acute confusion changes in mentation and lethargy.  So it was decided for him to be evaluated in the ER and the workup showed that he has been having acute changes in mentation with encephalopathy and is being admitted for further IV fluid management and UTI which is due to Enterococcus faecalis     Patient seen on .  He is still lethargic today morning.  He is arousable and but has been confused.  He has not had much to eat and stated his pill out of his breakfast .  He has been on IV fluids with IV antibiotic and lab results looking better compared to yesterday.    Patient seen on .   He is more alert oriented to self surroundings and denies any specific complaint.  He did eat but had to be fed and he had pulled his IV at present.  He has had good sleep and denies any dysuria at present.  Labs reviewed  and his creatinine is improved to 1.56 and sodium is 134      Vital Signs:    Temp:  [96 °F (35.6 °C)-97.6 °F (36.4 °C)] 96.3 °F (35.7 °C)  Heart Rate:  [63-71] 66  Resp:  [16] 16  BP: (121-147)/(67-90) 147/90    Intake and output:    I/O last 3 completed shifts:  In: 240 [P.O.:240]  Out: -   I/O this shift:  In: 480 [P.O.:480]  Out: -     Physical Examination:    General Appearance:  Alert and cooperative, not in any acute distress.   Head:    Atraumatic and normocephalic, without obvious abnormality.   Eyes:            PERRLA,  No pallor. Extraocular movements are within normal limits.   Neck:   Supple,  No lymph glands, no bruit   Lungs:     Chest shape is normal. Breath sounds heard bilaterally equally.  No crackles or wheezing.     Heart:    Normal S1 and S2, no murmur,  No JVD   Abdomen:     Normal bowel sounds, no masses, no organomegaly. Soft     nontender, no guarding, no rebound tenderness   Extremities:   Moves all extremities well, no edema, no cyanosis,    Skin:   No  bruising or rash.   Neurologic:   Grossly nonfocal and moves all extremities..  He is awake and alert he does have baseline Parkinson's disease     Laboratory results:  Results from last 7 days   Lab Units 01/29/24  0552 01/28/24  0652 01/27/24  1159   SODIUM mmol/L 134* 133* 128*   POTASSIUM mmol/L 3.7 4.1 4.4   CHLORIDE mmol/L 103 100 94*   CO2 mmol/L 18.9* 21.4* 24.1   BUN mg/dL 21 26* 31*   CREATININE mg/dL 1.56* 1.91* 2.25*   CALCIUM mg/dL 7.7* 8.1* 8.1*   BILIRUBIN mg/dL 0.4 0.6 0.6   ALK PHOS U/L 126* 120* 129*   ALT (SGPT) U/L <5 <5 <5   AST (SGOT) U/L 19 18 19   GLUCOSE mg/dL 69 74 99     Results from last 7 days   Lab Units 01/29/24  0552 01/28/24  0652 01/27/24  1159   WBC 10*3/mm3 7.92 7.47 9.60   HEMOGLOBIN g/dL 7.8* 7.9* 7.7*   HEMATOCRIT % 25.4* 24.5* 23.8*   PLATELETS 10*3/mm3 148 139* 134*         Results from last 7 days   Lab Units 01/27/24  1159   HSTROP T ng/L 45*     Results from last 7 days   Lab Units 01/27/24  2054  01/27/24  1622 01/27/24  1241 01/23/24  1610   BLOODCX  No growth at 24 hours No growth at 24 hours  --   --    URINECX   --   --  No growth 50,000 CFU/mL Mixed Katiana Isolated       Radiology results:    Imaging Results (Last 24 Hours)       ** No results found for the last 24 hours. **            I have reviewed the patient's radiology reports.    Medication Review:     I have reviewed the patients active and prn medications.     Assessment:      Metabolic encephalopathy    Dehydration    UTI (urinary tract infection) due to Enterococcus    Altered mental status    Abdominal aortic aneurysm (AAA) without rupture    Generalized weakness    Parkinson's disease dementia    Fall    Acute renal failure superimposed on stage 3 chronic kidney disease    Acute metabolic encephalopathy    Paroxysmal atrial fibrillation  Hyponatremia      Plan:    1.  Acute metabolic encephalopathy-this seems to be multifactorial because of underlying prerenal azotemia with mild dehydration as well as a UTI and he does have Parkinson's dementia which has been progressive.  He is clinically slowly improving and his more alert but with some confusion      2.  UTI due to Enterococcus faecalis-he is on Unasyn based on the sensitivity and will switch over to Augmentin with oral conversion from tomorrow     3.  Acute on chronic renal failure-he is slightly better his creatinine has come down to 1.56  He will get IV fluids today at 75 mill per and tomorrow morning should be stopped.     4.  Parkinson's dementia-he will be continued on his medications and should definitely see some more improvement in the next few days with the treatment of his UTI     5.  Paroxysmal U-anc-mdcttnx of his risk of falls and has decided not to do chronic anticoagulation and at present his rate is controlled    6.  Hyponatremia-he has been improving with normal saline at present and with hydration and is still 134 compared to 128 upon admission.  Hopefully by tomorrow  should be above 135 and will stop his fluids and discharge him     7.  Anemia of chronic disease-hemoglobin has been staying stable and no signs of overt bleeding    Case discussed with the next of kin the daughter and Dr. Hurley who is the son-in-law will continue current management as above and if stabilized will be transferred back to [facility] tomorrow    Dieudonne Jensen MD  01/29/24  09:18 EST      Please note that portions of this note were completed with a voice recognition program.

## 2024-01-30 VITALS
WEIGHT: 159.17 LBS | SYSTOLIC BLOOD PRESSURE: 150 MMHG | TEMPERATURE: 97.3 F | RESPIRATION RATE: 18 BRPM | HEIGHT: 67 IN | DIASTOLIC BLOOD PRESSURE: 90 MMHG | HEART RATE: 75 BPM | OXYGEN SATURATION: 97 % | BODY MASS INDEX: 24.98 KG/M2

## 2024-01-30 LAB
ALBUMIN SERPL-MCNC: 3.4 G/DL (ref 3.5–5.2)
ALBUMIN/GLOB SERPL: 1.4 G/DL
ALP SERPL-CCNC: 132 U/L (ref 39–117)
ALT SERPL W P-5'-P-CCNC: <5 U/L (ref 1–41)
ANION GAP SERPL CALCULATED.3IONS-SCNC: 12.2 MMOL/L (ref 5–15)
AST SERPL-CCNC: 21 U/L (ref 1–40)
BILIRUB SERPL-MCNC: 0.4 MG/DL (ref 0–1.2)
BUN SERPL-MCNC: 17 MG/DL (ref 8–23)
BUN/CREAT SERPL: 10.1 (ref 7–25)
CALCIUM SPEC-SCNC: 8 MG/DL (ref 8.6–10.5)
CHLORIDE SERPL-SCNC: 106 MMOL/L (ref 98–107)
CO2 SERPL-SCNC: 19.8 MMOL/L (ref 22–29)
CREAT SERPL-MCNC: 1.69 MG/DL (ref 0.76–1.27)
DEPRECATED RDW RBC AUTO: 51.9 FL (ref 37–54)
EGFRCR SERPLBLD CKD-EPI 2021: 38.6 ML/MIN/1.73
ERYTHROCYTE [DISTWIDTH] IN BLOOD BY AUTOMATED COUNT: 15.1 % (ref 12.3–15.4)
GLOBULIN UR ELPH-MCNC: 2.4 GM/DL
GLUCOSE SERPL-MCNC: 80 MG/DL (ref 65–99)
HCT VFR BLD AUTO: 24.4 % (ref 37.5–51)
HGB BLD-MCNC: 7.5 G/DL (ref 13–17.7)
MCH RBC QN AUTO: 29.8 PG (ref 26.6–33)
MCHC RBC AUTO-ENTMCNC: 30.7 G/DL (ref 31.5–35.7)
MCV RBC AUTO: 96.8 FL (ref 79–97)
PLATELET # BLD AUTO: 149 10*3/MM3 (ref 140–450)
PMV BLD AUTO: 10.2 FL (ref 6–12)
POTASSIUM SERPL-SCNC: 4 MMOL/L (ref 3.5–5.2)
PROT SERPL-MCNC: 5.8 G/DL (ref 6–8.5)
RBC # BLD AUTO: 2.52 10*6/MM3 (ref 4.14–5.8)
SODIUM SERPL-SCNC: 138 MMOL/L (ref 136–145)
WBC NRBC COR # BLD AUTO: 7.47 10*3/MM3 (ref 3.4–10.8)

## 2024-01-30 PROCEDURE — 25010000002 AMPICILLIN-SULBACTAM PER 1.5 G: Performed by: INTERNAL MEDICINE

## 2024-01-30 PROCEDURE — 80053 COMPREHEN METABOLIC PANEL: CPT | Performed by: INTERNAL MEDICINE

## 2024-01-30 PROCEDURE — 25810000003 SODIUM CHLORIDE 0.9 % SOLUTION: Performed by: INTERNAL MEDICINE

## 2024-01-30 PROCEDURE — 85027 COMPLETE CBC AUTOMATED: CPT | Performed by: INTERNAL MEDICINE

## 2024-01-30 RX ORDER — FERROUS SULFATE 324(65)MG
324 TABLET, DELAYED RELEASE (ENTERIC COATED) ORAL
Status: ON HOLD
Start: 2024-01-30

## 2024-01-30 RX ORDER — METOPROLOL SUCCINATE 25 MG/1
25 TABLET, EXTENDED RELEASE ORAL DAILY
Status: ON HOLD
Start: 2024-01-30

## 2024-01-30 RX ORDER — AMOXICILLIN AND CLAVULANATE POTASSIUM 875; 125 MG/1; MG/1
1 TABLET, FILM COATED ORAL 2 TIMES DAILY
Status: ON HOLD
Start: 2024-01-30

## 2024-01-30 RX ADMIN — CYANOCOBALAMIN TAB 500 MCG 1000 MCG: 500 TAB at 10:15

## 2024-01-30 RX ADMIN — Medication 250 MG: at 10:15

## 2024-01-30 RX ADMIN — CARBIDOPA AND LEVODOPA 2 TABLET: 25; 100 TABLET ORAL at 10:15

## 2024-01-30 RX ADMIN — Medication 10 ML: at 10:12

## 2024-01-30 RX ADMIN — SODIUM CHLORIDE 3 G: 900 INJECTION, SOLUTION INTRAVENOUS at 05:20

## 2024-01-30 RX ADMIN — SODIUM CHLORIDE 100 ML/HR: 9 INJECTION, SOLUTION INTRAVENOUS at 03:52

## 2024-01-30 RX ADMIN — ROPINIROLE HYDROCHLORIDE 1 MG: 1 TABLET, FILM COATED ORAL at 10:15

## 2024-01-30 NOTE — CASE MANAGEMENT/SOCIAL WORK
Case Management Discharge Note                Selected Continued Care - Discharged on 1/30/2024 Admission date: 1/27/2024 - Discharge disposition: Skilled Nursing Facility (DC - External)      Destination Coordination complete.      Service Provider Selected Services Address Phone Fax Patient Preferred    [facility] [address] [phone] --                 Transportation Services  Ambulance: [facility]    Final Discharge Disposition Code: 03 - skilled nursing facility (SNF)

## 2024-01-30 NOTE — DISCHARGE SUMMARY
[facility]   INTERNAL MEDICINE DISCHARGE SUMMARY      Name:  Radha Varela   Age:  88 y.o.  Sex:  male  :  1935  MRN:  [id number]   Visit Number:  [id number]  Primary Care Physician:  Dieudonne Jensen MD  Date of Discharge:  2024  Admission Date:  2024      Discharge Diagnosis:         Metabolic encephalopathy    Dehydration    UTI (urinary tract infection) due to Enterococcus    Altered mental status    Abdominal aortic aneurysm (AAA) without rupture    Generalized weakness    Parkinson's disease dementia    Fall    Acute renal failure superimposed on stage 3 chronic kidney disease    Acute metabolic encephalopathy    Paroxysmal atrial fibrillation          Consults:     Consults       No orders found from 2023 to 2024.            Procedures Performed:                 Hospital Course:   The patient was admitted on 2024  Please see H&P for details on admission HPI and ROS.    88-year-old patient with past medical history of Parkinson's disease with her dementia, chronic kidney disease stage III, hypertension, history of coronary artery disease status post CABG, PAD, aortic aneurysm s/p repair in  with degeneration of the graft and aneurysmal dilatation more than 6 cm in 2022, who has had a functional decline over the last week with decreased oral intake and was found to have UTI due to Enterococcus.  He has had labs done which showed that there was acute on chronic renal failure with UTI and so empirically was started on Cipro.  The cultures did come back yesterday showing Enterococcus faecalis and patient has worsened with further acute confusion changes in mentation and lethargy.  So it was decided for him to be evaluated in the ER and the workup showed that he has been having acute changes in mentation with encephalopathy and is being admitted for further IV fluid management and UTI which is due to Enterococcus faecalis      Patient seen  on January 28.  He is still lethargic today morning.  He is arousable and but has been confused.  He has not had much to eat and stated his pill out of his breakfast .  He has been on IV fluids with IV antibiotic and lab results looking better compared to yesterday.     Patient seen on January 29.   He is more alert oriented to self surroundings and denies any specific complaint.  He did eat but had to be fed and he had pulled his IV at present.  He has had good sleep and denies any dysuria at present.  Labs reviewed and his creatinine is improved to 1.56 and sodium is 134    Patient seen today on January 30.  He is feeling a lot better he did eat good breakfast and IV fluids had been continued till today.  His sodium has improved up to 138 and renal function is back to his baseline with a creatinine in the 1.6-1.7 range.  His GFR is in the 40s.  Hemoglobin has been 7.5 it has been and he does have iron deficiency anemia.      Will do iron tablets and repeat CBC CMP and a urine with a urine culture to be done after 5 days.  He will be discharged back on Augmentin based on the Enterococcus faecalis sensitivity for the next 5 more days.  Patient is hemodynamically stable and he had been on Lasix which has been stopped at the same time his metoprolol will be 25 mg and will give low-dose of amlodipine if his blood pressure consistently remains above 150.          Vital Signs:     Temp:  [97.3 °F (36.3 °C)-98.2 °F (36.8 °C)] 97.3 °F (36.3 °C)  Heart Rate:  [67-84] 75  Resp:  [16-18] 18  BP: (134-173)/(64-90) 150/90    Physical Exam:     General Appearance:    Alert and cooperative, not in any acute distress.   Head:    Atraumatic and normocephalic, without obvious abnormality.   Eyes:            PERRLA,  No pallor. Extraocular movements are within normal limits.   Neck:   Supple,  No lymph glands, no bruit   Lungs:     Chest shape is normal. Breath sounds heard bilaterally equally.  No crackles or wheezing.     Heart:     Normal S1 and S2, no murmur,  No JVD   Abdomen:     Normal bowel sounds, no masses, no organomegaly. Soft     nontender, no guarding, no rebound tenderness   Extremities:   Moves all extremities well, no edema, no cyanosis,    Skin:   No  bruising or rash.   Neurologic:   Grossly nonfocal and moves all extremities.  His gait has not been checked and he does have baseline Parkinson's disease       Pertinent Lab Results:     Results from last 7 days   Lab Units 01/30/24  0736 01/29/24  0552 01/28/24  0652   SODIUM mmol/L 138 134* 133*   POTASSIUM mmol/L 4.0 3.7 4.1   CHLORIDE mmol/L 106 103 100   CO2 mmol/L 19.8* 18.9* 21.4*   BUN mg/dL 17 21 26*   CREATININE mg/dL 1.69* 1.56* 1.91*   CALCIUM mg/dL 8.0* 7.7* 8.1*   BILIRUBIN mg/dL 0.4 0.4 0.6   ALK PHOS U/L 132* 126* 120*   ALT (SGPT) U/L <5 <5 <5   AST (SGOT) U/L 21 19 18   GLUCOSE mg/dL 80 69 74     Results from last 7 days   Lab Units 01/30/24  0736 01/29/24  0552 01/28/24  0652   WBC 10*3/mm3 7.47 7.92 7.47   HEMOGLOBIN g/dL 7.5* 7.8* 7.9*   HEMATOCRIT % 24.4* 25.4* 24.5*   PLATELETS 10*3/mm3 149 148 139*         Blood Culture   Date Value Ref Range Status   01/27/2024 No growth at 2 days  Preliminary   01/27/2024 No growth at 2 days  Preliminary     Urine Culture   Date Value Ref Range Status   01/27/2024 No growth  Final   01/23/2024 50,000 CFU/mL Mixed Katiana Isolated  Final       Pertinent Radiology Results:    Imaging Results (Most Recent)       Procedure Component Value Units Date/Time    CT Head Without Contrast [[id number]] Collected: 01/27/24 1332     Updated: 01/27/24 1335    Narrative:      CT HEAD WITHOUT CONTRAST     HISTORY: Altered mental status     TECHNIQUE: Noncontrast exam     FINDINGS: Limited by skull base streak artifact. Atrophic and chronic  white matter changes. Brain parenchyma is otherwise homogeneous without  evidence of hemorrhage, mass effect or edema. No extra-axial abnormality  is noted. Ventricles and cisterns appear normal.     The  visualized sinuses, orbits and petrous temporal bones appear  unremarkable.       Impression:      No acute intracranial abnormality           CTDI: 35.83 mGy  DLP:631.22 mGy.cm        This study was performed using dose reduction techniques to achieve  radiation exposure as low as reasonably achievable (ALARA)     This report was signed and finalized on 1/27/2024 1:33 PM by Lauren Lopez MD.       XR Chest 1 View [[id number]] Collected: 01/27/24 1232     Updated: 01/27/24 1235    Narrative:      PORTABLE CHEST  1/27/2024 12:29 PM     HISTORY: Shortness of breath     COMPARISON:   None     FINDINGS: Prior median sternotomy the cardiac silhouette is enlarged.  The mediastinal and hilar contours are unremarkable. Bibasilar  atelectasis. There is no pneumothorax. The visualized osseous structures  demonstrate no acute abnormalities.       Impression:      No acute cardiopulmonary process.                       This report was signed and finalized on 1/27/2024 12:33 PM by Lauren Lopez MD.                       Discharge Disposition:      Skilled Nursing Facility (DC - External)    Discharge Medication:         Discharge Medications        New Medications        Instructions Start Date   amoxicillin-clavulanate 875-125 MG per tablet  Commonly known as: AUGMENTIN   1 tablet, Oral, 2 Times Daily      ferrous sulfate 324 (65 Fe) MG tablet delayed-release EC tablet   324 mg, Oral, Daily With Breakfast             Changes to Medications        Instructions Start Date   metoprolol succinate XL 25 MG 24 hr tablet  Commonly known as: TOPROL-XL  What changed: how much to take   25 mg, Oral, Daily, Hold for BP < 100/70             Continue These Medications        Instructions Start Date   acetaminophen 325 MG tablet  Commonly known as: TYLENOL   650 mg, Oral, Every 6 Hours PRN      albuterol sulfate  (90 Base) MCG/ACT inhaler  Commonly known as: PROVENTIL HFA;VENTOLIN HFA;PROAIR HFA   1 puff, Inhalation, Every  4 Hours PRN      aspirin 81 MG EC tablet   81 mg, Oral, Daily      carbidopa-levodopa  MG per tablet  Commonly known as: SINEMET   2 tablets, Oral, 4 Times Daily      famotidine 10 MG tablet  Commonly known as: PEPCID   10 mg, Oral, 2 Times Daily PRN      pantoprazole 40 MG EC tablet  Commonly known as: PROTONIX   40 mg, Oral, Daily      polyethylene glycol 17 g packet  Commonly known as: MIRALAX   17 g, Oral, Daily PRN      rOPINIRole 0.5 MG tablet  Commonly known as: REQUIP   1 mg, Oral, 4 Times Daily, Take 1 hour before bedtime.      Sennosides 8.6 MG capsule   8.6 mg, Oral, Every 12 Hours PRN      vitamin B-12 1000 MCG tablet  Commonly known as: CYANOCOBALAMIN   1,000 mcg, Oral, Daily             Stop These Medications      amLODIPine 5 MG tablet  Commonly known as: NORVASC     atorvastatin 40 MG tablet  Commonly known as: LIPITOR     calamine 8-8 % lotion lotion     cholecalciferol 25 MCG (1000 UT) tablet  Commonly known as: VITAMIN D3     ciprofloxacin 500 MG tablet  Commonly known as: CIPRO     Diclofenac Sodium 1 % gel gel  Commonly known as: VOLTAREN     escitalopram 10 MG tablet  Commonly known as: LEXAPRO     Florastor 250 MG capsule  Generic drug: saccharomyces boulardii     furosemide 20 MG tablet  Commonly known as: LASIX     hydrOXYzine pamoate 25 MG capsule  Commonly known as: Vistaril     loperamide 2 MG capsule  Commonly known as: IMODIUM     melatonin 1 MG tablet     metoprolol tartrate 25 MG tablet  Commonly known as: LOPRESSOR     nitroglycerin 0.4 MG SL tablet  Commonly known as: NITROSTAT     ondansetron 4 MG tablet  Commonly known as: ZOFRAN     psyllium 58.12 % packet  Commonly known as: METAMUCIL MULTIHEALTH FIBER     sertraline 50 MG tablet  Commonly known as: ZOLOFT              Discharge Diet:       Regular diet      Follow-up Appointments:      No future appointments.      Test Results Pending at Discharge:      Pending Labs       Order Current Status    Blood Culture - Blood,  Arm, Left Preliminary result    Blood Culture - Blood, Hand, Left Preliminary result    CANDIDA AURIS SCREEN - Swab, Axilla Right, Axilla Left and Groin Preliminary result               Dieudonne Jensen MD  01/30/24  09:28 EST    Time:  Discharge 38 min    Please note that portions of this note were completed with a voice recognition program.

## 2024-01-30 NOTE — CASE MANAGEMENT/SOCIAL WORK
Continued Stay Note   [place]     Patient Name: Radha Varela  MRN: [id number]  Today's Date: 1/30/2024    Admit Date: 1/27/2024    [facility]   Discharge Plan       Row Name 01/30/24 0939       Plan    [facility]

## 2024-01-30 NOTE — PLAN OF CARE
Goal Outcome Evaluation: Patient being discharged home today

## 2024-01-30 NOTE — CASE MANAGEMENT/SOCIAL WORK
Continued Stay Note  IVETTE Manzanares     Patient Name: Radha Varela  MRN: [id number]  Today's Date: 1/30/2024    Admit Date: 1/27/2024    Plan: spoke with Johanne/Nataliya Jha, stated pt can come back today 1/30/STR call report to [phone] and [facility]   Discharge Plan       Row Name 01/30/24 1016       Plan    Plan spoke with Johanne/Nataliya Jha, stated pt can come back today 1/30/STR call report to [phone] and they will pull d/c summary from [facility]; called and informed daughter Sandra and completed imm       Row Name 01/30/24 0939       Plan    Plan lm# for Nataliya Jha                   Discharge Codes    No documentation.                 Expected Discharge Date and Time       Expected Discharge Date Expected Discharge Time    Jan 30, 2024               Adilia Herman RN

## 2024-01-31 ENCOUNTER — APPOINTMENT (OUTPATIENT)
Dept: CT IMAGING | Facility: HOSPITAL | Age: 89
End: 2024-01-31
Payer: MEDICARE

## 2024-01-31 ENCOUNTER — APPOINTMENT (OUTPATIENT)
Dept: GENERAL RADIOLOGY | Facility: HOSPITAL | Age: 89
End: 2024-01-31
Payer: MEDICARE

## 2024-01-31 ENCOUNTER — HOSPITAL ENCOUNTER (INPATIENT)
Facility: HOSPITAL | Age: 89
LOS: 4 days | Discharge: NURSING, REHAB OR LONG-TERM CARE | End: 2024-02-06
Attending: STUDENT IN AN ORGANIZED HEALTH CARE EDUCATION/TRAINING PROGRAM | Admitting: INTERNAL MEDICINE
Payer: MEDICARE

## 2024-01-31 DIAGNOSIS — N30.00 ACUTE CYSTITIS WITHOUT HEMATURIA: Primary | ICD-10-CM

## 2024-01-31 PROBLEM — N39.0 URINARY TRACT INFECTION: Status: ACTIVE | Noted: 2024-01-31

## 2024-01-31 PROBLEM — A41.9 SEPSIS: Status: ACTIVE | Noted: 2024-01-31

## 2024-01-31 PROBLEM — N18.30 CHRONIC KIDNEY DISEASE, STAGE III (MODERATE): Status: ACTIVE | Noted: 2024-01-31

## 2024-01-31 LAB
ALBUMIN SERPL-MCNC: 3.5 G/DL (ref 3.5–5.2)
ALBUMIN/GLOB SERPL: 1.3 G/DL
ALP SERPL-CCNC: 119 U/L (ref 39–117)
ALT SERPL W P-5'-P-CCNC: <5 U/L (ref 1–41)
ANION GAP SERPL CALCULATED.3IONS-SCNC: 11.4 MMOL/L (ref 5–15)
AST SERPL-CCNC: 20 U/L (ref 1–40)
BACTERIA UR QL AUTO: ABNORMAL /HPF
BASOPHILS # BLD AUTO: 0.01 10*3/MM3 (ref 0–0.2)
BASOPHILS NFR BLD AUTO: 0.1 % (ref 0–1.5)
BILIRUB SERPL-MCNC: 0.6 MG/DL (ref 0–1.2)
BILIRUB UR QL STRIP: NEGATIVE
BUN SERPL-MCNC: 19 MG/DL (ref 8–23)
BUN/CREAT SERPL: 9 (ref 7–25)
C DIFF GDH + TOXINS A+B STL QL IA.RAPID: NEGATIVE
C DIFF GDH + TOXINS A+B STL QL IA.RAPID: NEGATIVE
CALCIUM SPEC-SCNC: 8.2 MG/DL (ref 8.6–10.5)
CHLORIDE SERPL-SCNC: 107 MMOL/L (ref 98–107)
CLARITY UR: ABNORMAL
CO2 SERPL-SCNC: 20.6 MMOL/L (ref 22–29)
COLOR UR: YELLOW
CREAT SERPL-MCNC: 2.12 MG/DL (ref 0.76–1.27)
CRP SERPL-MCNC: 3.75 MG/DL (ref 0–0.5)
D-LACTATE SERPL-SCNC: 1 MMOL/L (ref 0.5–2)
DEPRECATED RDW RBC AUTO: 53.3 FL (ref 37–54)
EGFRCR SERPLBLD CKD-EPI 2021: 29.4 ML/MIN/1.73
EOSINOPHIL # BLD AUTO: 0.31 10*3/MM3 (ref 0–0.4)
EOSINOPHIL NFR BLD AUTO: 3 % (ref 0.3–6.2)
ERYTHROCYTE [DISTWIDTH] IN BLOOD BY AUTOMATED COUNT: 15.9 % (ref 12.3–15.4)
FLUAV RNA RESP QL NAA+PROBE: NOT DETECTED
FLUBV RNA RESP QL NAA+PROBE: NOT DETECTED
GLOBULIN UR ELPH-MCNC: 2.6 GM/DL
GLUCOSE SERPL-MCNC: 93 MG/DL (ref 65–99)
GLUCOSE UR STRIP-MCNC: NEGATIVE MG/DL
HCT VFR BLD AUTO: 24.7 % (ref 37.5–51)
HGB BLD-MCNC: 7.8 G/DL (ref 13–17.7)
HGB UR QL STRIP.AUTO: ABNORMAL
HOLD SPECIMEN: NORMAL
HOLD SPECIMEN: NORMAL
HYALINE CASTS UR QL AUTO: ABNORMAL /LPF
IMM GRANULOCYTES # BLD AUTO: 0.06 10*3/MM3 (ref 0–0.05)
IMM GRANULOCYTES NFR BLD AUTO: 0.6 % (ref 0–0.5)
KETONES UR QL STRIP: ABNORMAL
LEUKOCYTE ESTERASE UR QL STRIP.AUTO: ABNORMAL
LYMPHOCYTES # BLD AUTO: 0.13 10*3/MM3 (ref 0.7–3.1)
LYMPHOCYTES NFR BLD AUTO: 1.3 % (ref 19.6–45.3)
MAGNESIUM SERPL-MCNC: 1.5 MG/DL (ref 1.6–2.4)
MCH RBC QN AUTO: 30.4 PG (ref 26.6–33)
MCHC RBC AUTO-ENTMCNC: 31.6 G/DL (ref 31.5–35.7)
MCV RBC AUTO: 96.1 FL (ref 79–97)
MONOCYTES # BLD AUTO: 0.68 10*3/MM3 (ref 0.1–0.9)
MONOCYTES NFR BLD AUTO: 6.6 % (ref 5–12)
NEUTROPHILS NFR BLD AUTO: 88.4 % (ref 42.7–76)
NEUTROPHILS NFR BLD AUTO: 9.14 10*3/MM3 (ref 1.7–7)
NITRITE UR QL STRIP: NEGATIVE
NRBC BLD AUTO-RTO: 0 /100 WBC (ref 0–0.2)
NT-PROBNP SERPL-MCNC: ABNORMAL PG/ML (ref 0–1800)
PH UR STRIP.AUTO: 5.5 [PH] (ref 5–8)
PLATELET # BLD AUTO: 126 10*3/MM3 (ref 140–450)
PMV BLD AUTO: 10 FL (ref 6–12)
POTASSIUM SERPL-SCNC: 3.9 MMOL/L (ref 3.5–5.2)
PROCALCITONIN SERPL-MCNC: 1.12 NG/ML (ref 0–0.25)
PROT SERPL-MCNC: 6.1 G/DL (ref 6–8.5)
PROT UR QL STRIP: ABNORMAL
RBC # BLD AUTO: 2.57 10*6/MM3 (ref 4.14–5.8)
RBC # UR STRIP: ABNORMAL /HPF
REF LAB TEST METHOD: ABNORMAL
SARS-COV-2 RNA RESP QL NAA+PROBE: NOT DETECTED
SODIUM SERPL-SCNC: 139 MMOL/L (ref 136–145)
SP GR UR STRIP: 1.02 (ref 1–1.03)
SQUAMOUS #/AREA URNS HPF: ABNORMAL /HPF
TROPONIN T SERPL HS-MCNC: 74 NG/L
UROBILINOGEN UR QL STRIP: ABNORMAL
WBC # UR STRIP: ABNORMAL /HPF
WBC NRBC COR # BLD AUTO: 10.33 10*3/MM3 (ref 3.4–10.8)
WHOLE BLOOD HOLD COAG: NORMAL
WHOLE BLOOD HOLD SPECIMEN: NORMAL

## 2024-01-31 PROCEDURE — 86140 C-REACTIVE PROTEIN: CPT | Performed by: STUDENT IN AN ORGANIZED HEALTH CARE EDUCATION/TRAINING PROGRAM

## 2024-01-31 PROCEDURE — 87449 NOS EACH ORGANISM AG IA: CPT | Performed by: STUDENT IN AN ORGANIZED HEALTH CARE EDUCATION/TRAINING PROGRAM

## 2024-01-31 PROCEDURE — 85025 COMPLETE CBC W/AUTO DIFF WBC: CPT

## 2024-01-31 PROCEDURE — 81001 URINALYSIS AUTO W/SCOPE: CPT

## 2024-01-31 PROCEDURE — P9612 CATHETERIZE FOR URINE SPEC: HCPCS

## 2024-01-31 PROCEDURE — 70450 CT HEAD/BRAIN W/O DYE: CPT

## 2024-01-31 PROCEDURE — 0202U NFCT DS 22 TRGT SARS-COV-2: CPT | Performed by: STUDENT IN AN ORGANIZED HEALTH CARE EDUCATION/TRAINING PROGRAM

## 2024-01-31 PROCEDURE — 87086 URINE CULTURE/COLONY COUNT: CPT

## 2024-01-31 PROCEDURE — 84145 PROCALCITONIN (PCT): CPT | Performed by: STUDENT IN AN ORGANIZED HEALTH CARE EDUCATION/TRAINING PROGRAM

## 2024-01-31 PROCEDURE — 83605 ASSAY OF LACTIC ACID: CPT

## 2024-01-31 PROCEDURE — 87507 IADNA-DNA/RNA PROBE TQ 12-25: CPT | Performed by: STUDENT IN AN ORGANIZED HEALTH CARE EDUCATION/TRAINING PROGRAM

## 2024-01-31 PROCEDURE — 25010000002 PIPERACILLIN SOD-TAZOBACTAM PER 1 G: Performed by: STUDENT IN AN ORGANIZED HEALTH CARE EDUCATION/TRAINING PROGRAM

## 2024-01-31 PROCEDURE — 93005 ELECTROCARDIOGRAM TRACING: CPT

## 2024-01-31 PROCEDURE — 87324 CLOSTRIDIUM AG IA: CPT | Performed by: STUDENT IN AN ORGANIZED HEALTH CARE EDUCATION/TRAINING PROGRAM

## 2024-01-31 PROCEDURE — 36415 COLL VENOUS BLD VENIPUNCTURE: CPT

## 2024-01-31 PROCEDURE — 83880 ASSAY OF NATRIURETIC PEPTIDE: CPT | Performed by: STUDENT IN AN ORGANIZED HEALTH CARE EDUCATION/TRAINING PROGRAM

## 2024-01-31 PROCEDURE — 87040 BLOOD CULTURE FOR BACTERIA: CPT

## 2024-01-31 PROCEDURE — 99223 1ST HOSP IP/OBS HIGH 75: CPT | Performed by: INTERNAL MEDICINE

## 2024-01-31 PROCEDURE — 71045 X-RAY EXAM CHEST 1 VIEW: CPT

## 2024-01-31 PROCEDURE — 80053 COMPREHEN METABOLIC PANEL: CPT

## 2024-01-31 PROCEDURE — 99285 EMERGENCY DEPT VISIT HI MDM: CPT

## 2024-01-31 PROCEDURE — 83735 ASSAY OF MAGNESIUM: CPT | Performed by: STUDENT IN AN ORGANIZED HEALTH CARE EDUCATION/TRAINING PROGRAM

## 2024-01-31 PROCEDURE — 87636 SARSCOV2 & INF A&B AMP PRB: CPT | Performed by: STUDENT IN AN ORGANIZED HEALTH CARE EDUCATION/TRAINING PROGRAM

## 2024-01-31 PROCEDURE — 84484 ASSAY OF TROPONIN QUANT: CPT | Performed by: STUDENT IN AN ORGANIZED HEALTH CARE EDUCATION/TRAINING PROGRAM

## 2024-01-31 RX ORDER — VANCOMYCIN/0.9 % SOD CHLORIDE 1.5G/250ML
20 PLASTIC BAG, INJECTION (ML) INTRAVENOUS ONCE
Status: COMPLETED | OUTPATIENT
Start: 2024-01-31 | End: 2024-02-01

## 2024-01-31 RX ORDER — ACETAMINOPHEN 500 MG
1000 TABLET ORAL ONCE
Status: DISCONTINUED | OUTPATIENT
Start: 2024-01-31 | End: 2024-01-31

## 2024-01-31 RX ORDER — ACETAMINOPHEN 650 MG/1
650 SUPPOSITORY RECTAL ONCE
Status: COMPLETED | OUTPATIENT
Start: 2024-01-31 | End: 2024-01-31

## 2024-01-31 RX ORDER — SODIUM CHLORIDE 0.9 % (FLUSH) 0.9 %
10 SYRINGE (ML) INJECTION AS NEEDED
Status: DISCONTINUED | OUTPATIENT
Start: 2024-01-31 | End: 2024-02-06 | Stop reason: HOSPADM

## 2024-01-31 RX ADMIN — ACETAMINOPHEN 650 MG: 650 SUPPOSITORY RECTAL at 22:36

## 2024-01-31 RX ADMIN — PIPERACILLIN SODIUM AND TAZOBACTAM SODIUM 3.38 G: 3; .375 INJECTION, SOLUTION INTRAVENOUS at 23:33

## 2024-02-01 LAB
ADV 40+41 DNA STL QL NAA+NON-PROBE: NOT DETECTED
ANION GAP SERPL CALCULATED.3IONS-SCNC: 12.4 MMOL/L (ref 5–15)
ASTRO TYP 1-8 RNA STL QL NAA+NON-PROBE: NOT DETECTED
B PARAPERT DNA SPEC QL NAA+PROBE: NOT DETECTED
B PERT DNA SPEC QL NAA+PROBE: NOT DETECTED
BACTERIA SPEC AEROBE CULT: NO GROWTH
BACTERIA SPEC AEROBE CULT: NORMAL
BACTERIA SPEC AEROBE CULT: NORMAL
BUN SERPL-MCNC: 19 MG/DL (ref 8–23)
BUN/CREAT SERPL: 9.6 (ref 7–25)
C CAYETANENSIS DNA STL QL NAA+NON-PROBE: NOT DETECTED
C COLI+JEJ+UPSA DNA STL QL NAA+NON-PROBE: NOT DETECTED
C PNEUM DNA NPH QL NAA+NON-PROBE: NOT DETECTED
CALCIUM SPEC-SCNC: 7.6 MG/DL (ref 8.6–10.5)
CHLORIDE SERPL-SCNC: 110 MMOL/L (ref 98–107)
CO2 SERPL-SCNC: 18.6 MMOL/L (ref 22–29)
CREAT SERPL-MCNC: 1.98 MG/DL (ref 0.76–1.27)
CRYPTOSP DNA STL QL NAA+NON-PROBE: NOT DETECTED
DEPRECATED RDW RBC AUTO: 56.5 FL (ref 37–54)
E HISTOLYT DNA STL QL NAA+NON-PROBE: NOT DETECTED
EAEC PAA PLAS AGGR+AATA ST NAA+NON-PRB: NOT DETECTED
EC STX1+STX2 GENES STL QL NAA+NON-PROBE: NOT DETECTED
EGFRCR SERPLBLD CKD-EPI 2021: 31.9 ML/MIN/1.73
EPEC EAE GENE STL QL NAA+NON-PROBE: NOT DETECTED
ERYTHROCYTE [DISTWIDTH] IN BLOOD BY AUTOMATED COUNT: 16.1 % (ref 12.3–15.4)
ETEC LTA+ST1A+ST1B TOX ST NAA+NON-PROBE: NOT DETECTED
FLUAV SUBTYP SPEC NAA+PROBE: NOT DETECTED
FLUBV RNA ISLT QL NAA+PROBE: NOT DETECTED
G LAMBLIA DNA STL QL NAA+NON-PROBE: NOT DETECTED
GEN 5 2HR TROPONIN T REFLEX: 72 NG/L
GLUCOSE SERPL-MCNC: 86 MG/DL (ref 65–99)
HADV DNA SPEC NAA+PROBE: NOT DETECTED
HCOV 229E RNA SPEC QL NAA+PROBE: NOT DETECTED
HCOV HKU1 RNA SPEC QL NAA+PROBE: NOT DETECTED
HCOV NL63 RNA SPEC QL NAA+PROBE: NOT DETECTED
HCOV OC43 RNA SPEC QL NAA+PROBE: NOT DETECTED
HCT VFR BLD AUTO: 25.1 % (ref 37.5–51)
HGB BLD-MCNC: 7.7 G/DL (ref 13–17.7)
HMPV RNA NPH QL NAA+NON-PROBE: NOT DETECTED
HPIV1 RNA ISLT QL NAA+PROBE: NOT DETECTED
HPIV2 RNA SPEC QL NAA+PROBE: NOT DETECTED
HPIV3 RNA NPH QL NAA+PROBE: NOT DETECTED
HPIV4 P GENE NPH QL NAA+PROBE: NOT DETECTED
M PNEUMO IGG SER IA-ACNC: NOT DETECTED
MCH RBC QN AUTO: 30.1 PG (ref 26.6–33)
MCHC RBC AUTO-ENTMCNC: 30.7 G/DL (ref 31.5–35.7)
MCV RBC AUTO: 98 FL (ref 79–97)
NOROVIRUS GI+II RNA STL QL NAA+NON-PROBE: NOT DETECTED
P SHIGELLOIDES DNA STL QL NAA+NON-PROBE: NOT DETECTED
PLATELET # BLD AUTO: 110 10*3/MM3 (ref 140–450)
PMV BLD AUTO: 10 FL (ref 6–12)
POTASSIUM SERPL-SCNC: 4.3 MMOL/L (ref 3.5–5.2)
RBC # BLD AUTO: 2.56 10*6/MM3 (ref 4.14–5.8)
RHINOVIRUS RNA SPEC NAA+PROBE: NOT DETECTED
RSV RNA NPH QL NAA+NON-PROBE: NOT DETECTED
RVA RNA STL QL NAA+NON-PROBE: NOT DETECTED
S ENT+BONG DNA STL QL NAA+NON-PROBE: NOT DETECTED
SAPO I+II+IV+V RNA STL QL NAA+NON-PROBE: NOT DETECTED
SARS-COV-2 RNA NPH QL NAA+NON-PROBE: NOT DETECTED
SHIGELLA SP+EIEC IPAH ST NAA+NON-PROBE: NOT DETECTED
SODIUM SERPL-SCNC: 141 MMOL/L (ref 136–145)
TROPONIN T DELTA: -2 NG/L
V CHOL+PARA+VUL DNA STL QL NAA+NON-PROBE: NOT DETECTED
V CHOLERAE DNA STL QL NAA+NON-PROBE: NOT DETECTED
VANCOMYCIN SERPL-MCNC: 16.1 MCG/ML (ref 5–40)
WBC NRBC COR # BLD AUTO: 8.44 10*3/MM3 (ref 3.4–10.8)
Y ENTEROCOL DNA STL QL NAA+NON-PROBE: NOT DETECTED

## 2024-02-01 PROCEDURE — 25810000003 SODIUM CHLORIDE 0.9 % SOLUTION: Performed by: INTERNAL MEDICINE

## 2024-02-01 PROCEDURE — G0378 HOSPITAL OBSERVATION PER HR: HCPCS

## 2024-02-01 PROCEDURE — 25010000002 PIPERACILLIN SOD-TAZOBACTAM PER 1 G: Performed by: INTERNAL MEDICINE

## 2024-02-01 PROCEDURE — 80202 ASSAY OF VANCOMYCIN: CPT | Performed by: INTERNAL MEDICINE

## 2024-02-01 PROCEDURE — 25010000002 VANCOMYCIN 5 G RECONSTITUTED SOLUTION: Performed by: STUDENT IN AN ORGANIZED HEALTH CARE EDUCATION/TRAINING PROGRAM

## 2024-02-01 PROCEDURE — 85027 COMPLETE CBC AUTOMATED: CPT | Performed by: INTERNAL MEDICINE

## 2024-02-01 PROCEDURE — 87641 MR-STAPH DNA AMP PROBE: CPT | Performed by: INTERNAL MEDICINE

## 2024-02-01 PROCEDURE — 25010000002 VANCOMYCIN PER 500 MG: Performed by: INTERNAL MEDICINE

## 2024-02-01 PROCEDURE — 80048 BASIC METABOLIC PNL TOTAL CA: CPT | Performed by: INTERNAL MEDICINE

## 2024-02-01 PROCEDURE — 84484 ASSAY OF TROPONIN QUANT: CPT | Performed by: STUDENT IN AN ORGANIZED HEALTH CARE EDUCATION/TRAINING PROGRAM

## 2024-02-01 PROCEDURE — 25810000003 SODIUM CHLORIDE 0.9 % SOLUTION: Performed by: STUDENT IN AN ORGANIZED HEALTH CARE EDUCATION/TRAINING PROGRAM

## 2024-02-01 RX ORDER — ACETAMINOPHEN 650 MG/1
650 SUPPOSITORY RECTAL EVERY 4 HOURS PRN
Status: DISCONTINUED | OUTPATIENT
Start: 2024-01-31 | End: 2024-02-06 | Stop reason: HOSPADM

## 2024-02-01 RX ORDER — SODIUM CHLORIDE 9 MG/ML
75 INJECTION, SOLUTION INTRAVENOUS CONTINUOUS
Status: ACTIVE | OUTPATIENT
Start: 2024-02-01 | End: 2024-02-02

## 2024-02-01 RX ORDER — VANCOMYCIN HYDROCHLORIDE 750 MG/150ML
750 INJECTION, SOLUTION INTRAVENOUS ONCE
Status: COMPLETED | OUTPATIENT
Start: 2024-02-01 | End: 2024-02-01

## 2024-02-01 RX ORDER — SODIUM CHLORIDE 9 MG/ML
40 INJECTION, SOLUTION INTRAVENOUS AS NEEDED
Status: DISCONTINUED | OUTPATIENT
Start: 2024-01-31 | End: 2024-02-06 | Stop reason: HOSPADM

## 2024-02-01 RX ORDER — CHOLECALCIFEROL (VITAMIN D3) 125 MCG
1000 CAPSULE ORAL DAILY
Status: DISCONTINUED | OUTPATIENT
Start: 2024-02-01 | End: 2024-02-06 | Stop reason: HOSPADM

## 2024-02-01 RX ORDER — SODIUM CHLORIDE 0.9 % (FLUSH) 0.9 %
10 SYRINGE (ML) INJECTION EVERY 12 HOURS SCHEDULED
Status: DISCONTINUED | OUTPATIENT
Start: 2024-02-01 | End: 2024-02-06 | Stop reason: HOSPADM

## 2024-02-01 RX ORDER — ACETAMINOPHEN 160 MG/5ML
650 SOLUTION ORAL EVERY 4 HOURS PRN
Status: DISCONTINUED | OUTPATIENT
Start: 2024-01-31 | End: 2024-02-06 | Stop reason: HOSPADM

## 2024-02-01 RX ORDER — METOPROLOL SUCCINATE 25 MG/1
25 TABLET, EXTENDED RELEASE ORAL DAILY
Status: DISCONTINUED | OUTPATIENT
Start: 2024-02-01 | End: 2024-02-03

## 2024-02-01 RX ORDER — ENOXAPARIN SODIUM 100 MG/ML
30 INJECTION SUBCUTANEOUS EVERY 24 HOURS
Status: DISCONTINUED | OUTPATIENT
Start: 2024-02-01 | End: 2024-02-01

## 2024-02-01 RX ORDER — AMOXICILLIN 250 MG
2 CAPSULE ORAL 2 TIMES DAILY
Status: DISCONTINUED | OUTPATIENT
Start: 2024-02-01 | End: 2024-02-06 | Stop reason: HOSPADM

## 2024-02-01 RX ORDER — SODIUM CHLORIDE 9 MG/ML
100 INJECTION, SOLUTION INTRAVENOUS CONTINUOUS
Status: ACTIVE | OUTPATIENT
Start: 2024-02-01 | End: 2024-02-01

## 2024-02-01 RX ORDER — L.ACID,PARA/B.BIFIDUM/S.THERM 8B CELL
1 CAPSULE ORAL 2 TIMES DAILY
Status: DISCONTINUED | OUTPATIENT
Start: 2024-02-01 | End: 2024-02-06 | Stop reason: HOSPADM

## 2024-02-01 RX ORDER — POLYETHYLENE GLYCOL 3350 17 G/17G
17 POWDER, FOR SOLUTION ORAL DAILY PRN
Status: DISCONTINUED | OUTPATIENT
Start: 2024-01-31 | End: 2024-02-06 | Stop reason: HOSPADM

## 2024-02-01 RX ORDER — ROPINIROLE 1 MG/1
1 TABLET, FILM COATED ORAL 4 TIMES DAILY
Status: DISCONTINUED | OUTPATIENT
Start: 2024-02-01 | End: 2024-02-04

## 2024-02-01 RX ORDER — SODIUM CHLORIDE 0.9 % (FLUSH) 0.9 %
10 SYRINGE (ML) INJECTION AS NEEDED
Status: DISCONTINUED | OUTPATIENT
Start: 2024-01-31 | End: 2024-02-06 | Stop reason: HOSPADM

## 2024-02-01 RX ORDER — PANTOPRAZOLE SODIUM 40 MG/1
40 TABLET, DELAYED RELEASE ORAL DAILY
Status: DISCONTINUED | OUTPATIENT
Start: 2024-02-01 | End: 2024-02-06 | Stop reason: HOSPADM

## 2024-02-01 RX ORDER — ONDANSETRON 2 MG/ML
4 INJECTION INTRAMUSCULAR; INTRAVENOUS EVERY 6 HOURS PRN
Status: DISCONTINUED | OUTPATIENT
Start: 2024-01-31 | End: 2024-02-06 | Stop reason: HOSPADM

## 2024-02-01 RX ORDER — ACETAMINOPHEN 325 MG/1
650 TABLET ORAL EVERY 4 HOURS PRN
Status: DISCONTINUED | OUTPATIENT
Start: 2024-01-31 | End: 2024-02-06 | Stop reason: HOSPADM

## 2024-02-01 RX ORDER — BISACODYL 10 MG
10 SUPPOSITORY, RECTAL RECTAL DAILY PRN
Status: DISCONTINUED | OUTPATIENT
Start: 2024-01-31 | End: 2024-02-06 | Stop reason: HOSPADM

## 2024-02-01 RX ORDER — ASPIRIN 81 MG/1
81 TABLET ORAL DAILY
Status: DISCONTINUED | OUTPATIENT
Start: 2024-02-01 | End: 2024-02-06 | Stop reason: HOSPADM

## 2024-02-01 RX ORDER — BISACODYL 5 MG/1
5 TABLET, DELAYED RELEASE ORAL DAILY PRN
Status: DISCONTINUED | OUTPATIENT
Start: 2024-01-31 | End: 2024-02-06 | Stop reason: HOSPADM

## 2024-02-01 RX ADMIN — CARBIDOPA AND LEVODOPA 2 TABLET: 25; 100 TABLET ORAL at 11:03

## 2024-02-01 RX ADMIN — CARBIDOPA AND LEVODOPA 2 TABLET: 25; 100 TABLET ORAL at 18:09

## 2024-02-01 RX ADMIN — Medication 10 ML: at 09:47

## 2024-02-01 RX ADMIN — CARBIDOPA AND LEVODOPA 2 TABLET: 25; 100 TABLET ORAL at 13:22

## 2024-02-01 RX ADMIN — ROPINIROLE HYDROCHLORIDE 1 MG: 1 TABLET, FILM COATED ORAL at 18:09

## 2024-02-01 RX ADMIN — Medication 1 CAPSULE: at 21:33

## 2024-02-01 RX ADMIN — SODIUM CHLORIDE 100 ML/HR: 9 INJECTION, SOLUTION INTRAVENOUS at 00:28

## 2024-02-01 RX ADMIN — PIPERACILLIN SODIUM AND TAZOBACTAM SODIUM 3.38 G: 3; .375 INJECTION, SOLUTION INTRAVENOUS at 18:09

## 2024-02-01 RX ADMIN — VANCOMYCIN HYDROCHLORIDE 750 MG: 750 INJECTION, SOLUTION INTRAVENOUS at 09:44

## 2024-02-01 RX ADMIN — CARBIDOPA AND LEVODOPA 2 TABLET: 25; 100 TABLET ORAL at 21:34

## 2024-02-01 RX ADMIN — Medication 10 ML: at 21:36

## 2024-02-01 RX ADMIN — ASPIRIN 81 MG: 81 TABLET, COATED ORAL at 09:45

## 2024-02-01 RX ADMIN — ROPINIROLE HYDROCHLORIDE 1 MG: 1 TABLET, FILM COATED ORAL at 21:33

## 2024-02-01 RX ADMIN — PIPERACILLIN SODIUM AND TAZOBACTAM SODIUM 3.38 G: 3; .375 INJECTION, SOLUTION INTRAVENOUS at 11:03

## 2024-02-01 RX ADMIN — ROPINIROLE HYDROCHLORIDE 1 MG: 1 TABLET, FILM COATED ORAL at 13:22

## 2024-02-01 RX ADMIN — Medication 10 ML: at 09:44

## 2024-02-01 RX ADMIN — VANCOMYCIN HYDROCHLORIDE 1500 MG: 5 INJECTION, POWDER, LYOPHILIZED, FOR SOLUTION INTRAVENOUS at 00:28

## 2024-02-01 RX ADMIN — SODIUM CHLORIDE 75 ML/HR: 9 INJECTION, SOLUTION INTRAVENOUS at 13:04

## 2024-02-01 NOTE — H&P
[facility]   HISTORY AND PHYSICAL      Name:  Radha Varela   Age:  88 y.o.  Sex:  male  :  1935  MRN:  [id number]   Visit Number:  [id number]  Admission Date:  2024  Date Of Service:  24  Primary Care Physician:  Dieudonne Jensen MD    Chief Complaint:     Fever, generalized weakness and confusion.    History Of Presenting Illness:      Radha Varela  is an 88-year-old pleasant male resident of assisted living facility, with multiple medical problems including coronary artery disease status post CABG, chronic kidney disease stage III, abdominal aortic aneurysm repair, atrial fibrillation, Parkinson's disease, carotid and celiac artery stenosis, COPD on home oxygen at nighttime, obstructive sleep apnea was brought to the emergency room by EMS from the nursing home facility with fever, generalized weakness and confusion.  Patient was recently discharged from this facility on 2024 after being treated for metabolic encephalopathy, dehydration and UTI secondary to Enterococcus.  He was discharged on Augmentin.  Patient is currently confused and is unable to provide any history.    In the emergency room, he had a fever of 102.7, pulse 88, blood pressure 148/79 and pulse oxygen saturation of 97% on 2 L of oxygen.  Troponin T 74, proBNP 13,700.  CMP was remarkable for a creatinine of 2.12 (baseline 1.7), magnesium 1.5, alkaline phosphatase 190 but LFT otherwise was unremarkable.  Lactic acid was 1 and procalcitonin was 1.12.  White count was 10, hemoglobin 7.8 (baseline), platelets 126.  Urine analysis was suggestive of urinary tract infection.  Patient was noted to have diarrhea in the emergency room and C. difficile toxin was sent which was negative.  COVID and flu test were negative.  Urine culture was sent.  Patient received Tylenol, Zosyn and vancomycin in the emergency room and is currently being admitted to the medical floor with telemetry  for altered mental status and recurrent UTI.    Review Of Systems:    All systems were reviewed and negative except as mentioned in history of presenting illness, assessment and plan.    Past Medical History: Patient  has a past medical history of Abdominal aortic aneurysm (AAA) without rupture (07/02/2018), Aneurysm, Arthritis, Cardiac murmur, Carotid stenosis, Celiac artery stenosis, Chest pain, CHF (congestive heart failure), Chronic kidney disease, Coronary artery disease, Dyslipidemia, Elevated cholesterol, GERD (gastroesophageal reflux disease), Hiatal hernia, History of exercise stress test, History of nuclear stress test, History of pneumonia, Hypertension, Lumbar degenerative disc disease, MI, old (1995), Osteoarthritis, PAD (peripheral artery disease), Parkinson's disease, Paroxysmal A-fib, Peptic ulceration, Psoriasis, Renal artery stenosis, Sleep apnea, Spinal stenosis, Tremor, and Vertebral artery stenosis.    Past Surgical History: Patient  has a past surgical history that includes Coronary artery bypass graft (1995); AAA repair, endovascular (1994); Shoulder open rotator cuff repair (Right); Appendectomy; Colonoscopy; Esophagogastroduodenoscopy; Skin biopsy; Cataract extraction w/ intraocular lens implant (Left, 12/10/2018); and Cataract extraction w/ intraocular lens implant (Right, 6/12/2023).    Social History: Patient  reports that he quit smoking about 55 years ago. His smoking use included cigarettes. He has a 45.00 pack-year smoking history. He has never used smokeless tobacco. He reports that he does not currently use alcohol after a past usage of about 2.0 standard drinks of alcohol per week. He reports that he does not use drugs.    Family History:  Patient family history includes No Known Problems in his father; Other in his mother.      Allergies:      Celebrex [celecoxib] and Sulfa antibiotics    Home Medications:    Prior to Admission Medications       Prescriptions Last Dose Informant  Patient Reported? Taking?    acetaminophen (TYLENOL) 325 MG tablet   Yes No    Take 2 tablets by mouth Every 6 (Six) Hours As Needed for Mild Pain.    albuterol sulfate  (90 Base) MCG/ACT inhaler   Yes No    Inhale 1 puff Every 4 (Four) Hours As Needed for Wheezing (as needed for soa).    amoxicillin-clavulanate (AUGMENTIN) 875-125 MG per tablet   No No    Take 1 tablet by mouth 2 (Two) Times a Day.    aspirin 81 MG EC tablet   No No    Take 1 tablet by mouth Daily.    carbidopa-levodopa (SINEMET)  MG per tablet   No No    Take 2 tablets by mouth 4 (Four) Times a Day for 30 days.    famotidine (PEPCID) 10 MG tablet   No No    Take 1 tablet by mouth 2 (Two) Times a Day As Needed for Heartburn.    Patient not taking:  Reported on 1/27/2024    ferrous sulfate 324 (65 Fe) MG tablet delayed-release EC tablet   No No    Take 1 tablet by mouth Daily With Breakfast.    metoprolol succinate XL (TOPROL-XL) 25 MG 24 hr tablet   No No    Take 1 tablet by mouth Daily. Hold for BP < 100/70    pantoprazole (PROTONIX) 40 MG EC tablet   Yes No    Take 1 tablet by mouth Daily.    polyethylene glycol (MIRALAX) 17 g packet   No No    Take 17 g by mouth Daily As Needed (constipation).    rOPINIRole (REQUIP) 0.5 MG tablet   No No    Take 2 tablets by mouth 4 (Four) Times a Day. Take 1 hour before bedtime.    Sennosides 8.6 MG capsule   Yes No    Take 8.6 mg by mouth Every 12 (Twelve) Hours As Needed (as needed to promte bm).    vitamin B-12 (CYANOCOBALAMIN) 1000 MCG tablet   No No    Take 1 tablet by mouth Daily.          ED Medications:    Medications   sodium chloride 0.9 % flush 10 mL (has no administration in time range)   piperacillin-tazobactam (ZOSYN) 3.375 g in iso-osmotic dextrose 50 ml (premix) (3.375 g Intravenous New Bag 1/31/24 7361)   vancomycin IVPB 1500 mg in 0.9% NaCl (Premix) 500 mL (has no administration in time range)   acetaminophen (TYLENOL) suppository 650 mg (650 mg Rectal Given 1/31/24 6766)      Vital Signs:  Temp:  [102.7 °F (39.3 °C)] 102.7 °F (39.3 °C)  Heart Rate:  [88] 88  Resp:  [20] 20  BP: (148)/(79) 148/79        01/31/24  2221   Weight: 72.2 kg (159 lb 2.8 oz)     Body mass index is 24.93 kg/m².    Physical Exam:     Most recent vital Signs: /79 (BP Location: Left arm, Patient Position: Lying)   Pulse 88   Temp (!) 102.7 °F (39.3 °C) (Rectal)   Resp 20   Wt 72.2 kg (159 lb 2.8 oz)   SpO2 97%   BMI 24.93 kg/m²     Physical Exam  Constitutional:       General: He is not in acute distress.     Appearance: He is ill-appearing.      Comments: Alert on-call but confused.   HENT:      Head: Normocephalic and atraumatic.      Right Ear: External ear normal.      Left Ear: External ear normal.      Nose: Nose normal.      Mouth/Throat:      Mouth: Mucous membranes are moist.   Eyes:      Extraocular Movements: Extraocular movements intact.      Conjunctiva/sclera: Conjunctivae normal.   Cardiovascular:      Rate and Rhythm: Normal rate and regular rhythm.      Pulses: Normal pulses.      Heart sounds: Normal heart sounds. No murmur heard.     Comments: Midline CABG scar noted.  Pulmonary:      Effort: Pulmonary effort is normal.      Breath sounds: Normal breath sounds. No wheezing or rales.   Abdominal:      General: Bowel sounds are normal. There is no distension.      Palpations: Abdomen is soft.      Tenderness: There is no abdominal tenderness. There is no guarding or rebound.   Musculoskeletal:      Cervical back: Neck supple.      Right lower leg: No edema.      Left lower leg: No edema.      Comments: Moves all 4 limbs equally.  Bilateral cogwheel rigidity noted.  Vein stripping scars noted in the legs.   Skin:     General: Skin is warm.      Findings: Bruising present. No erythema or rash.      Comments: Ecchymotic patches noted in bilateral upper extremities.   Neurological:      Mental Status: He is alert. He is disoriented.      Comments: Moves all 4 limbs equally but does have  generalized weakness.   Psychiatric:      Comments: Pleasantly confused.       Laboratory data:    I have reviewed the labs done in the emergency room.    Results from last 7 days   Lab Units 01/31/24 2226 01/30/24 0736 01/29/24  0552   SODIUM mmol/L 139 138 134*   POTASSIUM mmol/L 3.9 4.0 3.7   CHLORIDE mmol/L 107 106 103   CO2 mmol/L 20.6* 19.8* 18.9*   BUN mg/dL 19 17 21   CREATININE mg/dL 2.12* 1.69* 1.56*   CALCIUM mg/dL 8.2* 8.0* 7.7*   BILIRUBIN mg/dL 0.6 0.4 0.4   ALK PHOS U/L 119* 132* 126*   ALT (SGPT) U/L <5 <5 <5   AST (SGOT) U/L 20 21 19   GLUCOSE mg/dL 93 80 69     Results from last 7 days   Lab Units 01/31/24 2226 01/30/24 0736 01/29/24  0552   WBC 10*3/mm3 10.33 7.47 7.92   HEMOGLOBIN g/dL 7.8* 7.5* 7.8*   HEMATOCRIT % 24.7* 24.4* 25.4*   PLATELETS 10*3/mm3 126* 149 148         Results from last 7 days   Lab Units 01/31/24 2226 01/27/24  1159   HSTROP T ng/L 74* 45*     Results from last 7 days   Lab Units 01/31/24  2226   PROBNP pg/mL 13,703.0*       Results from last 7 days   Lab Units 01/31/24  2237   COLOR UA  Yellow   GLUCOSE UA  Negative   KETONES UA  Trace*   BLOOD UA  Small (1+)*   LEUKOCYTES UA  Large (3+)*   PH, URINE  5.5   BILIRUBIN UA  Negative   UROBILINOGEN UA  0.2 E.U./dL   RBC UA /HPF 3-5*   WBC UA /HPF Unable to determine due to loaded field*     EKG:      EKG done in the emergency room was reviewed by me.  It shows sinus rhythm at 87 bpm.  Left axis deviation.  Nonspecific intraventricular conduction abnormality noted with nonspecific ST-T changes.    Radiology:    Portable chest x-ray done in the emergency room was reviewed by me.  It is a rotated film with chronic appearing lung parenchymal changes without any acute abnormality.  Official report is currently pending.    Assessment:    Acute metabolic encephalopathy, POA.  Acute urinary tract infection, POA.  Sepsis secondary to #2, POA.  Chronic kidney disease stage III.  Coronary artery disease status post CABG.  Abdominal  aortic aneurysm status post previous repair.  Parkinson's disease.  Obstructive sleep apnea.  Chronic anemia and thrombocytopenia.    Plan:    Acute metabolic encephalopathy/sepsis/urinary tract infection.  - Patient does seem to have recurrent urinary tract infection.  - We will treat him with broad-spectrum antibiotics to cover both gram-negative's and gram-positive's including enterococci.  - Continue Zosyn and vancomycin.  - Blood and urine urine culture have been sent.  - Lactobacillus supplements.  - Continue normal saline at 100 mL/h for 1 bag.    Chronic kidney disease.  - Mild bump in creatinine noted.  - Continue normal saline.    We will consult physical and Occupational Therapy due to his Parkinson's disease.  His CODE STATUS is DNR/DNI.      Risk Assessment: Moderate  DVT Prophylaxis: Enoxaparin  Code Status: DNR/DNI  Diet: N.p.o. until fully awake.      Osvaldo Glass MD  01/31/24  23:42 EST    Dictated utilizing Dragon dictation.

## 2024-02-01 NOTE — PROGRESS NOTES
[facility]  INTERNAL MEDICINE PROGRESS NOTE    Name:  Radha Varela   Age:  88 y.o.  Sex:  male  :  1935  MRN:  [id number]   Visit Number:  [id number]  Admission Date:  2024  Date Of Service:  24  Primary Care Physician:  Dieudonne Jensen MD     LOS: 0 days :  Patient Care Team:  Dieudonne Jensen MD as PCP - General (Internal Medicine):      Subjective / Interval History:       Radha Varela  is an 88-year-old pleasant male resident of assisted living facility, with multiple medical problems including coronary artery disease status post CABG, chronic kidney disease stage III, abdominal aortic aneurysm repair, atrial fibrillation, Parkinson's disease, carotid and celiac artery stenosis, COPD on home oxygen at nighttime, obstructive sleep apnea was brought to the emergency room by EMS from the nursing home facility with fever, generalized weakness and confusion.  Patient was recently discharged from this facility on 2024 after being treated for metabolic encephalopathy, dehydration and UTI secondary to Enterococcus.  He was discharged on Augmentin.  Patient is currently confused and is unable to provide any history.     In the emergency room, he had a fever of 102.7, pulse 88, blood pressure 148/79 and pulse oxygen saturation of 97% on 2 L of oxygen.  Troponin T 74, proBNP 13,700.  CMP was remarkable for a creatinine of 2.12 (baseline 1.7), magnesium 1.5, alkaline phosphatase 190 but LFT otherwise was unremarkable.  Lactic acid was 1 and procalcitonin was 1.12.  White count was 10, hemoglobin 7.8 (baseline), platelets 126.  Urine analysis was suggestive of urinary tract infection.  Patient was noted to have diarrhea in the emergency room and C. difficile toxin was sent which was negative.  COVID and flu test were negative.  Urine culture was sent.  Patient received Tylenol, Zosyn and vancomycin in the emergency room and is currently being admitted to  the medical floor     Today morning on February 1 patient has been seen.  He has been very sleepy and arousable but has been having difficulty to stay awake.  Events noted and chart reviewed since admission last evening as above.  This morning he is afebrile and hemodynamically stable.    Vital Signs:    Temp:  [98.2 °F (36.8 °C)-102.7 °F (39.3 °C)] 98.8 °F (37.1 °C)  Heart Rate:  [53-88] 53  Resp:  [18-20] 18  BP: ()/(46-82) 128/60    Intake and output:    I/O last 3 completed shifts:  In: 550 [IV Piggyback:550]  Out: 30 [Urine:30]  No intake/output data recorded.    Physical Examination:    General Appearance:  Lethargic  and sleepy, not in any acute distress.   Head:    Atraumatic and normocephalic, without obvious abnormality.   Eyes:            PERRLA,  No pallor.    Neck:   Supple,  No lymph glands, no bruit   Lungs:     Chest shape is normal. Breath sounds heard bilaterally equally.  No crackles or wheezing.     Heart:    Normal S1 and S2, no murmur,  No JVD   Abdomen:     Normal bowel sounds, no masses, no organomegaly. Soft     nontender, no guarding, no rebound tenderness   Extremities:   Moves all extremities well, no edema, no cyanosis,    Skin:   No  bruising or rash.   Neurologic: He is lethargic so unable to get much on the exam and is sleeping.  He does have baseline Parkinson's disease     Laboratory results:  Results from last 7 days   Lab Units 02/01/24  0557 01/31/24  2226 01/30/24  0736 01/29/24  0552   SODIUM mmol/L 141 139 138 134*   POTASSIUM mmol/L 4.3 3.9 4.0 3.7   CHLORIDE mmol/L 110* 107 106 103   CO2 mmol/L 18.6* 20.6* 19.8* 18.9*   BUN mg/dL 19 19 17 21   CREATININE mg/dL 1.98* 2.12* 1.69* 1.56*   CALCIUM mg/dL 7.6* 8.2* 8.0* 7.7*   BILIRUBIN mg/dL  --  0.6 0.4 0.4   ALK PHOS U/L  --  119* 132* 126*   ALT (SGPT) U/L  --  <5 <5 <5   AST (SGOT) U/L  --  20 21 19   GLUCOSE mg/dL 86 93 80 69     Results from last 7 days   Lab Units 02/01/24  0557 01/31/24  2226 01/30/24  0736   WBC  10*3/mm3 8.44 10.33 7.47   HEMOGLOBIN g/dL 7.7* 7.8* 7.5*   HEMATOCRIT % 25.1* 24.7* 24.4*   PLATELETS 10*3/mm3 110* 126* 149         Results from last 7 days   Lab Units 02/01/24  0026 01/31/24  2226 01/27/24  1159   HSTROP T ng/L 72* 74* 45*     Results from last 7 days   Lab Units 01/27/24  1634 01/27/24  1622 01/27/24  1241   BLOODCX  No growth at 4 days No growth at 4 days  --    URINECX   --   --  No growth       Radiology results:    Imaging Results (Last 24 Hours)       Procedure Component Value Units Date/Time    CT Head Without Contrast [[id number]] Collected: 01/31/24 2345     Updated: 01/31/24 2348    Narrative:      FINAL REPORT    TECHNIQUE:  null    CLINICAL HISTORY:  Mental status change, unknown cause    COMPARISON:  null    FINDINGS:  Exam: CT Brain without contrast    Comparison: None    Clinical history: Mental status change    Findings:    No acute intracranial hemorrhage.    Calcification seen at the basal ganglia and dentate nuclei    Cerebral volume loss.    No abnormal extra-axial fluid collections.    No intracranial mass    No midline shift.    Bilateral cerebral white matter disease, likely ischemic microvascular in nature    No skull fracture.      Impression:      Impression:    1. No acute intracranial hemorrhage.    2. Cerebral volume loss. Cerebral white matter disease, likely ischemic microvascular in nature    Authenticated and Electronically Signed by Charlotte Richardson MD  on 01/31/2024 11:45:59 PM    XR Chest 1 View [[id number]] Resulted: 01/31/24 2258     Updated: 01/31/24 2259            I have reviewed the patient's radiology reports.    Medication Review:     I have reviewed the patients active and prn medications.     Assessment:      Urinary tract infection    Generalized weakness    Parkinson's disease dementia    Coronary artery disease involving native coronary artery of native heart without angina pectoris    Acute metabolic encephalopathy    Sepsis    Chronic kidney  disease, stage III (moderate)          Plan:    1.  Acute metabolic encephalopathy secondary to simple sepsis-he has had UTI due to Enterococcus which has been treated and he was just discharged on Augmentin day before yesterday.  Will broaden the coverage with vancomycin and Zosyn to cover gram-positive as well as negatives and do pancultures    2.  Acute on chronic kidney disease-he does have baseline CKD stage III and is slightly worse and will continue with hydration.  This most likely is due to decreased oral intake because of his changes in mental status and not awaken enough to drink and  IV fluids will be continued at present    3.  Parkinson's disease with dementia-this has flared up because of his infection and will continue with aggressive treatment at present along with his home medicine when he is awake will be started    4.  Mild bradycardia.  He has been on metoprolol so we will hold it until his heart rate goes above 80    See rest as per orders and patient will be closely followed up      Dieudonne Jensen MD  02/01/24  09:09 EST      Please note that portions of this note were completed with a voice recognition program.

## 2024-02-01 NOTE — ED PROVIDER NOTES
Subjective:  History of Present Illness:    Patient is an 88-year-old male with history of cardiac stents, CHF, CKD, hyperlipidemia, GERD, hypertension, peripheral arterial disease, Parkinson's disease, paroxysmal A-fib, peptic ulcer disease who presents today with fever, altered mental status.  Per chart review, was recently admitted due to sepsis from urinary source.  Per staff today, patient has become increasingly confused and now has fever.  Concern for return of UTI now presents to emergency department for evaluation.  Patient unable to communicate on arrival, not patient's baseline.      Nurses Notes reviewed and agree, including vitals, allergies, social history and prior medical history.     REVIEW OF SYSTEMS: All systems reviewed and not pertinent unless noted.  Review of Systems   Unable to perform ROS: Dementia       Past Medical History:   Diagnosis Date    Abdominal aortic aneurysm (AAA) without rupture 07/02/2018    Aneurysm     Arthritis     Cardiac murmur     Carotid stenosis     Celiac artery stenosis     Chest pain     CHF (congestive heart failure)     Chronic kidney disease     Coronary artery disease     Dyslipidemia     Elevated cholesterol     GERD (gastroesophageal reflux disease)     Hiatal hernia     History of exercise stress test     History of nuclear stress test     History of pneumonia     Hypertension     Lumbar degenerative disc disease     MI, old 1995    Osteoarthritis     PAD (peripheral artery disease)     Parkinson's disease     Paroxysmal A-fib     Peptic ulceration     Psoriasis     Renal artery stenosis     Sleep apnea     uses o2 @ HS    Spinal stenosis     Tremor     Vertebral artery stenosis        Allergies:    Celebrex [celecoxib] and Sulfa antibiotics      Past Surgical History:   Procedure Laterality Date    ABDOMINAL AORTIC ANEURYSM REPAIR WITH ENDOGRAFT  1994    APPENDECTOMY      CATARACT EXTRACTION W/ INTRAOCULAR LENS IMPLANT Left 12/10/2018    Procedure: CATARACT  PHACO EXTRACTION WITH INTRAOCULAR LENS IMPLANT LEFT;  Surgeon: Charity Carrasco MD;  Location: [facility] OR;  Service: Ophthalmology    CATARACT EXTRACTION W/ INTRAOCULAR LENS IMPLANT Right 2023    Procedure: CATARACT PHACO EXTRACTION WITH INTRAOCULAR LENS IMPLANT RIGHT;  Surgeon: Charity Carrasco MD;  Location: [facility];  Service: Ophthalmology;  Laterality: Right;    COLONOSCOPY      CORONARY ARTERY BYPASS GRAFT      ENDOSCOPY      SHOULDER ROTATOR CUFF REPAIR Right     SKIN BIOPSY           Social History     Socioeconomic History    Marital status:     Number of children: 3   Tobacco Use    Smoking status: Former     Packs/day: 3.00     Years: 15.00     Additional pack years: 0.00     Total pack years: 45.00     Types: Cigarettes     Quit date:      Years since quittin.1    Smokeless tobacco: Never    Tobacco comments:     quit 50 years ago   Vaping Use    Vaping Use: Never used   Substance and Sexual Activity    Alcohol use: Not Currently     Alcohol/week: 2.0 standard drinks of alcohol     Types: 2 Glasses of wine per week     Comment: DAILY    Drug use: No    Sexual activity: Defer         Family History   Problem Relation Age of Onset    Other Mother          of old age    No Known Problems Father        Objective  Physical Exam:  /64 (BP Location: Left arm, Patient Position: Lying)   Pulse 73   Temp 98.2 °F (36.8 °C) (Rectal)   Resp 18   Wt 72.2 kg (159 lb 2.8 oz)   SpO2 98%   BMI 24.93 kg/m²      Physical Exam  Constitutional:       General: He is not in acute distress.     Appearance: Normal appearance. He is normal weight. He is ill-appearing. He is not toxic-appearing.      Comments: Febrile on arrival   HENT:      Head: Normocephalic and atraumatic.      Nose: Nose normal. No congestion or rhinorrhea.      Mouth/Throat:      Mouth: Mucous membranes are moist.      Pharynx: Oropharynx is clear.   Eyes:      Extraocular Movements: Extraocular movements intact.       Conjunctiva/sclera: Conjunctivae normal.      Pupils: Pupils are equal, round, and reactive to light.   Cardiovascular:      Rate and Rhythm: Normal rate and regular rhythm.      Pulses: Normal pulses.   Pulmonary:      Effort: Pulmonary effort is normal. No respiratory distress.      Breath sounds: Normal breath sounds.   Abdominal:      General: Abdomen is flat. Bowel sounds are normal. There is no distension.      Palpations: Abdomen is soft.      Tenderness: There is no abdominal tenderness.   Musculoskeletal:         General: No swelling or tenderness. Normal range of motion.      Cervical back: Normal range of motion and neck supple. No rigidity or tenderness.   Skin:     General: Skin is warm and dry.      Capillary Refill: Capillary refill takes less than 2 seconds.   Neurological:      General: No focal deficit present.      Mental Status: He is oriented to person, place, and time. He is disoriented and confused.      Cranial Nerves: No cranial nerve deficit.      Sensory: No sensory deficit.      Motor: No weakness.   Psychiatric:         Mood and Affect: Mood normal.         Behavior: Behavior normal.         Thought Content: Thought content normal.         Judgment: Judgment normal.         Procedures    ED Course:    ED Course as of 02/01/24 0215 Wed Jan 31, 2024 2244 EKG interpreted by me, left bundle branch blocks with normal sinus rhythm, no concerning ST changes noted, rate of 87, abnormal EKG [JE]      ED Course User Index  [JE] Waqas Damon MD       Lab Results (last 24 hours)       Procedure Component Value Units Date/Time    CBC & Differential [[id number]]  (Abnormal) Collected: 01/31/24 2226    Specimen: Blood Updated: 01/31/24 2234    Narrative:      The following orders were created for panel order CBC & Differential.  Procedure                               Abnormality         Status                     ---------                               -----------         ------                      CBC Auto Differential[[id number]]        Abnormal            Final result                 Please view results for these tests on the individual orders.    Comprehensive Metabolic Panel [[id number]]  (Abnormal) Collected: 01/31/24 2226    Specimen: Blood Updated: 01/31/24 2259     Glucose 93 mg/dL      BUN 19 mg/dL      Creatinine 2.12 mg/dL      Sodium 139 mmol/L      Potassium 3.9 mmol/L      Chloride 107 mmol/L      CO2 20.6 mmol/L      Calcium 8.2 mg/dL      Total Protein 6.1 g/dL      Albumin 3.5 g/dL      ALT (SGPT) <5 U/L      AST (SGOT) 20 U/L      Alkaline Phosphatase 119 U/L      Total Bilirubin 0.6 mg/dL      Globulin 2.6 gm/dL      A/G Ratio 1.3 g/dL      BUN/Creatinine Ratio 9.0     Anion Gap 11.4 mmol/L      eGFR 29.4 mL/min/1.73     Narrative:      GFR Normal >60  Chronic Kidney Disease <60  Kidney Failure <15    The GFR formula is only valid for adults with stable renal function between ages 18 and 70.    Lactic Acid, Plasma [[id number]]  (Normal) Collected: 01/31/24 2226    Specimen: Blood Updated: 01/31/24 2248     Lactate 1.0 mmol/L     CBC Auto Differential [[id number]]  (Abnormal) Collected: 01/31/24 2226    Specimen: Blood Updated: 01/31/24 2234     WBC 10.33 10*3/mm3      RBC 2.57 10*6/mm3      Hemoglobin 7.8 g/dL      Hematocrit 24.7 %      MCV 96.1 fL      MCH 30.4 pg      MCHC 31.6 g/dL      RDW 15.9 %      RDW-SD 53.3 fl      MPV 10.0 fL      Platelets 126 10*3/mm3      Neutrophil % 88.4 %      Lymphocyte % 1.3 %      Monocyte % 6.6 %      Eosinophil % 3.0 %      Basophil % 0.1 %      Immature Grans % 0.6 %      Neutrophils, Absolute 9.14 10*3/mm3      Lymphocytes, Absolute 0.13 10*3/mm3      Monocytes, Absolute 0.68 10*3/mm3      Eosinophils, Absolute 0.31 10*3/mm3      Basophils, Absolute 0.01 10*3/mm3      Immature Grans, Absolute 0.06 10*3/mm3      nRBC 0.0 /100 WBC     High Sensitivity Troponin T [[id number]]  (Abnormal) Collected: 01/31/24 2226    Specimen: Blood Updated: 01/31/24  2310     HS Troponin T 74 ng/L     Narrative:      High Sensitive Troponin T Reference Range:  <14.0 ng/L- Negative Female for AMI  <22.0 ng/L- Negative Male for AMI  >=14 - Abnormal Female indicating possible myocardial injury.  >=22 - Abnormal Male indicating possible myocardial injury.   Clinicians would have to utilize clinical acumen, EKG, Troponin, and serial changes to determine if it is an Acute Myocardial Infarction or myocardial injury due to an underlying chronic condition.         BNP [[id number]]  (Abnormal) Collected: 01/31/24 2226    Specimen: Blood Updated: 01/31/24 2300     proBNP 13,703.0 pg/mL     Narrative:      This assay is used as an aid in the diagnosis of individuals suspected of having heart failure. It can be used as an aid in the diagnosis of acute decompensated heart failure (ADHF) in patients presenting with signs and symptoms of ADHF to the emergency department (ED). In addition, NT-proBNP of <300 pg/mL indicates ADHF is not likely.    Age Range Result Interpretation  NT-proBNP Concentration (pg/mL:      <50             Positive            >450                   Gray                 300-450                    Negative             <300    50-75           Positive            >900                  Gray                300-900                  Negative            <300      >75             Positive            >1800                  Gray                300-1800                  Negative            <300    C-reactive Protein [[id number]]  (Abnormal) Collected: 01/31/24 2226    Specimen: Blood Updated: 01/31/24 2259     C-Reactive Protein 3.75 mg/dL     Procalcitonin [[id number]]  (Abnormal) Collected: 01/31/24 2226    Specimen: Blood Updated: 01/31/24 2300     Procalcitonin 1.12 ng/mL     Narrative:      As a Marker for Sepsis (Non-Neonates):    1. <0.5 ng/mL represents a low risk of severe sepsis and/or septic shock.  2. >2 ng/mL represents a high risk of severe sepsis and/or septic shock.    As  "a Marker for Lower Respiratory Tract Infections that require antibiotic therapy:    PCT on Admission    Antibiotic Therapy       6-12 Hrs later    >0.5                Strongly Recommended  >0.25 - <0.5        Recommended   0.1 - 0.25          Discouraged              Remeasure/reassess PCT  <0.1                Strongly Discouraged     Remeasure/reassess PCT    As 28 day mortality risk marker: \"Change in Procalcitonin Result\" (>80% or <=80%) if Day 0 (or Day 1) and Day 4 values are available. Refer to http://[email or web address]-pct-[email or web address]    Change in PCT <=80%  A decrease of PCT levels below or equal to 80% defines a positive change in PCT test result representing a higher risk for 28-day all-cause mortality of patients diagnosed with severe sepsis for septic shock.    Change in PCT >80%  A decrease of PCT levels of more than 80% defines a negative change in PCT result representing a lower risk for 28-day all-cause mortality of patients diagnosed with severe sepsis or septic shock.       Magnesium [[id number]]  (Abnormal) Collected: 01/31/24 2226    Specimen: Blood Updated: 01/31/24 2259     Magnesium 1.5 mg/dL     COVID-19 and FLU A/B PCR, 1 HR TAT - Swab, Nasopharynx [[id number]]  (Normal) Collected: 01/31/24 2233    Specimen: Swab from Nasopharynx Updated: 01/31/24 2301     COVID19 Not Detected     Influenza A PCR Not Detected     Influenza B PCR Not Detected    Narrative:      Fact sheet for providers: https://www.fda.gov/media/516959/download    Fact sheet for patients: https://www.fda.gov/media/903663/download    Test performed by PCR.    Respiratory Panel PCR w/COVID-19(SARS-CoV-2) PANKAJ/NIKKY/BENJA/PAD/COR/LITO In-House, NP Swab in UTM/VTM, 2 HR TAT - Swab, Nasopharynx [[id number]]  (Normal) Collected: 01/31/24 2233    Specimen: Swab from Nasopharynx Updated: 02/01/24 0001     ADENOVIRUS, PCR Not Detected     Coronavirus 229E Not Detected     Coronavirus HKU1 Not Detected     Coronavirus NL63 Not Detected     " Coronavirus OC43 Not Detected     COVID19 Not Detected     Human Metapneumovirus Not Detected     Human Rhinovirus/Enterovirus Not Detected     Influenza A PCR Not Detected     Influenza B PCR Not Detected     Parainfluenza Virus 1 Not Detected     Parainfluenza Virus 2 Not Detected     Parainfluenza Virus 3 Not Detected     Parainfluenza Virus 4 Not Detected     RSV, PCR Not Detected     Bordetella pertussis pcr Not Detected     Bordetella parapertussis PCR Not Detected     Chlamydophila pneumoniae PCR Not Detected     Mycoplasma pneumo by PCR Not Detected    Narrative:      In the setting of a positive respiratory panel with a viral infection PLUS a negative procalcitonin without other underlying concern for bacterial infection, consider observing off antibiotics or discontinuation of antibiotics and continue supportive care. If the respiratory panel is positive for atypical bacterial infection (Bordetella pertussis, Chlamydophila pneumoniae, or Mycoplasma pneumoniae), consider antibiotic de-escalation to target atypical bacterial infection.    Urinalysis With Culture If Indicated - Straight Cath [[id number]]  (Abnormal) Collected: 01/31/24 2237    Specimen: Urine from Straight Cath Updated: 01/31/24 2255     Color, UA Yellow     Appearance, UA Turbid     pH, UA 5.5     Specific Gravity, UA 1.018     Glucose, UA Negative     Ketones, UA Trace     Bilirubin, UA Negative     Blood, UA Small (1+)     Protein, UA 30 mg/dL (1+)     Leuk Esterase, UA Large (3+)     Nitrite, UA Negative     Urobilinogen, UA 0.2 E.U./dL    Narrative:      In absence of clinical symptoms, the presence of pyuria, bacteria, and/or nitrites on the urinalysis result does not correlate with infection.    Urinalysis, Microscopic Only - Straight Cath [[id number]]  (Abnormal) Collected: 01/31/24 2237    Specimen: Urine from Straight Cath Updated: 01/31/24 2259     RBC, UA 3-5 /HPF      WBC, UA       Unable to determine due to loaded field     /HPF      Bacteria, UA 3+ /HPF      Squamous Epithelial Cells, UA 3-6 /HPF      Hyaline Casts, UA None Seen /LPF      Methodology Manual Light Microscopy    Urine Culture - Urine, Straight Cath [[id number]] Collected: 01/31/24 2237    Specimen: Urine from Straight Cath Updated: 01/31/24 2259    Gastrointestinal Panel, PCR - Stool, Per Rectum [[id number]]  (Normal) Collected: 01/31/24 2248    Specimen: Stool from Per Rectum Updated: 02/01/24 0028     Campylobacter Not Detected     Plesiomonas shigelloides Not Detected     Salmonella Not Detected     Vibrio Not Detected     Vibrio cholerae Not Detected     Yersinia enterocolitica Not Detected     Enteroaggregative E. coli (EAEC) Not Detected     Enteropathogenic E. coli (EPEC) Not Detected     Enterotoxigenic E. coli (ETEC) lt/st Not Detected     Shiga-like toxin-producing E. coli (STEC) stx1/stx2 Not Detected     Shigella/Enteroinvasive E. coli (EIEC) Not Detected     Cryptosporidium Not Detected     Cyclospora cayetanensis Not Detected     Entamoeba histolytica Not Detected     Giardia lamblia Not Detected     Adenovirus F40/41 Not Detected     Astrovirus Not Detected     Norovirus GI/GII Not Detected     Rotavirus A Not Detected     Sapovirus (I, II, IV or V) Not Detected    Clostridioides difficile Toxin - Stool, Per Rectum [[id number]]  (Normal) Collected: 01/31/24 2248    Specimen: Stool from Per Rectum Updated: 01/31/24 2336    Narrative:      The following orders were created for panel order Clostridioides difficile Toxin - Stool, Per Rectum.  Procedure                               Abnormality         Status                     ---------                               -----------         ------                     Clostridioides difficile...[[id number]]  Normal              Final result                 Please view results for these tests on the individual orders.    Clostridioides difficile EIA - Stool, Per Rectum [[id number]]  (Normal) Collected: 01/31/24 2248     Specimen: Stool from Per Rectum Updated: 01/31/24 2336     C Diff GDH Ag Negative     C.diff Toxin Ag Negative    Narrative:      The result indicates the absence of toxigenic C.difficile from stool specimen.    Blood Culture - Blood, Hand, Right [[id number]] Collected: 01/31/24 2318    Specimen: Blood from Hand, Right Updated: 01/31/24 2357    Blood Culture - Blood, Hand, Left [[id number]] Collected: 01/31/24 2323    Specimen: Blood from Hand, Left Updated: 01/31/24 2357    High Sensitivity Troponin T 2Hr [[id number]]  (Abnormal) Collected: 02/01/24 0026    Specimen: Blood Updated: 02/01/24 0107     HS Troponin T 72 ng/L      Troponin T Delta -2 ng/L     Narrative:      High Sensitive Troponin T Reference Range:  <14.0 ng/L- Negative Female for AMI  <22.0 ng/L- Negative Male for AMI  >=14 - Abnormal Female indicating possible myocardial injury.  >=22 - Abnormal Male indicating possible myocardial injury.   Clinicians would have to utilize clinical acumen, EKG, Troponin, and serial changes to determine if it is an Acute Myocardial Infarction or myocardial injury due to an underlying chronic condition.                  CT Head Without Contrast    Result Date: 1/31/2024  FINAL REPORT TECHNIQUE: null CLINICAL HISTORY: Mental status change, unknown cause COMPARISON: null FINDINGS: Exam: CT Brain without contrast Comparison: None Clinical history: Mental status change Findings: No acute intracranial hemorrhage. Calcification seen at the basal ganglia and dentate nuclei Cerebral volume loss. No abnormal extra-axial fluid collections. No intracranial mass No midline shift. Bilateral cerebral white matter disease, likely ischemic microvascular in nature No skull fracture.     Impression: Impression: 1. No acute intracranial hemorrhage. 2. Cerebral volume loss. Cerebral white matter disease, likely ischemic microvascular in nature Authenticated and Electronically Signed by Charlotte Richardson MD on 01/31/2024 11:45:59 PM         MDM     Amount and/or Complexity of Data Reviewed  Decide to obtain previous medical records or to obtain history from someone other than the patient: yes        Initial impression of presenting illness: Altered mental status    DDX: includes but is not limited to: Urinary tract infection, sepsis, intracranial hemorrhage, bacterial pneumonia    Patient arrives stable with vitals interpreted by myself.     Pertinent features from physical exam: Febrile on arrival, clear to auscultation, regular rate and rhythm with no murmur, makes incomprehensible sounds and withdraws to pain with patient unable to follow commands.    Initial diagnostic plan: CBC, CMP, lipase, UA, CRP, lactic acid, Pro-Dick, blood culture, troponin, ECG, chest x-ray, CT head    Results from initial plan were reviewed and interpreted by me revealing concern for worsening urinary tract infection on UA    Diagnostic information from other sources: Reviewed past medical records including medical records from past visit, patient had been admitted with IV antibiotics however, transition to Bactrim prior to discharge.  Enterococcus culture and susceptibilities appear to be unavailable in [facility], suspect that Dr. Lucas may have access to this at his nursing facility    Interventions / Re-evaluation: Given concerns for drug-resistant infection given Zosyn, vancomycin in the emergency department    Results/clinical rationale were discussed with patient at bedside as well as patient's family member via phone total    Consultations/Discussion of results with other physicians: Given my concern for worsening urinary tract infection refractory to outpatient therapy as etiology of patient's fever, discussed with hospitalist admitted to their service further management    Disposition plan: Admit  -----        Final diagnoses:   Acute cystitis without hematuria          Waqas Damon MD  02/01/24 0216

## 2024-02-01 NOTE — PROGRESS NOTES
Pharmacokinetic Consult - Piperacillin-tazobactam Dosing    Radha Varela is a 88 y.o. male who has been consulted to dose piperacillin-tazobactam for  UTI .    Current Antimicrobial Therapy    Anti-Infectives (From admission, onward)      Ordered     Dose/Rate Route Frequency Start Stop    02/01/24 0235  piperacillin-tazobactam (ZOSYN) 3.375 g in iso-osmotic dextrose 50 ml (premix)        Ordering Provider: Osvaldo Glass MD    3.375 g  over 4 Hours Intravenous Every 8 Hours 02/01/24 0800 02/06/24 0759    02/01/24 0302  vancomycin (dosing per levels)        Ordering Provider: Osvaldo Glass MD     Does not apply Daily 02/01/24 0301 02/04/24 0859    02/01/24 0019  Pharmacy to dose vancomycin        Ordering Provider: Osvaldo Glass MD     Does not apply Continuous PRN 02/01/24 0019 02/04/24 0018    02/01/24 0019  Pharmacy to Dose Zosyn        Ordering Provider: Osvaldo Glass MD     Does not apply Continuous PRN 02/01/24 0019 02/06/24 0018    01/31/24 2334  vancomycin IVPB 1500 mg in 0.9% NaCl (Premix) 500 mL        Ordering Provider: Waqas Damon MD    20 mg/kg × 72.2 kg  333.3 mL/hr over 90 Minutes Intravenous Once 01/31/24 2350 02/01/24 0226    01/31/24 2305  piperacillin-tazobactam (ZOSYN) 3.375 g in iso-osmotic dextrose 50 ml (premix)        Ordering Provider: Waqas Damon MD    3.375 g  over 30 Minutes Intravenous Once 01/31/24 2321 02/01/24 0016            Microbiology Results (last 10 days)       Procedure Component Value - Date/Time    Gastrointestinal Panel, PCR - Stool, Per Rectum [[id number]]  (Normal) Collected: 01/31/24 2248    Lab Status: Final result Specimen: Stool from Per Rectum Updated: 02/01/24 0028     Campylobacter Not Detected     Plesiomonas shigelloides Not Detected     Salmonella Not Detected     Vibrio Not Detected     Vibrio cholerae Not Detected     Yersinia enterocolitica Not Detected     Enteroaggregative E. coli (EAEC) Not Detected     Enteropathogenic E. coli  (EPEC) Not Detected     Enterotoxigenic E. coli (ETEC) lt/st Not Detected     Shiga-like toxin-producing E. coli (STEC) stx1/stx2 Not Detected     Shigella/Enteroinvasive E. coli (EIEC) Not Detected     Cryptosporidium Not Detected     Cyclospora cayetanensis Not Detected     Entamoeba histolytica Not Detected     Giardia lamblia Not Detected     Adenovirus F40/41 Not Detected     Astrovirus Not Detected     Norovirus GI/GII Not Detected     Rotavirus A Not Detected     Sapovirus (I, II, IV or V) Not Detected    Clostridioides difficile Toxin - Stool, Per Rectum [[id number]]  (Normal) Collected: 01/31/24 2248    Lab Status: Final result Specimen: Stool from Per Rectum Updated: 01/31/24 2336    Narrative:      The following orders were created for panel order Clostridioides difficile Toxin - Stool, Per Rectum.  Procedure                               Abnormality         Status                     ---------                               -----------         ------                     Clostridioides difficile...[[id number]]  Normal              Final result                 Please view results for these tests on the individual orders.    Clostridioides difficile EIA - Stool, Per Rectum [[id number]]  (Normal) Collected: 01/31/24 2248    Lab Status: Final result Specimen: Stool from Per Rectum Updated: 01/31/24 2336     C Diff GDH Ag Negative     C.diff Toxin Ag Negative    Narrative:      The result indicates the absence of toxigenic C.difficile from stool specimen.    COVID-19 and FLU A/B PCR, 1 HR TAT - Swab, Nasopharynx [[id number]]  (Normal) Collected: 01/31/24 2233    Lab Status: Final result Specimen: Swab from Nasopharynx Updated: 01/31/24 2301     COVID19 Not Detected     Influenza A PCR Not Detected     Influenza B PCR Not Detected    Narrative:      Fact sheet for providers: https://www.fda.gov/media/323816/download    Fact sheet for patients: https://www.fda.gov/media/299559/download    Test performed by PCR.     Respiratory Panel PCR w/COVID-19(SARS-CoV-2) PANKAJ/NIKKY/BENJA/PAD/COR/LITO In-House, NP Swab in UTM/VTM, 2 HR TAT - Swab, Nasopharynx [[id number]]  (Normal) Collected: 01/31/24 2233    Lab Status: Final result Specimen: Swab from Nasopharynx Updated: 02/01/24 0001     ADENOVIRUS, PCR Not Detected     Coronavirus 229E Not Detected     Coronavirus HKU1 Not Detected     Coronavirus NL63 Not Detected     Coronavirus OC43 Not Detected     COVID19 Not Detected     Human Metapneumovirus Not Detected     Human Rhinovirus/Enterovirus Not Detected     Influenza A PCR Not Detected     Influenza B PCR Not Detected     Parainfluenza Virus 1 Not Detected     Parainfluenza Virus 2 Not Detected     Parainfluenza Virus 3 Not Detected     Parainfluenza Virus 4 Not Detected     RSV, PCR Not Detected     Bordetella pertussis pcr Not Detected     Bordetella parapertussis PCR Not Detected     Chlamydophila pneumoniae PCR Not Detected     Mycoplasma pneumo by PCR Not Detected    Narrative:      In the setting of a positive respiratory panel with a viral infection PLUS a negative procalcitonin without other underlying concern for bacterial infection, consider observing off antibiotics or discontinuation of antibiotics and continue supportive care. If the respiratory panel is positive for atypical bacterial infection (Bordetella pertussis, Chlamydophila pneumoniae, or Mycoplasma pneumoniae), consider antibiotic de-escalation to target atypical bacterial infection.    VRE Culture - Swab, Per Rectum [[id number]]  (Normal) Collected: 01/27/24 2012    Lab Status: Final result Specimen: Swab from Per Rectum Updated: 01/29/24 1153     VRE Screen CX No Vancomycin Resistant Enterococcus Isolated    CANDIDA AURIS SCREEN - Swab, Axilla Right, Axilla Left and Groin [[id number]]  (Normal) Collected: 01/27/24 2012    Lab Status: Preliminary result Specimen: Swab from Axilla Right, Axilla Left and Groin Updated: 01/31/24 2200     Candida Auris Screen Culture No  Candida auris isolated at 4 days    MRSA Screen, PCR (Inpatient) - Swab, Nares [[id number]]  (Normal) Collected: 01/27/24 2012    Lab Status: Final result Specimen: Swab from Nares Updated: 01/28/24 1352     MRSA PCR No MRSA Detected    Narrative:      The negative predictive value of this diagnostic test is high and should only be used to consider de-escalating anti-MRSA therapy. A positive result may indicate colonization with MRSA and must be correlated clinically.    Acinetobacter Screen - Swab, Axilla, Left [[id number]]  (Normal) Collected: 01/27/24 2012    Lab Status: Final result Specimen: Swab from Axilla, Left Updated: 01/29/24 1152     Acinetobacter Screen CX No Acinetobacter isolated    Blood Culture - Blood, Hand, Left [[id number]]  (Normal) Collected: 01/27/24 1634    Lab Status: Preliminary result Specimen: Blood from Hand, Left Updated: 01/31/24 1645     Blood Culture No growth at 4 days    Blood Culture - Blood, Arm, Left [[id number]]  (Normal) Collected: 01/27/24 1622    Lab Status: Preliminary result Specimen: Blood from Arm, Left Updated: 01/31/24 1645     Blood Culture No growth at 4 days    COVID-19 and FLU A/B PCR, 1 HR TAT - Swab, Nasopharynx [[id number]]  (Normal) Collected: 01/27/24 1241    Lab Status: Final result Specimen: Swab from Nasopharynx Updated: 01/27/24 1309     COVID19 Not Detected     Influenza A PCR Not Detected     Influenza B PCR Not Detected    Narrative:      Fact sheet for providers: https://www.fda.gov/media/378362/download    Fact sheet for patients: https://www.fda.gov/media/419133/download    Test performed by PCR.    Urine Culture - Urine, Urine, Clean Catch [[id number]]  (Normal) Collected: 01/27/24 1241    Lab Status: Final result Specimen: Urine, Clean Catch Updated: 01/28/24 1531     Urine Culture No growth    Urine Culture - Urine, Urine, Clean Catch [[id number]] Collected: 01/23/24 1610    Lab Status: Final result Specimen: Urine, Clean Catch Updated: 01/26/24 1018      Urine Culture 50,000 CFU/mL Mixed Katiana Isolated    Narrative:      Specimen contains mixed organisms of questionable pathogenicity suggestive of contamination. If symptoms persist, suggest recollection.  Colonization of the urinary tract without infection is common. Treatment is discouraged unless the patient is symptomatic, pregnant, or undergoing an invasive urologic procedure.             Allergies    Celebrex [celecoxib] and Sulfa antibiotics    Relevant clinical data and objective history reviewed:    Creatinine   Date Value Ref Range Status   01/31/2024 2.12 (H) 0.76 - 1.27 mg/dL Final   03/31/2022 1.86 (H) 0.80 - 1.30 mg/dL Final     Estimated Creatinine Clearance: 24.6 mL/min (A) (by C-G formula based on SCr of 2.12 mg/dL (H)).    No intake/output data recorded.    Patient weight: 72.2 kg (159 lb 2.8 oz)    Asessment/Plan    Initiate Piperacillin-tazobactam 3.375 g IV every 8 hours  Pharmacy will monitor Mr. Varela's renal function and clinical status and adjust the piperacillin-tazobactam dose and/or frequency as needed.    Thank you,  Tara Campbell [facility],PharmD  2/1/2024  03:08 EST

## 2024-02-01 NOTE — CASE MANAGEMENT/SOCIAL WORK
Discharge Planning Assessment  [facility]     Patient Name: Radha Varela  MRN: [id number]  Today's Date: 2/1/2024    Admit Date: 1/31/2024    Plan: The patient unable to answer due to confusion at this time, his daughter, Sandra, is at bedside and can answer questions.  He is a current resident at [facility] where he is followed by Dr. Jensen.  He gets his medications from the facility provided pharmacy.  Does not elect to enroll in Meds to Bed.  At the SNF he uses the following DME:  walker, wheelchair, oxygen, hospital bed, and cane.  At the time of DC the patient's daughter plans for him to return to [facility].  Questions and concerns were addressed with the patient's daughter.  Will provide additional resources and information upon patient request.   Discharge Needs Assessment       Row Name 02/01/24 1225       Living Environment    People in Home facility resident    Unique Family Situation Resident at [facility]    Current Living Arrangements residential facility    Duration at Residence Since October 2023    Potentially Unsafe Housing Conditions none    In the past 12 months has the electric, gas, oil, or water company threatened to shut off services in your home? No    Primary Care Provided by other (see comments)  Nursing staff at [facility]    Provides Primary Care For no one, unable/limited ability to care for self    Family Caregiver if Needed none    Quality of Family Relationships helpful;involved;supportive    Able to Return to Prior Arrangements yes                   Discharge Plan       Row Name 02/01/24 1226       Plan    Plan The patient unable to answer due to confusion at this time, his daughter, Sandra, is at bedside and can answer questions.  He is a current resident at [facility] where he is followed by Dr. Jensen.  He gets his medications from the facility provided pharmacy.  Does not elect to enroll in Meds to Bed.  At the SNF he uses the following DME:   walker, wheelchair, oxygen, hospital bed, and cane.  At the time of DC the patient's daughter plans for him to return to [facility].  Questions and concerns were addressed with the patient's daughter.  Will provide additional resources and information upon patient request.    Patient/Family in Agreement with Plan yes    Provided Post Acute Provider List? N/A    Provided Post Acute Provider Quality & Resource List? N/A    Plan Comments No needs    Final Discharge Disposition Code 03 - skilled nursing facility (SNF)    Final Note Plans to return to [facility]                  Continued Care and Services - Admitted Since 1/31/2024    Coordination has not been started for this encounter.       Selected Continued Care - Prior Encounters Includes continued care and service providers with selected services from prior encounters from 11/2/2023 to 2/1/2024      Discharged on 1/30/2024 Admission date: 1/27/2024 - Discharge disposition: Skilled Nursing Facility (DC - External)      Destination       Service Provider Selected Services Address Phone Fax Patient Preferred    [facility] [address] [id number] [phone] --                             Demographic Summary       Row Name 02/01/24 1221       General Information    Admission Type observation    Arrived From emergency department    Required Notices Provided Observation Status Notice    Referral Source admission list    Reason for Consult discharge planning    Preferred Language English       Contact Information    Permission Granted to Share Info With                    Functional Status       Row Name 02/01/24 1221       Functional Status    Usual Activity Tolerance moderate    Current Activity Tolerance fair       Physical Activity    On average, how many days per week do you engage in moderate to strenuous exercise (like a brisk walk)? 0 days    On average, how many minutes do you  engage in exercise at this level? 0 min    Number of minutes of exercise per week 0       Assessment of Health Literacy    How often do you have someone help you read hospital materials? Sometimes    How often do you have problems learning about your medical condition because of difficulty understanding written information? Sometimes    How often do you have a problem understanding what is told to you about your medical condition? Sometimes    How confident are you filling out medical forms by yourself? Somewhat    Health Literacy Moderate       Functional Status, IADL    Medications assistive person    Meal Preparation completely dependent    Housekeeping completely dependent    Laundry completely dependent    Shopping completely dependent       Mental Status    General Appearance WDL WDL       Mental Status Summary    Recent Changes in Mental Status/Cognitive Functioning unable to assess    Mental Status Comments Patient unable to answer due to confusion at this time, his daughter Sandra is able to answer questions                   Psychosocial    No documentation.                  Abuse/Neglect       Row Name 02/01/24 1222       Personal Safety    Feels Unsafe at Home or Work/School unable to answer (comment required)  Patient unable to answer due to confusion at this time.    Feels Threatened by Someone unable to answer (comment required)  Patient unable to answer due to confusion at this time.    Does Anyone Try to Keep You From Having Contact with Others or Doing Things Outside Your Home? unable to answer (comment required)  Patient unable to answer due to confusion at this time.    Physical Signs of Abuse Present no                   Legal       Row Name 02/01/24 1222       Financial Resource Strain    How hard is it for you to pay for the very basics like food, housing, medical care, and heating? Not hard                   Substance Abuse       Row Name 02/01/24 1222       Substance Use    Substance Use  Status never used                   Patient Forms       Row Name 02/01/24 1236       Patient Forms    Patient Observation Letter Delivered    Delivered to Support person  Sandra Hurley, Daughter    Method of delivery In person                      Damaris Teresa RN

## 2024-02-01 NOTE — PROGRESS NOTES
Pharmacy Consult-Vancomycin Dosing  Radha Varela is a  88 y.o. male receiving vancomycin therapy.     Indication: UTI  Consulting Provider: Dr. Glass    Goal Trough: 15 - 20 mcg/ml    Current Antimicrobial Therapy  Anti-Infectives (From admission, onward)      Ordered     Dose/Rate Route Frequency Start Stop    02/01/24 0235  piperacillin-tazobactam (ZOSYN) 3.375 g in iso-osmotic dextrose 50 ml (premix)        Ordering Provider: Osvaldo Glass MD    3.375 g  over 4 Hours Intravenous Every 8 Hours 02/01/24 0800 02/06/24 0759    02/01/24 0302  vancomycin (dosing per levels)        Ordering Provider: Osvaldo Glass MD     Does not apply Daily 02/01/24 0301 02/04/24 0859    02/01/24 0019  Pharmacy to dose vancomycin        Ordering Provider: Osvaldo Glass MD     Does not apply Continuous PRN 02/01/24 0019 02/04/24 0018    02/01/24 0019  Pharmacy to Dose Zosyn        Ordering Provider: Osvaldo Glass MD     Does not apply Continuous PRN 02/01/24 0019 02/06/24 0018    01/31/24 2334  vancomycin IVPB 1500 mg in 0.9% NaCl (Premix) 500 mL        Ordering Provider: Waqas Damon MD    20 mg/kg × 72.2 kg  333.3 mL/hr over 90 Minutes Intravenous Once 01/31/24 2350 02/01/24 0226    01/31/24 2305  piperacillin-tazobactam (ZOSYN) 3.375 g in iso-osmotic dextrose 50 ml (premix)        Ordering Provider: Waqas Damon MD    3.375 g  over 30 Minutes Intravenous Once 01/31/24 2321 02/01/24 0016            Allergies    Allergies as of 01/31/2024 - Reviewed 01/31/2024   Allergen Reaction Noted    Celebrex [celecoxib] Anaphylaxis 12/05/2018    Sulfa antibiotics Anaphylaxis 07/02/2018       Labs    Results from last 7 days   Lab Units 01/31/24  2226 01/30/24  0736 01/29/24  0552   BUN mg/dL 19 17 21   CREATININE mg/dL 2.12* 1.69* 1.56*       Results from last 7 days   Lab Units 01/31/24  2226 01/30/24  0736 01/29/24  0552   WBC 10*3/mm3 10.33 7.47 7.92       Evaluation of Dosing     Last Dose Received in the ED/Outside  Facility: Vancomycin 1500 mg 2/1 @ 0028  Is Patient on Dialysis or Renal Replacement: No    Ht -  170.2 cm  Wt - 72.2 kg (159 lb 2.8 oz)    Estimated Creatinine Clearance: 24.6 mL/min (A) (by C-G formula based on SCr of 2.12 mg/dL (H)).    Intake & Output (last 3 days)         01/29 0701 01/30 0700 01/30 0701 01/31 0700 01/31 0701 02/01 0700    IV Piggyback   550    Total Intake(mL/kg)   550 (7.6)    Urine (mL/kg/hr)   30    Total Output   30    Net   +520                   Microbiology and Radiology  Microbiology Results (last 10 days)       Procedure Component Value - Date/Time    Gastrointestinal Panel, PCR - Stool, Per Rectum [[id number]]  (Normal) Collected: 01/31/24 2248    Lab Status: Final result Specimen: Stool from Per Rectum Updated: 02/01/24 0028     Campylobacter Not Detected     Plesiomonas shigelloides Not Detected     Salmonella Not Detected     Vibrio Not Detected     Vibrio cholerae Not Detected     Yersinia enterocolitica Not Detected     Enteroaggregative E. coli (EAEC) Not Detected     Enteropathogenic E. coli (EPEC) Not Detected     Enterotoxigenic E. coli (ETEC) lt/st Not Detected     Shiga-like toxin-producing E. coli (STEC) stx1/stx2 Not Detected     Shigella/Enteroinvasive E. coli (EIEC) Not Detected     Cryptosporidium Not Detected     Cyclospora cayetanensis Not Detected     Entamoeba histolytica Not Detected     Giardia lamblia Not Detected     Adenovirus F40/41 Not Detected     Astrovirus Not Detected     Norovirus GI/GII Not Detected     Rotavirus A Not Detected     Sapovirus (I, II, IV or V) Not Detected    Clostridioides difficile Toxin - Stool, Per Rectum [[id number]]  (Normal) Collected: 01/31/24 2248    Lab Status: Final result Specimen: Stool from Per Rectum Updated: 01/31/24 2336    Narrative:      The following orders were created for panel order Clostridioides difficile Toxin - Stool, Per Rectum.  Procedure                               Abnormality         Status                      ---------                               -----------         ------                     Clostridioides difficile...[[id number]]  Normal              Final result                 Please view results for these tests on the individual orders.    Clostridioides difficile EIA - Stool, Per Rectum [[id number]]  (Normal) Collected: 01/31/24 2248    Lab Status: Final result Specimen: Stool from Per Rectum Updated: 01/31/24 2336     C Diff GDH Ag Negative     C.diff Toxin Ag Negative    Narrative:      The result indicates the absence of toxigenic C.difficile from stool specimen.    COVID-19 and FLU A/B PCR, 1 HR TAT - Swab, Nasopharynx [[id number]]  (Normal) Collected: 01/31/24 2233    Lab Status: Final result Specimen: Swab from Nasopharynx Updated: 01/31/24 2301     COVID19 Not Detected     Influenza A PCR Not Detected     Influenza B PCR Not Detected    Narrative:      Fact sheet for providers: https://www.fda.gov/media/915762/download    Fact sheet for patients: https://www.fda.gov/media/495131/download    Test performed by PCR.    Respiratory Panel PCR w/COVID-19(SARS-CoV-2) PANKAJ/NIKKY/BENJA/PAD/COR/LITO In-House, NP Swab in UTM/VTM, 2 HR TAT - Swab, Nasopharynx [[id number]]  (Normal) Collected: 01/31/24 2233    Lab Status: Final result Specimen: Swab from Nasopharynx Updated: 02/01/24 0001     ADENOVIRUS, PCR Not Detected     Coronavirus 229E Not Detected     Coronavirus HKU1 Not Detected     Coronavirus NL63 Not Detected     Coronavirus OC43 Not Detected     COVID19 Not Detected     Human Metapneumovirus Not Detected     Human Rhinovirus/Enterovirus Not Detected     Influenza A PCR Not Detected     Influenza B PCR Not Detected     Parainfluenza Virus 1 Not Detected     Parainfluenza Virus 2 Not Detected     Parainfluenza Virus 3 Not Detected     Parainfluenza Virus 4 Not Detected     RSV, PCR Not Detected     Bordetella pertussis pcr Not Detected     Bordetella parapertussis PCR Not Detected     Chlamydophila pneumoniae  PCR Not Detected     Mycoplasma pneumo by PCR Not Detected    Narrative:      In the setting of a positive respiratory panel with a viral infection PLUS a negative procalcitonin without other underlying concern for bacterial infection, consider observing off antibiotics or discontinuation of antibiotics and continue supportive care. If the respiratory panel is positive for atypical bacterial infection (Bordetella pertussis, Chlamydophila pneumoniae, or Mycoplasma pneumoniae), consider antibiotic de-escalation to target atypical bacterial infection.    VRE Culture - Swab, Per Rectum [[id number]]  (Normal) Collected: 01/27/24 2012    Lab Status: Final result Specimen: Swab from Per Rectum Updated: 01/29/24 1153     VRE Screen CX No Vancomycin Resistant Enterococcus Isolated    CANDIDA AURIS SCREEN - Swab, Axilla Right, Axilla Left and Groin [[id number]]  (Normal) Collected: 01/27/24 2012    Lab Status: Preliminary result Specimen: Swab from Axilla Right, Axilla Left and Groin Updated: 01/31/24 2200     Candida Auris Screen Culture No Candida auris isolated at 4 days    MRSA Screen, PCR (Inpatient) - Swab, Nares [[id number]]  (Normal) Collected: 01/27/24 2012    Lab Status: Final result Specimen: Swab from Nares Updated: 01/28/24 1352     MRSA PCR No MRSA Detected    Narrative:      The negative predictive value of this diagnostic test is high and should only be used to consider de-escalating anti-MRSA therapy. A positive result may indicate colonization with MRSA and must be correlated clinically.    Acinetobacter Screen - Swab, Axilla, Left [[id number]]  (Normal) Collected: 01/27/24 2012    Lab Status: Final result Specimen: Swab from Axilla, Left Updated: 01/29/24 1152     Acinetobacter Screen CX No Acinetobacter isolated    Blood Culture - Blood, Hand, Left [[id number]]  (Normal) Collected: 01/27/24 1634    Lab Status: Preliminary result Specimen: Blood from Hand, Left Updated: 01/31/24 1645     Blood Culture No growth  at 4 days    Blood Culture - Blood, Arm, Left [[id number]]  (Normal) Collected: 01/27/24 1622    Lab Status: Preliminary result Specimen: Blood from Arm, Left Updated: 01/31/24 1645     Blood Culture No growth at 4 days    COVID-19 and FLU A/B PCR, 1 HR TAT - Swab, Nasopharynx [[id number]]  (Normal) Collected: 01/27/24 1241    Lab Status: Final result Specimen: Swab from Nasopharynx Updated: 01/27/24 1309     COVID19 Not Detected     Influenza A PCR Not Detected     Influenza B PCR Not Detected    Narrative:      Fact sheet for providers: https://www.fda.gov/media/424289/download    Fact sheet for patients: https://www.fda.gov/media/520751/download    Test performed by PCR.    Urine Culture - Urine, Urine, Clean Catch [[id number]]  (Normal) Collected: 01/27/24 1241    Lab Status: Final result Specimen: Urine, Clean Catch Updated: 01/28/24 1531     Urine Culture No growth    Urine Culture - Urine, Urine, Clean Catch [[id number]] Collected: 01/23/24 1610    Lab Status: Final result Specimen: Urine, Clean Catch Updated: 01/26/24 1018     Urine Culture 50,000 CFU/mL Mixed Katiana Isolated    Narrative:      Specimen contains mixed organisms of questionable pathogenicity suggestive of contamination. If symptoms persist, suggest recollection.  Colonization of the urinary tract without infection is common. Treatment is discouraged unless the patient is symptomatic, pregnant, or undergoing an invasive urologic procedure.            Vancomycin Levels:                      Assessment/Plan    Pharmacy to dose vancomycin for UTI. Goal trough 15 - 20 mcg/mL.  Patient currently receiving vancomycin dosed per level.   Assess clearance by vancomycin random level on 2/1 @ 0600  Pharmacy will continue to monitor renal function, cultures and sensitivities, and clinical status to adjust regimen as necessary.    Thank you,  Tara Campbell RPH,PharmD  02/01/24 03:46 EST

## 2024-02-01 NOTE — PROGRESS NOTES
Pharmacy Consult-Vancomycin Dosing  Radha Varela is a  88 y.o. male receiving vancomycin therapy.     Indication: UTI  Consulting Provider: Dr. Glass    Goal Trough: 15-20 mcg/mL    Current Antimicrobial Therapy  Anti-Infectives (From admission, onward)      Ordered     Dose/Rate Route Frequency Start Stop    02/01/24 0818  vancomycin in dextrose 5% 150 mL (VANCOCIN) IVPB 750 mg        Ordering Provider: Dieudonne Jensen MD    750 mg  over 45 Minutes Intravenous Once 02/01/24 0900      02/01/24 0235  piperacillin-tazobactam (ZOSYN) 3.375 g in iso-osmotic dextrose 50 ml (premix)        Ordering Provider: Osvaldo Glass MD    3.375 g  over 4 Hours Intravenous Every 8 Hours 02/01/24 0800 02/06/24 0759    02/01/24 0302  vancomycin (dosing per levels)        Ordering Provider: Osvaldo Glass MD     Does not apply Daily 02/01/24 0301 02/04/24 0859    02/01/24 0019  Pharmacy to dose vancomycin        Ordering Provider: Osvaldo Glass MD     Does not apply Continuous PRN 02/01/24 0019 02/04/24 0018    02/01/24 0019  Pharmacy to Dose Zosyn        Ordering Provider: Osvaldo Glass MD     Does not apply Continuous PRN 02/01/24 0019 02/06/24 0018    01/31/24 2334  vancomycin IVPB 1500 mg in 0.9% NaCl (Premix) 500 mL        Ordering Provider: Waqas Damon MD    20 mg/kg × 72.2 kg  333.3 mL/hr over 90 Minutes Intravenous Once 01/31/24 2350 02/01/24 0226    01/31/24 2305  piperacillin-tazobactam (ZOSYN) 3.375 g in iso-osmotic dextrose 50 ml (premix)        Ordering Provider: Waqas Damon MD    3.375 g  over 30 Minutes Intravenous Once 01/31/24 2321 02/01/24 0016            Allergies  Allergies as of 01/31/2024 - Reviewed 01/31/2024   Allergen Reaction Noted    Celebrex [celecoxib] Anaphylaxis 12/05/2018    Sulfa antibiotics Anaphylaxis 07/02/2018       Labs    Results from last 7 days   Lab Units 02/01/24  0557 01/31/24  2226 01/30/24  0736   BUN mg/dL 19 19 17   CREATININE mg/dL 1.98* 2.12* 1.69*        Results from last 7 days   Lab Units 02/01/24  0557 01/31/24  2226 01/30/24  0736   WBC 10*3/mm3 8.44 10.33 7.47       Evaluation of Dosing     Last Dose Received in the ED/Outside Facility: Yes  Is Patient on Dialysis or Renal Replacement: No    Ht -    Wt - 72.2 kg (159 lb 2.8 oz)    Estimated Creatinine Clearance: 26.3 mL/min (A) (by C-G formula based on SCr of 1.98 mg/dL (H)).    Intake & Output (last 3 days)         01/29 0701 01/30 0700 01/30 0701  01/31 0700 01/31 0701 02/01 0700 02/01 0701 02/02 0700    IV Piggyback   550     Total Intake(mL/kg)   550 (7.6)     Urine (mL/kg/hr)   30     Total Output   30     Net   +520                     Microbiology and Radiology  Microbiology Results (last 10 days)       Procedure Component Value - Date/Time    Gastrointestinal Panel, PCR - Stool, Per Rectum [[id number]]  (Normal) Collected: 01/31/24 2248    Lab Status: Final result Specimen: Stool from Per Rectum Updated: 02/01/24 0028     Campylobacter Not Detected     Plesiomonas shigelloides Not Detected     Salmonella Not Detected     Vibrio Not Detected     Vibrio cholerae Not Detected     Yersinia enterocolitica Not Detected     Enteroaggregative E. coli (EAEC) Not Detected     Enteropathogenic E. coli (EPEC) Not Detected     Enterotoxigenic E. coli (ETEC) lt/st Not Detected     Shiga-like toxin-producing E. coli (STEC) stx1/stx2 Not Detected     Shigella/Enteroinvasive E. coli (EIEC) Not Detected     Cryptosporidium Not Detected     Cyclospora cayetanensis Not Detected     Entamoeba histolytica Not Detected     Giardia lamblia Not Detected     Adenovirus F40/41 Not Detected     Astrovirus Not Detected     Norovirus GI/GII Not Detected     Rotavirus A Not Detected     Sapovirus (I, II, IV or V) Not Detected    Clostridioides difficile Toxin - Stool, Per Rectum [[id number]]  (Normal) Collected: 01/31/24 2248    Lab Status: Final result Specimen: Stool from Per Rectum Updated: 01/31/24 2336    Narrative:       The following orders were created for panel order Clostridioides difficile Toxin - Stool, Per Rectum.  Procedure                               Abnormality         Status                     ---------                               -----------         ------                     Clostridioides difficile...[[id number]]  Normal              Final result                 Please view results for these tests on the individual orders.    Clostridioides difficile EIA - Stool, Per Rectum [[id number]]  (Normal) Collected: 01/31/24 2248    Lab Status: Final result Specimen: Stool from Per Rectum Updated: 01/31/24 2336     C Diff GDH Ag Negative     C.diff Toxin Ag Negative    Narrative:      The result indicates the absence of toxigenic C.difficile from stool specimen.    COVID-19 and FLU A/B PCR, 1 HR TAT - Swab, Nasopharynx [[id number]]  (Normal) Collected: 01/31/24 2233    Lab Status: Final result Specimen: Swab from Nasopharynx Updated: 01/31/24 2301     COVID19 Not Detected     Influenza A PCR Not Detected     Influenza B PCR Not Detected    Narrative:      Fact sheet for providers: https://www.fda.gov/media/073608/download    Fact sheet for patients: https://www.fda.gov/media/035193/download    Test performed by PCR.    Respiratory Panel PCR w/COVID-19(SARS-CoV-2) PANKAJ/NIKKY/BENJA/PAD/COR/LITO In-House, NP Swab in UTM/VTM, 2 HR TAT - Swab, Nasopharynx [[id number]]  (Normal) Collected: 01/31/24 2233    Lab Status: Final result Specimen: Swab from Nasopharynx Updated: 02/01/24 0001     ADENOVIRUS, PCR Not Detected     Coronavirus 229E Not Detected     Coronavirus HKU1 Not Detected     Coronavirus NL63 Not Detected     Coronavirus OC43 Not Detected     COVID19 Not Detected     Human Metapneumovirus Not Detected     Human Rhinovirus/Enterovirus Not Detected     Influenza A PCR Not Detected     Influenza B PCR Not Detected     Parainfluenza Virus 1 Not Detected     Parainfluenza Virus 2 Not Detected     Parainfluenza Virus 3 Not Detected      Parainfluenza Virus 4 Not Detected     RSV, PCR Not Detected     Bordetella pertussis pcr Not Detected     Bordetella parapertussis PCR Not Detected     Chlamydophila pneumoniae PCR Not Detected     Mycoplasma pneumo by PCR Not Detected    Narrative:      In the setting of a positive respiratory panel with a viral infection PLUS a negative procalcitonin without other underlying concern for bacterial infection, consider observing off antibiotics or discontinuation of antibiotics and continue supportive care. If the respiratory panel is positive for atypical bacterial infection (Bordetella pertussis, Chlamydophila pneumoniae, or Mycoplasma pneumoniae), consider antibiotic de-escalation to target atypical bacterial infection.    VRE Culture - Swab, Per Rectum [[id number]]  (Normal) Collected: 01/27/24 2012    Lab Status: Final result Specimen: Swab from Per Rectum Updated: 01/29/24 1153     VRE Screen CX No Vancomycin Resistant Enterococcus Isolated    CANDIDA AURIS SCREEN - Swab, Axilla Right, Axilla Left and Groin [[id number]]  (Normal) Collected: 01/27/24 2012    Lab Status: Preliminary result Specimen: Swab from Axilla Right, Axilla Left and Groin Updated: 01/31/24 2200     Candida Auris Screen Culture No Candida auris isolated at 4 days    MRSA Screen, PCR (Inpatient) - Swab, Nares [[id number]]  (Normal) Collected: 01/27/24 2012    Lab Status: Final result Specimen: Swab from Nares Updated: 01/28/24 1352     MRSA PCR No MRSA Detected    Narrative:      The negative predictive value of this diagnostic test is high and should only be used to consider de-escalating anti-MRSA therapy. A positive result may indicate colonization with MRSA and must be correlated clinically.    Acinetobacter Screen - Swab, Axilla, Left [[id number]]  (Normal) Collected: 01/27/24 2012    Lab Status: Final result Specimen: Swab from Axilla, Left Updated: 01/29/24 1152     Acinetobacter Screen CX No Acinetobacter isolated    Blood Culture -  Blood, Hand, Left [[id number]]  (Normal) Collected: 01/27/24 1634    Lab Status: Preliminary result Specimen: Blood from Hand, Left Updated: 01/31/24 1645     Blood Culture No growth at 4 days    Blood Culture - Blood, Arm, Left [[id number]]  (Normal) Collected: 01/27/24 1622    Lab Status: Preliminary result Specimen: Blood from Arm, Left Updated: 01/31/24 1645     Blood Culture No growth at 4 days    COVID-19 and FLU A/B PCR, 1 HR TAT - Swab, Nasopharynx [[id number]]  (Normal) Collected: 01/27/24 1241    Lab Status: Final result Specimen: Swab from Nasopharynx Updated: 01/27/24 1309     COVID19 Not Detected     Influenza A PCR Not Detected     Influenza B PCR Not Detected    Narrative:      Fact sheet for providers: https://www.fda.gov/media/226303/download    Fact sheet for patients: https://www.fda.gov/media/465076/download    Test performed by PCR.    Urine Culture - Urine, Urine, Clean Catch [[id number]]  (Normal) Collected: 01/27/24 1241    Lab Status: Final result Specimen: Urine, Clean Catch Updated: 01/28/24 1531     Urine Culture No growth    Urine Culture - Urine, Urine, Clean Catch [[id number]] Collected: 01/23/24 1610    Lab Status: Final result Specimen: Urine, Clean Catch Updated: 01/26/24 1018     Urine Culture 50,000 CFU/mL Mixed Katiana Isolated    Narrative:      Specimen contains mixed organisms of questionable pathogenicity suggestive of contamination. If symptoms persist, suggest recollection.  Colonization of the urinary tract without infection is common. Treatment is discouraged unless the patient is symptomatic, pregnant, or undergoing an invasive urologic procedure.            Vancomycin Levels:    Results from last 7 days   Lab Units 02/01/24  0557   VANCOMYCIN RM mcg/mL 16.10                   Assessment/Plan    Pharmacy to dose vancomycin for UTI. Goal trough 15-20 mcg/mL.  Patient currently receiving vancomycin dosed per level due to elevated SCR with uncertain baseline. Will order vancomycin  750 mg for one dose.  Assess clearance by vancomycin random level on 2/2 @ 0600  Pharmacy will continue to monitor renal function, cultures and sensitivities, and clinical status to adjust regimen as necessary.    Thank you for the consult,    Grant BestD, BCPS   02/01/24 08:19 EST

## 2024-02-02 ENCOUNTER — APPOINTMENT (OUTPATIENT)
Dept: CT IMAGING | Facility: HOSPITAL | Age: 89
End: 2024-02-02
Payer: MEDICARE

## 2024-02-02 LAB
ALBUMIN SERPL-MCNC: 3.4 G/DL (ref 3.5–5.2)
ALBUMIN/GLOB SERPL: 1.5 G/DL
ALP SERPL-CCNC: 111 U/L (ref 39–117)
ALT SERPL W P-5'-P-CCNC: <5 U/L (ref 1–41)
ANION GAP SERPL CALCULATED.3IONS-SCNC: 11.6 MMOL/L (ref 5–15)
AST SERPL-CCNC: 23 U/L (ref 1–40)
BILIRUB SERPL-MCNC: 0.4 MG/DL (ref 0–1.2)
BUN SERPL-MCNC: 18 MG/DL (ref 8–23)
BUN/CREAT SERPL: 9.1 (ref 7–25)
CALCIUM SPEC-SCNC: 7.8 MG/DL (ref 8.6–10.5)
CHLORIDE SERPL-SCNC: 111 MMOL/L (ref 98–107)
CO2 SERPL-SCNC: 19.4 MMOL/L (ref 22–29)
CREAT SERPL-MCNC: 1.97 MG/DL (ref 0.76–1.27)
DEPRECATED RDW RBC AUTO: 57.7 FL (ref 37–54)
EGFRCR SERPLBLD CKD-EPI 2021: 32.1 ML/MIN/1.73
ERYTHROCYTE [DISTWIDTH] IN BLOOD BY AUTOMATED COUNT: 16.3 % (ref 12.3–15.4)
GLOBULIN UR ELPH-MCNC: 2.3 GM/DL
GLUCOSE SERPL-MCNC: 91 MG/DL (ref 65–99)
HCT VFR BLD AUTO: 24.3 % (ref 37.5–51)
HGB BLD-MCNC: 7.5 G/DL (ref 13–17.7)
MCH RBC QN AUTO: 30.9 PG (ref 26.6–33)
MCHC RBC AUTO-ENTMCNC: 30.9 G/DL (ref 31.5–35.7)
MCV RBC AUTO: 100 FL (ref 79–97)
MRSA DNA SPEC QL NAA+PROBE: NORMAL
PLATELET # BLD AUTO: 110 10*3/MM3 (ref 140–450)
PMV BLD AUTO: 10.3 FL (ref 6–12)
POTASSIUM SERPL-SCNC: 3.9 MMOL/L (ref 3.5–5.2)
PROT SERPL-MCNC: 5.7 G/DL (ref 6–8.5)
RBC # BLD AUTO: 2.43 10*6/MM3 (ref 4.14–5.8)
SODIUM SERPL-SCNC: 142 MMOL/L (ref 136–145)
VANCOMYCIN SERPL-MCNC: 9.8 MCG/ML (ref 5–40)
WBC NRBC COR # BLD AUTO: 6.31 10*3/MM3 (ref 3.4–10.8)

## 2024-02-02 PROCEDURE — 85027 COMPLETE CBC AUTOMATED: CPT | Performed by: INTERNAL MEDICINE

## 2024-02-02 PROCEDURE — 97162 PT EVAL MOD COMPLEX 30 MIN: CPT

## 2024-02-02 PROCEDURE — 97166 OT EVAL MOD COMPLEX 45 MIN: CPT

## 2024-02-02 PROCEDURE — 25010000002 VANCOMYCIN 5 G RECONSTITUTED SOLUTION: Performed by: INTERNAL MEDICINE

## 2024-02-02 PROCEDURE — 25810000003 SODIUM CHLORIDE 0.9 % SOLUTION: Performed by: INTERNAL MEDICINE

## 2024-02-02 PROCEDURE — 71250 CT THORAX DX C-: CPT

## 2024-02-02 PROCEDURE — 80053 COMPREHEN METABOLIC PANEL: CPT | Performed by: INTERNAL MEDICINE

## 2024-02-02 PROCEDURE — 25010000002 PIPERACILLIN SOD-TAZOBACTAM PER 1 G: Performed by: INTERNAL MEDICINE

## 2024-02-02 PROCEDURE — 80202 ASSAY OF VANCOMYCIN: CPT | Performed by: INTERNAL MEDICINE

## 2024-02-02 RX ADMIN — ROPINIROLE HYDROCHLORIDE 1 MG: 1 TABLET, FILM COATED ORAL at 12:44

## 2024-02-02 RX ADMIN — PIPERACILLIN SODIUM AND TAZOBACTAM SODIUM 3.38 G: 3; .375 INJECTION, SOLUTION INTRAVENOUS at 02:11

## 2024-02-02 RX ADMIN — ROPINIROLE HYDROCHLORIDE 1 MG: 1 TABLET, FILM COATED ORAL at 18:23

## 2024-02-02 RX ADMIN — PIPERACILLIN SODIUM AND TAZOBACTAM SODIUM 3.38 G: 3; .375 INJECTION, SOLUTION INTRAVENOUS at 18:23

## 2024-02-02 RX ADMIN — Medication 1 CAPSULE: at 08:36

## 2024-02-02 RX ADMIN — CARBIDOPA AND LEVODOPA 2 TABLET: 25; 100 TABLET ORAL at 20:20

## 2024-02-02 RX ADMIN — CYANOCOBALAMIN TAB 500 MCG 1000 MCG: 500 TAB at 08:37

## 2024-02-02 RX ADMIN — PIPERACILLIN SODIUM AND TAZOBACTAM SODIUM 3.38 G: 3; .375 INJECTION, SOLUTION INTRAVENOUS at 10:22

## 2024-02-02 RX ADMIN — ASPIRIN 81 MG: 81 TABLET, COATED ORAL at 08:37

## 2024-02-02 RX ADMIN — ROPINIROLE HYDROCHLORIDE 1 MG: 1 TABLET, FILM COATED ORAL at 20:20

## 2024-02-02 RX ADMIN — VANCOMYCIN HYDROCHLORIDE 1250 MG: 5 INJECTION, POWDER, LYOPHILIZED, FOR SOLUTION INTRAVENOUS at 08:43

## 2024-02-02 RX ADMIN — PANTOPRAZOLE SODIUM 40 MG: 40 TABLET, DELAYED RELEASE ORAL at 05:01

## 2024-02-02 RX ADMIN — CARBIDOPA AND LEVODOPA 2 TABLET: 25; 100 TABLET ORAL at 08:36

## 2024-02-02 RX ADMIN — CARBIDOPA AND LEVODOPA 2 TABLET: 25; 100 TABLET ORAL at 18:23

## 2024-02-02 RX ADMIN — Medication 1 CAPSULE: at 20:20

## 2024-02-02 RX ADMIN — Medication 10 ML: at 20:20

## 2024-02-02 RX ADMIN — CARBIDOPA AND LEVODOPA 2 TABLET: 25; 100 TABLET ORAL at 12:44

## 2024-02-02 RX ADMIN — Medication 10 ML: at 08:37

## 2024-02-02 RX ADMIN — ROPINIROLE HYDROCHLORIDE 1 MG: 1 TABLET, FILM COATED ORAL at 08:36

## 2024-02-02 RX ADMIN — ACETAMINOPHEN 650 MG: 325 TABLET, FILM COATED ORAL at 22:50

## 2024-02-02 NOTE — PROGRESS NOTES
Pharmacy Consult-Vancomycin Dosing  Radha Varela is a  88 y.o. male receiving vancomycin therapy.     Indication: UTI  Consulting Provider: Dr. Glass     Goal Trough: 15-20 mcg/mL       Current Antimicrobial Therapy  Anti-Infectives (From admission, onward)      Ordered     Dose/Rate Route Frequency Start Stop    02/02/24 0806  vancomycin 1250 mg/250 mL 0.9% NS IVPB ([facility])        Ordering Provider: Dieudonne Jensen MD    1,250 mg  over 75 Minutes Intravenous Once 02/02/24 0900      02/01/24 0818  vancomycin in dextrose 5% 150 mL (VANCOCIN) IVPB 750 mg        Ordering Provider: Dieudonne Jensen MD    750 mg  over 45 Minutes Intravenous Once 02/01/24 0900 02/01/24 1029    02/01/24 0235  piperacillin-tazobactam (ZOSYN) 3.375 g in iso-osmotic dextrose 50 ml (premix)        Ordering Provider: Osvaldo Glass MD    3.375 g  over 4 Hours Intravenous Every 8 Hours 02/01/24 0800 02/06/24 0159    02/01/24 0302  vancomycin (dosing per levels)        Ordering Provider: Osvaldo Glass MD     Does not apply Daily 02/01/24 0301 02/04/24 0859    02/01/24 0019  Pharmacy to dose vancomycin        Ordering Provider: Osvaldo Glass MD     Does not apply Continuous PRN 02/01/24 0019 02/04/24 0018    02/01/24 0019  Pharmacy to Dose Zosyn        Ordering Provider: Osvaldo Glass MD     Does not apply Continuous PRN 02/01/24 0019 02/06/24 0018    01/31/24 2334  vancomycin IVPB 1500 mg in 0.9% NaCl (Premix) 500 mL        Ordering Provider: Waqas Damon MD    20 mg/kg × 72.2 kg  333.3 mL/hr over 90 Minutes Intravenous Once 01/31/24 2350 02/01/24 0226    01/31/24 2305  piperacillin-tazobactam (ZOSYN) 3.375 g in iso-osmotic dextrose 50 ml (premix)        Ordering Provider: Waqas Damon MD    3.375 g  over 30 Minutes Intravenous Once 01/31/24 2321 02/01/24 0016            Allergies  Allergies as of 01/31/2024 - Reviewed 01/31/2024   Allergen Reaction Noted    Celebrex [celecoxib] Anaphylaxis 12/05/2018    Sulfa antibiotics  "Anaphylaxis 07/02/2018       Labs    Results from last 7 days   Lab Units 02/02/24  0528 02/01/24  0557 01/31/24  2226   BUN mg/dL 18 19 19   CREATININE mg/dL 1.97* 1.98* 2.12*       Results from last 7 days   Lab Units 02/02/24  0527 02/01/24  0557 01/31/24  2226   WBC 10*3/mm3 6.31 8.44 10.33       Evaluation of Dosing     Last Dose Received in the ED/Outside Facility: No  Is Patient on Dialysis or Renal Replacement: No    Ht - 170.2 cm (67.01\")  Wt - 72.4 kg (159 lb 9.8 oz)    Estimated Creatinine Clearance: 26.5 mL/min (A) (by C-G formula based on SCr of 1.97 mg/dL (H)).    Intake & Output (last 3 days)         01/30 0701 01/31 0700 01/31 0701 02/01 0700 02/01 0701  02/02 0700 02/02 0701  02/03 0700    P.O.   3061     IV Piggyback  550      Total Intake(mL/kg)  550 (7.6) 3061 (42.3)     Urine (mL/kg/hr)  30 100 (0.1)     Stool   0     Total Output  30 100     Net  +520 +2961             Urine Unmeasured Occurrence   3 x     Stool Unmeasured Occurrence   2 x             Microbiology and Radiology  Microbiology Results (last 10 days)       Procedure Component Value - Date/Time    MRSA Screen, PCR (Inpatient) - Swab, Nares [[id number]]  (Normal) Collected: 02/01/24 0417    Lab Status: Final result Specimen: Swab from Nares Updated: 02/02/24 0033     MRSA PCR No MRSA Detected    Narrative:      The negative predictive value of this diagnostic test is high and should only be used to consider de-escalating anti-MRSA therapy. A positive result may indicate colonization with MRSA and must be correlated clinically.    Blood Culture - Blood, Hand, Left [[id number]]  (Normal) Collected: 01/31/24 2323    Lab Status: Preliminary result Specimen: Blood from Hand, Left Updated: 02/02/24 0001     Blood Culture No growth at 24 hours    Blood Culture - Blood, Hand, Right [[id number]]  (Normal) Collected: 01/31/24 2318    Lab Status: Preliminary result Specimen: Blood from Hand, Right Updated: 02/02/24 0001     Blood Culture No " growth at 24 hours    Gastrointestinal Panel, PCR - Stool, Per Rectum [[id number]]  (Normal) Collected: 01/31/24 2248    Lab Status: Final result Specimen: Stool from Per Rectum Updated: 02/01/24 0028     Campylobacter Not Detected     Plesiomonas shigelloides Not Detected     Salmonella Not Detected     Vibrio Not Detected     Vibrio cholerae Not Detected     Yersinia enterocolitica Not Detected     Enteroaggregative E. coli (EAEC) Not Detected     Enteropathogenic E. coli (EPEC) Not Detected     Enterotoxigenic E. coli (ETEC) lt/st Not Detected     Shiga-like toxin-producing E. coli (STEC) stx1/stx2 Not Detected     Shigella/Enteroinvasive E. coli (EIEC) Not Detected     Cryptosporidium Not Detected     Cyclospora cayetanensis Not Detected     Entamoeba histolytica Not Detected     Giardia lamblia Not Detected     Adenovirus F40/41 Not Detected     Astrovirus Not Detected     Norovirus GI/GII Not Detected     Rotavirus A Not Detected     Sapovirus (I, II, IV or V) Not Detected    Clostridioides difficile Toxin - Stool, Per Rectum [[id number]]  (Normal) Collected: 01/31/24 2248    Lab Status: Final result Specimen: Stool from Per Rectum Updated: 01/31/24 2336    Narrative:      The following orders were created for panel order Clostridioides difficile Toxin - Stool, Per Rectum.  Procedure                               Abnormality         Status                     ---------                               -----------         ------                     Clostridioides difficile...[[id number]]  Normal              Final result                 Please view results for these tests on the individual orders.    Clostridioides difficile EIA - Stool, Per Rectum [[id number]]  (Normal) Collected: 01/31/24 2248    Lab Status: Final result Specimen: Stool from Per Rectum Updated: 01/31/24 2336     C Diff GDH Ag Negative     C.diff Toxin Ag Negative    Narrative:      The result indicates the absence of toxigenic C.difficile from stool  specimen.    Urine Culture - Urine, Straight Cath [[id number]]  (Normal) Collected: 01/31/24 2237    Lab Status: Final result Specimen: Urine from Straight Cath Updated: 02/01/24 2114     Urine Culture No growth    COVID-19 and FLU A/B PCR, 1 HR TAT - Swab, Nasopharynx [[id number]]  (Normal) Collected: 01/31/24 2233    Lab Status: Final result Specimen: Swab from Nasopharynx Updated: 01/31/24 2301     COVID19 Not Detected     Influenza A PCR Not Detected     Influenza B PCR Not Detected    Narrative:      Fact sheet for providers: https://www.fda.gov/media/355664/download    Fact sheet for patients: https://www.fda.gov/media/231763/download    Test performed by PCR.    Respiratory Panel PCR w/COVID-19(SARS-CoV-2) PANKAJ/NIKKY/BENJA/PAD/COR/LITO In-House, NP Swab in UTM/VTM, 2 HR TAT - Swab, Nasopharynx [[id number]]  (Normal) Collected: 01/31/24 2233    Lab Status: Final result Specimen: Swab from Nasopharynx Updated: 02/01/24 0001     ADENOVIRUS, PCR Not Detected     Coronavirus 229E Not Detected     Coronavirus HKU1 Not Detected     Coronavirus NL63 Not Detected     Coronavirus OC43 Not Detected     COVID19 Not Detected     Human Metapneumovirus Not Detected     Human Rhinovirus/Enterovirus Not Detected     Influenza A PCR Not Detected     Influenza B PCR Not Detected     Parainfluenza Virus 1 Not Detected     Parainfluenza Virus 2 Not Detected     Parainfluenza Virus 3 Not Detected     Parainfluenza Virus 4 Not Detected     RSV, PCR Not Detected     Bordetella pertussis pcr Not Detected     Bordetella parapertussis PCR Not Detected     Chlamydophila pneumoniae PCR Not Detected     Mycoplasma pneumo by PCR Not Detected    Narrative:      In the setting of a positive respiratory panel with a viral infection PLUS a negative procalcitonin without other underlying concern for bacterial infection, consider observing off antibiotics or discontinuation of antibiotics and continue supportive care. If the respiratory panel is positive  for atypical bacterial infection (Bordetella pertussis, Chlamydophila pneumoniae, or Mycoplasma pneumoniae), consider antibiotic de-escalation to target atypical bacterial infection.    VRE Culture - Swab, Per Rectum [[id number]]  (Normal) Collected: 01/27/24 2012    Lab Status: Final result Specimen: Swab from Per Rectum Updated: 01/29/24 1153     VRE Screen CX No Vancomycin Resistant Enterococcus Isolated    CANDIDA AURIS SCREEN - Swab, Axilla Right, Axilla Left and Groin [[id number]]  (Normal) Collected: 01/27/24 2012    Lab Status: Preliminary result Specimen: Swab from Axilla Right, Axilla Left and Groin Updated: 02/02/24 0706     Candida Auris Screen Culture Culture in progress    MRSA Screen, PCR (Inpatient) - Swab, Nares [[id number]]  (Normal) Collected: 01/27/24 2012    Lab Status: Final result Specimen: Swab from Nares Updated: 01/28/24 1352     MRSA PCR No MRSA Detected    Narrative:      The negative predictive value of this diagnostic test is high and should only be used to consider de-escalating anti-MRSA therapy. A positive result may indicate colonization with MRSA and must be correlated clinically.    Acinetobacter Screen - Swab, Axilla, Left [[id number]]  (Normal) Collected: 01/27/24 2012    Lab Status: Final result Specimen: Swab from Axilla, Left Updated: 01/29/24 1152     Acinetobacter Screen CX No Acinetobacter isolated    Blood Culture - Blood, Hand, Left [[id number]]  (Normal) Collected: 01/27/24 1634    Lab Status: Final result Specimen: Blood from Hand, Left Updated: 02/01/24 1645     Blood Culture No growth at 5 days    Blood Culture - Blood, Arm, Left [[id number]]  (Normal) Collected: 01/27/24 1622    Lab Status: Final result Specimen: Blood from Arm, Left Updated: 02/01/24 1645     Blood Culture No growth at 5 days    COVID-19 and FLU A/B PCR, 1 HR TAT - Swab, Nasopharynx [[id number]]  (Normal) Collected: 01/27/24 1241    Lab Status: Final result Specimen: Swab from Nasopharynx Updated:  01/27/24 1309     COVID19 Not Detected     Influenza A PCR Not Detected     Influenza B PCR Not Detected    Narrative:      Fact sheet for providers: https://www.fda.gov/media/487915/download    Fact sheet for patients: https://www.fda.gov/media/367003/download    Test performed by PCR.    Urine Culture - Urine, Urine, Clean Catch [[id number]]  (Normal) Collected: 01/27/24 1241    Lab Status: Final result Specimen: Urine, Clean Catch Updated: 01/28/24 1531     Urine Culture No growth    Urine Culture - Urine, Urine, Clean Catch [[id number]] Collected: 01/23/24 1610    Lab Status: Final result Specimen: Urine, Clean Catch Updated: 01/26/24 1018     Urine Culture 50,000 CFU/mL Mixed Katiana Isolated    Narrative:      Specimen contains mixed organisms of questionable pathogenicity suggestive of contamination. If symptoms persist, suggest recollection.  Colonization of the urinary tract without infection is common. Treatment is discouraged unless the patient is symptomatic, pregnant, or undergoing an invasive urologic procedure.            Vancomycin Levels:    Results from last 7 days   Lab Units 02/02/24  0528 02/01/24  0557   VANCOMYCIN RM mcg/mL 9.80 16.10                   Assessment/Plan    Pharmacy to dose vancomycin for UTI. Goal trough 15-20 mcg/mL.  Patient currently receiving vancomycin dosed per level given elevated SCR. Will order vancomycin 1250 mg for one dose.  Assess clearance by vancomycin random level on 2/3 @ 0600  Consider discontinuation of vancomycin given negative MRSA PCR.  Pharmacy will continue to monitor renal function, cultures and sensitivities, and clinical status to adjust regimen as necessary.    Thank you for the consult,    Bruce Sykes PharmD, BCPS   02/02/24 08:06 EST

## 2024-02-02 NOTE — CASE MANAGEMENT/SOCIAL WORK
Case Management/Social Work    Patient Name:  Radha Varela  YOB: 1935  MRN: [id number]  Admit Date:  1/31/2024        09:33 EST  Met with patient at bedside. No new CM needs identified at this time. CM will continue to follow. Patient is ltc resident of [place]. Return when ready.        Electronically signed by:  Jm Corral RN  02/02/24 09:33 EST

## 2024-02-02 NOTE — PLAN OF CARE
Goal Outcome Evaluation:  Plan of Care Reviewed With: patient, family        Progress: no change  Outcome Evaluation: OT eval completed. Patient is supine in bed, on room air satting 98%, denies any pain. Patient is Ox3 with VCs. Patient's son in law at bedside reports patient lives at [facility] and walks short distances using a RW, requires assistance with all self care, needing increased help over the last 2 weeks d/t UTI. Patient performed supine to sit with CGA. Performed sit to stand with min A, walked 23' using RW with min A, satting 97% following activity. Patient requires max A for bathing and toileting, mod A for UBD, max A for LBD, mod A for self feeding and grooming. Patient left sitting in chair, chair alarm on and satting 98%. Patient to return to SNF when medically stable.      Anticipated Discharge Disposition (OT): skilled nursing facility

## 2024-02-02 NOTE — THERAPY EVALUATION
Patient Name: Radha Varela  : 1935    MRN: [id number]                              Today's Date: 2024       Admit Date: 2024    Visit Dx:     ICD-10-CM ICD-9-CM   1. Acute cystitis without hematuria  N30.00 595.0     Patient Active Problem List   Diagnosis    Abdominal aortic aneurysm (AAA) without rupture    Generalized weakness    Parkinson's disease dementia    Coronary artery disease involving native coronary artery of native heart without angina pectoris    Fall    Acute renal failure superimposed on stage 3 chronic kidney disease    Hypokalemia    Altered mental status    Dehydration    Metabolic encephalopathy    UTI (urinary tract infection) due to Enterococcus    Acute metabolic encephalopathy    Paroxysmal atrial fibrillation    Urinary tract infection    Sepsis    Chronic kidney disease, stage III (moderate)     Past Medical History:   Diagnosis Date    Abdominal aortic aneurysm (AAA) without rupture 2018    Aneurysm     Arthritis     Cardiac murmur     Carotid stenosis     Celiac artery stenosis     Chest pain     CHF (congestive heart failure)     Chronic kidney disease     Coronary artery disease     Dyslipidemia     Elevated cholesterol     GERD (gastroesophageal reflux disease)     Hiatal hernia     History of exercise stress test     History of nuclear stress test     History of pneumonia     Hypertension     Lumbar degenerative disc disease     MI, old     Osteoarthritis     PAD (peripheral artery disease)     Parkinson's disease     Paroxysmal A-fib     Peptic ulceration     Psoriasis     Renal artery stenosis     Sleep apnea     uses o2 @ HS    Spinal stenosis     Tremor     Vertebral artery stenosis      Past Surgical History:   Procedure Laterality Date    ABDOMINAL AORTIC ANEURYSM REPAIR WITH ENDOGRAFT      APPENDECTOMY      CATARACT EXTRACTION W/ INTRAOCULAR LENS IMPLANT Left 12/10/2018    Procedure: CATARACT PHACO EXTRACTION WITH INTRAOCULAR LENS  IMPLANT LEFT;  Surgeon: Charity Carrasco MD;  Location: [facility];  Service: Ophthalmology    CATARACT EXTRACTION W/ INTRAOCULAR LENS IMPLANT Right 6/12/2023    Procedure: CATARACT PHACO EXTRACTION WITH INTRAOCULAR LENS IMPLANT RIGHT;  Surgeon: Charity Carrasco MD;  Location: [facility];  Service: Ophthalmology;  Laterality: Right;    COLONOSCOPY      CORONARY ARTERY BYPASS GRAFT  1995    ENDOSCOPY      SHOULDER ROTATOR CUFF REPAIR Right     SKIN BIOPSY        General Information       Row Name 02/02/24 1410          Physical Therapy Time and Intention    Document Type evaluation (P)   -KB     Mode of Treatment physical therapy (P)   -KB       Row Name 02/02/24 1410          General Information    Prior Level of Function mod assist: (P)   -KB     Barriers to Rehab medically complex;previous functional deficit;cognitive status (P)   -KB       Row Name 02/02/24 1410          Living Environment    People in Home facility resident (P)   -KB       Row Name 02/02/24 1410          Cognition    Orientation Status (Cognition) oriented x 3 (P)   -KB       Row Name 02/02/24 1410          Safety Issues, Functional Mobility    Safety Issues Affecting Function (Mobility) safety precaution awareness;insight into deficits/self-awareness;judgment;awareness of need for assistance (P)   -KB     Impairments Affecting Function (Mobility) cognition;coordination;endurance/activity tolerance;motor control;motor planning (P)   -KB     Cognitive Impairments, Mobility Safety/Performance awareness, need for assistance;insight into deficits/self-awareness;judgment;problem-solving/reasoning;safety precaution awareness;sequencing abilities (P)   -KB               User Key  (r) = Recorded By, (t) = Taken By, (c) = Cosigned By      Initials Name Provider Type    Bekah Engle, PT Student PT Student                   Mobility       Row Name 02/02/24 1414          Bed Mobility    All Activities, [place] (Bed Mobility) contact guard (P)   -KB        Row Name 02/02/24 1414          Bed-Chair Transfer    Bed-Chair [place] (Transfers) minimum assist (75% patient effort);1 person assist;1 person to manage equipment (P)   -KB     Assistive Device (Bed-Chair Transfers) walker, front-wheeled (P)   -KB       Row Name 02/02/24 1414          Gait/Stairs (Locomotion)    [place] Level (Gait) minimum assist (75% patient effort);1 person assist;1 person to manage equipment (P)   -KB     Assistive Device (Gait) walker, front-wheeled (P)   -KB     Patient was able to Ambulate yes (P)   -KB     Distance in Feet (Gait) 23 (P)   -KB     Deviations/Abnormal Patterns (Gait) base of support, narrow;marian decreased;gait speed decreased (P)   -KB               User Key  (r) = Recorded By, (t) = Taken By, (c) = Cosigned By      Initials Name Provider Type    Bekah Engle, PT Student PT Student                   Obj/Interventions       Row Name 02/02/24 1417          Range of Motion Comprehensive    General Range of Motion bilateral lower extremity ROM WFL (P)   -KB       Row Name 02/02/24 1417          Strength Comprehensive (MMT)    Comment, General Manual Muscle Testing (MMT) Assessment LE 3+/5 (P)   -KB       Row Name 02/02/24 1417          Motor Skills    Coordination gross motor deficit;moderate impairment (P)   -KB       Row Name 02/02/24 1417          Balance    Balance Assessment sitting static balance;sitting dynamic balance;sit to stand dynamic balance;standing static balance;standing dynamic balance (P)   -KB     Static Sitting Balance contact guard (P)   -KB     Dynamic Sitting Balance contact guard (P)   -KB     Position, Sitting Balance unsupported (P)   -KB     Sit to Stand Dynamic Balance minimal assist;1-person assist (P)   -KB     Static Standing Balance minimal assist;1-person assist (P)   -KB     Dynamic Standing Balance minimal assist;1-person assist (P)   -KB     Position/Device Used, Standing Balance unsupported (P)   -KB               User Key   (r) = Recorded By, (t) = Taken By, (c) = Cosigned By      Initials Name Provider Type    Bekah Engle, PT Student PT Student                   Goals/Plan       Row Name 02/02/24 1435          Bed Mobility Goal 1 (PT)    Activity/Assistive Device (Bed Mobility Goal 1, PT) bed mobility activities, all (P)   -KB     [place] Level/Cues Needed (Bed Mobility Goal 1, PT) standby assist (P)   -KB     Time Frame (Bed Mobility Goal 1, PT) short term goal (STG);by discharge (P)   -KB     Progress/Outcomes (Bed Mobility Goal 1, PT) goal ongoing (P)   -KB       Row Name 02/02/24 1435          Transfer Goal 1 (PT)    Activity/Assistive Device (Transfer Goal 1, PT) transfers, all (P)   -KB     [place] Level/Cues Needed (Transfer Goal 1, PT) standby assist (P)   -KB     Time Frame (Transfer Goal 1, PT) short term goal (STG);by discharge (P)   -KB     Progress/Outcome (Transfer Goal 1, PT) goal ongoing (P)   -KB       Row Name 02/02/24 1435          Gait Training Goal 1 (PT)    Activity/Assistive Device (Gait Training Goal 1, PT) gait (walking locomotion) (P)   -KB     [place] Level (Gait Training Goal 1, PT) contact guard required (P)   -KB     Time Frame (Gait Training Goal 1, PT) short term goal (STG);by discharge (P)   -KB     Progress/Outcome (Gait Training Goal 1, PT) goal ongoing (P)   -KB       Row Name 02/02/24 1435          Therapy Assessment/Plan (PT)    Planned Therapy Interventions (PT) bed mobility training;gait training;motor coordination training;strengthening (P)   -KB               User Key  (r) = Recorded By, (t) = Taken By, (c) = Cosigned By      Initials Name Provider Type    Bekah Engle, PT Student PT Student                   Clinical Impression       Row Name 02/02/24 1425          Pain    Pretreatment Pain Rating 0/10 - no pain (P)   -KB     Posttreatment Pain Rating 0/10 - no pain (P)   -KB       Row Name 02/02/24 1425          Plan of Care Review    Plan of Care Reviewed With  patient;family (P)   -KB     Progress no change (P)   -KB     Outcome Evaluation PT eval completed. Patient presented supine in bed, on room air satting 98%. Patient si Ox3 with VCs to prompt. Patients son in law at bedside states patient lives at [facility] and can ambulate short distances using RW, but falls frequently due to freezing from parkinsons. Pt requires assistance with all ADLs, and has had UTI the past two weeks. Patient performed suping to sit on EOB with CGA. Pt performed sit to stand with Conchita and ambulated 23ft with min A. Pt satting 97% on room air following activity. Patient requires Max A for bathing and toileting, and max A for LBD. Pt requires mod A for grooming and feeding self. Pt would benefit from continued skilled intervention to increase overall strength and stability. Pt will return back to SNF upon D/C. Pt left sitting in chair, chair alarm on and satting 98%. (P)   -KB       Row Name 02/02/24 1429          Therapy Assessment/Plan (PT)    Rehab Potential (PT) fair, will monitor progress closely (P)   -KB     Criteria for Skilled Interventions Met (PT) yes;skilled treatment is necessary (P)   -KB     Therapy Frequency (PT) 5 times/wk (P)   -KB     Predicted Duration of Therapy Intervention (PT) 2 weeks (P)   -KB       Row Name 02/02/24 1422          Vital Signs    Pre SpO2 (%) 98 (P)   -KB     O2 Delivery Pre Treatment room air (P)   -KB     Intra SpO2 (%) 97 (P)   -KB     O2 Delivery Intra Treatment room air (P)   -KB     Post SpO2 (%) 98 (P)   -KB     O2 Delivery Post Treatment room air (P)   -KB     Pre Patient Position Supine (P)   -KB     Intra Patient Position Standing (P)   -KB     Post Patient Position Sitting (P)   -KB       Row Name 02/02/24 1426          Positioning and Restraints    Pre-Treatment Position in bed (P)   -KB     Post Treatment Position chair (P)   -KB     In Chair sitting;call light within reach;encouraged to call for assist;exit alarm on;with  family/caregiver (P)   -KB               User Key  (r) = Recorded By, (t) = Taken By, (c) = Cosigned By      Initials Name Provider Type    Bekah Engle, PT Student PT Student                   Outcome Measures       Row Name 02/02/24 1436 02/02/24 0800       How much help from another person do you currently need...    Turning from your back to your side while in flat bed without using bedrails? 3 (P)   -KB 3  -TC    Moving from lying on back to sitting on the side of a flat bed without bedrails? 3 (P)   -KB 3  -TC    Moving to and from a bed to a chair (including a wheelchair)? 2 (P)   -KB 2  -TC    Standing up from a chair using your arms (e.g., wheelchair, bedside chair)? 2 (P)   -KB 2  -TC    Climbing 3-5 steps with a railing? 1 (P)   -KB 1  -TC    To walk in hospital room? 2 (P)   -KB 2  -TC    AM-PAC 6 Clicks Score (PT) 13 (P)   -KB 13  -TC    Highest Level of Mobility Goal 4 --> Transfer to chair/commode (P)   -KB 4 --> Transfer to chair/commode  -TC      Row Name 02/02/24 1436 02/02/24 1423       Functional Assessment    Outcome Measure Options AM-PAC 6 Clicks Basic Mobility (PT) (P)   -KB AM-PAC 6 Clicks Daily Activity (OT)  -SD              User Key  (r) = Recorded By, (t) = Taken By, (c) = Cosigned By      Initials Name Provider Type    Lizbet Ward, OT Occupational Therapist    Birdie Jj, RN Registered Nurse    Bekah Engle, PT Student PT Student                                 Physical Therapy Education       Title: PT OT SLP Therapies (Done)       Topic: Physical Therapy (Done)       Point: Mobility training (Done)       Learning Progress Summary             Patient Eager, E, VU by COLBY at 2/2/2024 1438   Family Eager, E, VU by COLBY at 2/2/2024 1438                                         User Key       Initials Effective Dates Name Provider Type Discipline     01/18/24 -  Bekah Sheppard, PT Student PT Student PT                  PT Recommendation and Plan  Planned Therapy  Interventions (PT): (P) bed mobility training, gait training, motor coordination training, strengthening  Plan of Care Reviewed With: (P) patient, family  Progress: (P) no change  Outcome Evaluation: (P) PT eval completed. Patient presented supine in bed, on room air satting 98%. Patient si Ox3 with VCs to prompt. Patients son in law at bedside states patient lives at [facility] and can ambulate short distances using RW, but falls frequently due to freezing from parkinsons. Pt requires assistance with all ADLs, and has had UTI the past two weeks. Patient performed suping to sit on EOB with CGA. Pt performed sit to stand with Conchita and ambulated 23ft with min A. Pt satting 97% on room air following activity. Patient requires Max A for bathing and toileting, and max A for LBD. Pt requires mod A for grooming and feeding self. Pt would benefit from continued skilled intervention to increase overall strength and stability. Pt will return back to SNF upon D/C. Pt left sitting in chair, chair alarm on and satting 98%.     Time Calculation:   PT Evaluation Complexity  History, PT Evaluation Complexity: (P) 3 or more personal factors and/or comorbidities  Examination of Body Systems (PT Eval Complexity): (P) total of 3 or more elements  Clinical Presentation (PT Evaluation Complexity): (P) evolving  Clinical Decision Making (PT Evaluation Complexity): (P) moderate complexity  Overall Complexity (PT Evaluation Complexity): (P) moderate complexity     PT Charges       Row Name 02/02/24 4799             Time Calculation    Start Time 0100 (P)   -KB         Untimed Charges    PT Eval/Re-eval Minutes 40 (P)   -KB         Total Minutes    Untimed Charges Total Minutes 40 (P)   -KB       Total Minutes 40 (P)   -KB                User Key  (r) = Recorded By, (t) = Taken By, (c) = Cosigned By      Initials Name Provider Type    Bekah Engle, PT Student PT Student                  Therapy Charges for Today       Code  Description Service Date Service Provider Modifiers Qty    [id number] HC PT EVAL MOD COMPLEXITY 3 2/2/2024 Bekah Sheppard, PT Student GP 1            PT G-Codes  Outcome Measure Options: (P) AM-PAC 6 Clicks Basic Mobility (PT)  AM-PAC 6 Clicks Score (PT): (P) 13  AM-PAC 6 Clicks Score (OT): 12  PT Discharge Summary  Anticipated Discharge Disposition (PT): (P) skilled nursing facility    Bekah Sheppard, PT Student  2/2/2024

## 2024-02-02 NOTE — PLAN OF CARE
Goal Outcome Evaluation:  Plan of Care Reviewed With: (P) patient, family        Progress: (P) no change  Outcome Evaluation: (P) PT eval completed. Patient presented supine in bed, on room air satting 98%. Patient si Ox3 with VCs to prompt. Patients son in law at bedside states patient lives at [facility] and can ambulate short distances using RW, but falls frequently due to freezing from parkinsons. Pt requires assistance with all ADLs, and has had UTI the past two weeks. Patient performed suping to sit on EOB with CGA. Pt performed sit to stand with Conchita and ambulated 23ft with min A. Pt satting 97% on room air following activity. Patient requires Max A for bathing and toileting, and max A for LBD. Pt requires mod A for grooming and feeding self. Pt would benefit from continued skilled intervention to increase overall strength and stability. Pt will return back to SNF upon D/C. Pt left sitting in chair, chair alarm on and satting 98%.      Anticipated Discharge Disposition (PT): (P) skilled nursing facility

## 2024-02-02 NOTE — PLAN OF CARE
Goal Outcome Evaluation:  Plan of Care Reviewed With: patient        Progress: improving       VSS through the night. Afebrile this shift. Antibiotics and fluids given per orders, see MAR.

## 2024-02-02 NOTE — THERAPY EVALUATION
Patient Name: Radha Varela  : 1935    MRN: [id number]                              Today's Date: 2024       Admit Date: 2024    Visit Dx:     ICD-10-CM ICD-9-CM   1. Acute cystitis without hematuria  N30.00 595.0     Patient Active Problem List   Diagnosis    Abdominal aortic aneurysm (AAA) without rupture    Generalized weakness    Parkinson's disease dementia    Coronary artery disease involving native coronary artery of native heart without angina pectoris    Fall    Acute renal failure superimposed on stage 3 chronic kidney disease    Hypokalemia    Altered mental status    Dehydration    Metabolic encephalopathy    UTI (urinary tract infection) due to Enterococcus    Acute metabolic encephalopathy    Paroxysmal atrial fibrillation    Urinary tract infection    Sepsis    Chronic kidney disease, stage III (moderate)     Past Medical History:   Diagnosis Date    Abdominal aortic aneurysm (AAA) without rupture 2018    Aneurysm     Arthritis     Cardiac murmur     Carotid stenosis     Celiac artery stenosis     Chest pain     CHF (congestive heart failure)     Chronic kidney disease     Coronary artery disease     Dyslipidemia     Elevated cholesterol     GERD (gastroesophageal reflux disease)     Hiatal hernia     History of exercise stress test     History of nuclear stress test     History of pneumonia     Hypertension     Lumbar degenerative disc disease     MI, old     Osteoarthritis     PAD (peripheral artery disease)     Parkinson's disease     Paroxysmal A-fib     Peptic ulceration     Psoriasis     Renal artery stenosis     Sleep apnea     uses o2 @ HS    Spinal stenosis     Tremor     Vertebral artery stenosis      Past Surgical History:   Procedure Laterality Date    ABDOMINAL AORTIC ANEURYSM REPAIR WITH ENDOGRAFT      APPENDECTOMY      CATARACT EXTRACTION W/ INTRAOCULAR LENS IMPLANT Left 12/10/2018    Procedure: CATARACT PHACO EXTRACTION WITH INTRAOCULAR LENS  IMPLANT LEFT;  Surgeon: Charity Carrasco MD;  Location: [facility];  Service: Ophthalmology    CATARACT EXTRACTION W/ INTRAOCULAR LENS IMPLANT Right 6/12/2023    Procedure: CATARACT PHACO EXTRACTION WITH INTRAOCULAR LENS IMPLANT RIGHT;  Surgeon: Charity Carrasco MD;  Location: [facility];  Service: Ophthalmology;  Laterality: Right;    COLONOSCOPY      CORONARY ARTERY BYPASS GRAFT  1995    ENDOSCOPY      SHOULDER ROTATOR CUFF REPAIR Right     SKIN BIOPSY        General Information       Row Name 02/02/24 1412          OT Time and Intention    Document Type evaluation  -SD     Mode of Treatment occupational therapy  -SD       Row Name 02/02/24 1412          General Information    Patient Profile Reviewed yes  -SD     Prior Level of Function min assist:;transfer;all household mobility;mod assist:;ADL's  Lives at [facility], uses a RW for short distances. Requires assistance for all self care.  -SD     Existing Precautions/Restrictions fall  -SD     Barriers to Rehab medically complex;previous functional deficit;cognitive status  -SD       Row Name 02/02/24 1412          Living Environment    People in Home facility resident  -SD       Row Name 02/02/24 1412          Home Main Entrance    Number of Stairs, Main Entrance none  -SD       Row Name 02/02/24 1412          Stairs Within Home, Primary    Number of Stairs, Within Home, Primary none  -SD       Row Name 02/02/24 1412          Cognition    Orientation Status (Cognition) oriented x 3;verbal cues/prompts needed for orientation  -SD       Row Name 02/02/24 1412          Safety Issues, Functional Mobility    Safety Issues Affecting Function (Mobility) safety precautions follow-through/compliance;safety precaution awareness  -SD     Impairments Affecting Function (Mobility) balance;endurance/activity tolerance;cognition;strength  -SD               User Key  (r) = Recorded By, (t) = Taken By, (c) = Cosigned By      Initials Name Provider Type    SD Lizbet Skinner OT  Occupational Therapist                     Mobility/ADL's       [facility] Name 02/02/24 1414          Bed Mobility    Bed Mobility supine-sit  Simultaneous filing. User may be unaware of other data.  -SD     Supine-Sit [place] (Bed Mobility) contact guard  -SD     Assistive Device (Bed Mobility) bed rails;head of bed elevated  Simultaneous filing. User may be unaware of other data.  -[facility] Name 02/02/24 1414          Transfers    Transfers sit-stand transfer  -SD       Row Name 02/02/24 1414          Sit-Stand Transfer    Sit-Stand [place] (Transfers) minimum assist (75% patient effort)  Simultaneous filing. User may be unaware of other data.  -SD     Assistive Device (Sit-Stand Transfers) walker, front-wheeled  Simultaneous filing. User may be unaware of other data.  -SD       Row Name 02/02/24 1414          Functional Mobility    Functional Mobility- Ind. Level minimum assist (75% patient effort)  -SD     Functional Mobility- Device walker, front-wheeled  -SD     Functional Mobility-Distance (Feet) 23  -SD     Functional Mobility- Safety Issues balance decreased during turns;sequencing ability decreased;step length decreased;weight-shifting ability decreased  -SD       Row Name 02/02/24 1414          Activities of Daily Living    BADL Assessment/Intervention bathing;upper body dressing;lower body dressing;grooming;feeding;toileting  -SD       Row Name 02/02/24 1414          Bathing Assessment/Intervention    [place] Level (Bathing) maximum assist (25% patient effort)  -SD       Row Name 02/02/24 1414          Upper Body Dressing Assessment/Training    [place] Level (Upper Body Dressing) moderate assist (50% patient effort)  -SD       Row Name 02/02/24 1414          Lower Body Dressing Assessment/Training    [place] Level (Lower Body Dressing) maximum assist (25% patient effort)  -SD       Row Name 02/02/24 1414          Grooming Assessment/Training    [place] Level (Grooming)  moderate assist (50% patient effort)  -SD       Row Name 02/02/24 1414          Self-Feeding Assessment/Training    [place] Level (Feeding) moderate assist (50% patient effort)  -SD     Comment, (Feeding) per PCT has required assistance d/t lethargy  -SD       [facility] Name 02/02/24 1414          Toileting Assessment/Training    [place] Level (Toileting) maximum assist (25% patient effort)  -SD               User Key  (r) = Recorded By, (t) = Taken By, (c) = Cosigned By      Initials Name Provider Type    Lizbet Ward OT Occupational Therapist                   Obj/Interventions       Row Name 02/02/24 1415          Range of Motion Comprehensive    General Range of Motion bilateral upper extremity ROM WFL  -SD       Row Name 02/02/24 1415          Strength Comprehensive (MMT)    General Manual Muscle Testing (MMT) Assessment upper extremity strength deficits identified  -SD     Comment, General Manual Muscle Testing (MMT) Assessment UB 3+/5  -SD               User Key  (r) = Recorded By, (t) = Taken By, (c) = Cosigned By      Initials Name Provider Type    Lizbet Ward OT Occupational Therapist                   Goals/Plan       Row Name 02/02/24 1422          Transfer Goal 1 (OT)    Activity/Assistive Device (Transfer Goal 1, OT) sit-to-stand/stand-to-sit;walker, rolling  -SD     [place] Level/Cues Needed (Transfer Goal 1, OT) contact guard required  -SD     Time Frame (Transfer Goal 1, OT) long term goal (LTG)  -SD     Progress/Outcome (Transfer Goal 1, OT) goal ongoing  -SD       Row Name 02/02/24 1422          Dressing Goal 1 (OT)    Activity/Device (Dressing Goal 1, OT) upper body dressing  -SD     [place]/Cues Needed (Dressing Goal 1, OT) minimum assist (75% or more patient effort)  -SD     Time Frame (Dressing Goal 1, OT) 2 weeks  -SD     Progress/Outcome (Dressing Goal 1, OT) goal ongoing  -SD       Row Name 02/02/24 1422          Toileting Goal 1 (OT)    Activity/Device  (Toileting Goal 1, OT) toileting skills, all;commode, bedside without drop arms  -SD     [place] Level/Cues Needed (Toileting Goal 1, OT) minimum assist (75% or more patient effort)  -SD     Time Frame (Toileting Goal 1, OT) 2 weeks  -SD     Progress/Outcome (Toileting Goal 1, OT) goal ongoing  -SD       Row Name 02/02/24 1422          Strength Goal 1 (OT)    Strength Goal 1 (OT) Patient to perform UB ther ex as tolerated  -SD     Time Frame (Strength Goal 1, OT) long term goal (LTG)  -SD     Progress/Outcome (Strength Goal 1, OT) goal ongoing  -SD       Row Name 02/02/24 1422          Therapy Assessment/Plan (OT)    Planned Therapy Interventions (OT) activity tolerance training;adaptive equipment training;BADL retraining;patient/caregiver education/training;strengthening exercise;transfer/mobility retraining  -SD               User Key  (r) = Recorded By, (t) = Taken By, (c) = Cosigned By      Initials Name Provider Type    SD Lizbet Skinner OT Occupational Therapist                   Clinical Impression       Row Name 02/02/24 1416          Pain Assessment    Pretreatment Pain Rating 0/10 - no pain  -SD     Posttreatment Pain Rating 0/10 - no pain  -SD       Row Name 02/02/24 1416          Plan of Care Review    Plan of Care Reviewed With patient;family  -SD     Progress no change  -SD     Outcome Evaluation OT eval completed. Patient is supine in bed, on room air satting 98%, denies any pain. Patient is Ox3 with VCs. Patient's son in law at bedside reports patient lives at [facility] and walks short distances using a RW, requires assistance with all self care, needing increased help over the last 2 weeks d/t UTI. Patient performed supine to sit with CGA. Performed sit to stand with min A, walked 23' using RW with min A, satting 97% following activity. Patient requires max A for bathing and toileting, mod A for UBD, max A for LBD, mod A for self feeding and grooming. Patient left sitting in chair,  chair alarm on and satting 98%. Patient to return to SNF when medically stable.  -SD       Row Name 02/02/24 1416          Therapy Assessment/Plan (OT)    Patient/Family Therapy Goal Statement (OT) return to SNF  -SD     Rehab Potential (OT) good, to achieve stated therapy goals  -SD     Criteria for Skilled Therapeutic Interventions Met (OT) skilled treatment is necessary  -SD     Therapy Frequency (OT) 3 times/wk  5 times if indicated  -SD       Row Name 02/02/24 1416          Therapy Plan Review/Discharge Plan (OT)    Anticipated Discharge Disposition (OT) skilled nursing facility  -SD       Row Name 02/02/24 1416          Vital Signs    O2 Delivery Pre Treatment room air  -SD     O2 Delivery Intra Treatment room air  -SD     O2 Delivery Post Treatment room air  -SD       Row Name 02/02/24 1416          Positioning and Restraints    Pre-Treatment Position in bed  -SD     Post Treatment Position chair  -SD     In Chair sitting;call light within reach;encouraged to call for assist;exit alarm on;with family/caregiver  -SD               User Key  (r) = Recorded By, (t) = Taken By, (c) = Cosigned By      Initials Name Provider Type    Lizbet Ward OT Occupational Therapist                   Outcome Measures       Row Name 02/02/24 1423          How much help from another is currently needed...    Putting on and taking off regular lower body clothing? 2  -SD     Bathing (including washing, rinsing, and drying) 2  -SD     Toileting (which includes using toilet bed pan or urinal) 2  -SD     Putting on and taking off regular upper body clothing 2  -SD     Taking care of personal grooming (such as brushing teeth) 2  -SD     Eating meals 2  -SD     AM-PAC 6 Clicks Score (OT) 12  -SD       Row Name 02/02/24 0800          How much help from another person do you currently need...    Turning from your back to your side while in flat bed without using bedrails? 3  -TC     Moving from lying on back to sitting on the side  of a flat bed without bedrails? 3  -TC     Moving to and from a bed to a chair (including a wheelchair)? 2  -TC     Standing up from a chair using your arms (e.g., wheelchair, bedside chair)? 2  -TC     Climbing 3-5 steps with a railing? 1  -TC     To walk in hospital room? 2  -TC     AM-PAC 6 Clicks Score (PT) 13  -TC     Highest Level of Mobility Goal 4 --> Transfer to chair/commode  -TC       Row Name 02/02/24 1423          Functional Assessment    Outcome Measure Options AM-PAC 6 Clicks Daily Activity (OT)  -SD               User Key  (r) = Recorded By, (t) = Taken By, (c) = Cosigned By      Initials Name Provider Type    Lizbet Ward OT Occupational Therapist    Birdie Jj RN Registered Nurse                      OT Recommendation and Plan  Planned Therapy Interventions (OT): activity tolerance training, adaptive equipment training, BADL retraining, patient/caregiver education/training, strengthening exercise, transfer/mobility retraining  Therapy Frequency (OT): 3 times/wk (5 times if indicated)  Plan of Care Review  Plan of Care Reviewed With: patient, family  Progress: no change  Outcome Evaluation: OT eval completed. Patient is supine in bed, on room air satting 98%, denies any pain. Patient is Ox3 with VCs. Patient's son in law at bedside reports patient lives at [facility] and walks short distances using a RW, requires assistance with all self care, needing increased help over the last 2 weeks d/t UTI. Patient performed supine to sit with CGA. Performed sit to stand with min A, walked 23' using RW with min A, satting 97% following activity. Patient requires max A for bathing and toileting, mod A for UBD, max A for LBD, mod A for self feeding and grooming. Patient left sitting in chair, chair alarm on and satting 98%. Patient to return to SNF when medically stable.     Time Calculation:   Evaluation Complexity (OT)  Review Occupational Profile/Medical/Therapy History Complexity:  expanded/moderate complexity  Assessment, Occupational Performance/Identification of Deficit Complexity: 3-5 performance deficits  Clinical Decision Making Complexity (OT): detailed assessment/moderate complexity  Overall Complexity of Evaluation (OT): moderate complexity     Time Calculation- OT       Row Name 02/02/24 1424             Time Calculation- OT    OT Start Time 1302  -SD      OT Received On 02/02/24  -SD      OT Goal Re-Cert Due Date 02/14/24  -SD         Untimed Charges    OT Eval/Re-eval Minutes 45  -SD         Total Minutes    Untimed Charges Total Minutes 45  -SD       Total Minutes 45  -SD                User Key  (r) = Recorded By, (t) = Taken By, (c) = Cosigned By      Initials Name Provider Type    Lizbet Ward, OT Occupational Therapist                  Therapy Charges for Today       Code Description Service Date Service Provider Modifiers Qty    [id number] HC OT EVAL MOD COMPLEXITY 3 2/2/2024 Lizbet Skinner OT GO 1                 Lizbet Skinner OT  2/2/2024

## 2024-02-02 NOTE — PROGRESS NOTES
[facility]  INTERNAL MEDICINE PROGRESS NOTE    Name:  Radha Varela   Age:  88 y.o.  Sex:  male  :  1935  MRN:  [id number]   Visit Number:  [id number]  Admission Date:  2024  Date Of Service:  24  Primary Care Physician:  Dieudonne Jensen MD     LOS: 0 days :  Patient Care Team:  Dieudonne Jensen MD as PCP - General (Internal Medicine):      Subjective / Interval History:       Radha Varela  is an 88-year-old pleasant male resident of assisted living facility, with multiple medical problems including coronary artery disease status post CABG, chronic kidney disease stage III, abdominal aortic aneurysm repair, atrial fibrillation, Parkinson's disease, carotid and celiac artery stenosis, COPD on home oxygen at nighttime, obstructive sleep apnea was brought to the emergency room by EMS from the nursing home facility with fever, generalized weakness and confusion.  Patient was recently discharged from this facility on 2024 after being treated for metabolic encephalopathy, dehydration and UTI secondary to Enterococcus.  He was discharged on Augmentin.  Patient is currently confused and is unable to provide any history.     In the emergency room, he had a fever of 102.7, pulse 88, blood pressure 148/79 and pulse oxygen saturation of 97% on 2 L of oxygen.  Troponin T 74, proBNP 13,700.  CMP was remarkable for a creatinine of 2.12 (baseline 1.7), magnesium 1.5, alkaline phosphatase 190 but LFT otherwise was unremarkable.  Lactic acid was 1 and procalcitonin was 1.12.  White count was 10, hemoglobin 7.8 (baseline), platelets 126.  Urine analysis was suggestive of urinary tract infection.  Patient was noted to have diarrhea in the emergency room and C. difficile toxin was sent which was negative.  COVID and flu test were negative.  Urine culture was sent.  Patient received Tylenol, Zosyn and vancomycin in the emergency room and is currently being admitted to  the medical floor      February 1 patient has been seen.  He has been very sleepy and arousable but has been having difficulty to stay awake.  Events noted and chart reviewed since admission last evening as above.  This morning he is afebrile and hemodynamically stable.    Patient seen on February 2.  He is alert and oriented to self and surroundings this morning.  He states that he has been coughed.  Though noted that there has been some spells of confusion.  He has been able to drink and eat well.  Though the nurse stated that he was slightly hypoxic and had to be placed on 1 L oxygen and his saturation is 96% at present    Vital Signs:    Temp:  [97.4 °F (36.3 °C)-98.8 °F (37.1 °C)] 97.4 °F (36.3 °C)  Heart Rate:  [57-82] 82  Resp:  [18-20] 18  BP: (112-158)/(54-74) 155/72    Intake and output:    I/O last 3 completed shifts:  In: 3611 [P.O.:3061; IV Piggyback:550]  Out: 130 [Urine:130]  I/O this shift:  In: 360 [P.O.:360]  Out: -     Physical Examination:    General Appearance:  Awake, I however you wonderful wonderful you working the weekend also still like is not able I am yet tomorrow and Sunday I you I on call tomorrow or Sunday okay not in any acute distress.   Head:    Atraumatic and normocephalic, without obvious abnormality.   Eyes:            PERRLA,  No pallor.    Neck:   Supple,  No lymph glands, no bruit   Lungs:     Chest shape is normal. Breath sounds heard bilaterally equally.  No crackles or wheezing.     Heart:    Normal S1 and S2, no murmur,  No JVD   Abdomen:     Normal bowel sounds, no masses, no organomegaly. Soft     nontender, no guarding, no rebound tenderness   Extremities:   Moves all extremities well, no edema, no cyanosis,    Skin:   No  bruising or rash.   Neurologic: He is lethargic so unable to get much on the exam and is sleeping.  He does have baseline Parkinson's disease     Laboratory results:  Results from last 7 days   Lab Units 02/02/24  0528 02/01/24  0557 01/31/24  5507  01/30/24  0736   SODIUM mmol/L 142 141 139 138   POTASSIUM mmol/L 3.9 4.3 3.9 4.0   CHLORIDE mmol/L 111* 110* 107 106   CO2 mmol/L 19.4* 18.6* 20.6* 19.8*   BUN mg/dL 18 19 19 17   CREATININE mg/dL 1.97* 1.98* 2.12* 1.69*   CALCIUM mg/dL 7.8* 7.6* 8.2* 8.0*   BILIRUBIN mg/dL 0.4  --  0.6 0.4   ALK PHOS U/L 111  --  119* 132*   ALT (SGPT) U/L <5  --  <5 <5   AST (SGOT) U/L 23  --  20 21   GLUCOSE mg/dL 91 86 93 80     Results from last 7 days   Lab Units 02/02/24  0527 02/01/24  0557 01/31/24  2226   WBC 10*3/mm3 6.31 8.44 10.33   HEMOGLOBIN g/dL 7.5* 7.7* 7.8*   HEMATOCRIT % 24.3* 25.1* 24.7*   PLATELETS 10*3/mm3 110* 110* 126*         Results from last 7 days   Lab Units 02/01/24  0026 01/31/24  2226 01/27/24  1159   HSTROP T ng/L 72* 74* 45*     Results from last 7 days   Lab Units 01/31/24  2323 01/31/24  2318 01/31/24  2237 01/27/24  1634 01/27/24  1622 01/27/24  1241   BLOODCX  No growth at 24 hours No growth at 24 hours  --  No growth at 5 days No growth at 5 days  --    URINECX   --   --  No growth  --   --  No growth       Radiology results:    Imaging Results (Last 24 Hours)       Procedure Component Value Units Date/Time    XR Chest 1 View [[id number]] Collected: 02/01/24 1312     Updated: 02/01/24 1315    Narrative:      PROCEDURE: XR CHEST 1 VW-        HISTORY: fever eval PNA     COMPARISON: January 27, 2024.     FINDINGS: The patient is rotated to the left. The heart is normal in  size. The patient is status post median sternotomy. There is decreased  inspiratory effort. There is increased interstitial disease bilaterally  consistent with edema. There is no pneumothorax. There are no acute  osseous abnormalities. Air beneath the hemidiaphragms is likely within  bowel.       Impression:      Increased interstitial edema. Continued follow-up is  recommended.                       Images were reviewed, interpreted, and dictated by Dr. Loli Booth MD  Transcribed by Charlotte Schofield PA-C.     This report  was signed and finalized on 2/1/2024 1:13 PM by Loli Booth MD.               I have reviewed the patient's radiology reports.    Medication Review:     I have reviewed the patients active and prn medications.     Assessment:      Urinary tract infection    Generalized weakness    Parkinson's disease dementia    Coronary artery disease involving native coronary artery of native heart without angina pectoris    Acute metabolic encephalopathy    Sepsis    Chronic kidney disease, stage III (moderate)          Plan:    1.  Acute metabolic encephalopathy secondary to simple sepsis-he has had UTI due to Enterococcus which has been treated and he was just discharged on Augmentin day 1 January 30  Will broaden the coverage with vancomycin and Zosyn to cover gram-positive as well as negatives   Blood cultures and urine cultures results are pending    Source of his simple sepsis could be respiratory to the x-rays shows what may be mild interstitial changes will do CT scan of the lung today.    2.  Acute on chronic kidney disease-he does have baseline CKD stage III and is slightly worse with creatinine around 1.9.  He has had more than 2 L and will let him have oral liquids as there could be a possibility for fluid overload which we want to prevent    3.  Parkinson's disease with dementia-this has flared up because of his infection .  This seems to be clinically improving and will restart his medications    4.  Mild bradycardia.  This has resolved and he is metoprolol is on hold and will restart at a lower dose    See rest as per orders and patient will be closely followed up      Dieudonne Jensen MD  02/02/24  09:24 EST      Please note that portions of this note were completed with a voice recognition program.

## 2024-02-02 NOTE — PROGRESS NOTES
Dietitian Assessment    Patient Name: Radha Varela  YOB: 1935  MRN: [id number]  Admission date: 1/31/2024    Comment:    Pt with PI to buttock. Will provide Boost Glucose control daily.    Clinical Nutrition Assessment      Reason for Assessment PI   H&P  Past Medical History:   Diagnosis Date    Abdominal aortic aneurysm (AAA) without rupture 07/02/2018    Aneurysm     Arthritis     Cardiac murmur     Carotid stenosis     Celiac artery stenosis     Chest pain     CHF (congestive heart failure)     Chronic kidney disease     Coronary artery disease     Dyslipidemia     Elevated cholesterol     GERD (gastroesophageal reflux disease)     Hiatal hernia     History of exercise stress test     History of nuclear stress test     History of pneumonia     Hypertension     Lumbar degenerative disc disease     MI, old 1995    Osteoarthritis     PAD (peripheral artery disease)     Parkinson's disease     Paroxysmal A-fib     Peptic ulceration     Psoriasis     Renal artery stenosis     Sleep apnea     uses o2 @ HS    Spinal stenosis     Tremor     Vertebral artery stenosis        Past Surgical History:   Procedure Laterality Date    ABDOMINAL AORTIC ANEURYSM REPAIR WITH ENDOGRAFT  1994    APPENDECTOMY      CATARACT EXTRACTION W/ INTRAOCULAR LENS IMPLANT Left 12/10/2018    Procedure: CATARACT PHACO EXTRACTION WITH INTRAOCULAR LENS IMPLANT LEFT;  Surgeon: Charity Carrasco MD;  Location: [facility] OR;  Service: Ophthalmology    CATARACT EXTRACTION W/ INTRAOCULAR LENS IMPLANT Right 6/12/2023    Procedure: CATARACT PHACO EXTRACTION WITH INTRAOCULAR LENS IMPLANT RIGHT;  Surgeon: Charity Carrasco MD;  Location: [facility] OR;  Service: Ophthalmology;  Laterality: Right;    COLONOSCOPY      CORONARY ARTERY BYPASS GRAFT  1995    ENDOSCOPY      SHOULDER ROTATOR CUFF REPAIR Right     SKIN BIOPSY              Current Problems        Encounter Information        Trending Narrative     89 y/o M. Pt screened for PI. Stage 1  "PI to buttock. Wt is stable per EMR. Pt with PMH CKD. RD will provide Boost glucose daily d/t availability of ONS and to help promote wound healing.      Anthropometrics        Current Height, Weight Height: 170.2 cm (67.01\")  Weight: 72.4 kg (159 lb 9.8 oz) (02/02/24 0300)   Trending Weight Hx     This admission:              PTA:     Wt Readings from Last 30 Encounters:   02/02/24 0300 72.4 kg (159 lb 9.8 oz)   02/01/24 1306 69.3 kg (152 lb 12.5 oz)   01/31/24 2221 72.2 kg (159 lb 2.8 oz)   01/30/24 0338 72.2 kg (159 lb 2.8 oz)   01/29/24 0400 69.4 kg (153 lb)   01/27/24 2029 67.4 kg (148 lb 9.4 oz)   01/08/24 0741 75.3 kg (166 lb 0.1 oz)   09/05/23 0900 75.3 kg (166 lb)   09/04/23 0756 70.8 kg (156 lb)   08/25/23 0500 75.5 kg (166 lb 7.2 oz)   08/25/23 0300 75.5 kg (166 lb 7.2 oz)   08/24/23 1104 73.3 kg (161 lb 9.6 oz)   08/24/23 0500 73.3 kg (161 lb 9.6 oz)   08/23/23 1843 73.3 kg (161 lb 9.6 oz)   08/23/23 1543 72.6 kg (160 lb)   08/13/23 0909 73.5 kg (162 lb)   06/09/23 0835 73.5 kg (162 lb)   06/08/23 1527 72.6 kg (160 lb)   07/22/19 0812 77.6 kg (171 lb)   12/07/18 1141 73.5 kg (162 lb)   12/05/18 1221 73.5 kg (162 lb)   07/16/18 0921 76.7 kg (169 lb)   07/02/18 0658 76.7 kg (169 lb)      BMI kg/m2 Body mass index is 24.99 kg/m².     Labs        Pertinent Labs     Results from last 7 days   Lab Units 02/02/24  0528 02/01/24  0557 01/31/24 2226 01/30/24  0736   SODIUM mmol/L 142 141 139 138   POTASSIUM mmol/L 3.9 4.3 3.9 4.0   CHLORIDE mmol/L 111* 110* 107 106   CO2 mmol/L 19.4* 18.6* 20.6* 19.8*   BUN mg/dL 18 19 19 17   CREATININE mg/dL 1.97* 1.98* 2.12* 1.69*   CALCIUM mg/dL 7.8* 7.6* 8.2* 8.0*   BILIRUBIN mg/dL 0.4  --  0.6 0.4   ALK PHOS U/L 111  --  119* 132*   ALT (SGPT) U/L <5  --  <5 <5   AST (SGOT) U/L 23  --  20 21   GLUCOSE mg/dL 91 86 93 80       Results from last 7 days   Lab Units 02/02/24  0527 02/01/24  0557 01/31/24 2226 01/28/24  0652 01/27/24  1159   MAGNESIUM mg/dL  --   --  1.5*  --  " 1.7   HEMOGLOBIN g/dL 7.5*   < > 7.8*   < > 7.7*   HEMATOCRIT % 24.3*   < > 24.7*   < > 23.8*    < > = values in this interval not displayed.       Lab Results   Component Value Date    HGBA1C 5.7 (H) 03/31/2022            Medications       Scheduled Medications aspirin, 81 mg, Oral, Daily  carbidopa-levodopa, 2 tablet, Oral, 4x Daily  lactobacillus acidophilus, 1 capsule, Oral, BID  [Held by provider] metoprolol succinate XL, 25 mg, Oral, Daily  pantoprazole, 40 mg, Oral, Daily  piperacillin-tazobactam, 3.375 g, Intravenous, Q8H  rOPINIRole, 1 mg, Oral, 4x Daily  senna-docusate sodium, 2 tablet, Oral, BID  sodium chloride, 10 mL, Intravenous, Q12H  vancomycin (dosing per levels), , Does not apply, Daily  vitamin B-12, 1,000 mcg, Oral, Daily        Infusions Pharmacy to dose vancomycin,   Pharmacy to Dose Zosyn,   sodium chloride, 75 mL/hr, Last Rate: 75 mL/hr (02/01/24 1304)         PRN Medications   acetaminophen **OR** acetaminophen **OR** acetaminophen    senna-docusate sodium **AND** polyethylene glycol **AND** bisacodyl **AND** bisacodyl    ondansetron    Pharmacy to dose vancomycin    Pharmacy to Dose Zosyn    sodium chloride    sodium chloride    sodium chloride     Physical Findings        Trending Physical   Appearance, NFPE    --  Edema  No edema     Bowel Function LBM: 2/2/24     Tubes Peripheral IV     Chewing/Swallowing WNL     Skin Stage 1 PI to buttock       Estimated/Assessed Needs       Energy Requirements    EST Needs, Method, Wt used 9497-7415 kcal (30-35 kcal/kg CBW)       Protein Requirements    EST Needs, Method, Wt used  g pro (1.2-1.4 g pro/kg CBW)       Fluid Requirements     Estimated Needs (mL/day) 9298-3052 mL       Current Nutrition Orders & Evaluation of Intake       Oral Nutrition     Food Allergies    Current PO Diet Diet: Cardiac Diets; Healthy Heart (2-3 Na+); Texture: Regular Texture (IDDSI 7); Fluid Consistency: Thin (IDDSI 0)   Supplement    PO Evaluation     Trending % PO  Intake 66% x 3 meals       Nutrition Diagnosis         Nutrition Dx Problem 1 Increased nutrient needs r/t skin integrity AEB PI to buttocks and estimated needs.      Nutrition Dx Problem 2        Intervention Goal         Intervention Goal(s) Maintain CBW  PO intake >50% of estimated needs  Adhere to ONS  Wound improvement     Nutrition Intervention        RD Action      Nutrition Prescription          Diet Prescription HH   Supplement Prescription Boost Glucose Control daily       Monitor/Evaluation        Monitor Per protocol, I&O, PO intake, Supplement intake, Pertinent labs, Weight, Skin status, GI status, Symptoms     RD available PRN    Electronically signed by:  Tania Sinha RD  02/02/24 08:01 EST

## 2024-02-03 LAB
ABO GROUP BLD: NORMAL
ALBUMIN SERPL-MCNC: 3.3 G/DL (ref 3.5–5.2)
ALBUMIN/GLOB SERPL: 1.5 G/DL
ALP SERPL-CCNC: 114 U/L (ref 39–117)
ALT SERPL W P-5'-P-CCNC: <5 U/L (ref 1–41)
ANION GAP SERPL CALCULATED.3IONS-SCNC: 9.1 MMOL/L (ref 5–15)
AST SERPL-CCNC: 20 U/L (ref 1–40)
BILIRUB SERPL-MCNC: 0.4 MG/DL (ref 0–1.2)
BLD GP AB SCN SERPL QL: NEGATIVE
BUN SERPL-MCNC: 16 MG/DL (ref 8–23)
BUN/CREAT SERPL: 8.8 (ref 7–25)
CALCIUM SPEC-SCNC: 7.6 MG/DL (ref 8.6–10.5)
CHLORIDE SERPL-SCNC: 107 MMOL/L (ref 98–107)
CO2 SERPL-SCNC: 18.9 MMOL/L (ref 22–29)
CREAT SERPL-MCNC: 1.82 MG/DL (ref 0.76–1.27)
DEPRECATED RDW RBC AUTO: 59.6 FL (ref 37–54)
EGFRCR SERPLBLD CKD-EPI 2021: 35.3 ML/MIN/1.73
ERYTHROCYTE [DISTWIDTH] IN BLOOD BY AUTOMATED COUNT: 16.5 % (ref 12.3–15.4)
GLOBULIN UR ELPH-MCNC: 2.2 GM/DL
GLUCOSE SERPL-MCNC: 102 MG/DL (ref 65–99)
HCT VFR BLD AUTO: 22.7 % (ref 37.5–51)
HGB BLD-MCNC: 7.1 G/DL (ref 13–17.7)
MCH RBC QN AUTO: 30.7 PG (ref 26.6–33)
MCHC RBC AUTO-ENTMCNC: 31.3 G/DL (ref 31.5–35.7)
MCV RBC AUTO: 98.3 FL (ref 79–97)
PLATELET # BLD AUTO: 107 10*3/MM3 (ref 140–450)
PMV BLD AUTO: 10.8 FL (ref 6–12)
POTASSIUM SERPL-SCNC: 3.5 MMOL/L (ref 3.5–5.2)
PROT SERPL-MCNC: 5.5 G/DL (ref 6–8.5)
RBC # BLD AUTO: 2.31 10*6/MM3 (ref 4.14–5.8)
RH BLD: POSITIVE
SODIUM SERPL-SCNC: 135 MMOL/L (ref 136–145)
T&S EXPIRATION DATE: NORMAL
VANCOMYCIN SERPL-MCNC: 15.1 MCG/ML (ref 5–40)
WBC NRBC COR # BLD AUTO: 6.63 10*3/MM3 (ref 3.4–10.8)

## 2024-02-03 PROCEDURE — 86920 COMPATIBILITY TEST SPIN: CPT

## 2024-02-03 PROCEDURE — 80053 COMPREHEN METABOLIC PANEL: CPT | Performed by: INTERNAL MEDICINE

## 2024-02-03 PROCEDURE — 25010000002 PIPERACILLIN SOD-TAZOBACTAM PER 1 G: Performed by: INTERNAL MEDICINE

## 2024-02-03 PROCEDURE — 85027 COMPLETE CBC AUTOMATED: CPT | Performed by: INTERNAL MEDICINE

## 2024-02-03 PROCEDURE — 25010000002 FUROSEMIDE PER 20 MG: Performed by: INTERNAL MEDICINE

## 2024-02-03 PROCEDURE — 36430 TRANSFUSION BLD/BLD COMPNT: CPT

## 2024-02-03 PROCEDURE — 86901 BLOOD TYPING SEROLOGIC RH(D): CPT | Performed by: INTERNAL MEDICINE

## 2024-02-03 PROCEDURE — 80202 ASSAY OF VANCOMYCIN: CPT | Performed by: INTERNAL MEDICINE

## 2024-02-03 PROCEDURE — 86900 BLOOD TYPING SEROLOGIC ABO: CPT

## 2024-02-03 PROCEDURE — 86850 RBC ANTIBODY SCREEN: CPT | Performed by: INTERNAL MEDICINE

## 2024-02-03 PROCEDURE — 86900 BLOOD TYPING SEROLOGIC ABO: CPT | Performed by: INTERNAL MEDICINE

## 2024-02-03 PROCEDURE — P9016 RBC LEUKOCYTES REDUCED: HCPCS

## 2024-02-03 RX ORDER — AMLODIPINE BESYLATE 5 MG/1
5 TABLET ORAL
Status: DISCONTINUED | OUTPATIENT
Start: 2024-02-03 | End: 2024-02-04

## 2024-02-03 RX ORDER — FUROSEMIDE 10 MG/ML
40 INJECTION INTRAMUSCULAR; INTRAVENOUS ONCE
Status: COMPLETED | OUTPATIENT
Start: 2024-02-03 | End: 2024-02-03

## 2024-02-03 RX ADMIN — PIPERACILLIN SODIUM AND TAZOBACTAM SODIUM 3.38 G: 3; .375 INJECTION, SOLUTION INTRAVENOUS at 18:52

## 2024-02-03 RX ADMIN — ASPIRIN 81 MG: 81 TABLET, COATED ORAL at 09:38

## 2024-02-03 RX ADMIN — ROPINIROLE HYDROCHLORIDE 1 MG: 1 TABLET, FILM COATED ORAL at 23:00

## 2024-02-03 RX ADMIN — CYANOCOBALAMIN TAB 500 MCG 1000 MCG: 500 TAB at 09:38

## 2024-02-03 RX ADMIN — PIPERACILLIN SODIUM AND TAZOBACTAM SODIUM 3.38 G: 3; .375 INJECTION, SOLUTION INTRAVENOUS at 02:00

## 2024-02-03 RX ADMIN — CARBIDOPA AND LEVODOPA 2 TABLET: 25; 100 TABLET ORAL at 09:39

## 2024-02-03 RX ADMIN — DOCUSATE SODIUM 50MG AND SENNOSIDES 8.6MG 2 TABLET: 8.6; 5 TABLET, FILM COATED ORAL at 09:38

## 2024-02-03 RX ADMIN — Medication 10 ML: at 20:25

## 2024-02-03 RX ADMIN — ROPINIROLE HYDROCHLORIDE 1 MG: 1 TABLET, FILM COATED ORAL at 14:36

## 2024-02-03 RX ADMIN — ROPINIROLE HYDROCHLORIDE 1 MG: 1 TABLET, FILM COATED ORAL at 09:38

## 2024-02-03 RX ADMIN — ROPINIROLE HYDROCHLORIDE 1 MG: 1 TABLET, FILM COATED ORAL at 18:46

## 2024-02-03 RX ADMIN — FUROSEMIDE 40 MG: 10 INJECTION, SOLUTION INTRAMUSCULAR; INTRAVENOUS at 20:25

## 2024-02-03 RX ADMIN — Medication 10 ML: at 09:38

## 2024-02-03 RX ADMIN — DOCUSATE SODIUM 50MG AND SENNOSIDES 8.6MG 2 TABLET: 8.6; 5 TABLET, FILM COATED ORAL at 23:00

## 2024-02-03 RX ADMIN — CARBIDOPA AND LEVODOPA 2 TABLET: 25; 100 TABLET ORAL at 18:46

## 2024-02-03 RX ADMIN — Medication 1 CAPSULE: at 23:00

## 2024-02-03 RX ADMIN — AMLODIPINE BESYLATE 5 MG: 5 TABLET ORAL at 18:47

## 2024-02-03 RX ADMIN — PANTOPRAZOLE SODIUM 40 MG: 40 TABLET, DELAYED RELEASE ORAL at 06:45

## 2024-02-03 RX ADMIN — Medication 1 CAPSULE: at 09:38

## 2024-02-03 RX ADMIN — CARBIDOPA AND LEVODOPA 2 TABLET: 25; 100 TABLET ORAL at 23:00

## 2024-02-03 RX ADMIN — PIPERACILLIN SODIUM AND TAZOBACTAM SODIUM 3.38 G: 3; .375 INJECTION, SOLUTION INTRAVENOUS at 09:38

## 2024-02-03 RX ADMIN — CARBIDOPA AND LEVODOPA 2 TABLET: 25; 100 TABLET ORAL at 14:36

## 2024-02-03 NOTE — PROGRESS NOTES
[facility]  INTERNAL MEDICINE PROGRESS NOTE    Name:  Radha Varela   Age:  88 y.o.  Sex:  male  :  1935  MRN:  [id number]   Visit Number:  [id number]  Admission Date:  2024  Date Of Service:  24  Primary Care Physician:  Dieudonne Jensen MD     LOS: 1 day :  Patient Care Team:  Dieudonne Jensen MD as PCP - General (Internal Medicine):      Subjective / Interval History:       Radha Varela  is an 88-year-old pleasant male resident of assisted living facility, with multiple medical problems including coronary artery disease status post CABG, chronic kidney disease stage III, abdominal aortic aneurysm repair, atrial fibrillation, Parkinson's disease, carotid and celiac artery stenosis, COPD on home oxygen at nighttime, obstructive sleep apnea was brought to the emergency room by EMS from the nursing home facility with fever, generalized weakness and confusion.  Patient was recently discharged from this facility on 2024 after being treated for metabolic encephalopathy, dehydration and UTI secondary to Enterococcus.  He was discharged on Augmentin.  Patient is currently confused and is unable to provide any history.     In the emergency room, he had a fever of 102.7, pulse 88, blood pressure 148/79 and pulse oxygen saturation of 97% on 2 L of oxygen.  Troponin T 74, proBNP 13,700.  CMP was remarkable for a creatinine of 2.12 (baseline 1.7), magnesium 1.5, alkaline phosphatase 190 but LFT otherwise was unremarkable.  Lactic acid was 1 and procalcitonin was 1.12.  White count was 10, hemoglobin 7.8 (baseline), platelets 126.  Urine analysis was suggestive of urinary tract infection.  Patient was noted to have diarrhea in the emergency room and C. difficile toxin was sent which was negative.  COVID and flu test were negative.  Urine culture was sent.  Patient received Tylenol, Zosyn and vancomycin in the emergency room and is currently being admitted to  the medical floor      February 1 patient has been seen.  He has been very sleepy and arousable but has been having difficulty to stay awake.  Events noted and chart reviewed since admission last evening as above.  This morning he is afebrile and hemodynamically stable.    Patient seen on February 2.  He is alert and oriented to self and surroundings this morning.  He states that he has been coughed.  Though noted that there has been some spells of confusion.  He has been able to drink and eat well.  Though the nurse stated that he was slightly hypoxic and had to be placed on 1 L oxygen and his saturation is 96% at present    Patient seen on February 3.  He has been sleepy but arousable.  States has been feeling sleepy.  He has been slightly confused and appetite has not been great.  Noted blood pressure has been elevated today and heart rate still around 60s    Vital Signs:    Temp:  [96.3 °F (35.7 °C)-98 °F (36.7 °C)] 98 °F (36.7 °C)  Heart Rate:  [59-69] 59  Resp:  [16-18] 18  BP: (128-182)/(65-87) 182/87    Intake and output:    I/O last 3 completed shifts:  In: 1820 [P.O.:1770; IV Piggyback:50]  Out: -   I/O this shift:  In: 240 [P.O.:240]  Out: -     Physical Examination:    General Appearance:  Awake,  not in any acute distress.   Head:    Atraumatic and normocephalic, without obvious abnormality.   Eyes:            PERRLA,  No pallor.    Neck:   Supple,  No lymph glands, no bruit   Lungs:     Chest shape is normal. Breath sounds heard bilaterally equally.  No crackles or wheezing.     Heart:    Normal S1 and S2, no murmur,  No JVD   Abdomen:     Normal bowel sounds, no masses, no organomegaly. Soft     nontender, no guarding, no rebound tenderness   Extremities:   Moves all extremities well, no edema, no cyanosis,    Skin:   No  bruising or rash.   Neurologic: He is awake and oriented to self and surroundings     Laboratory results:  Results from last 7 days   Lab Units 02/03/24  0514 02/02/24  05  02/01/24  0557 01/31/24  2226   SODIUM mmol/L 135* 142 141 139   POTASSIUM mmol/L 3.5 3.9 4.3 3.9   CHLORIDE mmol/L 107 111* 110* 107   CO2 mmol/L 18.9* 19.4* 18.6* 20.6*   BUN mg/dL 16 18 19 19   CREATININE mg/dL 1.82* 1.97* 1.98* 2.12*   CALCIUM mg/dL 7.6* 7.8* 7.6* 8.2*   BILIRUBIN mg/dL 0.4 0.4  --  0.6   ALK PHOS U/L 114 111  --  119*   ALT (SGPT) U/L <5 <5  --  <5   AST (SGOT) U/L 20 23  --  20   GLUCOSE mg/dL 102* 91 86 93     Results from last 7 days   Lab Units 02/03/24  0514 02/02/24  0527 02/01/24  0557   WBC 10*3/mm3 6.63 6.31 8.44   HEMOGLOBIN g/dL 7.1* 7.5* 7.7*   HEMATOCRIT % 22.7* 24.3* 25.1*   PLATELETS 10*3/mm3 107* 110* 110*         Results from last 7 days   Lab Units 02/01/24  0026 01/31/24  2226   HSTROP T ng/L 72* 74*     Results from last 7 days   Lab Units 01/31/24  2323 01/31/24  2318 01/31/24  2237 01/27/24  1634 01/27/24  1622   BLOODCX  No growth at 2 days No growth at 2 days  --  No growth at 5 days No growth at 5 days   URINECX   --   --  No growth  --   --        Radiology results:    Imaging Results (Last 24 Hours)       ** No results found for the last 24 hours. **            I have reviewed the patient's radiology reports.    Medication Review:     I have reviewed the patients active and prn medications.     Assessment:      Urinary tract infection    Generalized weakness    Parkinson's disease dementia    Coronary artery disease involving native coronary artery of native heart without angina pectoris    Acute metabolic encephalopathy    Sepsis    Chronic kidney disease, stage III (moderate)  Anemia        Plan:    1.  Acute metabolic encephalopathy secondary to simple sepsis-he has had UTI due to Enterococcus which has been treated and he was just discharged on Augmentin day 1 January 30  Have  broaden the coverage with  Zosyn to cover gram-positive as well as negatives   Blood cultures and urine cultures have been negative till now but will need the Zosyn at least for 5  days      2.  Acute on chronic kidney disease-he does have baseline CKD stage III and is slightly improved and is back around 1.8    3.  Parkinson's disease with dementia-noted his dementia but is slowly getting worse though Parkinson's is stable    4.  Mild bradycardia.  Will still hold the beta-blocker and stop it but covered with amlodipine for blood pressure    5 hypertension-will give amlodipine 5 mg and still hold and probably stopped beta-blocker due to bradycardia    6.  Symptomatic anemia-he has been anemic with hemoglobin below 8 for a while and it has been fluctuating around 7.7 but has dropped to 7.1 and will do 1 blood transfusion as this could be causing significant fatigue and tiredness as he was also having mild hypoxemia which is at present resolved.  There is no active bleeding    See rest as per orders and patient will be closely followed up      Dieudonne Jensen MD  02/03/24  12:53 EST      Please note that portions of this note were completed with a voice recognition program.

## 2024-02-03 NOTE — PROGRESS NOTES
Enter Query Response Below      Query Response: Unable to determine              If applicable, please update the problem list.   Patient: Radha Varela        : 1935  Account: [id number]           Admit Date: 2024        How to Respond to this query:       a. Click New Note     b. Answer query within the yellow box.                c. Update the Problem List, if applicable.      If you have any questions about this query contact me at: [email or web address]         89 yo male admitted for metabolic encephalopathy with stage 3 CKD. Discharge summary states acute renal failure superimposed on stage 3 chronic kidney disease. Treated with IV fluids. Creatine baseline 1.7.   Creatine: 2.25 (), 1.91 (), 1.56 (), 1.69 ()    After study, was acute kidney injury (ADITHYA) clinically supported during this admission?    Acute kidney injury (ADITHYA) was supported with additional clinical indicators:____________  Acute kidney injury (ADITHYA) was not supported  Other- specify_____________  Unable to determine     ADITHYA is defined by KDIGO as any of the following:  Increase in creatinine > 0.3 mg/dl within 48 hours or less; or   Increase in creatinine level to > 1.5x baseline (historical or measure), which is known or presumed to have occurred within the prior 7 days   Urine volume <0.5 ml/kg/h for 6 hours.  Reference article: http://www.kdigo.org/clinical_practice_guidelines/pdf/KDIGO%20AKI%20Guideline.pdf (as published in [employer] Supp[employer], The Official Journal of the International Society of Nephrology, vol. 2, issue 1, 2012.)      By submitting this query, we are merely seeking further clarification of documentation to accurately reflect all conditions that you are monitoring, evaluating, treating or that extend the hospitalization or utilize additional resources of care. Please utilize your independent clinical judgment when addressing the question(s) above.      This query and your response, once completed, will be entered into the legal medical record.    Sincerely,  Xavier Hou RN  Clinical Documentation Integrity Program

## 2024-02-03 NOTE — THERAPY TREATMENT NOTE
PT treatment held.  Patient is sleeping when PT arrived, but rouses easily when his name is stated.  He declines working with PT twice and falls back to sleep.  Patient's daughter, Sandra, is present and states he is not having a great day and it is okay to let him sleep today.  PT will follow up tomorrow.     36.7

## 2024-02-03 NOTE — PLAN OF CARE
Goal Outcome Evaluation:      Pleasantly confused patient with bed alarm on at all times-medications given per Dr. Jensen's orders-scheduled IV antibiotics per orders-monitor labs and continue to monitor patient-supportive daughter proving support-will discharge back to LTC facility in a few days

## 2024-02-04 LAB
ALBUMIN SERPL-MCNC: 3.6 G/DL (ref 3.5–5.2)
ALBUMIN/GLOB SERPL: 1.3 G/DL
ALP SERPL-CCNC: 123 U/L (ref 39–117)
ALT SERPL W P-5'-P-CCNC: <5 U/L (ref 1–41)
ANION GAP SERPL CALCULATED.3IONS-SCNC: 11.2 MMOL/L (ref 5–15)
AST SERPL-CCNC: 18 U/L (ref 1–40)
BACTERIA ISLT: NORMAL
BH BB BLOOD EXPIRATION DATE: NORMAL
BH BB BLOOD TYPE BARCODE: 5100
BH BB DISPENSE STATUS: NORMAL
BH BB PRODUCT CODE: NORMAL
BH BB UNIT NUMBER: NORMAL
BILIRUB SERPL-MCNC: 0.5 MG/DL (ref 0–1.2)
BUN SERPL-MCNC: 15 MG/DL (ref 8–23)
BUN/CREAT SERPL: 8.2 (ref 7–25)
CALCIUM SPEC-SCNC: 8.4 MG/DL (ref 8.6–10.5)
CHLORIDE SERPL-SCNC: 106 MMOL/L (ref 98–107)
CO2 SERPL-SCNC: 18.8 MMOL/L (ref 22–29)
CREAT SERPL-MCNC: 1.82 MG/DL (ref 0.76–1.27)
CROSSMATCH INTERPRETATION: NORMAL
DEPRECATED RDW RBC AUTO: 55.4 FL (ref 37–54)
EGFRCR SERPLBLD CKD-EPI 2021: 35.3 ML/MIN/1.73
ERYTHROCYTE [DISTWIDTH] IN BLOOD BY AUTOMATED COUNT: 16 % (ref 12.3–15.4)
GLOBULIN UR ELPH-MCNC: 2.7 GM/DL
GLUCOSE SERPL-MCNC: 78 MG/DL (ref 65–99)
HCT VFR BLD AUTO: 30 % (ref 37.5–51)
HGB BLD-MCNC: 9.7 G/DL (ref 13–17.7)
MCH RBC QN AUTO: 30.5 PG (ref 26.6–33)
MCHC RBC AUTO-ENTMCNC: 32.3 G/DL (ref 31.5–35.7)
MCV RBC AUTO: 94.3 FL (ref 79–97)
PLATELET # BLD AUTO: 127 10*3/MM3 (ref 140–450)
PMV BLD AUTO: 10.3 FL (ref 6–12)
POTASSIUM SERPL-SCNC: 3.4 MMOL/L (ref 3.5–5.2)
PROT SERPL-MCNC: 6.3 G/DL (ref 6–8.5)
RBC # BLD AUTO: 3.18 10*6/MM3 (ref 4.14–5.8)
SODIUM SERPL-SCNC: 136 MMOL/L (ref 136–145)
UNIT  ABO: NORMAL
UNIT  RH: NORMAL
WBC NRBC COR # BLD AUTO: 8.08 10*3/MM3 (ref 3.4–10.8)

## 2024-02-04 PROCEDURE — 25010000002 PIPERACILLIN SOD-TAZOBACTAM PER 1 G: Performed by: INTERNAL MEDICINE

## 2024-02-04 PROCEDURE — 97110 THERAPEUTIC EXERCISES: CPT

## 2024-02-04 PROCEDURE — 97116 GAIT TRAINING THERAPY: CPT

## 2024-02-04 PROCEDURE — 80053 COMPREHEN METABOLIC PANEL: CPT | Performed by: INTERNAL MEDICINE

## 2024-02-04 PROCEDURE — 85027 COMPLETE CBC AUTOMATED: CPT | Performed by: INTERNAL MEDICINE

## 2024-02-04 RX ORDER — ROPINIROLE 1 MG/1
1 TABLET, FILM COATED ORAL EVERY 12 HOURS SCHEDULED
Status: DISCONTINUED | OUTPATIENT
Start: 2024-02-04 | End: 2024-02-06 | Stop reason: HOSPADM

## 2024-02-04 RX ORDER — AMLODIPINE BESYLATE 5 MG/1
10 TABLET ORAL
Status: DISCONTINUED | OUTPATIENT
Start: 2024-02-05 | End: 2024-02-06 | Stop reason: HOSPADM

## 2024-02-04 RX ADMIN — AMLODIPINE BESYLATE 5 MG: 5 TABLET ORAL at 09:34

## 2024-02-04 RX ADMIN — PIPERACILLIN SODIUM AND TAZOBACTAM SODIUM 3.38 G: 3; .375 INJECTION, SOLUTION INTRAVENOUS at 18:02

## 2024-02-04 RX ADMIN — ASPIRIN 81 MG: 81 TABLET, COATED ORAL at 09:34

## 2024-02-04 RX ADMIN — Medication 1 CAPSULE: at 20:25

## 2024-02-04 RX ADMIN — ROPINIROLE HYDROCHLORIDE 1 MG: 1 TABLET, FILM COATED ORAL at 20:25

## 2024-02-04 RX ADMIN — CYANOCOBALAMIN TAB 500 MCG 1000 MCG: 500 TAB at 09:34

## 2024-02-04 RX ADMIN — ROPINIROLE HYDROCHLORIDE 1 MG: 1 TABLET, FILM COATED ORAL at 09:34

## 2024-02-04 RX ADMIN — CARBIDOPA AND LEVODOPA 2 TABLET: 25; 100 TABLET ORAL at 09:34

## 2024-02-04 RX ADMIN — Medication 10 ML: at 02:00

## 2024-02-04 RX ADMIN — PANTOPRAZOLE SODIUM 40 MG: 40 TABLET, DELAYED RELEASE ORAL at 06:35

## 2024-02-04 RX ADMIN — CARBIDOPA AND LEVODOPA 2 TABLET: 25; 100 TABLET ORAL at 20:25

## 2024-02-04 RX ADMIN — PIPERACILLIN SODIUM AND TAZOBACTAM SODIUM 3.38 G: 3; .375 INJECTION, SOLUTION INTRAVENOUS at 02:00

## 2024-02-04 RX ADMIN — DOCUSATE SODIUM 50MG AND SENNOSIDES 8.6MG 2 TABLET: 8.6; 5 TABLET, FILM COATED ORAL at 20:25

## 2024-02-04 RX ADMIN — Medication 10 ML: at 09:35

## 2024-02-04 RX ADMIN — DOCUSATE SODIUM 50MG AND SENNOSIDES 8.6MG 2 TABLET: 8.6; 5 TABLET, FILM COATED ORAL at 09:34

## 2024-02-04 RX ADMIN — PIPERACILLIN SODIUM AND TAZOBACTAM SODIUM 3.38 G: 3; .375 INJECTION, SOLUTION INTRAVENOUS at 11:14

## 2024-02-04 RX ADMIN — CARBIDOPA AND LEVODOPA 2 TABLET: 25; 100 TABLET ORAL at 13:22

## 2024-02-04 RX ADMIN — Medication 1 CAPSULE: at 09:34

## 2024-02-04 RX ADMIN — CARBIDOPA AND LEVODOPA 2 TABLET: 25; 100 TABLET ORAL at 18:01

## 2024-02-04 NOTE — PLAN OF CARE
Goal Outcome Evaluation:  Plan of Care Reviewed With: patient        Progress: no change  Outcome Evaluation: pleasantly confused patient with bed alarm on at all times-medications given per Dr. Jensen's orders-scheduled IV antibiotics per orders-one unit PRBC's infused with completion @ 1955-Lasix IV -to be given post infusion-monitor labs and continue to monitor patient-plan to discharge back to LTC facility where he resides with his wife in a few days

## 2024-02-04 NOTE — PROGRESS NOTES
[facility]  INTERNAL MEDICINE PROGRESS NOTE    Name:  Radha Varela   Age:  88 y.o.  Sex:  male  :  1935  MRN:  [id number]   Visit Number:  [id number]  Admission Date:  2024  Date Of Service:  24  Primary Care Physician:  Dieudonne Jensen MD     LOS: 2 days :  Patient Care Team:  Dieudonne Jensen MD as PCP - General (Internal Medicine):      Subjective / Interval History:       Radha Varela  is an 88-year-old pleasant male resident of assisted living facility, with multiple medical problems including coronary artery disease status post CABG, chronic kidney disease stage III, abdominal aortic aneurysm repair, atrial fibrillation, Parkinson's disease, carotid and celiac artery stenosis, COPD on home oxygen at nighttime, obstructive sleep apnea was brought to the emergency room by EMS from the nursing home facility with fever, generalized weakness and confusion.  Patient was recently discharged from this facility on 2024 after being treated for metabolic encephalopathy, dehydration and UTI secondary to Enterococcus.  He was discharged on Augmentin.  Patient is currently confused and is unable to provide any history.     In the emergency room, he had a fever of 102.7, pulse 88, blood pressure 148/79 and pulse oxygen saturation of 97% on 2 L of oxygen.  Troponin T 74, proBNP 13,700.  CMP was remarkable for a creatinine of 2.12 (baseline 1.7), magnesium 1.5, alkaline phosphatase 190 but LFT otherwise was unremarkable.  Lactic acid was 1 and procalcitonin was 1.12.  White count was 10, hemoglobin 7.8 (baseline), platelets 126.  Urine analysis was suggestive of urinary tract infection.  Patient was noted to have diarrhea in the emergency room and C. difficile toxin was sent which was negative.  COVID and flu test were negative.  Urine culture was sent.  Patient received Tylenol, Zosyn and vancomycin in the emergency room and is currently being admitted to  the medical floor      February 1 patient has been seen.  He has been very sleepy and arousable but has been having difficulty to stay awake.  Events noted and chart reviewed since admission last evening as above.  This morning he is afebrile and hemodynamically stable.    Patient seen on February 2.  He is alert and oriented to self and surroundings this morning.  He states that he has been coughed.  Though noted that there has been some spells of confusion.  He has been able to drink and eat well.  Though the nurse stated that he was slightly hypoxic and had to be placed on 1 L oxygen and his saturation is 96% at present    Patient seen on February 3.  He has been sleepy but arousable.  States has been feeling sleepy.  He has been slightly confused and appetite has not been great.  He was given 1 unit of blood transfusion and posttransfusion his hemoglobin is stable.    Patient seen on February 4.  He is more better with his mentation he is alert oriented talking and has been trying to eat better.  He states that his sleep has been impaired and says that he has been staying awake at night and sleeping more during the daytime    Vital Signs:    Temp:  [97.4 °F (36.3 °C)-97.8 °F (36.6 °C)] 97.6 °F (36.4 °C)  Heart Rate:  [68-84] 69  Resp:  [16-20] 16  BP: (138-191)/() 162/88    Intake and output:    I/O last 3 completed shifts:  In: 1843.3 [P.O.:1330; Blood:313.3; IV Piggyback:200]  Out: -   I/O this shift:  In: 420 [P.O.:420]  Out: -     Physical Examination:    General Appearance:  Awake,  not in any acute distress.   Head:    Atraumatic and normocephalic, without obvious abnormality.   Eyes:            PERRLA,  No pallor.    Neck:   Supple,  No lymph glands, no bruit   Lungs:     Chest shape is normal. Breath sounds heard bilaterally equally.  No crackles or wheezing.     Heart:    Normal S1 and S2, no murmur,  No JVD   Abdomen:     Normal bowel sounds, no masses, no organomegaly. Soft     nontender, no  guarding, no rebound tenderness   Extremities:   Moves all extremities well, no edema, no cyanosis,    Skin:   No  bruising or rash.   Neurologic: He is awake and oriented to self and surroundings     Laboratory results:  Results from last 7 days   Lab Units 02/04/24  0519 02/03/24  0514 02/02/24  0528   SODIUM mmol/L 136 135* 142   POTASSIUM mmol/L 3.4* 3.5 3.9   CHLORIDE mmol/L 106 107 111*   CO2 mmol/L 18.8* 18.9* 19.4*   BUN mg/dL 15 16 18   CREATININE mg/dL 1.82* 1.82* 1.97*   CALCIUM mg/dL 8.4* 7.6* 7.8*   BILIRUBIN mg/dL 0.5 0.4 0.4   ALK PHOS U/L 123* 114 111   ALT (SGPT) U/L <5 <5 <5   AST (SGOT) U/L 18 20 23   GLUCOSE mg/dL 78 102* 91     Results from last 7 days   Lab Units 02/04/24  0519 02/03/24  0514 02/02/24  0527   WBC 10*3/mm3 8.08 6.63 6.31   HEMOGLOBIN g/dL 9.7* 7.1* 7.5*   HEMATOCRIT % 30.0* 22.7* 24.3*   PLATELETS 10*3/mm3 127* 107* 110*         Results from last 7 days   Lab Units 02/01/24  0026 01/31/24  2226   HSTROP T ng/L 72* 74*     Results from last 7 days   Lab Units 01/31/24  2323 01/31/24  2318 01/31/24  2237   BLOODCX  No growth at 3 days No growth at 3 days  --    URINECX   --   --  No growth       Radiology results:    Imaging Results (Last 24 Hours)       ** No results found for the last 24 hours. **            I have reviewed the patient's radiology reports.    Medication Review:     I have reviewed the patients active and prn medications.     Assessment:      Urinary tract infection    Generalized weakness    Parkinson's disease dementia    Coronary artery disease involving native coronary artery of native heart without angina pectoris    Acute metabolic encephalopathy    Sepsis    Chronic kidney disease, stage III (moderate)  Anemia        Plan:    1.  Acute metabolic encephalopathy secondary to simple sepsis-he has had UTI due to Enterococcus which has been treated and he was just discharged on Augmentin day 1 January 30  Have  broaden the coverage with  Zosyn to cover  gram-positive as well as negatives   Blood cultures and urine cultures have been negative till now but will need the Zosyn at least for 5 days      2.  Acute on chronic kidney disease-he does have baseline CKD stage III and is slightly improved and is back around 1.8    3.  Parkinson's disease with dementia-noted his dementia but is slowly getting worse though Parkinson's is stable    4.  Mild bradycardia.  Will still hold the beta-blocker and stop it but covered with amlodipine for blood pressure    5 hypertension-changed to  amlodipine 10 mg and still hold and probably stopped beta-blocker due to bradycardia    6.  Symptomatic anemia-he got 1 unit of blood transfusion and he is stable posttransfusion.  Will still repeat stool for occult blood    See rest as per orders and patient will be closely followed up      Dieudonne Jensen MD  02/04/24  15:15 EST      Please note that portions of this note were completed with a voice recognition program.

## 2024-02-04 NOTE — PLAN OF CARE
Goal Outcome Evaluation:         Interventions implemented as appropriate.

## 2024-02-04 NOTE — THERAPY TREATMENT NOTE
Patient Name: Radha Varela  : 1935    MRN: [id number]                              Today's Date: 2024       Admit Date: 2024    Visit Dx:     ICD-10-CM ICD-9-CM   1. Acute cystitis without hematuria  N30.00 595.0     Patient Active Problem List   Diagnosis    Abdominal aortic aneurysm (AAA) without rupture    Generalized weakness    Parkinson's disease dementia    Coronary artery disease involving native coronary artery of native heart without angina pectoris    Fall    Acute renal failure superimposed on stage 3 chronic kidney disease    Hypokalemia    Altered mental status    Dehydration    Metabolic encephalopathy    UTI (urinary tract infection) due to Enterococcus    Acute metabolic encephalopathy    Paroxysmal atrial fibrillation    Urinary tract infection    Sepsis    Chronic kidney disease, stage III (moderate)     Past Medical History:   Diagnosis Date    Abdominal aortic aneurysm (AAA) without rupture 2018    Aneurysm     Arthritis     Cardiac murmur     Carotid stenosis     Celiac artery stenosis     Chest pain     CHF (congestive heart failure)     Chronic kidney disease     Coronary artery disease     Dyslipidemia     Elevated cholesterol     GERD (gastroesophageal reflux disease)     Hiatal hernia     History of exercise stress test     History of nuclear stress test     History of pneumonia     Hypertension     Lumbar degenerative disc disease     MI, old     Osteoarthritis     PAD (peripheral artery disease)     Parkinson's disease     Paroxysmal A-fib     Peptic ulceration     Psoriasis     Renal artery stenosis     Sleep apnea     uses o2 @ HS    Spinal stenosis     Tremor     Vertebral artery stenosis      Past Surgical History:   Procedure Laterality Date    ABDOMINAL AORTIC ANEURYSM REPAIR WITH ENDOGRAFT      APPENDECTOMY      CATARACT EXTRACTION W/ INTRAOCULAR LENS IMPLANT Left 12/10/2018    Procedure: CATARACT PHACO EXTRACTION WITH INTRAOCULAR LENS  IMPLANT LEFT;  Surgeon: Charity Carrasco MD;  Location: [facility];  Service: Ophthalmology    CATARACT EXTRACTION W/ INTRAOCULAR LENS IMPLANT Right 6/12/2023    Procedure: CATARACT PHACO EXTRACTION WITH INTRAOCULAR LENS IMPLANT RIGHT;  Surgeon: Chariyt Carrasco MD;  Location: [facility];  Service: Ophthalmology;  Laterality: Right;    COLONOSCOPY      CORONARY ARTERY BYPASS GRAFT  1995    ENDOSCOPY      SHOULDER ROTATOR CUFF REPAIR Right     SKIN BIOPSY        General Information       Row Name 02/04/24 0924          Physical Therapy Time and Intention    Document Type therapy note (daily note)  -MR     Mode of Treatment physical therapy  -MR       Row Name 02/04/24 0924          Cognition    Orientation Status (Cognition) oriented x 4  -MR       Row Name 02/04/24 0924          Safety Issues, Functional Mobility    Safety Issues Affecting Function (Mobility) judgment  -MR     Impairments Affecting Function (Mobility) balance;endurance/activity tolerance;strength;coordination;postural/trunk control  -MR               User Key  (r) = Recorded By, (t) = Taken By, (c) = Cosigned By      Initials Name Provider Type    MR Joseline Jeffrey, DAGOBERTO Physical Therapist Assistant                   Mobility       Row Name 02/04/24 0924          Bed Mobility    Bed Mobility supine-sit-supine  -MR     Supine-Sit [place] (Bed Mobility) minimum assist (75% patient effort)  -MR     Supine-Sit-Supine [place] (Bed Mobility) moderate assist (50% patient effort)  -MR     Assistive Device (Bed Mobility) head of bed elevated  -MR       Row Name 02/04/24 0924          Sit-Stand Transfer    Sit-Stand [place] (Transfers) moderate assist (50% patient effort)  -MR     Assistive Device (Sit-Stand Transfers) walker, front-wheeled  -MR     Comment, (Sit-Stand Transfer) Patient cued for hand placement during transfers  -MR       Row Name 02/04/24 0924          Gait/Stairs (Locomotion)    [place] Level (Gait) minimum assist (75% patient  effort);moderate assist (50% patient effort)  -MR     Assistive Device (Gait) walker, front-wheeled  -MR     Bilateral Gait Deviations forward flexed posture  -MR     Comment, (Gait/Stairs) patient has shuffling gait pattern and was cued for taking bigger steps; started has min assist and fatigues quickly becoming more mod assist to get back to EOB  -MR               User Key  (r) = Recorded By, (t) = Taken By, (c) = Cosigned By      Initials Name Provider Type     Wojciech DAGOBERTO Trevizo Physical Therapist Assistant                   Obj/Interventions       Row Name 02/04/24 0924          Balance    Dynamic Standing Balance moderate assist  -MR     Position/Device Used, Standing Balance walker, front-wheeled  -MR               User Key  (r) = Recorded By, (t) = Taken By, (c) = Cosigned By      Initials Name Provider Type    Pipo MckeonDAGOBERTO dumont Physical Therapist Assistant                   Goals/Plan    No documentation.                  Clinical Impression       Row Name 02/04/24 0924          Pain    Pretreatment Pain Rating 0/10 - no pain  -MR     Posttreatment Pain Rating 0/10 - no pain  -MR       Row Name 02/04/24 0924          Plan of Care Review    Plan of Care Reviewed With patient  -MR     Progress no change  -MR     Outcome Evaluation Patient was min assist for supine to sit transfers. Ambulates 32ft with RWx and started out requiring min assist for balance but fatigues quickly progressing to mod assist to return to EOB.  He was very disappointed with his performance and strives to do more.  He would be a good candidate for rehab placement.  -MR       Row Name 02/04/24 0924          Vital Signs    Pre SpO2 (%) 97  -MR     O2 Delivery Pre Treatment supplemental O2  1L  -MR     O2 Delivery Intra Treatment supplemental O2  -MR     Post SpO2 (%) 94  -MR     O2 Delivery Post Treatment supplemental O2  1L  -MR       Row Name 02/04/24 0924          Positioning and Restraints    Pre-Treatment Position in bed   -MR     Post Treatment Position bed  -MR     In Bed call light within reach;encouraged to call for assist;exit alarm on  -MR               User Key  (r) = Recorded By, (t) = Taken By, (c) = Cosigned By      Initials Name Provider Type     Kolerey DAGOBERTO Trevizo Physical Therapist Assistant                   Outcome Measures       Row Name 02/04/24 0924          How much help from another person do you currently need...    Turning from your back to your side while in flat bed without using bedrails? 2  -MR     Moving from lying on back to sitting on the side of a flat bed without bedrails? 2  -MR     Moving to and from a bed to a chair (including a wheelchair)? 2  -MR     Standing up from a chair using your arms (e.g., wheelchair, bedside chair)? 2  -MR     Climbing 3-5 steps with a railing? 2  -MR     To walk in hospital room? 2  -MR     AM-PAC 6 Clicks Score (PT) 12  -MR     Highest Level of Mobility Goal 4 --> Transfer to chair/commode  -MR               User Key  (r) = Recorded By, (t) = Taken By, (c) = Cosigned By      Initials Name Provider Type    MR Jeffrey DAGOBERTO Trevizo Physical Therapist Assistant                                 Physical Therapy Education       Title: PT OT SLP Therapies (Done)       Topic: Physical Therapy (Done)       Point: Mobility training (Done)       Learning Progress Summary             Patient Acceptance, E,TB, VU,NR by  at 2/4/2024 0924    Eager, E, VU by  at 2/2/2024 1438   Family Eager, E, VU by  at 2/2/2024 1438                                         User Key       Initials Effective Dates Name Provider Type Discipline     06/16/21 -  Joseline Jeffrey PTA Physical Therapist Assistant PT    COLBY 01/18/24 -  Bekah Sheppard, JOAN Student PT Student PT                  PT Recommendation and Plan     Plan of Care Reviewed With: patient  Progress: no change  Outcome Evaluation: Patient was min assist for supine to sit transfers. Ambulates 32ft with RWx and started out requiring  min assist for balance but fatigues quickly progressing to mod assist to return to EOB.  He was very disappointed with his performance and strives to do more.  He would be a good candidate for rehab placement.     Time Calculation:         PT Charges       Row Name 02/04/24 0924 02/04/24 0849          Time Calculation    Start Time 0924  -MR --     Stop Time 0952  -MR --     Time Calculation (min) 28 min  -MR --     PT Received On 02/04/24  -MR --     PT Goal Re-Cert Due Date -- 02/12/24  -MR        Timed Charges    97002 - PT Therapeutic Exercise Minutes 10  -MR --     65723 - Gait Training Minutes  18  -MR --        Total Minutes    Timed Charges Total Minutes 28  -MR --      Total Minutes 28  -MR --               User Key  (r) = Recorded By, (t) = Taken By, (c) = Cosigned By      Initials Name Provider Type     Joseline Jeffrey PTA Physical Therapist Assistant                  Therapy Charges for Today       Code Description Service Date Service Provider Modifiers Qty    66626135157 HC PT THER PROC EA 15 MIN 2/4/2024 Joseline Jeffrey PTA GP 1    43752207918 HC GAIT TRAINING EA 15 MIN 2/4/2024 Joselnie Jeffrey PTA GP 1            PT G-Codes  Outcome Measure Options: AM-PAC 6 Clicks Basic Mobility (PT)  AM-PAC 6 Clicks Score (PT): 12  AM-PAC 6 Clicks Score (OT): 12       Joseline Jeffrey PTA  2/4/2024

## 2024-02-04 NOTE — PLAN OF CARE
Goal Outcome Evaluation:  Plan of Care Reviewed With: patient        Progress: no change  Outcome Evaluation: Patient was min assist for supine to sit transfers. Ambulates 32ft with RWx and started out requiring min assist for balance but fatigues quickly progressing to mod assist to return to EOB.  He was very disappointed with his performance and strives to do more.  He would be a good candidate for rehab placement.

## 2024-02-05 LAB
ALBUMIN SERPL-MCNC: 3.5 G/DL (ref 3.5–5.2)
ALBUMIN/GLOB SERPL: 1.4 G/DL
ALP SERPL-CCNC: 117 U/L (ref 39–117)
ALT SERPL W P-5'-P-CCNC: <5 U/L (ref 1–41)
ANION GAP SERPL CALCULATED.3IONS-SCNC: 11.2 MMOL/L (ref 5–15)
AST SERPL-CCNC: 15 U/L (ref 1–40)
BILIRUB SERPL-MCNC: 0.6 MG/DL (ref 0–1.2)
BUN SERPL-MCNC: 15 MG/DL (ref 8–23)
BUN/CREAT SERPL: 8.8 (ref 7–25)
CALCIUM SPEC-SCNC: 8.2 MG/DL (ref 8.6–10.5)
CHLORIDE SERPL-SCNC: 101 MMOL/L (ref 98–107)
CO2 SERPL-SCNC: 22.8 MMOL/L (ref 22–29)
CREAT SERPL-MCNC: 1.71 MG/DL (ref 0.76–1.27)
DEPRECATED RDW RBC AUTO: 54 FL (ref 37–54)
EGFRCR SERPLBLD CKD-EPI 2021: 38 ML/MIN/1.73
ERYTHROCYTE [DISTWIDTH] IN BLOOD BY AUTOMATED COUNT: 16 % (ref 12.3–15.4)
GLOBULIN UR ELPH-MCNC: 2.5 GM/DL
GLUCOSE SERPL-MCNC: 79 MG/DL (ref 65–99)
HCT VFR BLD AUTO: 28.1 % (ref 37.5–51)
HGB BLD-MCNC: 9.2 G/DL (ref 13–17.7)
MCH RBC QN AUTO: 30.7 PG (ref 26.6–33)
MCHC RBC AUTO-ENTMCNC: 32.7 G/DL (ref 31.5–35.7)
MCV RBC AUTO: 93.7 FL (ref 79–97)
PLATELET # BLD AUTO: 113 10*3/MM3 (ref 140–450)
PMV BLD AUTO: 9.8 FL (ref 6–12)
POTASSIUM SERPL-SCNC: 3.7 MMOL/L (ref 3.5–5.2)
PROT SERPL-MCNC: 6 G/DL (ref 6–8.5)
RBC # BLD AUTO: 3 10*6/MM3 (ref 4.14–5.8)
SODIUM SERPL-SCNC: 135 MMOL/L (ref 136–145)
WBC NRBC COR # BLD AUTO: 7.42 10*3/MM3 (ref 3.4–10.8)

## 2024-02-05 PROCEDURE — 97535 SELF CARE MNGMENT TRAINING: CPT

## 2024-02-05 PROCEDURE — 97116 GAIT TRAINING THERAPY: CPT

## 2024-02-05 PROCEDURE — 25010000002 PIPERACILLIN SOD-TAZOBACTAM PER 1 G: Performed by: INTERNAL MEDICINE

## 2024-02-05 PROCEDURE — 97110 THERAPEUTIC EXERCISES: CPT

## 2024-02-05 PROCEDURE — 97530 THERAPEUTIC ACTIVITIES: CPT

## 2024-02-05 PROCEDURE — 80053 COMPREHEN METABOLIC PANEL: CPT | Performed by: INTERNAL MEDICINE

## 2024-02-05 PROCEDURE — 85027 COMPLETE CBC AUTOMATED: CPT | Performed by: INTERNAL MEDICINE

## 2024-02-05 RX ADMIN — ROPINIROLE HYDROCHLORIDE 1 MG: 1 TABLET, FILM COATED ORAL at 08:34

## 2024-02-05 RX ADMIN — PANTOPRAZOLE SODIUM 40 MG: 40 TABLET, DELAYED RELEASE ORAL at 05:45

## 2024-02-05 RX ADMIN — PIPERACILLIN SODIUM AND TAZOBACTAM SODIUM 3.38 G: 3; .375 INJECTION, SOLUTION INTRAVENOUS at 18:35

## 2024-02-05 RX ADMIN — CARBIDOPA AND LEVODOPA 2 TABLET: 25; 100 TABLET ORAL at 18:35

## 2024-02-05 RX ADMIN — ASPIRIN 81 MG: 81 TABLET, COATED ORAL at 08:34

## 2024-02-05 RX ADMIN — AMLODIPINE BESYLATE 10 MG: 5 TABLET ORAL at 08:34

## 2024-02-05 RX ADMIN — CARBIDOPA AND LEVODOPA 2 TABLET: 25; 100 TABLET ORAL at 20:52

## 2024-02-05 RX ADMIN — CARBIDOPA AND LEVODOPA 2 TABLET: 25; 100 TABLET ORAL at 13:39

## 2024-02-05 RX ADMIN — PIPERACILLIN SODIUM AND TAZOBACTAM SODIUM 3.38 G: 3; .375 INJECTION, SOLUTION INTRAVENOUS at 11:09

## 2024-02-05 RX ADMIN — Medication 1 CAPSULE: at 08:34

## 2024-02-05 RX ADMIN — Medication 10 ML: at 08:35

## 2024-02-05 RX ADMIN — CARBIDOPA AND LEVODOPA 2 TABLET: 25; 100 TABLET ORAL at 08:34

## 2024-02-05 RX ADMIN — DOCUSATE SODIUM 50MG AND SENNOSIDES 8.6MG 2 TABLET: 8.6; 5 TABLET, FILM COATED ORAL at 20:52

## 2024-02-05 RX ADMIN — Medication 1 CAPSULE: at 20:52

## 2024-02-05 RX ADMIN — ROPINIROLE HYDROCHLORIDE 1 MG: 1 TABLET, FILM COATED ORAL at 20:52

## 2024-02-05 RX ADMIN — CYANOCOBALAMIN TAB 500 MCG 1000 MCG: 500 TAB at 08:34

## 2024-02-05 RX ADMIN — PIPERACILLIN SODIUM AND TAZOBACTAM SODIUM 3.38 G: 3; .375 INJECTION, SOLUTION INTRAVENOUS at 02:15

## 2024-02-05 RX ADMIN — Medication 10 ML: at 20:53

## 2024-02-05 NOTE — CASE MANAGEMENT/SOCIAL WORK
Case Management/Social Work    Patient Name:  Radha Varela  YOB: 1935  MRN: [id number]  Admit Date:  1/31/2024      [facility] reached out to Johanne/Nataliya Jha to see if pt can readmit when medically ready. No answer. Left  requesting a call back.     Johanne contacted this Sw. Confirms pt is able to return to the facility when medically stable.          Electronically signed by:  Cathryn Sanches  02/05/24 14:17 EST

## 2024-02-05 NOTE — THERAPY TREATMENT NOTE
Patient Name: Radha Varela  : 1935    MRN: [id number]                              Today's Date: 2024       Admit Date: 2024    Visit Dx:     ICD-10-CM ICD-9-CM   1. Acute cystitis without hematuria  N30.00 595.0     Patient Active Problem List   Diagnosis    Abdominal aortic aneurysm (AAA) without rupture    Generalized weakness    Parkinson's disease dementia    Coronary artery disease involving native coronary artery of native heart without angina pectoris    Fall    Acute renal failure superimposed on stage 3 chronic kidney disease    Hypokalemia    Altered mental status    Dehydration    Metabolic encephalopathy    UTI (urinary tract infection) due to Enterococcus    Acute metabolic encephalopathy    Paroxysmal atrial fibrillation    Urinary tract infection    Sepsis    Chronic kidney disease, stage III (moderate)     Past Medical History:   Diagnosis Date    Abdominal aortic aneurysm (AAA) without rupture 2018    Aneurysm     Arthritis     Cardiac murmur     Carotid stenosis     Celiac artery stenosis     Chest pain     CHF (congestive heart failure)     Chronic kidney disease     Coronary artery disease     Dyslipidemia     Elevated cholesterol     GERD (gastroesophageal reflux disease)     Hiatal hernia     History of exercise stress test     History of nuclear stress test     History of pneumonia     Hypertension     Lumbar degenerative disc disease     MI, old     Osteoarthritis     PAD (peripheral artery disease)     Parkinson's disease     Paroxysmal A-fib     Peptic ulceration     Psoriasis     Renal artery stenosis     Sleep apnea     uses o2 @ HS    Spinal stenosis     Tremor     Vertebral artery stenosis      Past Surgical History:   Procedure Laterality Date    ABDOMINAL AORTIC ANEURYSM REPAIR WITH ENDOGRAFT      APPENDECTOMY      CATARACT EXTRACTION W/ INTRAOCULAR LENS IMPLANT Left 12/10/2018    Procedure: CATARACT PHACO EXTRACTION WITH INTRAOCULAR LENS  IMPLANT LEFT;  Surgeon: Charity Carrasco MD;  Location: [facility];  Service: Ophthalmology    CATARACT EXTRACTION W/ INTRAOCULAR LENS IMPLANT Right 6/12/2023    Procedure: CATARACT PHACO EXTRACTION WITH INTRAOCULAR LENS IMPLANT RIGHT;  Surgeon: Charity Carrasco MD;  Location: [facility];  Service: Ophthalmology;  Laterality: Right;    COLONOSCOPY      CORONARY ARTERY BYPASS GRAFT  1995    ENDOSCOPY      SHOULDER ROTATOR CUFF REPAIR Right     SKIN BIOPSY        General Information       Row Name 02/05/24 1153 02/05/24 1135       Physical Therapy Time and Intention    Document Type therapy note (daily note)  -CC therapy note (daily note)  -CC    Mode of Treatment physical therapy  - physical therapy  -CC      Row Name 02/05/24 1153 02/05/24 1135       General Information    Patient Profile Reviewed yes  -CC yes  -CC    Existing Precautions/Restrictions fall  - fall  -CC      Row Name 02/05/24 1153 02/05/24 1135       Safety Issues, Functional Mobility    Safety Issues Affecting Function (Mobility) awareness of need for assistance;insight into deficits/self-awareness;judgment;positioning of assistive device;safety precautions follow-through/compliance  -CC awareness of need for assistance;insight into deficits/self-awareness;judgment;positioning of assistive device;safety precautions follow-through/compliance  -CC    Impairments Affecting Function (Mobility) balance;endurance/activity tolerance;strength;coordination  -CC balance;endurance/activity tolerance;strength;coordination  -CC              User Key  (r) = Recorded By, (t) = Taken By, (c) = Cosigned By      Initials Name Provider Type     Damaris Cantu, PTA Physical Therapist Assistant                   Mobility       Row Name 02/05/24 1136          Bed Mobility    Bed Mobility supine-sit  -CC     Supine-Sit [place] (Bed Mobility) minimum assist (75% patient effort);contact guard;verbal cues  -       Row Name 02/05/24 1136          Bed-Chair Transfer     Bed-Chair [place] (Transfers) minimum assist (75% patient effort);1 person assist  -     Assistive Device (Bed-Chair Transfers) walker, front-wheeled  -       Row Name 02/05/24 1136          Sit-Stand Transfer    Sit-Stand [place] (Transfers) minimum assist (75% patient effort)  -     Assistive Device (Sit-Stand Transfers) walker, front-wheeled  -       Row Name 02/05/24 1136          Gait/Stairs (Locomotion)    [place] Level (Gait) minimum assist (75% patient effort);verbal cues  -     Assistive Device (Gait) walker, front-wheeled  -     Distance in Feet (Gait) 43  -     Deviations/Abnormal Patterns (Gait) base of support, narrow;marian decreased;gait speed decreased  -     Bilateral Gait Deviations forward flexed posture  -               User Key  (r) = Recorded By, (t) = Taken By, (c) = Cosigned By      Initials Name Provider Type     Damaris Cantu, PTA Physical Therapist Assistant                   Obj/Interventions    No documentation.                  Goals/Plan    No documentation.                  Clinical Impression       Row Name 02/05/24 1138          Pain    Pretreatment Pain Rating 0/10 - no pain  -     Posttreatment Pain Rating 0/10 - no pain  -     Additional Documentation Pain Scale: Numbers Pre/Post-Treatment (Group)  -       Row Name 02/05/24 1138          Plan of Care Review    Plan of Care Reviewed With patient  -     Progress improving  -     Outcome Evaluation Pt agreeable to physical therapy. Performed rolling side to side with CGA and VC, supine to sit with min A and VC, sit <->stand x3 reps from EOB with min A and VC for hand placement, amb with RW 43 feet with min A and VC for closer proximity to AD,with 1 episode of freezing gait with VC to correct. Performed B LE ex in sitting AP, LAQ, hip abd, marching 1x15 reps. Con't with PT POC and progress as tolerated  -       Row Name 02/05/24 1138          Positioning and Restraints     Pre-Treatment Position in bed  -CC     Post Treatment Position chair  -CC     In Chair notified nsg;reclined;call light within reach;encouraged to call for assist;exit alarm on  -CC               User Key  (r) = Recorded By, (t) = Taken By, (c) = Cosigned By      Initials Name Provider Type     Damaris Cantu PTA Physical Therapist Assistant                   Outcome Measures       Row Name 02/05/24 1147          How much help from another person do you currently need...    Turning from your back to your side while in flat bed without using bedrails? 3  -CC     Moving from lying on back to sitting on the side of a flat bed without bedrails? 3  -CC     Moving to and from a bed to a chair (including a wheelchair)? 3  -CC     Standing up from a chair using your arms (e.g., wheelchair, bedside chair)? 3  -CC     Climbing 3-5 steps with a railing? 2  -CC     To walk in hospital room? 3  -CC     AM-PAC 6 Clicks Score (PT) 17  -CC     Highest Level of Mobility Goal 5 --> Static standing  -CC       Row Name 02/05/24 1147          Functional Assessment    Outcome Measure Options AM-PAC 6 Clicks Basic Mobility (PT)  -CC               User Key  (r) = Recorded By, (t) = Taken By, (c) = Cosigned By      Initials Name Provider Type    CC Damaris Cantu PTA Physical Therapist Assistant                                 Physical Therapy Education       Title: PT OT SLP Therapies (Done)       Topic: Physical Therapy (Done)       Point: Mobility training (Done)       Learning Progress Summary             Patient Acceptance, E,TB, VU by  at 2/5/2024 1148    Comment: Importance of increasing safety during amb    Acceptance, E,TB, VU,NR by MR at 2/4/2024 0924    Eager, E, VU by KB at 2/2/2024 1438   Family Eager, E, VU by KB at 2/2/2024 1438                                         User Key       Initials Effective Dates Name Provider Type [facility] 06/16/21 -  Damaris Cantu PTA Physical Therapist Assistant PT      06/16/21 -  Joseline Jeffrey PTA Physical Therapist Assistant PT    [facility] 01/18/24 -  Bekah Sheppard PT Student PT Student PT                  PT Recommendation and Plan     Plan of Care Reviewed With: patient  Progress: improving  Outcome Evaluation: Pt agreeable to physical therapy. Performed rolling side to side with CGA and VC, supine to sit with min A and VC, sit <->stand x3 reps from EOB with min A and VC for hand placement, amb with RW 43 feet with min A and VC for closer proximity to AD,with 1 episode of freezing gait with VC to correct. Performed B LE ex in sitting AP, LAQ, hip abd, marching 1x15 reps. Con't with PT POC and progress as tolerated     Time Calculation:         PT Charges       Row Name 02/05/24 1149             Time Calculation    Start Time 0935  -CC      Stop Time 1014  -CC      Time Calculation (min) 39 min  -CC      PT Received On 02/05/24  -CC      PT Goal Re-Cert Due Date 02/12/24  -CC         Timed Charges    54626 - PT Therapeutic Exercise Minutes 15  -CC      11536 - Gait Training Minutes  15  -CC      11683 - PT Therapeutic Activity Minutes 9  -CC         Total Minutes    Timed Charges Total Minutes 39  -CC       Total Minutes 39  -CC                User Key  (r) = Recorded By, (t) = Taken By, (c) = Cosigned By      Initials Name Provider Type    CC Damaris Cantu PTA Physical Therapist Assistant                  Therapy Charges for Today       Code Description Service Date Service Provider Modifiers Qty    69076957804 HC PT THER PROC EA 15 MIN 2/5/2024 Damaris Cantu, DAGOBERTO GP 1    47657362101 HC GAIT TRAINING EA 15 MIN 2/5/2024 Damaris Cantu PTA GP 1    27748096811 HC PT THERAPEUTIC ACT EA 15 MIN 2/5/2024 Damaris Cnatu PTA GP 1            PT G-Codes  Outcome Measure Options: AM-PAC 6 Clicks Basic Mobility (PT)  AM-PAC 6 Clicks Score (PT): 17  AM-PAC 6 Clicks Score (OT): 12       Damaris Cantu PTA  2/5/2024

## 2024-02-05 NOTE — PLAN OF CARE
Goal Outcome Evaluation:  Plan of Care Reviewed With: patient        Progress: improving  Outcome Evaluation: Pt agreeable to physical therapy. Performed rolling side to side with CGA and VC, supine to sit with min A and VC, sit <->stand x3 reps from EOB with min A and VC for hand placement, amb with RW 43 feet with min A and VC for closer proximity to AD,with 1 episode of freezing gait with VC to correct. Performed B LE ex in sitting AP, LAQ, hip abd, marching 1x15 reps. Con't with PT POC and progress as tolerated

## 2024-02-05 NOTE — PLAN OF CARE
Goal Outcome Evaluation:  Plan of Care Reviewed With: patient        Progress: improving  Outcome Evaluation: OT tx completed. Patient reclined in chair, agreeable to participate. Patient performed UB AROM x 15 reps, 1 set followed by grooming tasks with min A. Patient required assists to prepare lunch tray but able to perform self feeding with sup. Patient left sitting in chair for safer position during self feeding, pillows on left side to maintain midline. Continue OT POC      Anticipated Discharge Disposition (OT): skilled nursing facility

## 2024-02-05 NOTE — THERAPY TREATMENT NOTE
Patient Name: Radha Varela  : 1935    MRN: [id number]                              Today's Date: 2024       Admit Date: 2024    Visit Dx:     ICD-10-CM ICD-9-CM   1. Acute cystitis without hematuria  N30.00 595.0     Patient Active Problem List   Diagnosis    Abdominal aortic aneurysm (AAA) without rupture    Generalized weakness    Parkinson's disease dementia    Coronary artery disease involving native coronary artery of native heart without angina pectoris    Fall    Acute renal failure superimposed on stage 3 chronic kidney disease    Hypokalemia    Altered mental status    Dehydration    Metabolic encephalopathy    UTI (urinary tract infection) due to Enterococcus    Acute metabolic encephalopathy    Paroxysmal atrial fibrillation    Urinary tract infection    Sepsis    Chronic kidney disease, stage III (moderate)     Past Medical History:   Diagnosis Date    Abdominal aortic aneurysm (AAA) without rupture 2018    Aneurysm     Arthritis     Cardiac murmur     Carotid stenosis     Celiac artery stenosis     Chest pain     CHF (congestive heart failure)     Chronic kidney disease     Coronary artery disease     Dyslipidemia     Elevated cholesterol     GERD (gastroesophageal reflux disease)     Hiatal hernia     History of exercise stress test     History of nuclear stress test     History of pneumonia     Hypertension     Lumbar degenerative disc disease     MI, old     Osteoarthritis     PAD (peripheral artery disease)     Parkinson's disease     Paroxysmal A-fib     Peptic ulceration     Psoriasis     Renal artery stenosis     Sleep apnea     uses o2 @ HS    Spinal stenosis     Tremor     Vertebral artery stenosis      Past Surgical History:   Procedure Laterality Date    ABDOMINAL AORTIC ANEURYSM REPAIR WITH ENDOGRAFT      APPENDECTOMY      CATARACT EXTRACTION W/ INTRAOCULAR LENS IMPLANT Left 12/10/2018    Procedure: CATARACT PHACO EXTRACTION WITH INTRAOCULAR LENS  IMPLANT LEFT;  Surgeon: Charity Carrasco MD;  Location: [facility];  Service: Ophthalmology    CATARACT EXTRACTION W/ INTRAOCULAR LENS IMPLANT Right 6/12/2023    Procedure: CATARACT PHACO EXTRACTION WITH INTRAOCULAR LENS IMPLANT RIGHT;  Surgeon: Charity Carrasco MD;  Location: [facility];  Service: Ophthalmology;  Laterality: Right;    COLONOSCOPY      CORONARY ARTERY BYPASS GRAFT  1995    ENDOSCOPY      SHOULDER ROTATOR CUFF REPAIR Right     SKIN BIOPSY        General Information       Row Name 02/05/24 1323          OT Time and Intention    Document Type therapy note (daily note)  -SD     Mode of Treatment occupational therapy  -SD       Row Name 02/05/24 1323          General Information    Patient Profile Reviewed yes  -SD               User Key  (r) = Recorded By, (t) = Taken By, (c) = Cosigned By      Initials Name Provider Type    Lizbet Ward OT Occupational Therapist                     Mobility/ADL's       Row Name 02/05/24 1323          Bed Mobility    Comment, (Bed Mobility) in chair  -SD       Row Name 02/05/24 1323          Grooming Assessment/Training    [place] Level (Grooming) hair care, combing/brushing;oral care regimen;wash face, hands;minimum assist (75% patient effort)  -SD     Position (Grooming) supported sitting  -SD       Row Name 02/05/24 1323          Self-Feeding Assessment/Training    [place] Level (Feeding) scoop food and bring to mouth;liquids to mouth;supervision  -SD     Position (Self-Feeding) supported sitting  -SD               User Key  (r) = Recorded By, (t) = Taken By, (c) = Cosigned By      Initials Name Provider Type    Lizbet Ward OT Occupational Therapist                   Obj/Interventions       Row Name 02/05/24 1323          Shoulder (Therapeutic Exercise)    Shoulder (Therapeutic Exercise) AROM (active range of motion)  -SD     Shoulder AROM (Therapeutic Exercise) bilateral;extension;flexion;aBduction;aDduction;15 repititions  -SD       Row Name  02/05/24 1323          Elbow/Forearm (Therapeutic Exercise)    Elbow/Forearm (Therapeutic Exercise) AROM (active range of motion)  -SD     Elbow/Forearm AROM (Therapeutic Exercise) bilateral;flexion;extension;15 repititions  -SD       Row Name 02/05/24 1323          Wrist (Therapeutic Exercise)    Wrist (Therapeutic Exercise) AROM (active range of motion)  -SD     Wrist AROM (Therapeutic Exercise) bilateral;flexion;extension;15 repititions  -SD       Row Name 02/05/24 1323          Hand (Therapeutic Exercise)    Hand (Therapeutic Exercise) AROM (active range of motion)  -SD     Hand AROM/AAROM (Therapeutic Exercise) bilateral;AROM (active range of motion);15 repititions  -SD       Row Name 02/05/24 1323          Motor Skills    Therapeutic Exercise shoulder;elbow/forearm;wrist;hand  -SD               User Key  (r) = Recorded By, (t) = Taken By, (c) = Cosigned By      Initials Name Provider Type    Lizbet Ward OT Occupational Therapist                   Goals/Plan    No documentation.                  Clinical Impression       Row Name 02/05/24 1324          Pain Assessment    Pretreatment Pain Rating 0/10 - no pain  -SD     Posttreatment Pain Rating 0/10 - no pain  -SD       Row Name 02/05/24 1324          Plan of Care Review    Plan of Care Reviewed With patient  -SD     Progress improving  -SD     Outcome Evaluation OT tx completed. Patient reclined in chair, agreeable to participate. Patient performed UB AROM x 15 reps, 1 set followed by grooming tasks with min A. Patient required assists to prepare lunch tray but able to perform self feeding with sup. Patient left sitting in chair for safer position during self feeding, pillows on left side to maintain midline. Continue OT POC  -SD       Row Name 02/05/24 1324          Vital Signs    O2 Delivery Pre Treatment room air  -SD     O2 Delivery Intra Treatment room air  -SD     O2 Delivery Post Treatment room air  -SD       Row Name 02/05/24 1324           Positioning and Restraints    Pre-Treatment Position sitting in chair/recliner  -SD     Post Treatment Position chair  -SD     In Chair sitting;call light within reach;encouraged to call for assist;exit alarm on  -SD               User Key  (r) = Recorded By, (t) = Taken By, (c) = Cosigned By      Initials Name Provider Type    Lizbet Ward OT Occupational Therapist                   Outcome Measures       Row Name 02/05/24 1327          How much help from another is currently needed...    Putting on and taking off regular lower body clothing? 2  -SD     Bathing (including washing, rinsing, and drying) 2  -SD     Toileting (which includes using toilet bed pan or urinal) 2  -SD     Putting on and taking off regular upper body clothing 3  -SD     Taking care of personal grooming (such as brushing teeth) 3  -SD     Eating meals 3  -SD     AM-PAC 6 Clicks Score (OT) 15  -SD       Row Name 02/05/24 1147          How much help from another person do you currently need...    Turning from your back to your side while in flat bed without using bedrails? 3  -CC     Moving from lying on back to sitting on the side of a flat bed without bedrails? 3  -CC     Moving to and from a bed to a chair (including a wheelchair)? 3  -CC     Standing up from a chair using your arms (e.g., wheelchair, bedside chair)? 3  -CC     Climbing 3-5 steps with a railing? 2  -CC     To walk in hospital room? 3  -CC     AM-PAC 6 Clicks Score (PT) 17  -CC     Highest Level of Mobility Goal 5 --> Static standing  -CC       Row Name 02/05/24 1327 02/05/24 1147       Functional Assessment    Outcome Measure Options AM-PAC 6 Clicks Daily Activity (OT)  -SD AM-PAC 6 Clicks Basic Mobility (PT)  -CC              User Key  (r) = Recorded By, (t) = Taken By, (c) = Cosigned By      Initials Name Provider Type    Damaris Calix PTA Physical Therapist Assistant    Lizbet Ward OT Occupational Therapist                    Occupational Therapy  Education       Title: PT OT SLP Therapies (In Progress)       Topic: Occupational Therapy (In Progress)       Point: ADL training (Done)       Description:   Instruct learner(s) on proper safety adaptation and remediation techniques during self care or transfers.   Instruct in proper use of assistive devices.                  Learning Progress Summary             Patient Acceptance, E,TB, VU by SD at 2/5/2024 9427    Comment: Benefit of OT                         Point: Home exercise program (Not Started)       Description:   Instruct learner(s) on appropriate technique for monitoring, assisting and/or progressing therapeutic exercises/activities.                  Learner Progress:  Not documented in this visit.              Point: Precautions (Not Started)       Description:   Instruct learner(s) on prescribed precautions during self-care and functional transfers.                  Learner Progress:  Not documented in this visit.              Point: Body mechanics (Not Started)       Description:   Instruct learner(s) on proper positioning and spine alignment during self-care, functional mobility activities and/or exercises.                  Learner Progress:  Not documented in this visit.                              User Key       Initials Effective Dates Name Provider Type Discipline    SD 06/16/21 -  Lizbet Skinner OT Occupational Therapist OT                  OT Recommendation and Plan  Planned Therapy Interventions (OT): activity tolerance training, adaptive equipment training, BADL retraining, patient/caregiver education/training, strengthening exercise, transfer/mobility retraining  Therapy Frequency (OT): 3 times/wk (5 times if indicated)  Plan of Care Review  Plan of Care Reviewed With: patient  Progress: improving  Outcome Evaluation: OT tx completed. Patient reclined in chair, agreeable to participate. Patient performed UB AROM x 15 reps, 1 set followed by grooming tasks with min A. Patient required  assists to prepare lunch tray but able to perform self feeding with sup. Patient left sitting in chair for safer position during self feeding, pillows on left side to maintain midline. Continue OT POC     Time Calculation:   Evaluation Complexity (OT)  Review Occupational Profile/Medical/Therapy History Complexity: expanded/moderate complexity  Assessment, Occupational Performance/Identification of Deficit Complexity: 3-5 performance deficits  Clinical Decision Making Complexity (OT): detailed assessment/moderate complexity  Overall Complexity of Evaluation (OT): moderate complexity     Time Calculation- OT       Row Name 02/05/24 1329 02/05/24 1149          Time Calculation- OT    OT Start Time 1116  -SD --     OT Stop Time 1139  -SD --     OT Time Calculation (min) 23 min  -SD --     OT Received On 02/05/24  -SD --     OT Goal Re-Cert Due Date 02/14/24  -SD --        Timed Charges    60406 - OT Therapeutic Exercise Minutes 15  -SD --     22367 - Gait Training Minutes  -- 15  -CC     65603 - OT Self Care/Mgmt Minutes 8  -SD --        Total Minutes    Timed Charges Total Minutes 23  -SD 15  -CC      Total Minutes 23  -SD 15  -CC               User Key  (r) = Recorded By, (t) = Taken By, (c) = Cosigned By      Initials Name Provider Type    CC Damaris Cantu, PTA Physical Therapist Assistant    SD Lizbet Skinner OT Occupational Therapist                  Therapy Charges for Today       Code Description Service Date Service Provider Modifiers Qty    20568674224  OT THER PROC EA 15 MIN 2/5/2024 Lizbet Skinner OT GO 1    [id number]  OT SELF CARE/MGMT/TRAIN EA 15 MIN 2/5/2024 Lizbet Skinner OT GO 1                 Lizbet Skinner OT  2/5/2024

## 2024-02-05 NOTE — CASE MANAGEMENT/SOCIAL WORK
Case Management/Social Work    Patient Name:  Radha Varela  YOB: 1935  MRN: [id number]  Admit Date:  1/31/2024        09:26 EST  Met with patient at bedside. Patient is ltc resident of [facility]. Plans to return when medically ready. No CM needs at this time.      Electronically signed by:  Jm Corral RN  02/05/24 09:26 EST

## 2024-02-05 NOTE — PLAN OF CARE
Goal Outcome Evaluation:           Progress: no change  Outcome Evaluation: VSS; pt awake most of night; remains confused; continue antibiotics

## 2024-02-05 NOTE — PROGRESS NOTES
[facility]  INTERNAL MEDICINE PROGRESS NOTE    Name:  Radha Varela   Age:  88 y.o.  Sex:  male  :  1935  MRN:  [id number]   Visit Number:  [id number]  Admission Date:  2024  Date Of Service:  24  Primary Care Physician:  Dieudonne Jensen MD     LOS: 3 days :  Patient Care Team:  Dieudonne Jensen MD as PCP - General (Internal Medicine):      Subjective / Interval History:       Radha Varela  is an 88-year-old pleasant male resident of assisted living facility, with multiple medical problems including coronary artery disease status post CABG, chronic kidney disease stage III, abdominal aortic aneurysm repair, atrial fibrillation, Parkinson's disease, carotid and celiac artery stenosis, COPD on home oxygen at nighttime, obstructive sleep apnea was brought to the emergency room by EMS from the nursing home facility with fever, generalized weakness and confusion.  Patient was recently discharged from this facility on 2024 after being treated for metabolic encephalopathy, dehydration and UTI secondary to Enterococcus.  He was discharged on Augmentin.  Patient is currently confused and is unable to provide any history.     In the emergency room, he had a fever of 102.7, pulse 88, blood pressure 148/79 and pulse oxygen saturation of 97% on 2 L of oxygen.  Troponin T 74, proBNP 13,700.  CMP was remarkable for a creatinine of 2.12 (baseline 1.7), magnesium 1.5, alkaline phosphatase 190 but LFT otherwise was unremarkable.  Lactic acid was 1 and procalcitonin was 1.12.  White count was 10, hemoglobin 7.8 (baseline), platelets 126.  Urine analysis was suggestive of urinary tract infection.  Patient was noted to have diarrhea in the emergency room and C. difficile toxin was sent which was negative.  COVID and flu test were negative.  Urine culture was sent.  Patient received Tylenol, Zosyn and vancomycin in the emergency room and is currently being admitted to  the medical floor      February 1 patient has been seen.  He has been very sleepy and arousable but has been having difficulty to stay awake.  Events noted and chart reviewed since admission last evening as above.  This morning he is afebrile and hemodynamically stable.    Patient seen on February 2.  He is alert and oriented to self and surroundings this morning.  He states that he has been coughed.  Though noted that there has been some spells of confusion.  He has been able to drink and eat well.  Though the nurse stated that he was slightly hypoxic and had to be placed on 1 L oxygen and his saturation is 96% at present    Patient seen on February 3.  He has been sleepy but arousable.  States has been feeling sleepy.  He has been slightly confused and appetite has not been great.  He was given 1 unit of blood transfusion and posttransfusion his hemoglobin is stable.    Patient seen today on February 5.  He is feeling better.  Has been stable and he will get his IV antibiotic today and stable to be discharged tomorrow back to [facility].  Denies any complaint and that his appetite has been stable    Vital Signs:    Temp:  [97.5 °F (36.4 °C)-98 °F (36.7 °C)] 97.6 °F (36.4 °C)  Heart Rate:  [68-87] 87  Resp:  [16-20] 18  BP: (138-169)/(70-94) 169/94    Intake and output:    I/O last 3 completed shifts:  In: 1622.3 [P.O.:960; I.V.:299; Blood:313.3; IV Piggyback:50]  Out: 380 [Urine:380]  I/O this shift:  In: 240 [P.O.:240]  Out: -     Physical Examination:    General Appearance:  Awake,  not in any acute distress.   Head:    Atraumatic and normocephalic, without obvious abnormality.   Eyes:            PERRLA,  No pallor.    Neck:   Supple,  No lymph glands, no bruit   Lungs:     Chest shape is normal. Breath sounds heard bilaterally equally.  No crackles or wheezing.     Heart:    Normal S1 and S2, no murmur,  No JVD   Abdomen:     Normal bowel sounds, no masses, no organomegaly. Soft     nontender, no guarding,  no rebound tenderness   Extremities:   Moves all extremities well, no edema, no cyanosis,    Skin:   No  bruising or rash.   Neurologic: He is awake and oriented to self and surroundings.  He does have baseline Parkinson's disease     Laboratory results:  Results from last 7 days   Lab Units 02/05/24  0510 02/04/24  0519 02/03/24  0514   SODIUM mmol/L 135* 136 135*   POTASSIUM mmol/L 3.7 3.4* 3.5   CHLORIDE mmol/L 101 106 107   CO2 mmol/L 22.8 18.8* 18.9*   BUN mg/dL 15 15 16   CREATININE mg/dL 1.71* 1.82* 1.82*   CALCIUM mg/dL 8.2* 8.4* 7.6*   BILIRUBIN mg/dL 0.6 0.5 0.4   ALK PHOS U/L 117 123* 114   ALT (SGPT) U/L <5 <5 <5   AST (SGOT) U/L 15 18 20   GLUCOSE mg/dL 79 78 102*     Results from last 7 days   Lab Units 02/05/24  0510 02/04/24  0519 02/03/24  0514   WBC 10*3/mm3 7.42 8.08 6.63   HEMOGLOBIN g/dL 9.2* 9.7* 7.1*   HEMATOCRIT % 28.1* 30.0* 22.7*   PLATELETS 10*3/mm3 113* 127* 107*         Results from last 7 days   Lab Units 02/01/24  0026 01/31/24  2226   HSTROP T ng/L 72* 74*     Results from last 7 days   Lab Units 01/31/24  2323 01/31/24  2318 01/31/24  2237   BLOODCX  No growth at 4 days No growth at 4 days  --    URINECX   --   --  No growth       Radiology results:    Imaging Results (Last 24 Hours)       ** No results found for the last 24 hours. **            I have reviewed the patient's radiology reports.    Medication Review:     I have reviewed the patients active and prn medications.     Assessment:      Urinary tract infection    Generalized weakness    Parkinson's disease dementia    Coronary artery disease involving native coronary artery of native heart without angina pectoris    Acute metabolic encephalopathy    Sepsis    Chronic kidney disease, stage III (moderate)  Anemia        Plan:    1.  Acute metabolic encephalopathy secondary to simple sepsis-he has had UTI due to Enterococcus which has been treated and he was just discharged on Augmentin day 1 January 30  Have  broaden the  coverage with  Zosyn to cover gram-positive as well as negatives   Blood cultures and urine cultures have been negative till now but will need the Zosyn at least for 5 days  Tomorrow will be stable to be discharged back to [facility]      2.  Acute on chronic kidney disease-he does have baseline CKD stage III and is slightly improved and is back around 1.7 and this is his baseline    3.  Parkinson's disease with dementia-noted his dementia but is slowly getting worse though Parkinson's is stable    4.  Mild bradycardia.  Will still hold the beta-blocker and stop it but covered with amlodipine for blood pressure    5 hypertension-changed to  amlodipine 10 mg and still hold and probably stopped beta-blocker due to bradycardia    6.  Symptomatic anemia-he got 1 unit of blood transfusion and he is stable posttransfusion.  Will still repeat stool for occult blood    He will finish his IV antibiotics today and he will be discharged back to nursing home tomorrow      Dieudonne Jensen MD  02/05/24  09:33 EST      Please note that portions of this note were completed with a voice recognition program.  He will finish his IV antibiotic today and tomorrow he will be discharged back to nursing home

## 2024-02-06 VITALS
HEIGHT: 67 IN | SYSTOLIC BLOOD PRESSURE: 160 MMHG | WEIGHT: 152.12 LBS | DIASTOLIC BLOOD PRESSURE: 79 MMHG | RESPIRATION RATE: 16 BRPM | BODY MASS INDEX: 23.88 KG/M2 | HEART RATE: 74 BPM | OXYGEN SATURATION: 98 % | TEMPERATURE: 97.6 F

## 2024-02-06 LAB
BACTERIA SPEC AEROBE CULT: NORMAL
BACTERIA SPEC AEROBE CULT: NORMAL
HEMOCCULT STL QL: NEGATIVE

## 2024-02-06 PROCEDURE — 82272 OCCULT BLD FECES 1-3 TESTS: CPT | Performed by: INTERNAL MEDICINE

## 2024-02-06 RX ORDER — AMLODIPINE BESYLATE 10 MG/1
10 TABLET ORAL
Start: 2024-02-07

## 2024-02-06 RX ORDER — ROPINIROLE 1 MG/1
1 TABLET, FILM COATED ORAL EVERY 12 HOURS SCHEDULED
Start: 2024-02-06

## 2024-02-06 RX ADMIN — ROPINIROLE HYDROCHLORIDE 1 MG: 1 TABLET, FILM COATED ORAL at 08:29

## 2024-02-06 RX ADMIN — ASPIRIN 81 MG: 81 TABLET, COATED ORAL at 08:29

## 2024-02-06 RX ADMIN — Medication 1 CAPSULE: at 08:29

## 2024-02-06 RX ADMIN — AMLODIPINE BESYLATE 10 MG: 5 TABLET ORAL at 08:29

## 2024-02-06 RX ADMIN — PANTOPRAZOLE SODIUM 40 MG: 40 TABLET, DELAYED RELEASE ORAL at 05:57

## 2024-02-06 RX ADMIN — Medication 10 ML: at 08:29

## 2024-02-06 RX ADMIN — CARBIDOPA AND LEVODOPA 2 TABLET: 25; 100 TABLET ORAL at 08:29

## 2024-02-06 RX ADMIN — CYANOCOBALAMIN TAB 500 MCG 1000 MCG: 500 TAB at 08:29

## 2024-02-06 NOTE — PLAN OF CARE
Goal Outcome Evaluation: Patient being discharged to [facility] via EMS

## 2024-02-06 NOTE — DISCHARGE SUMMARY
[facility]   INTERNAL MEDICINE DISCHARGE SUMMARY      Name:  Radha Varela   Age:  88 y.o.  Sex:  male  :  1935  MRN:  [id number]   Visit Number:  [id number]  Primary Care Physician:  Dieudonne Jensen MD  Date of Discharge:  2024  Admission Date:  2024      Discharge Diagnosis:         Urinary tract infection    Generalized weakness    Parkinson's disease dementia    Coronary artery disease involving native coronary artery of native heart without angina pectoris    Acute metabolic encephalopathy    Sepsis    Chronic kidney disease, stage III (moderate)  Anemia needing 1 blood transfusion        Consults:     Consults       No orders found from 2024 to 2024.            Procedures Performed:                 Hospital Course:   The patient was admitted on 2024  Please see H&P for details on admission HPI and ROS.  Radha Varela  is an 88-year-old pleasant male resident of assisted living facility, with multiple medical problems including coronary artery disease status post CABG, chronic kidney disease stage III, abdominal aortic aneurysm repair, atrial fibrillation, Parkinson's disease, carotid and celiac artery stenosis, COPD on home oxygen at nighttime, obstructive sleep apnea was brought to the emergency room by EMS from the nursing home facility with fever, generalized weakness and confusion.  Patient was recently discharged from this facility on 2024 after being treated for metabolic encephalopathy, dehydration and UTI secondary to Enterococcus.  He was discharged on Augmentin.  Patient is currently confused and is unable to provide any history.     In the emergency room, he had a fever of 102.7, pulse 88, blood pressure 148/79 and pulse oxygen saturation of 97% on 2 L of oxygen.  Troponin T 74, proBNP 13,700.  CMP was remarkable for a creatinine of 2.12 (baseline 1.7), magnesium 1.5, alkaline phosphatase 190 but LFT otherwise was  unremarkable.  Lactic acid was 1 and procalcitonin was 1.12.  White count was 10, hemoglobin 7.8 (baseline), platelets 126.  Urine analysis was suggestive of urinary tract infection.  Patient was noted to have diarrhea in the emergency room and C. difficile toxin was sent which was negative.  COVID and flu test were negative.  Urine culture was sent.  Patient received Tylenol, Zosyn and vancomycin in the emergency room and is currently being admitted to the medical floor       February 1 patient has been seen.  He has been very sleepy and arousable but has been having difficulty to stay awake.  Events noted and chart reviewed since admission last evening as above.  This morning he is afebrile and hemodynamically stable.     Patient on February 2.  He is alert and oriented to self and surroundings this morning.  He states that he has been coughed.  Though noted that there has been some spells of confusion.  He has been able to drink and eat well.  Though the nurse stated that he was slightly hypoxic and had to be placed on 1 L oxygen and his saturation is 96% at present     Patient  on February 3.  He has been sleepy but arousable.  States has been feeling sleepy.  He has been slightly confused and appetite has not been great.  He was given 1 unit of blood transfusion and posttransfusion his hemoglobin is stable.     Patient seen today on January 6.  He is alert oriented back to his normal self with his mentation.  He has been eating well over the last 2 days and has finished his IV course of antibiotic.  He will not need any oral and he is stable to be discharged back.  His blood pressure had been elevated so Norvasc 10 mg has been started during the course of stay and it has been stabilized.  Will keep a watch if it is persistently above 150 would add valsartan 160 mg after keeping a log of her blood pressure for 2 weeks and reevaluate at the nursing home    His renal function is also to his baseline and his  hemoglobin has been stable will try to follow-up with repeat CBC CMP in 2 weeks time and then do it monthly    Vital Signs:     Temp:  [97.3 °F (36.3 °C)-97.6 °F (36.4 °C)] 97.6 °F (36.4 °C)  Heart Rate:  [74-85] 74  Resp:  [16] 16  BP: (122-160)/(66-83) 160/79    Physical Exam:     General Appearance:    Alert and cooperative, not in any acute distress.   Head:    Atraumatic and normocephalic, without obvious abnormality.   Eyes:            PERRLA,  No pallor. Extraocular movements are within normal limits.   Neck:   Supple,  No lymph glands, no bruit   Lungs:     Chest shape is normal. Breath sounds heard bilaterally equally.  No crackles or wheezing.     Heart:    Normal S1 and S2, no murmur,  No JVD   Abdomen:     Normal bowel sounds, no masses, no organomegaly. Soft     nontender, no guarding, no rebound tenderness   Extremities:   Moves all extremities well, no edema, no cyanosis,    Skin:   No  bruising or rash.   Neurologic:   Grossly nonfocal and moves all extremities.        Pertinent Lab Results:     Results from last 7 days   Lab Units 02/05/24  0510 02/04/24  0519 02/03/24  0514   SODIUM mmol/L 135* 136 135*   POTASSIUM mmol/L 3.7 3.4* 3.5   CHLORIDE mmol/L 101 106 107   CO2 mmol/L 22.8 18.8* 18.9*   BUN mg/dL 15 15 16   CREATININE mg/dL 1.71* 1.82* 1.82*   CALCIUM mg/dL 8.2* 8.4* 7.6*   BILIRUBIN mg/dL 0.6 0.5 0.4   ALK PHOS U/L 117 123* 114   ALT (SGPT) U/L <5 <5 <5   AST (SGOT) U/L 15 18 20   GLUCOSE mg/dL 79 78 102*     Results from last 7 days   Lab Units 02/05/24  0510 02/04/24  0519 02/03/24  0514   WBC 10*3/mm3 7.42 8.08 6.63   HEMOGLOBIN g/dL 9.2* 9.7* 7.1*   HEMATOCRIT % 28.1* 30.0* 22.7*   PLATELETS 10*3/mm3 113* 127* 107*         Blood Culture   Date Value Ref Range Status   01/31/2024 No growth at 5 days  Final   01/31/2024 No growth at 5 days  Final     Urine Culture   Date Value Ref Range Status   01/31/2024 No growth  Final       Pertinent Radiology Results:    Imaging Results (Most  Recent)       Procedure Component Value Units Date/Time    CT Chest Without Contrast Diagnostic [[id number]] Collected: 02/02/24 1219     Updated: 02/02/24 1228    Narrative:      PROCEDURE: CT CHEST WO CONTRAST DIAGNOSTIC-     HISTORY: abnormal x ray, rule out pneumonia, cough; N30.00-Acute  cystitis without hematuria     COMPARISON: None.     PROCEDURE: Axial images were obtained from the lung apex to the mid  abdomen by computed tomography. This study was performed with techniques  to keep radiation doses as low as reasonably achievable, (ALARA).  Individualized dose reduction techniques using automated exposure  control or adjustment of mA and/or kV according to the patient size were  employed.     FINDINGS:     CHEST: There is asymmetric nodularity in the right axilla compared to  the left which may be vascular. CTA chest abdomen pelvis may be helpful  if patient can tolerate contrast.. Status post median sternotomy. There  is no suspicious hilar or mediastinal adenopathy. Ascending aorta is  dilated to 48 x 49 mm, consider cardiovascular consultation. Aortic arch  is dilated. The descending thoracic aorta is dilated and tortuous  measuring up to 54 mm in diameter. At the level of the diaphragm this  has a normal caliber but then dilates to 42 mm anterior diameter. More  distally there is significant aneurysm formation measuring 5.5 x 8.2 cm  recommend urgent vascular surgery consultation. Right common iliac  artery appears to be of normal caliber. The left common iliac artery is  mildly dilated. The heart is mildly enlarged. Coronary arterial  calcifications noted. No pericardial effusion identified. There is  minimal left pleural effusion and very small right pleural effusion..No  suspicious infiltrate or nodule identified. Limited images of the upper  abdomen are unremarkable. Status post median sternotomy. Vascular  calcifications noted. There is streak artifact from patient's arm  position. Thoracolumbar  scoliosis noted. There are air-fluid levels in  the visualized small and large bowel, consider ileus. Gallbladder  present with no CT visible stones.       Impression:      . IMPRESSION: Cardiomegaly with minimal bilateral pleural effusions,  early CHF not excluded.     Large infrarenal abdominal aortic aneurysm as described, recommend  urgent vascular surgery consultation.     Dilated tortuous ascending and descending thoracic aorta, recommend  cardiovascular consultation.           CTDI: 6.46 mGy  DLP:300.37 mGy.cm     This report was signed and finalized on 2/2/2024 12:26 PM by Loli Booth MD.       XR Chest 1 View [[id number]] Collected: 02/01/24 1312     Updated: 02/01/24 1315    Narrative:      PROCEDURE: XR CHEST 1 VW-        HISTORY: fever eval PNA     COMPARISON: January 27, 2024.     FINDINGS: The patient is rotated to the left. The heart is normal in  size. The patient is status post median sternotomy. There is decreased  inspiratory effort. There is increased interstitial disease bilaterally  consistent with edema. There is no pneumothorax. There are no acute  osseous abnormalities. Air beneath the hemidiaphragms is likely within  bowel.       Impression:      Increased interstitial edema. Continued follow-up is  recommended.                       Images were reviewed, interpreted, and dictated by Dr. Loli Booth MD  Transcribed by Charlotte Schofield PA-C.     This report was signed and finalized on 2/1/2024 1:13 PM by Loli Booth MD.       CT Head Without Contrast [[id number]] Collected: 01/31/24 2345     Updated: 01/31/24 2348    Narrative:      FINAL REPORT    TECHNIQUE:  null    CLINICAL HISTORY:  Mental status change, unknown cause    COMPARISON:  null    FINDINGS:  Exam: CT Brain without contrast    Comparison: None    Clinical history: Mental status change    Findings:    No acute intracranial hemorrhage.    Calcification seen at the basal ganglia and dentate nuclei    Cerebral volume loss.    No  abnormal extra-axial fluid collections.    No intracranial mass    No midline shift.    Bilateral cerebral white matter disease, likely ischemic microvascular in nature    No skull fracture.      Impression:      Impression:    1. No acute intracranial hemorrhage.    2. Cerebral volume loss. Cerebral white matter disease, likely ischemic microvascular in nature    Authenticated and Electronically Signed by Charlotte Richardson MD  on 01/31/2024 11:45:59 PM                    Discharge Disposition:      Skilled Nursing Facility (DC - External)    Discharge Medication:         Discharge Medications        New Medications        Instructions Start Date   amLODIPine 10 MG tablet  Commonly known as: NORVASC   10 mg, Oral, Every 24 Hours Scheduled   Start Date: February 7, 2024            Changes to Medications        Instructions Start Date   rOPINIRole 1 MG tablet  Commonly known as: REQUIP  What changed:   medication strength  when to take this   1 mg, Oral, Every 12 Hours Scheduled, Take 1 hour before bedtime.             Continue These Medications        Instructions Start Date   acetaminophen 325 MG tablet  Commonly known as: TYLENOL   650 mg, Oral, Every 6 Hours PRN      aspirin 81 MG EC tablet   81 mg, Oral, Daily      carbidopa-levodopa  MG per tablet  Commonly known as: SINEMET   2 tablets, Oral, 4 Times Daily      ferrous sulfate 324 (65 Fe) MG tablet delayed-release EC tablet   324 mg, Oral, Daily With Breakfast      pantoprazole 40 MG EC tablet  Commonly known as: PROTONIX   40 mg, Oral, Daily      polyethylene glycol 17 g packet  Commonly known as: MIRALAX   17 g, Oral, Daily PRN      Sennosides 8.6 MG capsule   8.6 mg, Oral, Every 12 Hours PRN      vitamin B-12 1000 MCG tablet  Commonly known as: CYANOCOBALAMIN   1,000 mcg, Oral, Daily             Stop These Medications      albuterol sulfate  (90 Base) MCG/ACT inhaler  Commonly known as: PROVENTIL HFA;VENTOLIN HFA;PROAIR HFA      amoxicillin-clavulanate 875-125 MG per tablet  Commonly known as: AUGMENTIN     famotidine 10 MG tablet  Commonly known as: PEPCID     metoprolol succinate XL 25 MG 24 hr tablet  Commonly known as: TOPROL-XL              Discharge Diet:             Follow-up Appointments:      No future appointments.  I will see the patient at [facility] so no follow-up appointment    Test Results Pending at Discharge:             Dieudonne Jensen MD  02/06/24  09:30 EST    Time:  Discharge 38 min    Please note that portions of this note were completed with a voice recognition program.

## 2024-02-06 NOTE — CASE MANAGEMENT/SOCIAL WORK
Case Management Discharge Note      Final Note: DC per EMS to [facility] for STR.    Provided Post Acute Provider List?: N/A  Provided Post Acute Provider Quality & Resource List?: N/A    Selected Continued Care - Discharged on 2/6/2024 Admission date: 1/31/2024 - Discharge disposition: Skilled Nursing Facility (DC - External)      Destination Coordination complete.      Service Provider Selected Services Address Phone Fax Patient Preferred    [facility] [address] [address] [phone] --              Durable Medical Equipment    No services have been selected for the patient.                Dialysis/Infusion    No services have been selected for the patient.                Home Medical Care    No services have been selected for the patient.                Therapy    No services have been selected for the patient.                Community Resources    No services have been selected for the patient.                Community & DME    No services have been selected for the patient.                    Selected Continued Care - Prior Encounters Includes continued care and service providers with selected services from prior encounters from 11/2/2023 to 2/6/2024      Discharged on 1/30/2024 Admission date: 1/27/2024 - Discharge disposition: Skilled Nursing Facility (DC - External)      Destination       Service Provider Selected Services Address Phone Fax Patient Preferred    [facility] [address] [phone] --                          Transportation Services  Ambulance: [facility]    Final Discharge Disposition Code: 03 - skilled nursing facility (SNF)

## 2024-02-11 NOTE — PROGRESS NOTES
"Enter Query Response Below      Query Response: Unable to determine              If applicable, please update the problem list.   If you have any questions about this query contact me at: [email or web address]     Dr. Jensen,    Patient admitted with sepsis due to UTI also has a documented hx. of unspecified CHF. A current echocardiogram is not available for review. However, a 2D echo from 8/24/23 showed \"LV EF=55-60%\". Home meds include Norvasc, Aspirin and Toprol XL with Norvasc and Aspirin continued during the admission.     Please clarify the type of heart failure treated/monitored:    Diastolic heart failure/HFpEF  Other- please specify_________  Unable to determine      By submitting this query, we are merely seeking further clarification of documentation to accurately reflect all conditions that you are monitoring, evaluating, treating or that extend the hospitalization or utilize additional resources of care. Please utilize your independent clinical judgment when addressing the question(s) above.     This query and your response, once completed, will be entered into the legal medical record.    Sincerely,  Sandra Sandhu RN  Clinical Documentation Integrity Program     "

## 2024-03-01 ENCOUNTER — LAB REQUISITION (OUTPATIENT)
Dept: LAB | Facility: HOSPITAL | Age: 89
End: 2024-03-01
Payer: MEDICARE

## 2024-03-01 DIAGNOSIS — N39.0 URINARY TRACT INFECTION, SITE NOT SPECIFIED: ICD-10-CM

## 2024-03-01 DIAGNOSIS — G20.A1 PARKINSON'S DISEASE WITHOUT DYSKINESIA, WITHOUT MENTION OF FLUCTUATIONS: ICD-10-CM

## 2024-03-01 DIAGNOSIS — D64.9 ANEMIA, UNSPECIFIED: ICD-10-CM

## 2024-03-01 DIAGNOSIS — N18.30 CHRONIC KIDNEY DISEASE, STAGE 3 UNSPECIFIED: ICD-10-CM

## 2024-03-01 LAB
ALBUMIN SERPL-MCNC: 3.4 G/DL (ref 3.5–5.2)
ALBUMIN/GLOB SERPL: 1.8 G/DL
ALP SERPL-CCNC: 141 U/L (ref 39–117)
ALT SERPL W P-5'-P-CCNC: <5 U/L (ref 1–41)
ANION GAP SERPL CALCULATED.3IONS-SCNC: 10.2 MMOL/L (ref 5–15)
AST SERPL-CCNC: 50 U/L (ref 1–40)
BASOPHILS # BLD AUTO: 0.05 10*3/MM3 (ref 0–0.2)
BASOPHILS NFR BLD AUTO: 0.6 % (ref 0–1.5)
BILIRUB SERPL-MCNC: 0.3 MG/DL (ref 0–1.2)
BILIRUB UR QL STRIP: NEGATIVE
BUN SERPL-MCNC: 29 MG/DL (ref 8–23)
BUN/CREAT SERPL: 14.1 (ref 7–25)
CALCIUM SPEC-SCNC: 8.4 MG/DL (ref 8.6–10.5)
CHLORIDE SERPL-SCNC: 108 MMOL/L (ref 98–107)
CLARITY UR: CLEAR
CO2 SERPL-SCNC: 20.8 MMOL/L (ref 22–29)
COLOR UR: YELLOW
CREAT SERPL-MCNC: 2.06 MG/DL (ref 0.76–1.27)
DEPRECATED RDW RBC AUTO: 58.3 FL (ref 37–54)
EGFRCR SERPLBLD CKD-EPI 2021: 30.4 ML/MIN/1.73
EOSINOPHIL # BLD AUTO: 0.21 10*3/MM3 (ref 0–0.4)
EOSINOPHIL NFR BLD AUTO: 2.5 % (ref 0.3–6.2)
ERYTHROCYTE [DISTWIDTH] IN BLOOD BY AUTOMATED COUNT: 17.2 % (ref 12.3–15.4)
GLOBULIN UR ELPH-MCNC: 1.9 GM/DL
GLUCOSE SERPL-MCNC: 84 MG/DL (ref 65–99)
GLUCOSE UR STRIP-MCNC: NEGATIVE MG/DL
HCT VFR BLD AUTO: 21.7 % (ref 37.5–51)
HGB BLD-MCNC: 7 G/DL (ref 13–17.7)
HGB UR QL STRIP.AUTO: NEGATIVE
IMM GRANULOCYTES # BLD AUTO: 0.03 10*3/MM3 (ref 0–0.05)
IMM GRANULOCYTES NFR BLD AUTO: 0.4 % (ref 0–0.5)
KETONES UR QL STRIP: NEGATIVE
LEUKOCYTE ESTERASE UR QL STRIP.AUTO: NEGATIVE
LYMPHOCYTES # BLD AUTO: 0.76 10*3/MM3 (ref 0.7–3.1)
LYMPHOCYTES NFR BLD AUTO: 9.2 % (ref 19.6–45.3)
MCH RBC QN AUTO: 30.3 PG (ref 26.6–33)
MCHC RBC AUTO-ENTMCNC: 32.3 G/DL (ref 31.5–35.7)
MCV RBC AUTO: 93.9 FL (ref 79–97)
MONOCYTES # BLD AUTO: 0.23 10*3/MM3 (ref 0.1–0.9)
MONOCYTES NFR BLD AUTO: 2.8 % (ref 5–12)
NEUTROPHILS NFR BLD AUTO: 7.02 10*3/MM3 (ref 1.7–7)
NEUTROPHILS NFR BLD AUTO: 84.5 % (ref 42.7–76)
NITRITE UR QL STRIP: NEGATIVE
NRBC BLD AUTO-RTO: 0 /100 WBC (ref 0–0.2)
PH UR STRIP.AUTO: 5.5 [PH] (ref 5–8)
PLATELET # BLD AUTO: 114 10*3/MM3 (ref 140–450)
PMV BLD AUTO: 12 FL (ref 6–12)
POTASSIUM SERPL-SCNC: 4.3 MMOL/L (ref 3.5–5.2)
PROT SERPL-MCNC: 5.3 G/DL (ref 6–8.5)
PROT UR QL STRIP: ABNORMAL
RBC # BLD AUTO: 2.31 10*6/MM3 (ref 4.14–5.8)
SODIUM SERPL-SCNC: 139 MMOL/L (ref 136–145)
SP GR UR STRIP: 1.02 (ref 1–1.03)
UROBILINOGEN UR QL STRIP: ABNORMAL
WBC NRBC COR # BLD AUTO: 8.3 10*3/MM3 (ref 3.4–10.8)

## 2024-03-01 PROCEDURE — 85025 COMPLETE CBC W/AUTO DIFF WBC: CPT | Performed by: INTERNAL MEDICINE

## 2024-03-01 PROCEDURE — 80053 COMPREHEN METABOLIC PANEL: CPT | Performed by: INTERNAL MEDICINE

## 2024-03-01 PROCEDURE — 81003 URINALYSIS AUTO W/O SCOPE: CPT | Performed by: INTERNAL MEDICINE

## 2024-06-05 LAB
BASOPHILS # BLD AUTO: 0.05 10*3/MM3 (ref 0–0.2)
BASOPHILS NFR BLD AUTO: 0.6 % (ref 0–1.5)
BILIRUB UR QL STRIP: NEGATIVE
CLARITY UR: CLEAR
COLOR UR: YELLOW
DEPRECATED RDW RBC AUTO: 58.3 FL (ref 37–54)
EOSINOPHIL # BLD AUTO: 0.21 10*3/MM3 (ref 0–0.4)
EOSINOPHIL NFR BLD AUTO: 2.5 % (ref 0.3–6.2)
ERYTHROCYTE [DISTWIDTH] IN BLOOD BY AUTOMATED COUNT: 17.2 % (ref 12.3–15.4)
GLUCOSE UR STRIP-MCNC: NEGATIVE MG/DL
HCT VFR BLD AUTO: 21.7 % (ref 37.5–51)
HGB BLD-MCNC: 7 G/DL (ref 13–17.7)
HGB UR QL STRIP.AUTO: NEGATIVE
IMM GRANULOCYTES # BLD AUTO: 0.03 10*3/MM3 (ref 0–0.05)
IMM GRANULOCYTES NFR BLD AUTO: 0.4 % (ref 0–0.5)
KETONES UR QL STRIP: NEGATIVE
LEUKOCYTE ESTERASE UR QL STRIP.AUTO: NEGATIVE
LYMPHOCYTES # BLD AUTO: 0.76 10*3/MM3 (ref 0.7–3.1)
LYMPHOCYTES NFR BLD AUTO: 9.2 % (ref 19.6–45.3)
MCH RBC QN AUTO: 30.3 PG (ref 26.6–33)
MCHC RBC AUTO-ENTMCNC: 32.3 G/DL (ref 31.5–35.7)
MCV RBC AUTO: 93.9 FL (ref 79–97)
MONOCYTES # BLD AUTO: 0.23 10*3/MM3 (ref 0.1–0.9)
MONOCYTES NFR BLD AUTO: 2.8 % (ref 5–12)
NEUTROPHILS NFR BLD AUTO: 7.02 10*3/MM3 (ref 1.7–7)
NEUTROPHILS NFR BLD AUTO: 84.5 % (ref 42.7–76)
NITRITE UR QL STRIP: NEGATIVE
NRBC BLD AUTO-RTO: 0 /100 WBC (ref 0–0.2)
PH UR STRIP.AUTO: 5.5 [PH] (ref 5–8)
PLATELET # BLD AUTO: 114 10*3/MM3 (ref 140–450)
PMV BLD AUTO: 12 FL (ref 6–12)
PROT UR QL STRIP: ABNORMAL
RBC # BLD AUTO: 2.31 10*6/MM3 (ref 4.14–5.8)
SP GR UR STRIP: 1.02 (ref 1–1.03)
UROBILINOGEN UR QL STRIP: ABNORMAL
WBC NRBC COR # BLD AUTO: 8.3 10*3/MM3 (ref 3.4–10.8)

## 2024-06-13 NOTE — DISCHARGE SUMMARY
[facility] Medicine Services  DISCHARGE SUMMARY    Patient Name: Radha Varlea  : 1935  MRN: [id number]    Date of Admission: 2023  7:58 AM  Date of Discharge:  2023  Primary Care Physician: Provider, No Known    Consults       Date and Time Order Name Status Description    2023 11:57 AM Inpatient Gastroenterology Consult Completed             Hospital Course     Presenting Problem: fall with weakness, diarrhea    Active Hospital Problems    Diagnosis  POA    Hypokalemia [E87.6]  Yes    Fall [W19.XXXA]  Yes    Parkinson's disease dementia [G20, F02.80]  Yes    Coronary artery disease involving native coronary artery of native heart without angina pectoris [I25.10]  Yes    Abdominal aortic aneurysm (AAA) without rupture [I71.40]  Yes      Resolved Hospital Problems    Diagnosis Date Resolved POA    Hypokalemia [E87.6] 2023 Yes    Acute kidney injury superimposed on CKD [N17.9, N18.9] 2023 Yes    Diarrhea [R19.7] 2023 Yes          Hospital Course:  Radha Varela is an 87 y.o. male with PMH signifcant for PAD, AAA, HTN, CAD, CABG, and Parkinsons who presented to the ED on 2023 with one month of diarrhea, then had a fall at home and was found to have hypokalemia on admission. GI was consulted. Stool PCR  was negative. Diarrhea resolved after stopping Entacapone and adding Metamucil. Potassium was supplemented and corrected after the diarrhea resolved. Creatinine returned to baseline.  PT/OT evaluated and recommended inpatient rehab; however, patient was in observation status and did not qualify for skilled rehab. Patient will discharge home to his independent-living facility, [facility]. [facility] will arrange PT there. Patient will need to f/u with his PCP in one week to monitor his creatinine and potassium levels.       Diarrhea, resolved  Hypokalemia, resolved  -GI consulted. diarrhea improved after stopping Entacapone  Chart review complete.   Discussed elevated AST/ALT seen on labs drawn by Dr. Jeffers in May 2024  Patient states he drinks to control back pain, doesn't want to take any more medications for pain  Discussed that continued drinking will likely lead to cirrhosis and liver failure and that cessation was imperative.  Following with pain medicine.  Encouraged to investigate AA and/or work on cutting back alcohol intake   and adding metamucil      ADITHYA/CKD  - baseline creatinine 1.7-2  - Cr to 1.68 today, looks dry on exam  - Encourage PO intake      [facility]  [facility] delirium  -continue Sinemet and Requip, entacapone held for diarrhea  -PT at discharge  -follow up with Neurologist at  as scheduled     CAD  PAD  AAA  -followed at   -continue home meds      Discharge Follow Up Recommendations for outpatient labs/diagnostics:  --Follow up with PCP one week, check BMP  --Physical therapy    Day of Discharge     HPI:   Patient sleeping in chair, awakes to voice.  Daughter at the bedside.  Patient says he is feeling better, denies further episodes of diarrhea.  Says last BM was 2 days ago.  Discussed doing Metamucil and adding MiraLAX as needed if patient has constipation.    Review of Systems  Gen- No fevers, chills  CV- No chest pain, palpitations  Resp- No cough, dyspnea  GI- No N/V/D, abd pain      Vital Signs:   Temp:  [97.5 °F (36.4 °C)-98.9 °F (37.2 °C)] 97.5 °F (36.4 °C)  Heart Rate:  [71-74] 71  Resp:  [16-18] 18  BP: (127-151)/(65-76) 138/66  Flow (L/min):  [2] 2      Physical Exam:  Constitutional: No acute distress, awake, alert  HENT: NCAT, mucous membranes dry  Respiratory: Clear to auscultation bilaterally, respiratory effort normal, room air  Cardiovascular: RRR, no murmurs, rubs, or gallops  Gastrointestinal: Positive bowel sounds, soft, nontender, nondistended  Musculoskeletal: Trace RLE edema (h/o bypass per daughter)  Psychiatric: Appropriate affect, cooperative  Neurologic: Oriented x 3, moves all extremities, Cranial Nerves grossly intact to confrontation, speech clear  Skin: No rashes      Pertinent  and/or Most Recent Results     LAB RESULTS:      Lab 09/11/23  0607 09/10/23  0750 09/09/23  0321 09/06/23  0356 09/05/23  0333   WBC 5.70 5.67 6.49 5.26 6.40   HEMOGLOBIN 9.2* 8.8* 8.4* 8.7* 9.3*   HEMATOCRIT 29.3* 28.8* 26.9* 28.2* 29.4*   PLATELETS 133* 133* 136* 130* 129*   NEUTROS ABS  --  3.76  --   --   4.22   IMMATURE GRANS (ABS)  --  0.05  --   --  0.03   LYMPHS ABS  --  0.80  --   --  0.98   MONOS ABS  --  0.69  --   --  0.88   EOS ABS  --  0.34  --   --  0.26   .7* 106.3* 101.9* 101.1* 100.7*         Lab 09/11/23  0607 09/10/23  0750 09/09/23  0321 09/06/23  0356 09/05/23 2036 09/05/23  0849   SODIUM 137 130* 139 144  --  143   POTASSIUM 4.2 4.3 4.4 4.3 4.1 3.5   CHLORIDE 104 99 108* 112*  --  109*   CO2 27.0 22.0 24.0 25.0  --  24.0   ANION GAP 6.0 9.0 7.0 7.0  --  10.0   BUN 21 19 16 17  --  19   CREATININE 1.68* 1.44* 1.38* 1.61*  --  1.63*   EGFR 39.1* 47.0* 49.5* 41.1*  --  40.5*   GLUCOSE 89 84 85 82  --  81   CALCIUM 8.7 8.3* 8.5* 8.5*  --  8.3*   MAGNESIUM 2.0  --   --   --   --   --                          Brief Urine Lab Results  (Last result in the past 365 days)        Color   Clarity   Blood   Leuk Est   Nitrite   Protein   CREAT   Urine HCG        09/04/23 1034 Yellow   Clear   Negative   Negative   Negative   Negative                 Microbiology Results (last 10 days)       Procedure Component Value - Date/Time    Gastrointestinal Panel, PCR - Stool, Per Rectum [[id number]]  (Normal) Collected: 09/04/23 1435    Lab Status: Final result Specimen: Stool from Per Rectum Updated: 09/04/23 1627     Campylobacter Not Detected     Plesiomonas shigelloides Not Detected     Salmonella Not Detected     Vibrio Not Detected     Vibrio cholerae Not Detected     Yersinia enterocolitica Not Detected     Enteroaggregative E. coli (EAEC) Not Detected     Enteropathogenic E. coli (EPEC) Not Detected     Enterotoxigenic E. coli (ETEC) lt/st Not Detected     Shiga-like toxin-producing E. coli (STEC) stx1/stx2 Not Detected     Shigella/Enteroinvasive E. coli (EIEC) Not Detected     Cryptosporidium Not Detected     Cyclospora cayetanensis Not Detected     Entamoeba histolytica Not Detected     Giardia lamblia Not Detected     Adenovirus F40/41 Not Detected     Astrovirus Not Detected     Norovirus GI/GII Not  Detected     Rotavirus A Not Detected     Sapovirus (I, II, IV or V) Not Detected    Ova & Parasite Examination - Stool, Per Rectum [[id number]] Collected: 09/04/23 1435    Lab Status: Final result Specimen: Stool from Per Rectum Updated: 09/10/23 1326     Ova + Parasite Exam No ova or parasites seen on concentrated smear     Trichrome Stain No ova or parasites seen on trichrome stain     Comment: WBC's seen on trichrome stain       COVID PRE-OP / PRE-PROCEDURE SCREENING ORDER (NO ISOLATION) - Swab, Nasopharynx [[id number]]  (Normal) Collected: 09/04/23 0842    Lab Status: Final result Specimen: Swab from Nasopharynx Updated: 09/04/23 1042    Narrative:      The following orders were created for panel order COVID PRE-OP / PRE-PROCEDURE SCREENING ORDER (NO ISOLATION) - Swab, Nasopharynx.  Procedure                               Abnormality         Status                     ---------                               -----------         ------                     COVID-19 and FLU A/B PCR...[[id number]]  Normal              Final result                 Please view results for these tests on the individual orders.    COVID-19 and FLU A/B PCR - Swab, Nasopharynx [[id number]]  (Normal) Collected: 09/04/23 0842    Lab Status: Final result Specimen: Swab from Nasopharynx Updated: 09/04/23 1042     COVID19 Not Detected     Influenza A PCR Not Detected     Influenza B PCR Not Detected    Narrative:      Fact sheet for providers: https://www.fda.gov/media/937136/download    Fact sheet for patients: https://www.fda.gov/media/135862/download    Test performed by PCR.            No radiology results for the last 10 days            Results for orders placed during the hospital encounter of 08/23/23    Adult Transthoracic Echo Complete W/ Cont if Necessary Per Protocol    Interpretation Summary  1.  Normal left ventricular size and systolic function, LVEF 55-60%.  2.  Mild to moderate concentric LVH.  3.  Grade 1 diastolic  dysfunction.  4.  Normal right ventricular size and systolic function.  5.  Normal left atrial volume index.  6.  Moderate calcification of the aortic valve without significant stenosis.  7.  Trace aortic regurgitation.  8.  Borderline dilation of the aortic root.      Plan for Follow-up of Pending Labs/Results:     Discharge Details        Discharge Medications        New Medications        Instructions Start Date   polyethylene glycol 17 g packet  Commonly known as: MIRALAX   17 g, Oral, Daily PRN      psyllium 58.12 % packet  Commonly known as: METAMUCIL MULTIHEALTH FIBER   1 packet, Oral, Daily   Start Date: September 12, 2023            Changes to Medications        Instructions Start Date   aspirin 81 MG EC tablet  What changed: additional instructions   81 mg, Oral, Daily      cholecalciferol 25 MCG (1000 UT) tablet  Commonly known as: VITAMIN D3  What changed: additional instructions   1,000 Units, Oral, Daily, 2 tablets daily       Florastor 250 MG capsule  Generic drug: saccharomyces boulardii  What changed: additional instructions   250 mg, Oral, 2 Times Daily      hydrOXYzine pamoate 25 MG capsule  Commonly known as: Vistaril  What changed:   when to take this  reasons to take this   25 mg, Oral, Nightly             Continue These Medications        Instructions Start Date   acetaminophen 325 MG tablet  Commonly known as: TYLENOL   650 mg, Oral, Every 6 Hours PRN      amLODIPine 5 MG tablet  Commonly known as: NORVASC   2.5 mg, Oral, Daily      carbidopa-levodopa  MG per tablet  Commonly known as: SINEMET   2 tablets, Oral, 4 Times Daily      Diclofenac Sodium 1 % gel gel  Commonly known as: VOLTAREN   4 g, Topical, 4 Times Daily PRN, Apply to Right hip      escitalopram 10 MG tablet  Commonly known as: LEXAPRO   10 mg, Oral, Daily      famotidine 10 MG tablet  Commonly known as: PEPCID   10 mg, Oral, 2 Times Daily PRN      furosemide 20 MG tablet  Commonly known as: LASIX   Take 1 tablet by mouth  Every Other Day. And 1 tablet as needed for edema.      loperamide 2 MG capsule  Commonly known as: IMODIUM   4 mg, Oral, 4 Times Daily PRN, Do not exceed 8 capsules daily      nitroglycerin 0.4 MG SL tablet  Commonly known as: NITROSTAT   0.4 mg, Sublingual, Every 5 Minutes PRN, Take no more than 3 doses in 15 minutes.  Has not needed it at [facility]      ondansetron 4 MG tablet  Commonly known as: ZOFRAN   4 mg, Oral, Every 8 Hours PRN      rOPINIRole 0.5 MG tablet  Commonly known as: REQUIP   1 mg, Oral, 4 Times Daily, Take 1 hour before bedtime.      vitamin B-12 1000 MCG tablet  Commonly known as: CYANOCOBALAMIN   1,000 mcg, Oral, Daily             Stop These Medications      entacapone 200 MG tablet  Commonly known as: COMTAN     HYDROcodone-acetaminophen 5-325 MG per tablet  Commonly known as: NORCO     naloxone 4 MG/0.1ML nasal spray  Commonly known as: NARCAN     traZODone 50 MG tablet  Commonly known as: DESYREL              Allergies   Allergen Reactions    Celebrex [Celecoxib] Anaphylaxis    Sulfa Antibiotics Anaphylaxis         Discharge Disposition:  Home or Self Care    Diet:  Hospital:  Diet Order   Procedures    Diet: Regular/House Diet; Texture: Regular Texture (IDDSI 7); Fluid Consistency: Thin (IDDSI 0)       Activity:  Activity Instructions       Activity as Tolerated              Restrictions or Other Recommendations:         CODE STATUS:    Code Status and Medical Interventions:   Ordered at: 09/04/23 1053     Code Status (Patient has no pulse and is not breathing):    No CPR (Do Not Attempt to Resuscitate)     Medical Interventions (Patient has pulse or is breathing):    Full Support       Future Appointments   Date Time Provider Department Center   10/26/2023  9:45 AM Florencio Lawson MD [facility]       Additional Instructions for the Follow-ups that You Need to Schedule       Ambulatory Referral to Physical Therapy Evaluate and treat   As directed      Specialty needed:  Evaluate and treat   Follow-up needed: Yes        Discharge Follow-up with PCP   As directed       Currently Documented PCP:    Provider, No Known    PCP Phone Number:    [phone]     Follow Up Details: One week, check BMP                      Dahiana Lara, APRN  09/11/23      Time Spent on Discharge:  I spent  35  minutes on this discharge activity which included: face-to-face encounter with the patient, reviewing the data in the system, coordination of the care with the nursing staff as well as consultants, documentation, and entering orders.

## (undated) DEVICE — Device

## (undated) DEVICE — CLEAR CORNEAL KNIFE 2.4MM ANG: Brand: SHARPOINT

## (undated) DEVICE — GLV SURG SENSICARE W/ALOE PF LF 7 STRL

## (undated) DEVICE — DRP SUP TRIDENT FACE

## (undated) DEVICE — EYE CARE POST OP KIT: Brand: MEDLINE INDUSTRIES, INC.

## (undated) DEVICE — SINGLE USE MEDICAL DEVICE FOR OPHTHALMIC SURGERY: Brand: 23G IRR HANDPIECE SMOOTH 12B

## (undated) DEVICE — CANN HYDRODISSECTION

## (undated) DEVICE — TOWEL,OR,DSP,ST,BLUE,STD,4/PK,20PK/CS: Brand: MEDLINE

## (undated) DEVICE — THE MONARCH® "D" CARTRIDGE IS A SINGLE-USE POLYPROPYLENE CARTRIDGE FOR POSTERIOR CHAMBER IOL DELIVERY: Brand: MONARCH® III

## (undated) DEVICE — GLV SURG BIOGEL M LTX PF 6 1/2

## (undated) DEVICE — NDL FLTR2 THN 19G 1IN

## (undated) DEVICE — SINGLE USE MEDICAL DEVICE FOR OPHTHALMIC SURGERY: Brand: 23G ASP HANDPIECE ROUGH 12/BOX

## (undated) DEVICE — 0.8MM CLEARPORT PARA KNIFE: Brand: SHARPOINT

## (undated) DEVICE — CANN IRR/INJ AIR ANT CHAMBER 6MM BEND 27G

## (undated) DEVICE — SOL IRRIG H2O 1000ML STRL

## (undated) DEVICE — KT POSTOP CATARACT PT CARE

## (undated) DEVICE — GLV SURG SENSICARE W/ALOE PF LF 6.5 STRL